# Patient Record
Sex: FEMALE | Race: WHITE | NOT HISPANIC OR LATINO | Employment: OTHER | URBAN - METROPOLITAN AREA
[De-identification: names, ages, dates, MRNs, and addresses within clinical notes are randomized per-mention and may not be internally consistent; named-entity substitution may affect disease eponyms.]

---

## 2020-12-29 ENCOUNTER — OFFICE VISIT CONVERTED (OUTPATIENT)
Dept: GASTROENTEROLOGY | Facility: CLINIC | Age: 60
End: 2020-12-29
Attending: INTERNAL MEDICINE

## 2021-03-05 ENCOUNTER — HOSPITAL ENCOUNTER (OUTPATIENT)
Dept: PREADMISSION TESTING | Facility: HOSPITAL | Age: 61
Discharge: HOME | End: 2021-03-05
Attending: INTERNAL MEDICINE

## 2021-03-05 LAB — SARS-COV-2 RNA SPEC QL NAA+PROBE: NOT DETECTED

## 2021-03-08 ENCOUNTER — HOSPITAL ENCOUNTER (OUTPATIENT)
Dept: GENERAL RADIOLOGY | Facility: HOSPITAL | Age: 61
Discharge: HOME | End: 2021-03-08
Attending: INTERNAL MEDICINE

## 2021-03-08 LAB
25(OH)D3 SERPL-MCNC: 34.9 NG/ML (ref 30–100)
ALBUMIN SERPL-MCNC: 4.1 G/DL (ref 3.5–5)
ALBUMIN/GLOB SERPL: 1.9 {RATIO} (ref 1.4–2.6)
ALP SERPL-CCNC: 79 U/L (ref 53–141)
ALT SERPL-CCNC: 8 U/L (ref 10–40)
ANION GAP SERPL CALC-SCNC: 21 MMOL/L (ref 8–19)
APPEARANCE UR: CLEAR
AST SERPL-CCNC: 19 U/L (ref 15–50)
BASOPHILS # BLD AUTO: 0.04 10*3/UL (ref 0–0.2)
BASOPHILS NFR BLD AUTO: 0.4 % (ref 0–3)
BILIRUB SERPL-MCNC: 0.19 MG/DL (ref 0.2–1.3)
BILIRUB UR QL: NEGATIVE
BUN SERPL-MCNC: 50 MG/DL (ref 5–25)
BUN/CREAT SERPL: 14 {RATIO} (ref 6–20)
CALCIUM SERPL-MCNC: 8.7 MG/DL (ref 8.7–10.4)
CHLORIDE SERPL-SCNC: 103 MMOL/L (ref 99–111)
COLOR UR: YELLOW
CONV ABS IMM GRAN: 0.02 10*3/UL (ref 0–0.2)
CONV BACTERIA: NEGATIVE
CONV CO2: 19 MMOL/L (ref 22–32)
CONV COLLECTION SOURCE (UA): ABNORMAL
CONV CREATININE URINE, RANDOM: 46.9 MG/DL (ref 10–300)
CONV HYALINE CASTS IN URINE MICRO: ABNORMAL /[LPF]
CONV IMMATURE GRAN: 0.2 % (ref 0–1.8)
CONV MICROALBUM.,U,RANDOM: 15.4 MG/L (ref 0–20)
CONV PROTEIN TO CREATININE RATIO (RANDOM URINE): 0.24 {RATIO} (ref 0–0.1)
CONV TOTAL PROTEIN: 6.3 G/DL (ref 6.3–8.2)
CONV UROBILINOGEN IN URINE BY AUTOMATED TEST STRIP: 0.2 {EHRLICHU}/DL (ref 0.1–1)
CREAT UR-MCNC: 3.49 MG/DL (ref 0.5–0.9)
DEPRECATED RDW RBC AUTO: 47.6 FL (ref 36.4–46.3)
EOSINOPHIL # BLD AUTO: 0.65 10*3/UL (ref 0–0.7)
EOSINOPHIL # BLD AUTO: 7 % (ref 0–7)
ERYTHROCYTE [DISTWIDTH] IN BLOOD BY AUTOMATED COUNT: 13.4 % (ref 11.7–14.4)
GFR SERPLBLD BASED ON 1.73 SQ M-ARVRAT: 14 ML/MIN/{1.73_M2}
GLOBULIN UR ELPH-MCNC: 2.2 G/DL (ref 2–3.5)
GLUCOSE SERPL-MCNC: 88 MG/DL (ref 65–99)
GLUCOSE UR QL: NEGATIVE MG/DL
HCT VFR BLD AUTO: 30.3 % (ref 37–47)
HGB BLD-MCNC: 10 G/DL (ref 12–16)
HGB UR QL STRIP: NEGATIVE
KETONES UR QL STRIP: NEGATIVE MG/DL
LEUKOCYTE ESTERASE UR QL STRIP: ABNORMAL
LYMPHOCYTES # BLD AUTO: 3.48 10*3/UL (ref 1–5)
LYMPHOCYTES NFR BLD AUTO: 37.3 % (ref 20–45)
MCH RBC QN AUTO: 32.1 PG (ref 27–31)
MCHC RBC AUTO-ENTMCNC: 33 G/DL (ref 33–37)
MCV RBC AUTO: 97.1 FL (ref 81–99)
MICROALBUMIN/CREAT UR: 32.8 MG/G{CRE} (ref 0–35)
MONOCYTES # BLD AUTO: 0.71 10*3/UL (ref 0.2–1.2)
MONOCYTES NFR BLD AUTO: 7.6 % (ref 3–10)
NEUTROPHILS # BLD AUTO: 4.43 10*3/UL (ref 2–8)
NEUTROPHILS NFR BLD AUTO: 47.5 % (ref 30–85)
NITRITE UR QL STRIP: NEGATIVE
NRBC CBCN: 0 % (ref 0–0.7)
OSMOLALITY SERPL CALC.SUM OF ELEC: 299 MOSM/KG (ref 273–304)
PH UR STRIP.AUTO: 6 [PH] (ref 5–8)
PHOSPHATE SERPL-MCNC: 5 MG/DL (ref 2.4–4.5)
PLATELET # BLD AUTO: 215 10*3/UL (ref 130–400)
PMV BLD AUTO: 11.6 FL (ref 9.4–12.3)
POTASSIUM SERPL-SCNC: 5.2 MMOL/L (ref 3.5–5.3)
PROT UR QL: ABNORMAL MG/DL
PROT UR-MCNC: 11.4 MG/DL
PTH-INTACT SERPL-MCNC: 191.7 PG/ML (ref 11.1–79.5)
RBC # BLD AUTO: 3.12 10*6/UL (ref 4.2–5.4)
RBC #/AREA URNS HPF: ABNORMAL /[HPF]
SODIUM SERPL-SCNC: 138 MMOL/L (ref 135–147)
SP GR UR: 1.01 (ref 1–1.03)
SQUAMOUS SPT QL MICRO: ABNORMAL /[HPF]
WBC # BLD AUTO: 9.33 10*3/UL (ref 4.8–10.8)
WBC #/AREA URNS HPF: ABNORMAL /[HPF]

## 2021-03-11 ENCOUNTER — HOSPITAL ENCOUNTER (OUTPATIENT)
Dept: GASTROENTEROLOGY | Facility: HOSPITAL | Age: 61
Setting detail: HOSPITAL OUTPATIENT SURGERY
Discharge: HOME | End: 2021-03-11
Attending: INTERNAL MEDICINE

## 2021-03-11 ENCOUNTER — HOSPITAL ENCOUNTER (OUTPATIENT)
Dept: GASTROENTEROLOGY | Facility: HOSPITAL | Age: 61
Discharge: HOME | End: 2021-03-11
Attending: INTERNAL MEDICINE

## 2021-03-11 LAB — GLUCOSE BLD-MCNC: 89 MG/DL (ref 65–99)

## 2021-03-12 LAB
HBV CORE AB SER DONR QL IA: NEGATIVE
HBV E AG SERPL QL IA: NEGATIVE
HBV SURFACE AB SER QL: NON REACTIVE

## 2021-03-23 ENCOUNTER — OFFICE VISIT CONVERTED (OUTPATIENT)
Dept: SURGERY | Facility: CLINIC | Age: 61
End: 2021-03-23
Attending: SURGERY

## 2021-04-05 ENCOUNTER — HOSPITAL ENCOUNTER (OUTPATIENT)
Dept: OTHER | Facility: HOSPITAL | Age: 61
Discharge: HOME | End: 2021-04-05
Attending: INTERNAL MEDICINE

## 2021-04-05 LAB
ALBUMIN SERPL-MCNC: 4.2 G/DL (ref 3.5–5)
ALBUMIN/GLOB SERPL: 1.6 {RATIO} (ref 1.4–2.6)
ALP SERPL-CCNC: 96 U/L (ref 53–141)
ALT SERPL-CCNC: 8 U/L (ref 10–40)
ANION GAP SERPL CALC-SCNC: 16 MMOL/L (ref 8–19)
APPEARANCE UR: CLEAR
AST SERPL-CCNC: 17 U/L (ref 15–50)
BASOPHILS # BLD AUTO: 0.02 10*3/UL (ref 0–0.2)
BASOPHILS NFR BLD AUTO: 0.3 % (ref 0–3)
BILIRUB SERPL-MCNC: 0.18 MG/DL (ref 0.2–1.3)
BILIRUB UR QL: NEGATIVE
BUN SERPL-MCNC: 41 MG/DL (ref 5–25)
BUN/CREAT SERPL: 13 {RATIO} (ref 6–20)
CALCIUM SERPL-MCNC: 8.8 MG/DL (ref 8.7–10.4)
CHLORIDE SERPL-SCNC: 107 MMOL/L (ref 99–111)
COLOR UR: YELLOW
CONV ABS IMM GRAN: 0.01 10*3/UL (ref 0–0.2)
CONV CO2: 20 MMOL/L (ref 22–32)
CONV COLLECTION SOURCE (UA): NORMAL
CONV CREATININE URINE, RANDOM: 32 MG/DL (ref 10–300)
CONV IMMATURE GRAN: 0.2 % (ref 0–1.8)
CONV MICROALBUM.,U,RANDOM: <12 MG/L (ref 0–20)
CONV PROTEIN TO CREATININE RATIO (RANDOM URINE): 0.22 {RATIO} (ref 0–0.1)
CONV TOTAL PROTEIN: 6.8 G/DL (ref 6.3–8.2)
CONV UROBILINOGEN IN URINE BY AUTOMATED TEST STRIP: 0.2 {EHRLICHU}/DL (ref 0.1–1)
CREAT UR-MCNC: 3.05 MG/DL (ref 0.5–0.9)
DEPRECATED RDW RBC AUTO: 48.3 FL (ref 36.4–46.3)
EOSINOPHIL # BLD AUTO: 0.38 10*3/UL (ref 0–0.7)
EOSINOPHIL # BLD AUTO: 6.2 % (ref 0–7)
ERYTHROCYTE [DISTWIDTH] IN BLOOD BY AUTOMATED COUNT: 13.2 % (ref 11.7–14.4)
GFR SERPLBLD BASED ON 1.73 SQ M-ARVRAT: 16 ML/MIN/{1.73_M2}
GLOBULIN UR ELPH-MCNC: 2.6 G/DL (ref 2–3.5)
GLUCOSE SERPL-MCNC: 146 MG/DL (ref 65–99)
GLUCOSE UR QL: NEGATIVE MG/DL
HCT VFR BLD AUTO: 32.5 % (ref 37–47)
HGB BLD-MCNC: 10.6 G/DL (ref 12–16)
HGB UR QL STRIP: NEGATIVE
KETONES UR QL STRIP: NEGATIVE MG/DL
LEUKOCYTE ESTERASE UR QL STRIP: NEGATIVE
LYMPHOCYTES # BLD AUTO: 2.83 10*3/UL (ref 1–5)
LYMPHOCYTES NFR BLD AUTO: 45.9 % (ref 20–45)
MCH RBC QN AUTO: 32 PG (ref 27–31)
MCHC RBC AUTO-ENTMCNC: 32.6 G/DL (ref 33–37)
MCV RBC AUTO: 98.2 FL (ref 81–99)
MICROALBUMIN/CREAT UR: 37.5 MG/G{CRE} (ref 0–35)
MONOCYTES # BLD AUTO: 0.41 10*3/UL (ref 0.2–1.2)
MONOCYTES NFR BLD AUTO: 6.7 % (ref 3–10)
NEUTROPHILS # BLD AUTO: 2.51 10*3/UL (ref 2–8)
NEUTROPHILS NFR BLD AUTO: 40.7 % (ref 30–85)
NITRITE UR QL STRIP: NEGATIVE
NRBC CBCN: 0 % (ref 0–0.7)
OSMOLALITY SERPL CALC.SUM OF ELEC: 299 MOSM/KG (ref 273–304)
PH UR STRIP.AUTO: 6 [PH] (ref 5–8)
PHOSPHATE SERPL-MCNC: 4.6 MG/DL (ref 2.4–4.5)
PLATELET # BLD AUTO: 181 10*3/UL (ref 130–400)
PMV BLD AUTO: 11.6 FL (ref 9.4–12.3)
POTASSIUM SERPL-SCNC: 5.2 MMOL/L (ref 3.5–5.3)
PROT UR QL: NEGATIVE MG/DL
PROT UR-MCNC: 7.1 MG/DL
PTH-INTACT SERPL-MCNC: 157 PG/ML (ref 11.1–79.5)
RBC # BLD AUTO: 3.31 10*6/UL (ref 4.2–5.4)
SODIUM SERPL-SCNC: 138 MMOL/L (ref 135–147)
SP GR UR: 1.01 (ref 1–1.03)
WBC # BLD AUTO: 6.16 10*3/UL (ref 4.8–10.8)

## 2021-04-12 ENCOUNTER — HOSPITAL ENCOUNTER (OUTPATIENT)
Dept: PREADMISSION TESTING | Facility: HOSPITAL | Age: 61
Discharge: HOME | End: 2021-04-12
Attending: SURGERY

## 2021-04-13 LAB — SARS-COV-2 RNA SPEC QL NAA+PROBE: NOT DETECTED

## 2021-04-15 ENCOUNTER — HOSPITAL ENCOUNTER (OUTPATIENT)
Dept: LAB | Facility: HOSPITAL | Age: 61
Discharge: HOME | End: 2021-04-15
Attending: INTERNAL MEDICINE

## 2021-04-16 ENCOUNTER — HOSPITAL ENCOUNTER (OUTPATIENT)
Dept: PERIOP | Facility: HOSPITAL | Age: 61
Setting detail: HOSPITAL OUTPATIENT SURGERY
Discharge: HOME | End: 2021-04-16
Attending: SURGERY

## 2021-04-16 LAB
ANION GAP SERPL CALC-SCNC: 15 MMOL/L (ref 8–19)
BASOPHILS # BLD AUTO: 0.03 10*3/UL (ref 0–0.2)
BASOPHILS NFR BLD AUTO: 0.5 % (ref 0–3)
BUN SERPL-MCNC: 47 MG/DL (ref 5–25)
BUN/CREAT SERPL: 13 {RATIO} (ref 6–20)
CALCIUM SERPL-MCNC: 9 MG/DL (ref 8.7–10.4)
CHLORIDE SERPL-SCNC: 108 MMOL/L (ref 99–111)
CONV ABS IMM GRAN: 0.02 10*3/UL (ref 0–0.2)
CONV CO2: 21 MMOL/L (ref 22–32)
CONV IMMATURE GRAN: 0.3 % (ref 0–1.8)
CREAT UR-MCNC: 3.61 MG/DL (ref 0.5–0.9)
DEPRECATED RDW RBC AUTO: 46.4 FL (ref 36.4–46.3)
EOSINOPHIL # BLD AUTO: 0.29 10*3/UL (ref 0–0.7)
EOSINOPHIL # BLD AUTO: 5.1 % (ref 0–7)
ERYTHROCYTE [DISTWIDTH] IN BLOOD BY AUTOMATED COUNT: 13.2 % (ref 11.7–14.4)
GFR SERPLBLD BASED ON 1.73 SQ M-ARVRAT: 13 ML/MIN/{1.73_M2}
GLUCOSE SERPL-MCNC: 77 MG/DL (ref 65–99)
HBV CORE AB SER DONR QL IA: NEGATIVE
HBV E AG SERPL QL IA: NEGATIVE
HBV SURFACE AB SER QL: NON REACTIVE
HCT VFR BLD AUTO: 31.7 % (ref 37–47)
HGB BLD-MCNC: 10.8 G/DL (ref 12–16)
LYMPHOCYTES # BLD AUTO: 2.36 10*3/UL (ref 1–5)
LYMPHOCYTES NFR BLD AUTO: 41.2 % (ref 20–45)
MCH RBC QN AUTO: 32.6 PG (ref 27–31)
MCHC RBC AUTO-ENTMCNC: 34.1 G/DL (ref 33–37)
MCV RBC AUTO: 95.8 FL (ref 81–99)
MONOCYTES # BLD AUTO: 0.4 10*3/UL (ref 0.2–1.2)
MONOCYTES NFR BLD AUTO: 7 % (ref 3–10)
NEUTROPHILS # BLD AUTO: 2.63 10*3/UL (ref 2–8)
NEUTROPHILS NFR BLD AUTO: 45.9 % (ref 30–85)
NRBC CBCN: 0 % (ref 0–0.7)
OSMOLALITY SERPL CALC.SUM OF ELEC: 297 MOSM/KG (ref 273–304)
PLATELET # BLD AUTO: 175 10*3/UL (ref 130–400)
PMV BLD AUTO: 11.5 FL (ref 9.4–12.3)
POTASSIUM SERPL-SCNC: 5.6 MMOL/L (ref 3.5–5.3)
RBC # BLD AUTO: 3.31 10*6/UL (ref 4.2–5.4)
SODIUM SERPL-SCNC: 138 MMOL/L (ref 135–147)
WBC # BLD AUTO: 5.73 10*3/UL (ref 4.8–10.8)

## 2021-05-14 VITALS — BODY MASS INDEX: 25.86 KG/M2 | HEIGHT: 64 IN | WEIGHT: 151.5 LBS

## 2021-05-14 VITALS
HEIGHT: 64 IN | DIASTOLIC BLOOD PRESSURE: 70 MMHG | WEIGHT: 148.5 LBS | BODY MASS INDEX: 25.35 KG/M2 | SYSTOLIC BLOOD PRESSURE: 151 MMHG

## 2021-06-10 RX ORDER — OMEPRAZOLE 20 MG/1
CAPSULE, DELAYED RELEASE ORAL
Qty: 60 CAPSULE | Refills: 1 | Status: SHIPPED | OUTPATIENT
Start: 2021-06-10 | End: 2021-08-18

## 2021-08-18 RX ORDER — OMEPRAZOLE 20 MG/1
TABLET, DELAYED RELEASE ORAL
COMMUNITY
End: 2021-08-18 | Stop reason: SDUPTHER

## 2021-08-18 RX ORDER — OMEPRAZOLE 20 MG/1
CAPSULE, DELAYED RELEASE ORAL
Qty: 60 CAPSULE | Refills: 0 | Status: SHIPPED | OUTPATIENT
Start: 2021-08-18 | End: 2021-09-20

## 2021-09-20 RX ORDER — OMEPRAZOLE 20 MG/1
CAPSULE, DELAYED RELEASE ORAL
Qty: 60 CAPSULE | Refills: 0 | Status: SHIPPED | OUTPATIENT
Start: 2021-09-20 | End: 2021-10-20

## 2021-09-21 ENCOUNTER — HOSPITAL ENCOUNTER (EMERGENCY)
Facility: HOSPITAL | Age: 61
Discharge: HOME | End: 2021-09-21
Attending: EMERGENCY MEDICINE | Admitting: EMERGENCY MEDICINE

## 2021-09-21 VITALS
OXYGEN SATURATION: 99 % | DIASTOLIC BLOOD PRESSURE: 60 MMHG | SYSTOLIC BLOOD PRESSURE: 114 MMHG | HEIGHT: 64 IN | BODY MASS INDEX: 26 KG/M2 | HEART RATE: 84 BPM | TEMPERATURE: 99.6 F | RESPIRATION RATE: 14 BRPM

## 2021-09-21 DIAGNOSIS — K52.9 COLITIS: Primary | ICD-10-CM

## 2021-09-21 LAB
027 TOXIN: NORMAL
ALBUMIN SERPL-MCNC: 3.9 G/DL (ref 3.5–5.2)
ALBUMIN/GLOB SERPL: 1.4 G/DL
ALP SERPL-CCNC: 109 U/L (ref 39–117)
ALT SERPL W P-5'-P-CCNC: 7 U/L (ref 1–33)
ANION GAP SERPL CALCULATED.3IONS-SCNC: 15 MMOL/L (ref 5–15)
AST SERPL-CCNC: 19 U/L (ref 1–32)
BACTERIA UR QL AUTO: ABNORMAL /HPF
BASOPHILS # BLD AUTO: 0.03 10*3/MM3 (ref 0–0.2)
BASOPHILS NFR BLD AUTO: 0.3 % (ref 0–1.5)
BILIRUB SERPL-MCNC: 0.3 MG/DL (ref 0–1.2)
BILIRUB UR QL STRIP: NEGATIVE
BUN SERPL-MCNC: 47 MG/DL (ref 6–20)
BUN/CREAT SERPL: 7.5 (ref 7–25)
C DIFF TOX GENS STL QL NAA+PROBE: NEGATIVE
CALCIUM SPEC-SCNC: 9.5 MG/DL (ref 8.6–10.5)
CHLORIDE SERPL-SCNC: 95 MMOL/L (ref 98–107)
CLARITY UR: ABNORMAL
CO2 SERPL-SCNC: 20 MMOL/L (ref 22–29)
COLOR UR: YELLOW
CREAT SERPL-MCNC: 6.23 MG/DL (ref 0.57–1)
DEPRECATED RDW RBC AUTO: 47.8 FL (ref 37–54)
EOSINOPHIL # BLD AUTO: 0.01 10*3/MM3 (ref 0–0.4)
EOSINOPHIL NFR BLD AUTO: 0.1 % (ref 0.3–6.2)
ERYTHROCYTE [DISTWIDTH] IN BLOOD BY AUTOMATED COUNT: 13.8 % (ref 12.3–15.4)
GFR SERPL CREATININE-BSD FRML MDRD: 7 ML/MIN/1.73
GFR SERPL CREATININE-BSD FRML MDRD: ABNORMAL ML/MIN/{1.73_M2}
GLOBULIN UR ELPH-MCNC: 2.8 GM/DL
GLUCOSE SERPL-MCNC: 116 MG/DL (ref 65–99)
GLUCOSE UR STRIP-MCNC: NEGATIVE MG/DL
HCT VFR BLD AUTO: 37.6 % (ref 34–46.6)
HGB BLD-MCNC: 12.9 G/DL (ref 12–15.9)
HGB UR QL STRIP.AUTO: ABNORMAL
HOLD SPECIMEN: NORMAL
HOLD SPECIMEN: NORMAL
HYALINE CASTS UR QL AUTO: ABNORMAL /LPF
IMM GRANULOCYTES # BLD AUTO: 0.03 10*3/MM3 (ref 0–0.05)
IMM GRANULOCYTES NFR BLD AUTO: 0.3 % (ref 0–0.5)
KETONES UR QL STRIP: NEGATIVE
LEUKOCYTE ESTERASE UR QL STRIP.AUTO: NEGATIVE
LIPASE SERPL-CCNC: 25 U/L (ref 13–60)
LYMPHOCYTES # BLD AUTO: 2.24 10*3/MM3 (ref 0.7–3.1)
LYMPHOCYTES NFR BLD AUTO: 18.9 % (ref 19.6–45.3)
MCH RBC QN AUTO: 32.3 PG (ref 26.6–33)
MCHC RBC AUTO-ENTMCNC: 34.3 G/DL (ref 31.5–35.7)
MCV RBC AUTO: 94.2 FL (ref 79–97)
MONOCYTES # BLD AUTO: 0.85 10*3/MM3 (ref 0.1–0.9)
MONOCYTES NFR BLD AUTO: 7.2 % (ref 5–12)
NEUTROPHILS NFR BLD AUTO: 73.2 % (ref 42.7–76)
NEUTROPHILS NFR BLD AUTO: 8.71 10*3/MM3 (ref 1.7–7)
NITRITE UR QL STRIP: NEGATIVE
NRBC BLD AUTO-RTO: 0 /100 WBC (ref 0–0.2)
PH UR STRIP.AUTO: 5.5 [PH] (ref 5–8)
PLATELET # BLD AUTO: 213 10*3/MM3 (ref 140–450)
PMV BLD AUTO: 11.6 FL (ref 6–12)
POTASSIUM SERPL-SCNC: 4.8 MMOL/L (ref 3.5–5.2)
PROT SERPL-MCNC: 6.7 G/DL (ref 6–8.5)
PROT UR QL STRIP: ABNORMAL
RBC # BLD AUTO: 3.99 10*6/MM3 (ref 3.77–5.28)
RBC # UR: ABNORMAL /HPF
REF LAB TEST METHOD: ABNORMAL
SODIUM SERPL-SCNC: 130 MMOL/L (ref 136–145)
SP GR UR STRIP: 1.01 (ref 1–1.03)
SQUAMOUS #/AREA URNS HPF: ABNORMAL /HPF
UROBILINOGEN UR QL STRIP: ABNORMAL
WBC # BLD AUTO: 11.87 10*3/MM3 (ref 3.4–10.8)
WBC UR QL AUTO: ABNORMAL /HPF
WHOLE BLOOD HOLD SPECIMEN: NORMAL
WHOLE BLOOD HOLD SPECIMEN: NORMAL
YEAST URNS QL MICRO: ABNORMAL /HPF

## 2021-09-21 PROCEDURE — 99283 EMERGENCY DEPT VISIT LOW MDM: CPT

## 2021-09-21 PROCEDURE — 87493 C DIFF AMPLIFIED PROBE: CPT | Performed by: EMERGENCY MEDICINE

## 2021-09-21 PROCEDURE — 85025 COMPLETE CBC W/AUTO DIFF WBC: CPT | Performed by: EMERGENCY MEDICINE

## 2021-09-21 PROCEDURE — 81001 URINALYSIS AUTO W/SCOPE: CPT | Performed by: EMERGENCY MEDICINE

## 2021-09-21 PROCEDURE — 83690 ASSAY OF LIPASE: CPT

## 2021-09-21 PROCEDURE — 80053 COMPREHEN METABOLIC PANEL: CPT

## 2021-09-21 RX ORDER — ATORVASTATIN CALCIUM 10 MG/1
10 TABLET, FILM COATED ORAL NIGHTLY
COMMUNITY

## 2021-09-21 RX ORDER — ONDANSETRON 4 MG/1
4 TABLET, FILM COATED ORAL EVERY 6 HOURS PRN
COMMUNITY

## 2021-09-21 RX ORDER — PREGABALIN 150 MG/1
150 CAPSULE ORAL NIGHTLY
COMMUNITY

## 2021-09-21 RX ORDER — METOPROLOL SUCCINATE 50 MG/1
50 TABLET, EXTENDED RELEASE ORAL NIGHTLY
COMMUNITY

## 2021-09-21 RX ORDER — LACTULOSE 10 G/15ML
30 SOLUTION ORAL DAILY PRN
COMMUNITY

## 2021-09-21 RX ORDER — TIZANIDINE 4 MG/1
4 TABLET ORAL NIGHTLY
COMMUNITY

## 2021-09-21 RX ORDER — ALBUTEROL SULFATE 90 UG/1
2 AEROSOL, METERED RESPIRATORY (INHALATION) EVERY 4 HOURS PRN
COMMUNITY

## 2021-09-21 RX ORDER — SODIUM CHLORIDE 0.9 % (FLUSH) 0.9 %
10 SYRINGE (ML) INJECTION AS NEEDED
Status: DISCONTINUED | OUTPATIENT
Start: 2021-09-21 | End: 2021-09-21 | Stop reason: HOSPADM

## 2021-09-21 RX ORDER — SODIUM BICARBONATE 650 MG/1
650 TABLET ORAL 2 TIMES DAILY
COMMUNITY
End: 2022-08-09

## 2021-09-21 RX ORDER — DICYCLOMINE HCL 20 MG
20 TABLET ORAL EVERY 6 HOURS
Qty: 20 TABLET | Refills: 0 | Status: SHIPPED | OUTPATIENT
Start: 2021-09-21 | End: 2021-09-24 | Stop reason: HOSPADM

## 2021-09-21 RX ORDER — FUROSEMIDE 80 MG
80 TABLET ORAL 2 TIMES DAILY
Status: ON HOLD | COMMUNITY
End: 2021-09-23

## 2021-09-21 RX ORDER — METRONIDAZOLE 500 MG/1
500 TABLET ORAL 3 TIMES DAILY
Qty: 15 TABLET | Refills: 0 | Status: SHIPPED | OUTPATIENT
Start: 2021-09-21 | End: 2021-09-24 | Stop reason: HOSPADM

## 2021-09-21 RX ORDER — CIPROFLOXACIN 500 MG/1
500 TABLET, FILM COATED ORAL EVERY 12 HOURS
Qty: 20 TABLET | Refills: 0 | Status: SHIPPED | OUTPATIENT
Start: 2021-09-21 | End: 2021-09-24 | Stop reason: HOSPADM

## 2021-09-21 RX ORDER — FLUTICASONE PROPIONATE 50 MCG
2 SPRAY, SUSPENSION (ML) NASAL DAILY PRN
COMMUNITY

## 2021-09-21 RX ORDER — CHOLECALCIFEROL (VITAMIN D3) 125 MCG
2000 CAPSULE ORAL DAILY
COMMUNITY
End: 2023-03-09

## 2021-09-21 RX ORDER — DOCUSATE SODIUM 100 MG/1
100 CAPSULE, LIQUID FILLED ORAL 2 TIMES DAILY PRN
COMMUNITY

## 2021-09-21 RX ORDER — SEVELAMER CARBONATE 800 MG/1
1600 TABLET, FILM COATED ORAL
COMMUNITY
End: 2021-11-16

## 2021-09-23 ENCOUNTER — HOSPITAL ENCOUNTER (OUTPATIENT)
Facility: HOSPITAL | Age: 61
Discharge: HOME | End: 2021-09-24
Attending: EMERGENCY MEDICINE | Admitting: INTERNAL MEDICINE

## 2021-09-23 DIAGNOSIS — R55 SYNCOPE, UNSPECIFIED SYNCOPE TYPE: ICD-10-CM

## 2021-09-23 DIAGNOSIS — D62 ACUTE BLOOD LOSS ANEMIA: ICD-10-CM

## 2021-09-23 DIAGNOSIS — K92.2 GASTROINTESTINAL HEMORRHAGE, UNSPECIFIED GASTROINTESTINAL HEMORRHAGE TYPE: Primary | ICD-10-CM

## 2021-09-23 DIAGNOSIS — N18.6 ESRD (END STAGE RENAL DISEASE) (HCC): ICD-10-CM

## 2021-09-23 PROBLEM — Z99.2 ESRD (END STAGE RENAL DISEASE) ON DIALYSIS: Status: ACTIVE | Noted: 2021-09-23

## 2021-09-23 LAB
ALBUMIN SERPL-MCNC: 3.3 G/DL (ref 3.5–5.2)
ALBUMIN/GLOB SERPL: 1.2 G/DL
ALP SERPL-CCNC: 96 U/L (ref 39–117)
ALT SERPL W P-5'-P-CCNC: 6 U/L (ref 1–33)
ANION GAP SERPL CALCULATED.3IONS-SCNC: 16.5 MMOL/L (ref 5–15)
APPEARANCE FLD: ABNORMAL
AST SERPL-CCNC: 13 U/L (ref 1–32)
BASOPHILS # BLD AUTO: 0.02 10*3/MM3 (ref 0–0.2)
BASOPHILS NFR BLD AUTO: 0.2 % (ref 0–1.5)
BILIRUB SERPL-MCNC: 0.2 MG/DL (ref 0–1.2)
BUN SERPL-MCNC: 57 MG/DL (ref 6–20)
BUN/CREAT SERPL: 8.2 (ref 7–25)
CALCIUM SPEC-SCNC: 8.8 MG/DL (ref 8.6–10.5)
CHLORIDE SERPL-SCNC: 97 MMOL/L (ref 98–107)
CO2 SERPL-SCNC: 19.5 MMOL/L (ref 22–29)
COLOR FLD: ABNORMAL
CREAT SERPL-MCNC: 6.92 MG/DL (ref 0.57–1)
DEPRECATED RDW RBC AUTO: 47.9 FL (ref 37–54)
EOSINOPHIL # BLD AUTO: 0.22 10*3/MM3 (ref 0–0.4)
EOSINOPHIL NFR BLD AUTO: 2.6 % (ref 0.3–6.2)
EOSINOPHIL NFR FLD MANUAL: 1 %
ERYTHROCYTE [DISTWIDTH] IN BLOOD BY AUTOMATED COUNT: 13.9 % (ref 12.3–15.4)
GFR SERPL CREATININE-BSD FRML MDRD: 6 ML/MIN/1.73
GFR SERPL CREATININE-BSD FRML MDRD: ABNORMAL ML/MIN/{1.73_M2}
GLOBULIN UR ELPH-MCNC: 2.7 GM/DL
GLUCOSE BLDC GLUCOMTR-MCNC: 155 MG/DL (ref 70–99)
GLUCOSE SERPL-MCNC: 165 MG/DL (ref 65–99)
HCT VFR BLD AUTO: 31.5 % (ref 34–46.6)
HGB BLD-MCNC: 10.9 G/DL (ref 12–15.9)
HOLD SPECIMEN: NORMAL
HOLD SPECIMEN: NORMAL
IMM GRANULOCYTES # BLD AUTO: 0.02 10*3/MM3 (ref 0–0.05)
IMM GRANULOCYTES NFR BLD AUTO: 0.2 % (ref 0–0.5)
LYMPHOCYTES # BLD AUTO: 1.55 10*3/MM3 (ref 0.7–3.1)
LYMPHOCYTES NFR BLD AUTO: 18.1 % (ref 19.6–45.3)
LYMPHOCYTES NFR FLD MANUAL: 15 %
MACROPHAGE FLUID: 39 %
MAGNESIUM SERPL-MCNC: 1.6 MG/DL (ref 1.6–2.6)
MCH RBC QN AUTO: 32.7 PG (ref 26.6–33)
MCHC RBC AUTO-ENTMCNC: 34.6 G/DL (ref 31.5–35.7)
MCV RBC AUTO: 94.6 FL (ref 79–97)
MONOCYTES # BLD AUTO: 0.83 10*3/MM3 (ref 0.1–0.9)
MONOCYTES NFR BLD AUTO: 9.7 % (ref 5–12)
MONOCYTES NFR FLD: 9 %
NEUTROPHILS NFR BLD AUTO: 5.92 10*3/MM3 (ref 1.7–7)
NEUTROPHILS NFR BLD AUTO: 69.2 % (ref 42.7–76)
NEUTROPHILS NFR FLD MANUAL: 36 %
NRBC BLD AUTO-RTO: 0 /100 WBC (ref 0–0.2)
NUC CELL # FLD: 1672 /MM3
PLATELET # BLD AUTO: 151 10*3/MM3 (ref 140–450)
PMV BLD AUTO: 12 FL (ref 6–12)
POTASSIUM SERPL-SCNC: 4.1 MMOL/L (ref 3.5–5.2)
PROT SERPL-MCNC: 6 G/DL (ref 6–8.5)
QT INTERVAL: 429 MS
QT INTERVAL: 469 MS
QT INTERVAL: 477 MS
RBC # BLD AUTO: 3.33 10*6/MM3 (ref 3.77–5.28)
RBC # FLD AUTO: <2000 /MM3
SODIUM SERPL-SCNC: 133 MMOL/L (ref 136–145)
TROPONIN T SERPL-MCNC: <0.01 NG/ML (ref 0–0.03)
TROPONIN T SERPL-MCNC: <0.01 NG/ML (ref 0–0.03)
WBC # BLD AUTO: 8.56 10*3/MM3 (ref 3.4–10.8)
WHOLE BLOOD HOLD SPECIMEN: NORMAL
WHOLE BLOOD HOLD SPECIMEN: NORMAL

## 2021-09-23 PROCEDURE — 87205 SMEAR GRAM STAIN: CPT | Performed by: INTERNAL MEDICINE

## 2021-09-23 PROCEDURE — G0378 HOSPITAL OBSERVATION PER HR: HCPCS

## 2021-09-23 PROCEDURE — 82962 GLUCOSE BLOOD TEST: CPT

## 2021-09-23 PROCEDURE — 93010 ELECTROCARDIOGRAM REPORT: CPT | Performed by: INTERNAL MEDICINE

## 2021-09-23 PROCEDURE — 89051 BODY FLUID CELL COUNT: CPT | Performed by: INTERNAL MEDICINE

## 2021-09-23 PROCEDURE — 80053 COMPREHEN METABOLIC PANEL: CPT | Performed by: EMERGENCY MEDICINE

## 2021-09-23 PROCEDURE — 25010000002 ONDANSETRON PER 1 MG: Performed by: EMERGENCY MEDICINE

## 2021-09-23 PROCEDURE — 84484 ASSAY OF TROPONIN QUANT: CPT | Performed by: EMERGENCY MEDICINE

## 2021-09-23 PROCEDURE — 96374 THER/PROPH/DIAG INJ IV PUSH: CPT

## 2021-09-23 PROCEDURE — 93005 ELECTROCARDIOGRAM TRACING: CPT | Performed by: EMERGENCY MEDICINE

## 2021-09-23 PROCEDURE — 87070 CULTURE OTHR SPECIMN AEROBIC: CPT | Performed by: INTERNAL MEDICINE

## 2021-09-23 PROCEDURE — 99284 EMERGENCY DEPT VISIT MOD MDM: CPT

## 2021-09-23 PROCEDURE — 84484 ASSAY OF TROPONIN QUANT: CPT | Performed by: INTERNAL MEDICINE

## 2021-09-23 PROCEDURE — 93005 ELECTROCARDIOGRAM TRACING: CPT | Performed by: INTERNAL MEDICINE

## 2021-09-23 PROCEDURE — 87506 IADNA-DNA/RNA PROBE TQ 6-11: CPT | Performed by: INTERNAL MEDICINE

## 2021-09-23 PROCEDURE — 63710000001 ONDANSETRON PER 8 MG: Performed by: INTERNAL MEDICINE

## 2021-09-23 PROCEDURE — 83735 ASSAY OF MAGNESIUM: CPT | Performed by: EMERGENCY MEDICINE

## 2021-09-23 PROCEDURE — 90945 DIALYSIS ONE EVALUATION: CPT

## 2021-09-23 PROCEDURE — 99214 OFFICE O/P EST MOD 30 MIN: CPT | Performed by: INTERNAL MEDICINE

## 2021-09-23 PROCEDURE — 83630 LACTOFERRIN FECAL (QUAL): CPT | Performed by: INTERNAL MEDICINE

## 2021-09-23 PROCEDURE — 85025 COMPLETE CBC W/AUTO DIFF WBC: CPT | Performed by: EMERGENCY MEDICINE

## 2021-09-23 PROCEDURE — 96375 TX/PRO/DX INJ NEW DRUG ADDON: CPT

## 2021-09-23 RX ORDER — NIFEDIPINE 30 MG/1
30 TABLET, EXTENDED RELEASE ORAL DAILY
COMMUNITY
End: 2021-11-16

## 2021-09-23 RX ORDER — SODIUM CHLORIDE 0.9 % (FLUSH) 0.9 %
10 SYRINGE (ML) INJECTION AS NEEDED
Status: DISCONTINUED | OUTPATIENT
Start: 2021-09-23 | End: 2021-09-24 | Stop reason: HOSPADM

## 2021-09-23 RX ORDER — METOPROLOL SUCCINATE 50 MG/1
50 TABLET, EXTENDED RELEASE ORAL DAILY
Status: DISCONTINUED | OUTPATIENT
Start: 2021-09-23 | End: 2021-09-24 | Stop reason: HOSPADM

## 2021-09-23 RX ORDER — LACTULOSE 10 G/15ML
30 SOLUTION ORAL DAILY
Status: DISCONTINUED | OUTPATIENT
Start: 2021-09-23 | End: 2021-09-24 | Stop reason: HOSPADM

## 2021-09-23 RX ORDER — CALCIUM CARBONATE 200(500)MG
2 TABLET,CHEWABLE ORAL 2 TIMES DAILY PRN
Status: DISCONTINUED | OUTPATIENT
Start: 2021-09-23 | End: 2021-09-24 | Stop reason: HOSPADM

## 2021-09-23 RX ORDER — ONDANSETRON 2 MG/ML
4 INJECTION INTRAMUSCULAR; INTRAVENOUS ONCE
Status: COMPLETED | OUTPATIENT
Start: 2021-09-23 | End: 2021-09-23

## 2021-09-23 RX ORDER — SODIUM CHLORIDE, SODIUM LACTATE, CALCIUM CHLORIDE, MAGNESIUM CHLORIDE AND DEXTROSE 1.5; 538; 448; 18.3; 5.08 G/100ML; MG/100ML; MG/100ML; MG/100ML; MG/100ML
2000 INJECTION, SOLUTION INTRAPERITONEAL 4 TIMES DAILY
Status: DISCONTINUED | OUTPATIENT
Start: 2021-09-23 | End: 2021-09-24 | Stop reason: HOSPADM

## 2021-09-23 RX ORDER — LORATADINE 10 MG/1
10 TABLET ORAL DAILY
COMMUNITY

## 2021-09-23 RX ORDER — DOCUSATE SODIUM 100 MG/1
100 CAPSULE, LIQUID FILLED ORAL DAILY
Status: DISCONTINUED | OUTPATIENT
Start: 2021-09-23 | End: 2021-09-24 | Stop reason: HOSPADM

## 2021-09-23 RX ORDER — ACETAMINOPHEN 325 MG/1
650 TABLET ORAL EVERY 4 HOURS PRN
Status: DISCONTINUED | OUTPATIENT
Start: 2021-09-23 | End: 2021-09-24 | Stop reason: HOSPADM

## 2021-09-23 RX ORDER — NITROGLYCERIN 0.4 MG/1
0.4 TABLET SUBLINGUAL
Status: DISCONTINUED | OUTPATIENT
Start: 2021-09-23 | End: 2021-09-24 | Stop reason: HOSPADM

## 2021-09-23 RX ORDER — ONDANSETRON 4 MG/1
4 TABLET, FILM COATED ORAL EVERY 6 HOURS PRN
Status: DISCONTINUED | OUTPATIENT
Start: 2021-09-23 | End: 2021-09-24 | Stop reason: HOSPADM

## 2021-09-23 RX ORDER — ONDANSETRON 2 MG/ML
4 INJECTION INTRAMUSCULAR; INTRAVENOUS EVERY 4 HOURS PRN
Status: DISCONTINUED | OUTPATIENT
Start: 2021-09-23 | End: 2021-09-24 | Stop reason: HOSPADM

## 2021-09-23 RX ORDER — SODIUM CHLORIDE 0.9 % (FLUSH) 0.9 %
10 SYRINGE (ML) INJECTION EVERY 12 HOURS SCHEDULED
Status: DISCONTINUED | OUTPATIENT
Start: 2021-09-23 | End: 2021-09-24 | Stop reason: HOSPADM

## 2021-09-23 RX ORDER — PANTOPRAZOLE SODIUM 40 MG/10ML
80 INJECTION, POWDER, LYOPHILIZED, FOR SOLUTION INTRAVENOUS ONCE
Status: COMPLETED | OUTPATIENT
Start: 2021-09-23 | End: 2021-09-23

## 2021-09-23 RX ADMIN — METOPROLOL SUCCINATE 50 MG: 50 TABLET, EXTENDED RELEASE ORAL at 12:25

## 2021-09-23 RX ADMIN — ONDANSETRON HYDROCHLORIDE 4 MG: 4 TABLET, FILM COATED ORAL at 12:37

## 2021-09-23 RX ADMIN — SODIUM CHLORIDE, SODIUM LACTATE, CALCIUM CHLORIDE, MAGNESIUM CHLORIDE AND DEXTROSE 2000 ML: 1.5; 538; 448; 18.3; 5.08 INJECTION, SOLUTION INTRAPERITONEAL at 09:07

## 2021-09-23 RX ADMIN — POLYETHYLENE GLYCOL 3350, SODIUM SULFATE ANHYDROUS, SODIUM BICARBONATE, SODIUM CHLORIDE, POTASSIUM CHLORIDE 4000 ML: 236; 22.74; 6.74; 5.86; 2.97 POWDER, FOR SOLUTION ORAL at 17:43

## 2021-09-23 RX ADMIN — NITROGLYCERIN 0.4 MG: 0.4 TABLET SUBLINGUAL at 12:28

## 2021-09-23 RX ADMIN — SODIUM CHLORIDE, SODIUM LACTATE, CALCIUM CHLORIDE, MAGNESIUM CHLORIDE AND DEXTROSE 2000 ML: 1.5; 538; 448; 18.3; 5.08 INJECTION, SOLUTION INTRAPERITONEAL at 13:07

## 2021-09-23 RX ADMIN — PANTOPRAZOLE SODIUM 80 MG: 40 INJECTION, POWDER, FOR SOLUTION INTRAVENOUS at 03:13

## 2021-09-23 RX ADMIN — SODIUM CHLORIDE, SODIUM LACTATE, CALCIUM CHLORIDE, MAGNESIUM CHLORIDE AND DEXTROSE 2000 ML: 1.5; 538; 448; 18.3; 5.08 INJECTION, SOLUTION INTRAPERITONEAL at 18:22

## 2021-09-23 RX ADMIN — ONDANSETRON 4 MG: 2 INJECTION INTRAMUSCULAR; INTRAVENOUS at 03:13

## 2021-09-23 RX ADMIN — SODIUM CHLORIDE, PRESERVATIVE FREE 10 ML: 5 INJECTION INTRAVENOUS at 09:08

## 2021-09-24 ENCOUNTER — READMISSION MANAGEMENT (OUTPATIENT)
Dept: CALL CENTER | Facility: HOSPITAL | Age: 61
End: 2021-09-24

## 2021-09-24 ENCOUNTER — ANESTHESIA EVENT (OUTPATIENT)
Dept: GASTROENTEROLOGY | Facility: HOSPITAL | Age: 61
End: 2021-09-24

## 2021-09-24 ENCOUNTER — ANESTHESIA (OUTPATIENT)
Dept: GASTROENTEROLOGY | Facility: HOSPITAL | Age: 61
End: 2021-09-24

## 2021-09-24 VITALS
SYSTOLIC BLOOD PRESSURE: 138 MMHG | WEIGHT: 140.65 LBS | OXYGEN SATURATION: 100 % | HEART RATE: 72 BPM | TEMPERATURE: 97.4 F | RESPIRATION RATE: 18 BRPM | BODY MASS INDEX: 24.01 KG/M2 | HEIGHT: 64 IN | DIASTOLIC BLOOD PRESSURE: 82 MMHG

## 2021-09-24 LAB
027 TOXIN: NORMAL
C COLI+JEJ+UPSA DNA STL QL NAA+NON-PROBE: NOT DETECTED
C DIFF TOX GENS STL QL NAA+PROBE: NEGATIVE
CRYPTOSP DNA STL QL NAA+NON-PROBE: NOT DETECTED
DEPRECATED RDW RBC AUTO: 45.5 FL (ref 37–54)
E HISTOLYT DNA STL QL NAA+NON-PROBE: NOT DETECTED
EC STX1+STX2 GENES STL QL NAA+NON-PROBE: NOT DETECTED
ERYTHROCYTE [DISTWIDTH] IN BLOOD BY AUTOMATED COUNT: 13.2 % (ref 12.3–15.4)
G LAMBLIA DNA STL QL NAA+NON-PROBE: NOT DETECTED
HCT VFR BLD AUTO: 31.5 % (ref 34–46.6)
HGB BLD-MCNC: 10.6 G/DL (ref 12–15.9)
LACTOFERRIN STL QL LA: POSITIVE
MCH RBC QN AUTO: 31.5 PG (ref 26.6–33)
MCHC RBC AUTO-ENTMCNC: 33.7 G/DL (ref 31.5–35.7)
MCV RBC AUTO: 93.5 FL (ref 79–97)
PLATELET # BLD AUTO: 173 10*3/MM3 (ref 140–450)
PMV BLD AUTO: 12.1 FL (ref 6–12)
RBC # BLD AUTO: 3.37 10*6/MM3 (ref 3.77–5.28)
S ENT+BONG DNA STL QL NAA+NON-PROBE: NOT DETECTED
SHIGELLA SP+EIEC IPAH ST NAA+NON-PROBE: NOT DETECTED
WBC # BLD AUTO: 7.04 10*3/MM3 (ref 3.4–10.8)

## 2021-09-24 PROCEDURE — 96372 THER/PROPH/DIAG INJ SC/IM: CPT

## 2021-09-24 PROCEDURE — 96376 TX/PRO/DX INJ SAME DRUG ADON: CPT

## 2021-09-24 PROCEDURE — 25010000002 ONDANSETRON PER 1 MG: Performed by: NURSE PRACTITIONER

## 2021-09-24 PROCEDURE — 25010000002 ENOXAPARIN PER 10 MG: Performed by: INTERNAL MEDICINE

## 2021-09-24 PROCEDURE — G0378 HOSPITAL OBSERVATION PER HR: HCPCS

## 2021-09-24 PROCEDURE — 45380 COLONOSCOPY AND BIOPSY: CPT | Performed by: INTERNAL MEDICINE

## 2021-09-24 PROCEDURE — 85027 COMPLETE CBC AUTOMATED: CPT | Performed by: INTERNAL MEDICINE

## 2021-09-24 PROCEDURE — 25010000002 PROPOFOL 10 MG/ML EMULSION: Performed by: NURSE ANESTHETIST, CERTIFIED REGISTERED

## 2021-09-24 PROCEDURE — 88305 TISSUE EXAM BY PATHOLOGIST: CPT | Performed by: INTERNAL MEDICINE

## 2021-09-24 PROCEDURE — 25010000002 AMPICILLIN PER 500 MG: Performed by: INTERNAL MEDICINE

## 2021-09-24 PROCEDURE — 45385 COLONOSCOPY W/LESION REMOVAL: CPT | Performed by: INTERNAL MEDICINE

## 2021-09-24 PROCEDURE — 87493 C DIFF AMPLIFIED PROBE: CPT | Performed by: INTERNAL MEDICINE

## 2021-09-24 PROCEDURE — 25010000002 GENTAMICIN PER 80 MG: Performed by: INTERNAL MEDICINE

## 2021-09-24 RX ORDER — PROPOFOL 10 MG/ML
VIAL (ML) INTRAVENOUS AS NEEDED
Status: DISCONTINUED | OUTPATIENT
Start: 2021-09-24 | End: 2021-09-24 | Stop reason: SURG

## 2021-09-24 RX ORDER — SODIUM CHLORIDE 9 MG/ML
9 INJECTION, SOLUTION INTRAVENOUS CONTINUOUS
Status: DISCONTINUED | OUTPATIENT
Start: 2021-09-24 | End: 2021-09-24 | Stop reason: HOSPADM

## 2021-09-24 RX ADMIN — PROPOFOL 150 MCG/KG/MIN: 10 INJECTION, EMULSION INTRAVENOUS at 09:40

## 2021-09-24 RX ADMIN — AMPICILLIN SODIUM 1 G: 1 INJECTION, POWDER, FOR SOLUTION INTRAMUSCULAR; INTRAVENOUS at 08:58

## 2021-09-24 RX ADMIN — LACTULOSE 30 ML: 20 SOLUTION ORAL at 11:05

## 2021-09-24 RX ADMIN — PROPOFOL 50 MG: 10 INJECTION, EMULSION INTRAVENOUS at 09:39

## 2021-09-24 RX ADMIN — SODIUM CHLORIDE, PRESERVATIVE FREE 10 ML: 5 INJECTION INTRAVENOUS at 11:05

## 2021-09-24 RX ADMIN — SODIUM CHLORIDE, SODIUM LACTATE, CALCIUM CHLORIDE, MAGNESIUM CHLORIDE AND DEXTROSE 2000 ML: 1.5; 538; 448; 18.3; 5.08 INJECTION, SOLUTION INTRAPERITONEAL at 00:10

## 2021-09-24 RX ADMIN — ONDANSETRON 4 MG: 2 INJECTION INTRAMUSCULAR; INTRAVENOUS at 01:41

## 2021-09-24 RX ADMIN — GENTAMICIN SULFATE 55 MG: 40 INJECTION, SOLUTION INTRAMUSCULAR; INTRAVENOUS at 09:36

## 2021-09-24 RX ADMIN — METOPROLOL SUCCINATE 50 MG: 50 TABLET, EXTENDED RELEASE ORAL at 11:05

## 2021-09-24 RX ADMIN — SODIUM CHLORIDE, PRESERVATIVE FREE 10 ML: 5 INJECTION INTRAVENOUS at 00:40

## 2021-09-24 RX ADMIN — DOCUSATE SODIUM 100 MG: 100 CAPSULE, LIQUID FILLED ORAL at 11:05

## 2021-09-24 RX ADMIN — ENOXAPARIN SODIUM 30 MG: 100 INJECTION SUBCUTANEOUS at 11:05

## 2021-09-24 RX ADMIN — SODIUM CHLORIDE 9 ML: 9 INJECTION, SOLUTION INTRAVENOUS at 08:55

## 2021-09-26 LAB
BACTERIA FLD CULT: NORMAL
GRAM STN SPEC: NORMAL
GRAM STN SPEC: NORMAL

## 2021-09-27 LAB
CYTO UR: NORMAL
LAB AP CASE REPORT: NORMAL
LAB AP CLINICAL INFORMATION: NORMAL
PATH REPORT.FINAL DX SPEC: NORMAL
PATH REPORT.GROSS SPEC: NORMAL

## 2021-09-28 ENCOUNTER — READMISSION MANAGEMENT (OUTPATIENT)
Dept: CALL CENTER | Facility: HOSPITAL | Age: 61
End: 2021-09-28

## 2021-10-05 ENCOUNTER — READMISSION MANAGEMENT (OUTPATIENT)
Dept: CALL CENTER | Facility: HOSPITAL | Age: 61
End: 2021-10-05

## 2021-10-12 ENCOUNTER — READMISSION MANAGEMENT (OUTPATIENT)
Dept: CALL CENTER | Facility: HOSPITAL | Age: 61
End: 2021-10-12

## 2021-10-19 ENCOUNTER — READMISSION MANAGEMENT (OUTPATIENT)
Dept: CALL CENTER | Facility: HOSPITAL | Age: 61
End: 2021-10-19

## 2021-10-20 RX ORDER — OMEPRAZOLE 20 MG/1
CAPSULE, DELAYED RELEASE ORAL
Qty: 60 CAPSULE | Refills: 0 | Status: SHIPPED | OUTPATIENT
Start: 2021-10-20 | End: 2021-11-18

## 2021-11-16 ENCOUNTER — OFFICE VISIT (OUTPATIENT)
Dept: GASTROENTEROLOGY | Facility: CLINIC | Age: 61
End: 2021-11-16

## 2021-11-16 ENCOUNTER — PREP FOR SURGERY (OUTPATIENT)
Dept: OTHER | Facility: HOSPITAL | Age: 61
End: 2021-11-16

## 2021-11-16 VITALS
SYSTOLIC BLOOD PRESSURE: 139 MMHG | BODY MASS INDEX: 25.03 KG/M2 | WEIGHT: 146.61 LBS | HEIGHT: 64 IN | DIASTOLIC BLOOD PRESSURE: 65 MMHG | HEART RATE: 62 BPM

## 2021-11-16 DIAGNOSIS — R10.13 EPIGASTRIC PAIN: Primary | ICD-10-CM

## 2021-11-16 DIAGNOSIS — K55.9 ISCHEMIC COLITIS (HCC): ICD-10-CM

## 2021-11-16 DIAGNOSIS — R11.0 NAUSEA: ICD-10-CM

## 2021-11-16 PROCEDURE — 99213 OFFICE O/P EST LOW 20 MIN: CPT | Performed by: INTERNAL MEDICINE

## 2021-11-16 RX ORDER — POLYETHYLENE GLYCOL 3350 17 G/17G
17 POWDER, FOR SOLUTION ORAL DAILY
COMMUNITY
Start: 2021-10-20

## 2021-11-18 RX ORDER — OMEPRAZOLE 20 MG/1
CAPSULE, DELAYED RELEASE ORAL
Qty: 60 CAPSULE | Refills: 0 | Status: SHIPPED | OUTPATIENT
Start: 2021-11-18 | End: 2021-12-20

## 2021-11-19 ENCOUNTER — TELEPHONE (OUTPATIENT)
Dept: GASTROENTEROLOGY | Facility: CLINIC | Age: 61
End: 2021-11-19

## 2021-11-22 ENCOUNTER — TELEPHONE (OUTPATIENT)
Dept: GASTROENTEROLOGY | Facility: CLINIC | Age: 61
End: 2021-11-22

## 2021-12-03 ENCOUNTER — HOSPITAL ENCOUNTER (OUTPATIENT)
Dept: ULTRASOUND IMAGING | Facility: HOSPITAL | Age: 61
Discharge: HOME | End: 2021-12-03
Admitting: INTERNAL MEDICINE

## 2021-12-03 DIAGNOSIS — R11.0 NAUSEA: ICD-10-CM

## 2021-12-03 DIAGNOSIS — R10.13 EPIGASTRIC PAIN: ICD-10-CM

## 2021-12-03 PROCEDURE — 76705 ECHO EXAM OF ABDOMEN: CPT

## 2021-12-08 ENCOUNTER — HOSPITAL ENCOUNTER (OUTPATIENT)
Dept: CT IMAGING | Facility: HOSPITAL | Age: 61
Discharge: HOME | End: 2021-12-08
Admitting: INTERNAL MEDICINE

## 2021-12-08 DIAGNOSIS — R10.13 EPIGASTRIC PAIN: ICD-10-CM

## 2021-12-08 DIAGNOSIS — K55.9 ISCHEMIC COLITIS (HCC): ICD-10-CM

## 2021-12-08 PROCEDURE — 0 IOPAMIDOL PER 1 ML: Performed by: INTERNAL MEDICINE

## 2021-12-08 PROCEDURE — 74174 CTA ABD&PLVS W/CONTRAST: CPT

## 2021-12-08 RX ADMIN — IOPAMIDOL 100 ML: 755 INJECTION, SOLUTION INTRAVENOUS at 15:20

## 2021-12-09 ENCOUNTER — TELEPHONE (OUTPATIENT)
Dept: GASTROENTEROLOGY | Facility: CLINIC | Age: 61
End: 2021-12-09

## 2021-12-15 ENCOUNTER — TELEPHONE (OUTPATIENT)
Dept: GASTROENTEROLOGY | Facility: CLINIC | Age: 61
End: 2021-12-15

## 2021-12-15 DIAGNOSIS — T82.856A STENOSIS OF PERIPHERAL VASCULAR STENT, INITIAL ENCOUNTER (HCC): Primary | ICD-10-CM

## 2021-12-20 RX ORDER — OMEPRAZOLE 20 MG/1
CAPSULE, DELAYED RELEASE ORAL
Qty: 60 CAPSULE | Refills: 0 | Status: SHIPPED | OUTPATIENT
Start: 2021-12-20 | End: 2022-01-18

## 2022-01-04 ENCOUNTER — OFFICE VISIT (OUTPATIENT)
Dept: VASCULAR SURGERY | Facility: HOSPITAL | Age: 62
End: 2022-01-04

## 2022-01-04 VITALS
TEMPERATURE: 98.3 F | HEART RATE: 76 BPM | DIASTOLIC BLOOD PRESSURE: 60 MMHG | SYSTOLIC BLOOD PRESSURE: 116 MMHG | RESPIRATION RATE: 18 BRPM | OXYGEN SATURATION: 97 %

## 2022-01-04 DIAGNOSIS — I73.9 PVD (PERIPHERAL VASCULAR DISEASE) WITH CLAUDICATION: Primary | ICD-10-CM

## 2022-01-04 PROCEDURE — G0463 HOSPITAL OUTPT CLINIC VISIT: HCPCS | Performed by: SURGERY

## 2022-01-04 PROCEDURE — 99204 OFFICE O/P NEW MOD 45 MIN: CPT | Performed by: SURGERY

## 2022-01-04 RX ORDER — CALCIUM ACETATE 667 MG/1
1334 CAPSULE ORAL 3 TIMES DAILY
COMMUNITY

## 2022-01-04 RX ORDER — SODIUM CHLORIDE 9 MG/ML
100 INJECTION, SOLUTION INTRAVENOUS CONTINUOUS
Status: CANCELLED | OUTPATIENT
Start: 2022-01-04

## 2022-01-18 RX ORDER — OMEPRAZOLE 20 MG/1
CAPSULE, DELAYED RELEASE ORAL
Qty: 60 CAPSULE | Refills: 0 | Status: SHIPPED | OUTPATIENT
Start: 2022-01-18 | End: 2022-02-17 | Stop reason: CLARIF

## 2022-01-20 ENCOUNTER — TELEPHONE (OUTPATIENT)
Dept: GASTROENTEROLOGY | Facility: CLINIC | Age: 62
End: 2022-01-20

## 2022-01-27 ENCOUNTER — ANESTHESIA EVENT (OUTPATIENT)
Dept: GASTROENTEROLOGY | Facility: HOSPITAL | Age: 62
End: 2022-01-27

## 2022-01-27 ENCOUNTER — HOSPITAL ENCOUNTER (OUTPATIENT)
Facility: HOSPITAL | Age: 62
Setting detail: HOSPITAL OUTPATIENT SURGERY
Discharge: HOME | End: 2022-01-27
Attending: INTERNAL MEDICINE | Admitting: INTERNAL MEDICINE

## 2022-01-27 ENCOUNTER — ANESTHESIA (OUTPATIENT)
Dept: GASTROENTEROLOGY | Facility: HOSPITAL | Age: 62
End: 2022-01-27

## 2022-01-27 VITALS
HEART RATE: 70 BPM | RESPIRATION RATE: 20 BRPM | WEIGHT: 149.03 LBS | DIASTOLIC BLOOD PRESSURE: 92 MMHG | SYSTOLIC BLOOD PRESSURE: 170 MMHG | BODY MASS INDEX: 25.44 KG/M2 | OXYGEN SATURATION: 100 % | HEIGHT: 64 IN | TEMPERATURE: 97.6 F

## 2022-01-27 DIAGNOSIS — R10.13 EPIGASTRIC PAIN: ICD-10-CM

## 2022-01-27 PROCEDURE — 25010000002 PROPOFOL 10 MG/ML EMULSION: Performed by: NURSE ANESTHETIST, CERTIFIED REGISTERED

## 2022-01-27 PROCEDURE — 88305 TISSUE EXAM BY PATHOLOGIST: CPT | Performed by: INTERNAL MEDICINE

## 2022-01-27 PROCEDURE — 43239 EGD BIOPSY SINGLE/MULTIPLE: CPT | Performed by: INTERNAL MEDICINE

## 2022-01-27 RX ORDER — SODIUM CHLORIDE 9 MG/ML
9 INJECTION, SOLUTION INTRAVENOUS CONTINUOUS
Status: DISCONTINUED | OUTPATIENT
Start: 2022-01-27 | End: 2022-01-27 | Stop reason: HOSPADM

## 2022-01-27 RX ORDER — LIDOCAINE HYDROCHLORIDE 20 MG/ML
INJECTION, SOLUTION INFILTRATION; PERINEURAL AS NEEDED
Status: DISCONTINUED | OUTPATIENT
Start: 2022-01-27 | End: 2022-01-27 | Stop reason: SURG

## 2022-01-27 RX ORDER — PROPOFOL 10 MG/ML
VIAL (ML) INTRAVENOUS AS NEEDED
Status: DISCONTINUED | OUTPATIENT
Start: 2022-01-27 | End: 2022-01-27 | Stop reason: SURG

## 2022-01-27 RX ORDER — SODIUM CHLORIDE, SODIUM LACTATE, POTASSIUM CHLORIDE, CALCIUM CHLORIDE 600; 310; 30; 20 MG/100ML; MG/100ML; MG/100ML; MG/100ML
30 INJECTION, SOLUTION INTRAVENOUS CONTINUOUS
Status: DISCONTINUED | OUTPATIENT
Start: 2022-01-27 | End: 2022-01-27 | Stop reason: HOSPADM

## 2022-01-27 RX ADMIN — PROPOFOL 200 MCG/KG/MIN: 10 INJECTION, EMULSION INTRAVENOUS at 12:09

## 2022-01-27 RX ADMIN — PROPOFOL 50 MG: 10 INJECTION, EMULSION INTRAVENOUS at 12:09

## 2022-01-27 RX ADMIN — SODIUM CHLORIDE, POTASSIUM CHLORIDE, SODIUM LACTATE AND CALCIUM CHLORIDE: 600; 310; 30; 20 INJECTION, SOLUTION INTRAVENOUS at 12:07

## 2022-01-27 RX ADMIN — LIDOCAINE HYDROCHLORIDE 100 MG: 20 INJECTION, SOLUTION INFILTRATION; PERINEURAL at 12:09

## 2022-01-28 ENCOUNTER — TELEPHONE (OUTPATIENT)
Dept: GASTROENTEROLOGY | Facility: CLINIC | Age: 62
End: 2022-01-28

## 2022-02-14 ENCOUNTER — HOSPITAL ENCOUNTER (OUTPATIENT)
Facility: HOSPITAL | Age: 62
Setting detail: HOSPITAL OUTPATIENT SURGERY
Discharge: HOME | End: 2022-02-14
Attending: SURGERY | Admitting: SURGERY

## 2022-02-14 VITALS
HEIGHT: 64 IN | RESPIRATION RATE: 18 BRPM | TEMPERATURE: 98 F | BODY MASS INDEX: 24.99 KG/M2 | WEIGHT: 146.39 LBS | OXYGEN SATURATION: 99 % | HEART RATE: 63 BPM | DIASTOLIC BLOOD PRESSURE: 77 MMHG | SYSTOLIC BLOOD PRESSURE: 151 MMHG

## 2022-02-14 DIAGNOSIS — I73.9 PVD (PERIPHERAL VASCULAR DISEASE) WITH CLAUDICATION: ICD-10-CM

## 2022-02-14 LAB
ANION GAP SERPL CALCULATED.3IONS-SCNC: 11.8 MMOL/L (ref 5–15)
BUN SERPL-MCNC: 45 MG/DL (ref 8–23)
BUN/CREAT SERPL: 9.4 (ref 7–25)
CALCIUM SPEC-SCNC: 8.8 MG/DL (ref 8.6–10.5)
CHLORIDE SERPL-SCNC: 103 MMOL/L (ref 98–107)
CO2 SERPL-SCNC: 25.2 MMOL/L (ref 22–29)
CREAT SERPL-MCNC: 4.8 MG/DL (ref 0.57–1)
GFR SERPL CREATININE-BSD FRML MDRD: 9 ML/MIN/1.73
GFR SERPL CREATININE-BSD FRML MDRD: ABNORMAL ML/MIN/{1.73_M2}
GLUCOSE SERPL-MCNC: 105 MG/DL (ref 65–99)
POTASSIUM SERPL-SCNC: 3.8 MMOL/L (ref 3.5–5.2)
SODIUM SERPL-SCNC: 140 MMOL/L (ref 136–145)

## 2022-02-14 PROCEDURE — 80048 BASIC METABOLIC PNL TOTAL CA: CPT | Performed by: SURGERY

## 2022-02-14 PROCEDURE — 37221 PR REVSC OPN/PRQ ILIAC ART W/STNT PLMT & ANGIOPLSTY: CPT | Performed by: SURGERY

## 2022-02-14 PROCEDURE — 75630 X-RAY AORTA LEG ARTERIES: CPT | Performed by: SURGERY

## 2022-02-14 PROCEDURE — C1887 CATHETER, GUIDING: HCPCS | Performed by: SURGERY

## 2022-02-14 PROCEDURE — C1894 INTRO/SHEATH, NON-LASER: HCPCS | Performed by: SURGERY

## 2022-02-14 PROCEDURE — C1769 GUIDE WIRE: HCPCS | Performed by: SURGERY

## 2022-02-14 PROCEDURE — 0 IODIXANOL PER 1 ML: Performed by: SURGERY

## 2022-02-14 PROCEDURE — 0 MEPERIDINE PER 100 MG: Performed by: SURGERY

## 2022-02-14 PROCEDURE — 25010000002 HYDRALAZINE PER 20 MG: Performed by: SURGERY

## 2022-02-14 PROCEDURE — 25010000002 MIDAZOLAM PER 1 MG

## 2022-02-14 PROCEDURE — 75716 ARTERY X-RAYS ARMS/LEGS: CPT | Performed by: SURGERY

## 2022-02-14 PROCEDURE — 25010000002 LORAZEPAM PER 2 MG: Performed by: SURGERY

## 2022-02-14 PROCEDURE — 99152 MOD SED SAME PHYS/QHP 5/>YRS: CPT | Performed by: SURGERY

## 2022-02-14 PROCEDURE — C1876 STENT, NON-COA/NON-COV W/DEL: HCPCS | Performed by: SURGERY

## 2022-02-14 PROCEDURE — S0260 H&P FOR SURGERY: HCPCS | Performed by: SURGERY

## 2022-02-14 PROCEDURE — 99153 MOD SED SAME PHYS/QHP EA: CPT | Performed by: SURGERY

## 2022-02-14 PROCEDURE — C1725 CATH, TRANSLUMIN NON-LASER: HCPCS | Performed by: SURGERY

## 2022-02-14 PROCEDURE — 37223 PR REVSC OPN/PRQ ILIAC ART W/STNT & ANGIOP IPSILATL: CPT | Performed by: SURGERY

## 2022-02-14 PROCEDURE — 25010000002 HEPARIN (PORCINE) PER 1000 UNITS: Performed by: SURGERY

## 2022-02-14 PROCEDURE — C1874 STENT, COATED/COV W/DEL SYS: HCPCS | Performed by: SURGERY

## 2022-02-14 PROCEDURE — C1760 CLOSURE DEV, VASC: HCPCS | Performed by: SURGERY

## 2022-02-14 DEVICE — ICAST COVERED STENT, 9MMX59MMX120CM
Type: IMPLANTABLE DEVICE | Status: FUNCTIONAL
Brand: ICAST

## 2022-02-14 DEVICE — PREMOUNTED STENT SYSTEM
Type: IMPLANTABLE DEVICE | Status: FUNCTIONAL
Brand: EXPRESS® LD ILIAC / BILIARY

## 2022-02-14 RX ORDER — SODIUM CHLORIDE 9 MG/ML
100 INJECTION, SOLUTION INTRAVENOUS CONTINUOUS
Status: DISCONTINUED | OUTPATIENT
Start: 2022-02-14 | End: 2022-02-14 | Stop reason: HOSPADM

## 2022-02-14 RX ORDER — HEPARIN SODIUM 1000 [USP'U]/ML
INJECTION, SOLUTION INTRAVENOUS; SUBCUTANEOUS AS NEEDED
Status: DISCONTINUED | OUTPATIENT
Start: 2022-02-14 | End: 2022-02-14 | Stop reason: HOSPADM

## 2022-02-14 RX ORDER — CLOPIDOGREL BISULFATE 75 MG/1
75 TABLET ORAL DAILY
Qty: 180 TABLET | Refills: 0 | Status: SHIPPED | OUTPATIENT
Start: 2022-02-14 | End: 2022-08-09

## 2022-02-14 RX ORDER — HYDRALAZINE HYDROCHLORIDE 20 MG/ML
INJECTION INTRAMUSCULAR; INTRAVENOUS AS NEEDED
Status: DISCONTINUED | OUTPATIENT
Start: 2022-02-14 | End: 2022-02-14 | Stop reason: HOSPADM

## 2022-02-14 RX ORDER — CLOPIDOGREL BISULFATE 75 MG/1
TABLET ORAL AS NEEDED
Status: DISCONTINUED | OUTPATIENT
Start: 2022-02-14 | End: 2022-02-14 | Stop reason: HOSPADM

## 2022-02-14 RX ORDER — ACETAMINOPHEN 325 MG/1
650 TABLET ORAL EVERY 4 HOURS PRN
Status: DISCONTINUED | OUTPATIENT
Start: 2022-02-14 | End: 2022-02-14 | Stop reason: HOSPADM

## 2022-02-14 RX ORDER — LIDOCAINE HYDROCHLORIDE 20 MG/ML
INJECTION, SOLUTION INFILTRATION; PERINEURAL AS NEEDED
Status: DISCONTINUED | OUTPATIENT
Start: 2022-02-14 | End: 2022-02-14 | Stop reason: HOSPADM

## 2022-02-14 RX ORDER — MIDAZOLAM HYDROCHLORIDE 2 MG/2ML
INJECTION, SOLUTION INTRAMUSCULAR; INTRAVENOUS AS NEEDED
Status: DISCONTINUED | OUTPATIENT
Start: 2022-02-14 | End: 2022-02-14 | Stop reason: HOSPADM

## 2022-02-14 RX ORDER — LORAZEPAM 2 MG/ML
INJECTION INTRAMUSCULAR AS NEEDED
Status: DISCONTINUED | OUTPATIENT
Start: 2022-02-14 | End: 2022-02-14 | Stop reason: HOSPADM

## 2022-02-14 RX ORDER — MEPERIDINE HYDROCHLORIDE 25 MG/ML
INJECTION INTRAMUSCULAR; INTRAVENOUS; SUBCUTANEOUS AS NEEDED
Status: DISCONTINUED | OUTPATIENT
Start: 2022-02-14 | End: 2022-02-14 | Stop reason: HOSPADM

## 2022-02-14 RX ORDER — IODIXANOL 320 MG/ML
INJECTION, SOLUTION INTRAVASCULAR AS NEEDED
Status: DISCONTINUED | OUTPATIENT
Start: 2022-02-14 | End: 2022-02-14 | Stop reason: HOSPADM

## 2022-02-17 RX ORDER — PANTOPRAZOLE SODIUM 20 MG/1
20 TABLET, DELAYED RELEASE ORAL DAILY
Qty: 30 TABLET | Refills: 3 | Status: SHIPPED | OUTPATIENT
Start: 2022-02-17 | End: 2022-06-20

## 2022-03-07 ENCOUNTER — OFFICE VISIT (OUTPATIENT)
Dept: VASCULAR SURGERY | Facility: HOSPITAL | Age: 62
End: 2022-03-07

## 2022-03-07 VITALS
OXYGEN SATURATION: 98 % | TEMPERATURE: 98.1 F | RESPIRATION RATE: 18 BRPM | HEART RATE: 81 BPM | SYSTOLIC BLOOD PRESSURE: 202 MMHG | DIASTOLIC BLOOD PRESSURE: 104 MMHG

## 2022-03-07 DIAGNOSIS — Z95.9 S/P ARTERIAL STENT: ICD-10-CM

## 2022-03-07 DIAGNOSIS — I73.9 PVD (PERIPHERAL VASCULAR DISEASE) WITH CLAUDICATION: Primary | ICD-10-CM

## 2022-03-07 PROCEDURE — G0463 HOSPITAL OUTPT CLINIC VISIT: HCPCS | Performed by: NURSE PRACTITIONER

## 2022-03-07 PROCEDURE — 99213 OFFICE O/P EST LOW 20 MIN: CPT | Performed by: NURSE PRACTITIONER

## 2022-03-17 ENCOUNTER — OFFICE VISIT (OUTPATIENT)
Dept: ORTHOPEDIC SURGERY | Facility: CLINIC | Age: 62
End: 2022-03-17

## 2022-03-17 VITALS — HEIGHT: 64 IN | WEIGHT: 146 LBS | BODY MASS INDEX: 24.92 KG/M2

## 2022-03-17 DIAGNOSIS — M17.12 PRIMARY OSTEOARTHRITIS OF LEFT KNEE: ICD-10-CM

## 2022-03-17 DIAGNOSIS — M25.562 LEFT KNEE PAIN, UNSPECIFIED CHRONICITY: Primary | ICD-10-CM

## 2022-03-17 PROCEDURE — 99203 OFFICE O/P NEW LOW 30 MIN: CPT | Performed by: ORTHOPAEDIC SURGERY

## 2022-03-17 PROCEDURE — 20610 DRAIN/INJ JOINT/BURSA W/O US: CPT | Performed by: ORTHOPAEDIC SURGERY

## 2022-03-17 RX ADMIN — TRIAMCINOLONE ACETONIDE 40 MG: 40 INJECTION, SUSPENSION INTRA-ARTICULAR; INTRAMUSCULAR at 14:48

## 2022-03-17 RX ADMIN — LIDOCAINE HYDROCHLORIDE 9 ML: 10 INJECTION, SOLUTION INFILTRATION; PERINEURAL at 14:48

## 2022-03-18 RX ORDER — TRIAMCINOLONE ACETONIDE 40 MG/ML
40 INJECTION, SUSPENSION INTRA-ARTICULAR; INTRAMUSCULAR
Status: COMPLETED | OUTPATIENT
Start: 2022-03-17 | End: 2022-03-17

## 2022-03-18 RX ORDER — LIDOCAINE HYDROCHLORIDE 10 MG/ML
9 INJECTION, SOLUTION INFILTRATION; PERINEURAL
Status: COMPLETED | OUTPATIENT
Start: 2022-03-17 | End: 2022-03-17

## 2022-03-22 ENCOUNTER — OFFICE VISIT (OUTPATIENT)
Dept: VASCULAR SURGERY | Facility: HOSPITAL | Age: 62
End: 2022-03-22

## 2022-03-22 VITALS
DIASTOLIC BLOOD PRESSURE: 106 MMHG | OXYGEN SATURATION: 96 % | HEART RATE: 74 BPM | RESPIRATION RATE: 18 BRPM | SYSTOLIC BLOOD PRESSURE: 202 MMHG | TEMPERATURE: 98.2 F

## 2022-03-22 DIAGNOSIS — I73.9 PVD (PERIPHERAL VASCULAR DISEASE) WITH CLAUDICATION: Primary | ICD-10-CM

## 2022-03-22 PROCEDURE — 99213 OFFICE O/P EST LOW 20 MIN: CPT | Performed by: SURGERY

## 2022-03-22 PROCEDURE — G0463 HOSPITAL OUTPT CLINIC VISIT: HCPCS | Performed by: SURGERY

## 2022-06-20 RX ORDER — PANTOPRAZOLE SODIUM 20 MG/1
TABLET, DELAYED RELEASE ORAL
Qty: 30 TABLET | Refills: 2 | Status: SHIPPED | OUTPATIENT
Start: 2022-06-20 | End: 2022-08-09

## 2022-08-09 ENCOUNTER — OFFICE VISIT (OUTPATIENT)
Dept: GASTROENTEROLOGY | Facility: CLINIC | Age: 62
End: 2022-08-09

## 2022-08-09 VITALS
HEART RATE: 72 BPM | DIASTOLIC BLOOD PRESSURE: 81 MMHG | WEIGHT: 147.4 LBS | HEIGHT: 64 IN | BODY MASS INDEX: 25.16 KG/M2 | SYSTOLIC BLOOD PRESSURE: 178 MMHG

## 2022-08-09 DIAGNOSIS — R12 HEARTBURN: ICD-10-CM

## 2022-08-09 DIAGNOSIS — R10.13 EPIGASTRIC PAIN: Primary | ICD-10-CM

## 2022-08-09 PROCEDURE — 99214 OFFICE O/P EST MOD 30 MIN: CPT | Performed by: INTERNAL MEDICINE

## 2022-08-09 RX ORDER — AMLODIPINE BESYLATE 2.5 MG/1
2.5 TABLET ORAL DAILY
COMMUNITY
End: 2023-03-09

## 2022-08-09 RX ORDER — OMEPRAZOLE 20 MG/1
20 TABLET, DELAYED RELEASE ORAL DAILY
Qty: 90 TABLET | Refills: 2 | Status: SHIPPED | OUTPATIENT
Start: 2022-08-09

## 2023-01-31 ENCOUNTER — OFFICE VISIT (OUTPATIENT)
Dept: SURGERY | Facility: CLINIC | Age: 63
End: 2023-01-31
Payer: COMMERCIAL

## 2023-01-31 VITALS — WEIGHT: 147 LBS | HEIGHT: 64 IN | BODY MASS INDEX: 25.1 KG/M2 | RESPIRATION RATE: 16 BRPM

## 2023-01-31 DIAGNOSIS — R19.00 ABDOMINAL WALL BULGE: Primary | ICD-10-CM

## 2023-01-31 DIAGNOSIS — R10.13 EPIGASTRIC PAIN: ICD-10-CM

## 2023-01-31 PROCEDURE — 99214 OFFICE O/P EST MOD 30 MIN: CPT | Performed by: SURGERY

## 2023-01-31 RX ORDER — DIPHENHYDRAMINE HCL 25 MG
25 TABLET ORAL EVERY 6 HOURS PRN
COMMUNITY

## 2023-01-31 RX ORDER — FENTANYL CITRATE 50 UG/ML
INJECTION, SOLUTION INTRAMUSCULAR; INTRAVENOUS
COMMUNITY

## 2023-01-31 RX ORDER — FUROSEMIDE 80 MG
80 TABLET ORAL 2 TIMES DAILY
COMMUNITY

## 2023-01-31 RX ORDER — PROMETHAZINE HYDROCHLORIDE 25 MG/1
TABLET ORAL
COMMUNITY
End: 2023-03-09

## 2023-01-31 RX ORDER — ERGOCALCIFEROL 1.25 MG/1
CAPSULE ORAL
COMMUNITY
End: 2023-03-09

## 2023-01-31 RX ORDER — BUTALBITAL, ACETAMINOPHEN AND CAFFEINE 50; 325; 40 MG/1; MG/1; MG/1
TABLET ORAL
COMMUNITY
End: 2023-03-09

## 2023-01-31 RX ORDER — ZOLPIDEM TARTRATE 5 MG/1
TABLET ORAL
COMMUNITY
End: 2023-03-09

## 2023-01-31 RX ORDER — LOSARTAN POTASSIUM 50 MG/1
50 TABLET ORAL NIGHTLY
COMMUNITY
Start: 2022-11-30

## 2023-01-31 RX ORDER — BUTALBITAL, ACETAMINOPHEN, CAFFEINE AND CODEINE PHOSPHATE 50; 325; 40; 30 MG/1; MG/1; MG/1; MG/1
CAPSULE ORAL
COMMUNITY
End: 2023-03-09

## 2023-01-31 RX ORDER — RANITIDINE 150 MG/1
TABLET ORAL
COMMUNITY
End: 2023-03-09

## 2023-01-31 RX ORDER — MECLIZINE HYDROCHLORIDE 25 MG/1
TABLET ORAL
COMMUNITY
End: 2023-03-09

## 2023-01-31 RX ORDER — TERBINAFINE HYDROCHLORIDE 250 MG/1
TABLET ORAL
COMMUNITY
End: 2023-03-09

## 2023-01-31 RX ORDER — VALACYCLOVIR HYDROCHLORIDE 500 MG/1
TABLET, FILM COATED ORAL
COMMUNITY
End: 2023-03-09

## 2023-01-31 RX ORDER — CYANOCOBALAMIN 1000 UG/ML
INJECTION, SOLUTION INTRAMUSCULAR; SUBCUTANEOUS
COMMUNITY
End: 2023-03-09

## 2023-01-31 RX ORDER — SODIUM ZIRCONIUM CYCLOSILICATE 10 G/10G
20 POWDER, FOR SUSPENSION ORAL
COMMUNITY
Start: 2022-12-05

## 2023-01-31 RX ORDER — ESTRADIOL 1 MG/1
TABLET ORAL
COMMUNITY
End: 2023-03-09

## 2023-01-31 RX ORDER — NALOXONE HYDROCHLORIDE 4 MG/.1ML
SPRAY NASAL
COMMUNITY
Start: 2022-11-10

## 2023-01-31 RX ORDER — SIMETHICONE 125 MG
125 TABLET,CHEWABLE ORAL EVERY 6 HOURS PRN
COMMUNITY

## 2023-01-31 RX ORDER — ACETAMINOPHEN, ASPIRIN AND CAFFEINE 250; 250; 65 MG/1; MG/1; MG/1
1 TABLET, FILM COATED ORAL EVERY 6 HOURS PRN
COMMUNITY

## 2023-01-31 RX ORDER — CLONAZEPAM 0.5 MG/1
TABLET ORAL
COMMUNITY
End: 2023-03-09

## 2023-01-31 RX ORDER — ESTRADIOL 0.5 MG/1
TABLET ORAL
COMMUNITY
End: 2023-03-09

## 2023-01-31 RX ORDER — LISINOPRIL 20 MG/1
TABLET ORAL
COMMUNITY
End: 2023-03-09

## 2023-01-31 RX ORDER — METOCLOPRAMIDE 10 MG/1
10 TABLET ORAL EVERY 4 HOURS PRN
COMMUNITY
Start: 2023-01-23

## 2023-02-13 ENCOUNTER — TRANSCRIBE ORDERS (OUTPATIENT)
Dept: VASCULAR SURGERY | Facility: HOSPITAL | Age: 63
End: 2023-02-13
Payer: COMMERCIAL

## 2023-02-13 DIAGNOSIS — I73.9 PVD (PERIPHERAL VASCULAR DISEASE): Primary | ICD-10-CM

## 2023-02-17 ENCOUNTER — HOSPITAL ENCOUNTER (OUTPATIENT)
Dept: CT IMAGING | Facility: HOSPITAL | Age: 63
Discharge: HOME | End: 2023-02-17
Admitting: SURGERY
Payer: COMMERCIAL

## 2023-02-17 DIAGNOSIS — R19.00 ABDOMINAL WALL BULGE: ICD-10-CM

## 2023-02-17 PROCEDURE — 74176 CT ABD & PELVIS W/O CONTRAST: CPT

## 2023-02-28 ENCOUNTER — OFFICE VISIT (OUTPATIENT)
Dept: SURGERY | Facility: CLINIC | Age: 63
End: 2023-02-28
Payer: COMMERCIAL

## 2023-02-28 ENCOUNTER — PREP FOR SURGERY (OUTPATIENT)
Dept: OTHER | Facility: HOSPITAL | Age: 63
End: 2023-02-28
Payer: COMMERCIAL

## 2023-02-28 VITALS — WEIGHT: 147 LBS | RESPIRATION RATE: 16 BRPM | HEIGHT: 64 IN | BODY MASS INDEX: 25.1 KG/M2

## 2023-02-28 DIAGNOSIS — T85.611S PERITONEAL DIALYSIS CATHETER DYSFUNCTION, SEQUELA: Primary | ICD-10-CM

## 2023-02-28 DIAGNOSIS — T85.611A PERITONEAL DIALYSIS CATHETER DYSFUNCTION: Primary | ICD-10-CM

## 2023-02-28 PROCEDURE — 99213 OFFICE O/P EST LOW 20 MIN: CPT | Performed by: SURGERY

## 2023-02-28 RX ORDER — DM/PE/ACETAMINOPHEN/CHLORPHENR 10-5-325-2
TABLET, SEQUENTIAL ORAL
COMMUNITY
Start: 2023-02-21 | End: 2023-03-09

## 2023-02-28 RX ORDER — SODIUM CHLORIDE 0.9 % (FLUSH) 0.9 %
10 SYRINGE (ML) INJECTION AS NEEDED
Status: CANCELLED | OUTPATIENT
Start: 2023-02-28

## 2023-02-28 RX ORDER — CLOTRIMAZOLE 1 %
CREAM (GRAM) TOPICAL
COMMUNITY
Start: 2023-02-21 | End: 2023-03-09

## 2023-02-28 RX ORDER — CEFAZOLIN SODIUM 2 G/100ML
2 INJECTION, SOLUTION INTRAVENOUS ONCE
Status: CANCELLED | OUTPATIENT
Start: 2023-02-28 | End: 2023-02-28

## 2023-02-28 RX ORDER — SODIUM CHLORIDE 0.9 % (FLUSH) 0.9 %
10 SYRINGE (ML) INJECTION EVERY 12 HOURS SCHEDULED
Status: CANCELLED | OUTPATIENT
Start: 2023-02-28

## 2023-02-28 RX ORDER — SODIUM CHLORIDE 9 MG/ML
40 INJECTION, SOLUTION INTRAVENOUS AS NEEDED
Status: CANCELLED | OUTPATIENT
Start: 2023-02-28

## 2023-03-09 ENCOUNTER — TELEPHONE (OUTPATIENT)
Dept: SURGERY | Facility: CLINIC | Age: 63
End: 2023-03-09
Payer: COMMERCIAL

## 2023-03-09 RX ORDER — CAL/D3/MAG11/ZINC/COP/MANG/BOR 600 MG-800
1 TABLET ORAL 2 TIMES DAILY
COMMUNITY

## 2023-03-09 RX ORDER — AMLODIPINE BESYLATE 10 MG/1
10 TABLET ORAL DAILY
COMMUNITY

## 2023-03-13 ENCOUNTER — APPOINTMENT (OUTPATIENT)
Dept: GENERAL RADIOLOGY | Facility: HOSPITAL | Age: 63
End: 2023-03-13
Payer: COMMERCIAL

## 2023-03-13 ENCOUNTER — ANESTHESIA EVENT (OUTPATIENT)
Dept: PERIOP | Facility: HOSPITAL | Age: 63
End: 2023-03-13
Payer: COMMERCIAL

## 2023-03-13 ENCOUNTER — ANESTHESIA (OUTPATIENT)
Dept: PERIOP | Facility: HOSPITAL | Age: 63
End: 2023-03-13
Payer: COMMERCIAL

## 2023-03-13 ENCOUNTER — HOSPITAL ENCOUNTER (OUTPATIENT)
Facility: HOSPITAL | Age: 63
Setting detail: HOSPITAL OUTPATIENT SURGERY
Discharge: HOME | End: 2023-03-13
Attending: SURGERY | Admitting: SURGERY
Payer: COMMERCIAL

## 2023-03-13 VITALS
HEART RATE: 70 BPM | WEIGHT: 149.03 LBS | SYSTOLIC BLOOD PRESSURE: 127 MMHG | BODY MASS INDEX: 25.44 KG/M2 | DIASTOLIC BLOOD PRESSURE: 66 MMHG | TEMPERATURE: 97.6 F | OXYGEN SATURATION: 94 % | RESPIRATION RATE: 18 BRPM | HEIGHT: 64 IN

## 2023-03-13 DIAGNOSIS — T85.611A PERITONEAL DIALYSIS CATHETER DYSFUNCTION: ICD-10-CM

## 2023-03-13 LAB
ANION GAP SERPL CALCULATED.3IONS-SCNC: 10.7 MMOL/L (ref 5–15)
BASOPHILS # BLD AUTO: 0.04 10*3/MM3 (ref 0–0.2)
BASOPHILS NFR BLD AUTO: 0.5 % (ref 0–1.5)
BUN SERPL-MCNC: 44 MG/DL (ref 8–23)
BUN/CREAT SERPL: 8.5 (ref 7–25)
CALCIUM SPEC-SCNC: 8.3 MG/DL (ref 8.6–10.5)
CHLORIDE SERPL-SCNC: 105 MMOL/L (ref 98–107)
CO2 SERPL-SCNC: 22.3 MMOL/L (ref 22–29)
CREAT SERPL-MCNC: 5.2 MG/DL (ref 0.57–1)
DEPRECATED RDW RBC AUTO: 48 FL (ref 37–54)
EGFRCR SERPLBLD CKD-EPI 2021: 8.9 ML/MIN/1.73
EOSINOPHIL # BLD AUTO: 0.29 10*3/MM3 (ref 0–0.4)
EOSINOPHIL NFR BLD AUTO: 3.7 % (ref 0.3–6.2)
ERYTHROCYTE [DISTWIDTH] IN BLOOD BY AUTOMATED COUNT: 13.4 % (ref 12.3–15.4)
GLUCOSE SERPL-MCNC: 83 MG/DL (ref 65–99)
HCT VFR BLD AUTO: 36 % (ref 34–46.6)
HGB BLD-MCNC: 12 G/DL (ref 12–15.9)
IMM GRANULOCYTES # BLD AUTO: 0.02 10*3/MM3 (ref 0–0.05)
IMM GRANULOCYTES NFR BLD AUTO: 0.3 % (ref 0–0.5)
LYMPHOCYTES # BLD AUTO: 3.42 10*3/MM3 (ref 0.7–3.1)
LYMPHOCYTES NFR BLD AUTO: 43.8 % (ref 19.6–45.3)
MCH RBC QN AUTO: 32 PG (ref 26.6–33)
MCHC RBC AUTO-ENTMCNC: 33.3 G/DL (ref 31.5–35.7)
MCV RBC AUTO: 96 FL (ref 79–97)
MONOCYTES # BLD AUTO: 0.59 10*3/MM3 (ref 0.1–0.9)
MONOCYTES NFR BLD AUTO: 7.6 % (ref 5–12)
NEUTROPHILS NFR BLD AUTO: 3.45 10*3/MM3 (ref 1.7–7)
NEUTROPHILS NFR BLD AUTO: 44.1 % (ref 42.7–76)
NRBC BLD AUTO-RTO: 0 /100 WBC (ref 0–0.2)
PLATELET # BLD AUTO: 247 10*3/MM3 (ref 140–450)
PMV BLD AUTO: 10.7 FL (ref 6–12)
POTASSIUM SERPL-SCNC: 4.6 MMOL/L (ref 3.5–5.2)
QT INTERVAL: 428 MS
RBC # BLD AUTO: 3.75 10*6/MM3 (ref 3.77–5.28)
SODIUM SERPL-SCNC: 138 MMOL/L (ref 136–145)
WBC NRBC COR # BLD: 7.81 10*3/MM3 (ref 3.4–10.8)

## 2023-03-13 PROCEDURE — C1750 CATH, HEMODIALYSIS,LONG-TERM: HCPCS | Performed by: SURGERY

## 2023-03-13 PROCEDURE — 93005 ELECTROCARDIOGRAM TRACING: CPT | Performed by: ANESTHESIOLOGY

## 2023-03-13 PROCEDURE — 25010000002 ONDANSETRON PER 1 MG: Performed by: NURSE ANESTHETIST, CERTIFIED REGISTERED

## 2023-03-13 PROCEDURE — 49324 LAP INSERT TUNNEL IP CATH: CPT | Performed by: SURGERY

## 2023-03-13 PROCEDURE — 25010000002 HYDROMORPHONE 1 MG/ML SOLUTION: Performed by: SURGERY

## 2023-03-13 PROCEDURE — 25010000002 HEPARIN (PORCINE) PER 1000 UNITS: Performed by: SURGERY

## 2023-03-13 PROCEDURE — 25010000002 CEFAZOLIN PER 500 MG: Performed by: SURGERY

## 2023-03-13 PROCEDURE — 25010000002 DEXAMETHASONE PER 1 MG: Performed by: NURSE ANESTHETIST, CERTIFIED REGISTERED

## 2023-03-13 PROCEDURE — 25010000002 PROPOFOL 10 MG/ML EMULSION: Performed by: NURSE ANESTHETIST, CERTIFIED REGISTERED

## 2023-03-13 PROCEDURE — 25010000002 FENTANYL CITRATE (PF) 50 MCG/ML SOLUTION: Performed by: NURSE ANESTHETIST, CERTIFIED REGISTERED

## 2023-03-13 PROCEDURE — 85025 COMPLETE CBC W/AUTO DIFF WBC: CPT | Performed by: ANESTHESIOLOGY

## 2023-03-13 PROCEDURE — 76000 FLUOROSCOPY <1 HR PHYS/QHP: CPT

## 2023-03-13 PROCEDURE — 0 LIDOCAINE 1 % SOLUTION 10 ML VIAL: Performed by: SURGERY

## 2023-03-13 PROCEDURE — 25010000002 MIDAZOLAM PER 1MG: Performed by: ANESTHESIOLOGY

## 2023-03-13 PROCEDURE — 80048 BASIC METABOLIC PNL TOTAL CA: CPT | Performed by: ANESTHESIOLOGY

## 2023-03-13 PROCEDURE — 71045 X-RAY EXAM CHEST 1 VIEW: CPT

## 2023-03-13 DEVICE — IMPLANTABLE DEVICE: Type: IMPLANTABLE DEVICE | Site: ABDOMEN | Status: FUNCTIONAL

## 2023-03-13 RX ORDER — MAGNESIUM HYDROXIDE 1200 MG/15ML
LIQUID ORAL AS NEEDED
Status: DISCONTINUED | OUTPATIENT
Start: 2023-03-13 | End: 2023-03-13 | Stop reason: HOSPADM

## 2023-03-13 RX ORDER — BUPIVACAINE HCL/0.9 % NACL/PF 0.1 %
2 PLASTIC BAG, INJECTION (ML) EPIDURAL ONCE
Status: COMPLETED | OUTPATIENT
Start: 2023-03-13 | End: 2023-03-13

## 2023-03-13 RX ORDER — ONDANSETRON 2 MG/ML
INJECTION INTRAMUSCULAR; INTRAVENOUS AS NEEDED
Status: DISCONTINUED | OUTPATIENT
Start: 2023-03-13 | End: 2023-03-13 | Stop reason: SURG

## 2023-03-13 RX ORDER — PHENYLEPHRINE HCL IN 0.9% NACL 1 MG/10 ML
SYRINGE (ML) INTRAVENOUS AS NEEDED
Status: DISCONTINUED | OUTPATIENT
Start: 2023-03-13 | End: 2023-03-13 | Stop reason: SURG

## 2023-03-13 RX ORDER — FENTANYL CITRATE 50 UG/ML
INJECTION, SOLUTION INTRAMUSCULAR; INTRAVENOUS AS NEEDED
Status: DISCONTINUED | OUTPATIENT
Start: 2023-03-13 | End: 2023-03-13 | Stop reason: SURG

## 2023-03-13 RX ORDER — HEPARIN SODIUM 5000 [USP'U]/ML
INJECTION, SOLUTION INTRAVENOUS; SUBCUTANEOUS AS NEEDED
Status: DISCONTINUED | OUTPATIENT
Start: 2023-03-13 | End: 2023-03-13 | Stop reason: HOSPADM

## 2023-03-13 RX ORDER — SODIUM CHLORIDE 0.9 % (FLUSH) 0.9 %
10 SYRINGE (ML) INJECTION EVERY 12 HOURS SCHEDULED
Status: DISCONTINUED | OUTPATIENT
Start: 2023-03-13 | End: 2023-03-13 | Stop reason: HOSPADM

## 2023-03-13 RX ORDER — LIDOCAINE HYDROCHLORIDE 20 MG/ML
INJECTION, SOLUTION EPIDURAL; INFILTRATION; INTRACAUDAL; PERINEURAL AS NEEDED
Status: DISCONTINUED | OUTPATIENT
Start: 2023-03-13 | End: 2023-03-13 | Stop reason: SURG

## 2023-03-13 RX ORDER — DOCUSATE SODIUM 100 MG/1
100 CAPSULE, LIQUID FILLED ORAL 2 TIMES DAILY PRN
Qty: 10 CAPSULE | Refills: 1 | Status: SHIPPED | OUTPATIENT
Start: 2023-03-13 | End: 2024-03-12

## 2023-03-13 RX ORDER — ROCURONIUM BROMIDE 10 MG/ML
INJECTION, SOLUTION INTRAVENOUS AS NEEDED
Status: DISCONTINUED | OUTPATIENT
Start: 2023-03-13 | End: 2023-03-13 | Stop reason: SURG

## 2023-03-13 RX ORDER — HYDROCODONE BITARTRATE AND ACETAMINOPHEN 5; 325 MG/1; MG/1
1-2 TABLET ORAL EVERY 4 HOURS PRN
Qty: 20 TABLET | Refills: 0 | Status: SHIPPED | OUTPATIENT
Start: 2023-03-13

## 2023-03-13 RX ORDER — SODIUM CHLORIDE 9 MG/ML
9 INJECTION, SOLUTION INTRAVENOUS CONTINUOUS PRN
Status: DISCONTINUED | OUTPATIENT
Start: 2023-03-13 | End: 2023-03-13 | Stop reason: HOSPADM

## 2023-03-13 RX ORDER — EPHEDRINE SULFATE 50 MG/ML
INJECTION, SOLUTION INTRAVENOUS AS NEEDED
Status: DISCONTINUED | OUTPATIENT
Start: 2023-03-13 | End: 2023-03-13 | Stop reason: SURG

## 2023-03-13 RX ORDER — DEXMEDETOMIDINE HYDROCHLORIDE 100 UG/ML
INJECTION, SOLUTION INTRAVENOUS AS NEEDED
Status: DISCONTINUED | OUTPATIENT
Start: 2023-03-13 | End: 2023-03-13 | Stop reason: SURG

## 2023-03-13 RX ORDER — DEXAMETHASONE SODIUM PHOSPHATE 4 MG/ML
INJECTION, SOLUTION INTRA-ARTICULAR; INTRALESIONAL; INTRAMUSCULAR; INTRAVENOUS; SOFT TISSUE AS NEEDED
Status: DISCONTINUED | OUTPATIENT
Start: 2023-03-13 | End: 2023-03-13 | Stop reason: SURG

## 2023-03-13 RX ORDER — MIDAZOLAM HYDROCHLORIDE 2 MG/2ML
2 INJECTION, SOLUTION INTRAMUSCULAR; INTRAVENOUS ONCE
Status: COMPLETED | OUTPATIENT
Start: 2023-03-13 | End: 2023-03-13

## 2023-03-13 RX ORDER — ACETAMINOPHEN 500 MG
1000 TABLET ORAL ONCE
Status: COMPLETED | OUTPATIENT
Start: 2023-03-13 | End: 2023-03-13

## 2023-03-13 RX ORDER — PROPOFOL 10 MG/ML
VIAL (ML) INTRAVENOUS AS NEEDED
Status: DISCONTINUED | OUTPATIENT
Start: 2023-03-13 | End: 2023-03-13 | Stop reason: SURG

## 2023-03-13 RX ORDER — HYDROCODONE BITARTRATE AND ACETAMINOPHEN 5; 325 MG/1; MG/1
1 TABLET ORAL ONCE AS NEEDED
Status: DISCONTINUED | OUTPATIENT
Start: 2023-03-13 | End: 2023-03-13 | Stop reason: HOSPADM

## 2023-03-13 RX ORDER — SODIUM CHLORIDE 0.9 % (FLUSH) 0.9 %
10 SYRINGE (ML) INJECTION AS NEEDED
Status: DISCONTINUED | OUTPATIENT
Start: 2023-03-13 | End: 2023-03-13 | Stop reason: HOSPADM

## 2023-03-13 RX ORDER — SODIUM CHLORIDE 9 MG/ML
40 INJECTION, SOLUTION INTRAVENOUS AS NEEDED
Status: DISCONTINUED | OUTPATIENT
Start: 2023-03-13 | End: 2023-03-13 | Stop reason: HOSPADM

## 2023-03-13 RX ADMIN — Medication 200 MCG: at 12:40

## 2023-03-13 RX ADMIN — EPHEDRINE SULFATE 10 MG: 50 INJECTION INTRAVENOUS at 13:01

## 2023-03-13 RX ADMIN — MIDAZOLAM HYDROCHLORIDE 2 MG: 1 INJECTION, SOLUTION INTRAMUSCULAR; INTRAVENOUS at 11:09

## 2023-03-13 RX ADMIN — Medication 200 MCG: at 12:49

## 2023-03-13 RX ADMIN — ONDANSETRON 4 MG: 2 INJECTION INTRAMUSCULAR; INTRAVENOUS at 12:02

## 2023-03-13 RX ADMIN — LIDOCAINE HYDROCHLORIDE 40 MG: 20 INJECTION, SOLUTION EPIDURAL; INFILTRATION; INTRACAUDAL; PERINEURAL at 12:00

## 2023-03-13 RX ADMIN — EPHEDRINE SULFATE 10 MG: 50 INJECTION INTRAVENOUS at 12:49

## 2023-03-13 RX ADMIN — FENTANYL CITRATE 50 MCG: 50 INJECTION, SOLUTION INTRAMUSCULAR; INTRAVENOUS at 12:17

## 2023-03-13 RX ADMIN — DEXMEDETOMIDINE HYDROCHLORIDE 20 MCG: 100 INJECTION, SOLUTION, CONCENTRATE INTRAVENOUS at 12:21

## 2023-03-13 RX ADMIN — Medication 200 MCG: at 13:18

## 2023-03-13 RX ADMIN — PROPOFOL 120 MG: 10 INJECTION, EMULSION INTRAVENOUS at 12:00

## 2023-03-13 RX ADMIN — PROPOFOL 80 MG: 10 INJECTION, EMULSION INTRAVENOUS at 12:24

## 2023-03-13 RX ADMIN — SODIUM CHLORIDE 9 ML/HR: 9 INJECTION, SOLUTION INTRAVENOUS at 09:06

## 2023-03-13 RX ADMIN — ACETAMINOPHEN 1000 MG: 500 TABLET ORAL at 09:07

## 2023-03-13 RX ADMIN — ROCURONIUM BROMIDE 50 MG: 10 INJECTION, SOLUTION INTRAVENOUS at 12:01

## 2023-03-13 RX ADMIN — DEXAMETHASONE SODIUM PHOSPHATE 4 MG: 4 INJECTION, SOLUTION INTRA-ARTICULAR; INTRALESIONAL; INTRAMUSCULAR; INTRAVENOUS; SOFT TISSUE at 12:01

## 2023-03-13 RX ADMIN — Medication 2 G: at 12:05

## 2023-03-13 RX ADMIN — EPHEDRINE SULFATE 10 MG: 50 INJECTION INTRAVENOUS at 13:18

## 2023-03-13 RX ADMIN — FENTANYL CITRATE 25 MCG: 50 INJECTION, SOLUTION INTRAMUSCULAR; INTRAVENOUS at 13:58

## 2023-03-13 RX ADMIN — HYDROMORPHONE HYDROCHLORIDE 0.5 MG: 1 INJECTION, SOLUTION INTRAMUSCULAR; INTRAVENOUS; SUBCUTANEOUS at 14:33

## 2023-03-13 RX ADMIN — FENTANYL CITRATE 50 MCG: 50 INJECTION, SOLUTION INTRAMUSCULAR; INTRAVENOUS at 12:22

## 2023-03-13 RX ADMIN — Medication 200 MCG: at 13:01

## 2023-03-13 RX ADMIN — HYDROCODONE BITARTRATE AND ACETAMINOPHEN 1 TABLET: 5; 325 TABLET ORAL at 14:47

## 2023-03-29 ENCOUNTER — HOSPITAL ENCOUNTER (OUTPATIENT)
Dept: CARDIOLOGY | Facility: HOSPITAL | Age: 63
Discharge: HOME | End: 2023-03-29
Payer: COMMERCIAL

## 2023-03-29 ENCOUNTER — OFFICE VISIT (OUTPATIENT)
Dept: VASCULAR SURGERY | Facility: HOSPITAL | Age: 63
End: 2023-03-29
Payer: COMMERCIAL

## 2023-03-29 VITALS
RESPIRATION RATE: 18 BRPM | OXYGEN SATURATION: 94 % | SYSTOLIC BLOOD PRESSURE: 150 MMHG | DIASTOLIC BLOOD PRESSURE: 85 MMHG | TEMPERATURE: 97.6 F | HEART RATE: 68 BPM

## 2023-03-29 DIAGNOSIS — Z99.2 ESRD (END STAGE RENAL DISEASE) ON DIALYSIS: ICD-10-CM

## 2023-03-29 DIAGNOSIS — I73.9 PVD (PERIPHERAL VASCULAR DISEASE) WITH CLAUDICATION: Primary | ICD-10-CM

## 2023-03-29 DIAGNOSIS — I73.9 PVD (PERIPHERAL VASCULAR DISEASE): ICD-10-CM

## 2023-03-29 DIAGNOSIS — N18.6 ESRD (END STAGE RENAL DISEASE) ON DIALYSIS: ICD-10-CM

## 2023-03-29 LAB
BH CV LOWER ARTERIAL LEFT ABI RATIO: 1.2
BH CV LOWER ARTERIAL LEFT DORSALIS PEDIS SYS MAX: 179
BH CV LOWER ARTERIAL LEFT GREAT TOE SYS MAX: 133
BH CV LOWER ARTERIAL LEFT LOW THIGH SYS MAX: 171
BH CV LOWER ARTERIAL LEFT POPLITEAL SYS MAX: 199
BH CV LOWER ARTERIAL LEFT POST TIBIAL SYS MAX: 178
BH CV LOWER ARTERIAL LEFT TBI RATIO: 0.89
BH CV LOWER ARTERIAL RIGHT ABI RATIO: 1.3
BH CV LOWER ARTERIAL RIGHT DORSALIS PEDIS SYS MAX: 174
BH CV LOWER ARTERIAL RIGHT GREAT TOE SYS MAX: 136
BH CV LOWER ARTERIAL RIGHT LOW THIGH SYS MAX: 162
BH CV LOWER ARTERIAL RIGHT POPLITEAL SYS MAX: 179
BH CV LOWER ARTERIAL RIGHT POST TIBIAL SYS MAX: 193
BH CV LOWER ARTERIAL RIGHT TBI RATIO: 0.91
MAXIMAL PREDICTED HEART RATE: 159 BPM
STRESS TARGET HR: 135 BPM
UPPER ARTERIAL LEFT ARM BRACHIAL SYS MAX: 150 MMHG
UPPER ARTERIAL RIGHT ARM BRACHIAL SYS MAX: 149 MMHG

## 2023-03-29 PROCEDURE — 93923 UPR/LXTR ART STDY 3+ LVLS: CPT

## 2023-04-19 ENCOUNTER — TRANSCRIBE ORDERS (OUTPATIENT)
Dept: ADMINISTRATIVE | Facility: HOSPITAL | Age: 63
End: 2023-04-19
Payer: COMMERCIAL

## 2023-04-19 DIAGNOSIS — N18.6 ESRD (END STAGE RENAL DISEASE): Primary | ICD-10-CM

## 2023-04-20 ENCOUNTER — HOSPITAL ENCOUNTER (OUTPATIENT)
Dept: INFUSION THERAPY | Facility: HOSPITAL | Age: 63
Discharge: HOME | End: 2023-04-20
Attending: STUDENT IN AN ORGANIZED HEALTH CARE EDUCATION/TRAINING PROGRAM | Admitting: STUDENT IN AN ORGANIZED HEALTH CARE EDUCATION/TRAINING PROGRAM
Payer: COMMERCIAL

## 2023-04-20 VITALS
OXYGEN SATURATION: 92 % | RESPIRATION RATE: 18 BRPM | HEIGHT: 64 IN | DIASTOLIC BLOOD PRESSURE: 57 MMHG | TEMPERATURE: 97.9 F | WEIGHT: 150 LBS | HEART RATE: 66 BPM | SYSTOLIC BLOOD PRESSURE: 140 MMHG | BODY MASS INDEX: 25.61 KG/M2

## 2023-04-20 PROCEDURE — 0 LIDOCAINE 1 % SOLUTION: Performed by: STUDENT IN AN ORGANIZED HEALTH CARE EDUCATION/TRAINING PROGRAM

## 2023-04-20 RX ORDER — LIDOCAINE HYDROCHLORIDE 10 MG/ML
20 INJECTION, SOLUTION INFILTRATION; PERINEURAL ONCE
Status: COMPLETED | OUTPATIENT
Start: 2023-04-20 | End: 2023-04-20

## 2023-04-20 RX ADMIN — LIDOCAINE HYDROCHLORIDE 10 ML: 10 INJECTION, SOLUTION INFILTRATION; PERINEURAL at 09:24

## 2023-06-02 RX ORDER — OMEPRAZOLE 20 MG/1
CAPSULE, DELAYED RELEASE ORAL
Qty: 90 CAPSULE | Refills: 1 | Status: SHIPPED | OUTPATIENT
Start: 2023-06-02

## 2023-08-15 ENCOUNTER — OFFICE VISIT (OUTPATIENT)
Dept: GASTROENTEROLOGY | Facility: CLINIC | Age: 63
End: 2023-08-15
Payer: COMMERCIAL

## 2023-08-15 VITALS
SYSTOLIC BLOOD PRESSURE: 168 MMHG | HEIGHT: 64 IN | DIASTOLIC BLOOD PRESSURE: 82 MMHG | BODY MASS INDEX: 31.99 KG/M2 | WEIGHT: 187.4 LBS | HEART RATE: 63 BPM

## 2023-08-15 DIAGNOSIS — R10.31 BILATERAL LOWER ABDOMINAL CRAMPING: ICD-10-CM

## 2023-08-15 DIAGNOSIS — E83.110 HEREDITARY HEMOCHROMATOSIS: Primary | ICD-10-CM

## 2023-08-15 DIAGNOSIS — R10.32 BILATERAL LOWER ABDOMINAL CRAMPING: ICD-10-CM

## 2023-08-15 PROCEDURE — 1160F RVW MEDS BY RX/DR IN RCRD: CPT | Performed by: INTERNAL MEDICINE

## 2023-08-15 PROCEDURE — 99214 OFFICE O/P EST MOD 30 MIN: CPT | Performed by: INTERNAL MEDICINE

## 2023-08-15 PROCEDURE — 1159F MED LIST DOCD IN RCRD: CPT | Performed by: INTERNAL MEDICINE

## 2023-08-15 RX ORDER — DICYCLOMINE HYDROCHLORIDE 10 MG/1
10 CAPSULE ORAL
Qty: 120 CAPSULE | Refills: 0 | Status: SHIPPED | OUTPATIENT
Start: 2023-08-15

## 2023-08-29 ENCOUNTER — TELEPHONE (OUTPATIENT)
Dept: GASTROENTEROLOGY | Facility: CLINIC | Age: 63
End: 2023-08-29
Payer: COMMERCIAL

## 2023-08-29 ENCOUNTER — LAB (OUTPATIENT)
Dept: LAB | Facility: HOSPITAL | Age: 63
End: 2023-08-29
Payer: COMMERCIAL

## 2023-08-29 DIAGNOSIS — E83.110 HEREDITARY HEMOCHROMATOSIS: ICD-10-CM

## 2023-08-29 LAB
BASOPHILS # BLD AUTO: 0.04 10*3/MM3 (ref 0–0.2)
BASOPHILS NFR BLD AUTO: 0.5 % (ref 0–1.5)
DEPRECATED RDW RBC AUTO: 45.3 FL (ref 37–54)
EOSINOPHIL # BLD AUTO: 0.37 10*3/MM3 (ref 0–0.4)
EOSINOPHIL NFR BLD AUTO: 4.8 % (ref 0.3–6.2)
ERYTHROCYTE [DISTWIDTH] IN BLOOD BY AUTOMATED COUNT: 13.2 % (ref 12.3–15.4)
FERRITIN SERPL-MCNC: 90.2 NG/ML (ref 13–150)
HCT VFR BLD AUTO: 33.2 % (ref 34–46.6)
HGB BLD-MCNC: 10.9 G/DL (ref 12–15.9)
IMM GRANULOCYTES # BLD AUTO: 0.02 10*3/MM3 (ref 0–0.05)
IMM GRANULOCYTES NFR BLD AUTO: 0.3 % (ref 0–0.5)
IRON 24H UR-MRATE: 115 MCG/DL (ref 37–145)
IRON SATN MFR SERPL: 43 % (ref 20–50)
LYMPHOCYTES # BLD AUTO: 3.01 10*3/MM3 (ref 0.7–3.1)
LYMPHOCYTES NFR BLD AUTO: 38.9 % (ref 19.6–45.3)
MCH RBC QN AUTO: 31 PG (ref 26.6–33)
MCHC RBC AUTO-ENTMCNC: 32.8 G/DL (ref 31.5–35.7)
MCV RBC AUTO: 94.3 FL (ref 79–97)
MONOCYTES # BLD AUTO: 0.58 10*3/MM3 (ref 0.1–0.9)
MONOCYTES NFR BLD AUTO: 7.5 % (ref 5–12)
NEUTROPHILS NFR BLD AUTO: 3.72 10*3/MM3 (ref 1.7–7)
NEUTROPHILS NFR BLD AUTO: 48 % (ref 42.7–76)
NRBC BLD AUTO-RTO: 0 /100 WBC (ref 0–0.2)
PLATELET # BLD AUTO: 287 10*3/MM3 (ref 140–450)
PMV BLD AUTO: 11.8 FL (ref 6–12)
RBC # BLD AUTO: 3.52 10*6/MM3 (ref 3.77–5.28)
TIBC SERPL-MCNC: 270 MCG/DL (ref 298–536)
TRANSFERRIN SERPL-MCNC: 181 MG/DL (ref 200–360)
WBC NRBC COR # BLD: 7.74 10*3/MM3 (ref 3.4–10.8)

## 2023-08-29 PROCEDURE — 84466 ASSAY OF TRANSFERRIN: CPT

## 2023-08-29 PROCEDURE — 82728 ASSAY OF FERRITIN: CPT

## 2023-08-29 PROCEDURE — 36415 COLL VENOUS BLD VENIPUNCTURE: CPT

## 2023-08-29 PROCEDURE — 83540 ASSAY OF IRON: CPT

## 2023-08-29 PROCEDURE — 85025 COMPLETE CBC W/AUTO DIFF WBC: CPT

## 2023-08-29 PROCEDURE — 81256 HFE GENE: CPT

## 2023-08-29 RX ORDER — DICYCLOMINE HYDROCHLORIDE 10 MG/1
CAPSULE ORAL
Qty: 120 CAPSULE | Refills: 0 | OUTPATIENT
Start: 2023-08-29

## 2023-09-05 ENCOUNTER — TELEPHONE (OUTPATIENT)
Dept: GASTROENTEROLOGY | Facility: CLINIC | Age: 63
End: 2023-09-05
Payer: COMMERCIAL

## 2023-09-07 ENCOUNTER — OFFICE VISIT (OUTPATIENT)
Dept: SURGERY | Facility: CLINIC | Age: 63
End: 2023-09-07
Payer: COMMERCIAL

## 2023-09-07 VITALS — RESPIRATION RATE: 16 BRPM | BODY MASS INDEX: 23.73 KG/M2 | HEIGHT: 64 IN | WEIGHT: 139 LBS

## 2023-09-07 DIAGNOSIS — T85.611D PERITONEAL DIALYSIS CATHETER DYSFUNCTION, SUBSEQUENT ENCOUNTER: Primary | ICD-10-CM

## 2023-09-07 LAB
HFE GENE MUT ANL BLD/T: NORMAL
IMP & REVIEW OF LAB RESULTS: NORMAL

## 2023-09-07 RX ORDER — GINGER ROOT/GINGER ROOT EXT 262.5 MG
1 CAPSULE ORAL 2 TIMES DAILY
COMMUNITY
Start: 2023-08-28

## 2023-09-07 RX ORDER — ONDANSETRON 4 MG/1
TABLET, ORALLY DISINTEGRATING ORAL
COMMUNITY

## 2023-09-08 ENCOUNTER — TELEPHONE (OUTPATIENT)
Dept: GASTROENTEROLOGY | Facility: CLINIC | Age: 63
End: 2023-09-08
Payer: COMMERCIAL

## 2023-10-10 ENCOUNTER — HOSPITAL ENCOUNTER (OUTPATIENT)
Dept: CT IMAGING | Facility: HOSPITAL | Age: 63
Discharge: HOME | End: 2023-10-10
Admitting: SURGERY
Payer: COMMERCIAL

## 2023-10-10 DIAGNOSIS — T85.611D PERITONEAL DIALYSIS CATHETER DYSFUNCTION, SUBSEQUENT ENCOUNTER: ICD-10-CM

## 2023-10-10 PROCEDURE — 74176 CT ABD & PELVIS W/O CONTRAST: CPT

## 2023-10-11 ENCOUNTER — TELEPHONE (OUTPATIENT)
Dept: SURGERY | Facility: CLINIC | Age: 63
End: 2023-10-11
Payer: COMMERCIAL

## 2023-10-18 ENCOUNTER — TELEPHONE (OUTPATIENT)
Dept: SURGERY | Facility: CLINIC | Age: 63
End: 2023-10-18
Payer: COMMERCIAL

## 2023-11-16 ENCOUNTER — TELEPHONE (OUTPATIENT)
Dept: GASTROENTEROLOGY | Facility: CLINIC | Age: 63
End: 2023-11-16
Payer: COMMERCIAL

## 2023-11-16 RX ORDER — OMEPRAZOLE 20 MG/1
20 CAPSULE, DELAYED RELEASE ORAL 2 TIMES DAILY
Qty: 180 CAPSULE | Refills: 0 | Status: SHIPPED | OUTPATIENT
Start: 2023-11-16

## 2024-02-20 ENCOUNTER — OFFICE VISIT (OUTPATIENT)
Dept: GASTROENTEROLOGY | Facility: CLINIC | Age: 64
End: 2024-02-20
Payer: COMMERCIAL

## 2024-02-20 VITALS
DIASTOLIC BLOOD PRESSURE: 92 MMHG | WEIGHT: 146.4 LBS | HEIGHT: 64 IN | BODY MASS INDEX: 25 KG/M2 | HEART RATE: 82 BPM | SYSTOLIC BLOOD PRESSURE: 199 MMHG

## 2024-02-20 DIAGNOSIS — K58.1 IRRITABLE BOWEL SYNDROME WITH CONSTIPATION: Primary | ICD-10-CM

## 2024-02-20 DIAGNOSIS — K21.9 GASTROESOPHAGEAL REFLUX DISEASE, UNSPECIFIED WHETHER ESOPHAGITIS PRESENT: ICD-10-CM

## 2024-02-20 DIAGNOSIS — E83.110 HEREDITARY HEMOCHROMATOSIS: ICD-10-CM

## 2024-02-20 PROCEDURE — 1159F MED LIST DOCD IN RCRD: CPT | Performed by: INTERNAL MEDICINE

## 2024-02-20 PROCEDURE — 1160F RVW MEDS BY RX/DR IN RCRD: CPT | Performed by: INTERNAL MEDICINE

## 2024-02-20 PROCEDURE — 99214 OFFICE O/P EST MOD 30 MIN: CPT | Performed by: INTERNAL MEDICINE

## 2024-02-20 RX ORDER — OMEPRAZOLE 40 MG/1
40 CAPSULE, DELAYED RELEASE ORAL DAILY
Qty: 90 CAPSULE | Refills: 1 | Status: SHIPPED | OUTPATIENT
Start: 2024-02-20

## 2024-02-20 RX ORDER — OXYCODONE AND ACETAMINOPHEN 10; 325 MG/1; MG/1
TABLET ORAL
COMMUNITY
Start: 2024-02-05

## 2024-02-27 ENCOUNTER — TELEPHONE (OUTPATIENT)
Dept: GASTROENTEROLOGY | Facility: CLINIC | Age: 64
End: 2024-02-27
Payer: COMMERCIAL

## 2024-03-04 ENCOUNTER — TELEPHONE (OUTPATIENT)
Dept: VASCULAR SURGERY | Facility: HOSPITAL | Age: 64
End: 2024-03-04
Payer: COMMERCIAL

## 2024-03-05 ENCOUNTER — TELEPHONE (OUTPATIENT)
Dept: GASTROENTEROLOGY | Facility: CLINIC | Age: 64
End: 2024-03-05
Payer: COMMERCIAL

## 2024-03-05 RX ORDER — PLECANATIDE 3 MG/1
3 TABLET ORAL DAILY
Qty: 90 TABLET | Refills: 0 | Status: SHIPPED | OUTPATIENT
Start: 2024-03-05 | End: 2024-06-03

## 2024-03-12 ENCOUNTER — TELEPHONE (OUTPATIENT)
Dept: GASTROENTEROLOGY | Facility: CLINIC | Age: 64
End: 2024-03-12
Payer: MEDICAID

## 2024-03-13 ENCOUNTER — HOSPITAL ENCOUNTER (EMERGENCY)
Facility: HOSPITAL | Age: 64
Discharge: HOME | End: 2024-03-13
Attending: EMERGENCY MEDICINE | Admitting: EMERGENCY MEDICINE
Payer: MEDICAID

## 2024-03-13 ENCOUNTER — APPOINTMENT (OUTPATIENT)
Dept: CT IMAGING | Facility: HOSPITAL | Age: 64
End: 2024-03-13
Payer: MEDICAID

## 2024-03-13 VITALS
DIASTOLIC BLOOD PRESSURE: 93 MMHG | HEIGHT: 64 IN | HEART RATE: 71 BPM | OXYGEN SATURATION: 98 % | SYSTOLIC BLOOD PRESSURE: 199 MMHG | TEMPERATURE: 98.4 F | BODY MASS INDEX: 19.5 KG/M2 | RESPIRATION RATE: 16 BRPM | WEIGHT: 114.2 LBS

## 2024-03-13 DIAGNOSIS — R10.84 GENERALIZED ABDOMINAL PAIN: Primary | ICD-10-CM

## 2024-03-13 DIAGNOSIS — R11.2 NAUSEA AND VOMITING, UNSPECIFIED VOMITING TYPE: Primary | ICD-10-CM

## 2024-03-13 LAB
ALBUMIN SERPL-MCNC: 3.5 G/DL (ref 3.5–5.2)
ALBUMIN/GLOB SERPL: 1.2 G/DL
ALP SERPL-CCNC: 85 U/L (ref 39–117)
ALT SERPL W P-5'-P-CCNC: 17 U/L (ref 1–33)
ANION GAP SERPL CALCULATED.3IONS-SCNC: 12.2 MMOL/L (ref 5–15)
AST SERPL-CCNC: 25 U/L (ref 1–32)
BACTERIA UR QL AUTO: NORMAL /HPF
BASOPHILS # BLD AUTO: 0.06 10*3/MM3 (ref 0–0.2)
BASOPHILS NFR BLD AUTO: 0.8 % (ref 0–1.5)
BILIRUB SERPL-MCNC: 0.2 MG/DL (ref 0–1.2)
BILIRUB UR QL STRIP: NEGATIVE
BUN SERPL-MCNC: 52 MG/DL (ref 8–23)
BUN/CREAT SERPL: 7.9 (ref 7–25)
CALCIUM SPEC-SCNC: 10.1 MG/DL (ref 8.6–10.5)
CHLORIDE SERPL-SCNC: 105 MMOL/L (ref 98–107)
CLARITY UR: CLEAR
CO2 SERPL-SCNC: 23.8 MMOL/L (ref 22–29)
COLOR UR: YELLOW
CREAT SERPL-MCNC: 6.59 MG/DL (ref 0.57–1)
D-LACTATE SERPL-SCNC: 0.9 MMOL/L (ref 0.5–2)
DEPRECATED RDW RBC AUTO: 47.2 FL (ref 37–54)
EGFRCR SERPLBLD CKD-EPI 2021: 6.6 ML/MIN/1.73
EOSINOPHIL # BLD AUTO: 0.16 10*3/MM3 (ref 0–0.4)
EOSINOPHIL NFR BLD AUTO: 2.1 % (ref 0.3–6.2)
ERYTHROCYTE [DISTWIDTH] IN BLOOD BY AUTOMATED COUNT: 13.7 % (ref 12.3–15.4)
GLOBULIN UR ELPH-MCNC: 3 GM/DL
GLUCOSE SERPL-MCNC: 97 MG/DL (ref 65–99)
GLUCOSE UR STRIP-MCNC: NEGATIVE MG/DL
HCT VFR BLD AUTO: 40.5 % (ref 34–46.6)
HGB BLD-MCNC: 12.9 G/DL (ref 12–15.9)
HGB UR QL STRIP.AUTO: ABNORMAL
HOLD SPECIMEN: NORMAL
HOLD SPECIMEN: NORMAL
HYALINE CASTS UR QL AUTO: NORMAL /LPF
IMM GRANULOCYTES # BLD AUTO: 0.02 10*3/MM3 (ref 0–0.05)
IMM GRANULOCYTES NFR BLD AUTO: 0.3 % (ref 0–0.5)
KETONES UR QL STRIP: NEGATIVE
LEUKOCYTE ESTERASE UR QL STRIP.AUTO: NEGATIVE
LIPASE SERPL-CCNC: 95 U/L (ref 13–60)
LYMPHOCYTES # BLD AUTO: 3.45 10*3/MM3 (ref 0.7–3.1)
LYMPHOCYTES NFR BLD AUTO: 44.3 % (ref 19.6–45.3)
MCH RBC QN AUTO: 29.7 PG (ref 26.6–33)
MCHC RBC AUTO-ENTMCNC: 31.9 G/DL (ref 31.5–35.7)
MCV RBC AUTO: 93.1 FL (ref 79–97)
MONOCYTES # BLD AUTO: 0.43 10*3/MM3 (ref 0.1–0.9)
MONOCYTES NFR BLD AUTO: 5.5 % (ref 5–12)
NEUTROPHILS NFR BLD AUTO: 3.67 10*3/MM3 (ref 1.7–7)
NEUTROPHILS NFR BLD AUTO: 47 % (ref 42.7–76)
NITRITE UR QL STRIP: NEGATIVE
NRBC BLD AUTO-RTO: 0 /100 WBC (ref 0–0.2)
PH UR STRIP.AUTO: 8 [PH] (ref 5–8)
PLATELET # BLD AUTO: 317 10*3/MM3 (ref 140–450)
PMV BLD AUTO: 12 FL (ref 6–12)
POTASSIUM SERPL-SCNC: 5.5 MMOL/L (ref 3.5–5.2)
PROT SERPL-MCNC: 6.5 G/DL (ref 6–8.5)
PROT UR QL STRIP: ABNORMAL
RBC # BLD AUTO: 4.35 10*6/MM3 (ref 3.77–5.28)
RBC # UR STRIP: NORMAL /HPF
REF LAB TEST METHOD: NORMAL
SODIUM SERPL-SCNC: 141 MMOL/L (ref 136–145)
SP GR UR STRIP: 1.01 (ref 1–1.03)
SQUAMOUS #/AREA URNS HPF: NORMAL /HPF
UROBILINOGEN UR QL STRIP: ABNORMAL
WBC # UR STRIP: NORMAL /HPF
WBC NRBC COR # BLD AUTO: 7.79 10*3/MM3 (ref 3.4–10.8)
WHOLE BLOOD HOLD COAG: NORMAL
WHOLE BLOOD HOLD SPECIMEN: NORMAL

## 2024-03-13 PROCEDURE — 25810000003 SODIUM CHLORIDE 0.9 % SOLUTION

## 2024-03-13 PROCEDURE — 99285 EMERGENCY DEPT VISIT HI MDM: CPT

## 2024-03-13 PROCEDURE — 25010000002 MORPHINE PER 10 MG: Performed by: EMERGENCY MEDICINE

## 2024-03-13 PROCEDURE — 36415 COLL VENOUS BLD VENIPUNCTURE: CPT | Performed by: EMERGENCY MEDICINE

## 2024-03-13 PROCEDURE — 83690 ASSAY OF LIPASE: CPT | Performed by: EMERGENCY MEDICINE

## 2024-03-13 PROCEDURE — 36415 COLL VENOUS BLD VENIPUNCTURE: CPT

## 2024-03-13 PROCEDURE — 25510000001 IOPAMIDOL PER 1 ML: Performed by: EMERGENCY MEDICINE

## 2024-03-13 PROCEDURE — 96375 TX/PRO/DX INJ NEW DRUG ADDON: CPT

## 2024-03-13 PROCEDURE — 74177 CT ABD & PELVIS W/CONTRAST: CPT

## 2024-03-13 PROCEDURE — 80053 COMPREHEN METABOLIC PANEL: CPT | Performed by: EMERGENCY MEDICINE

## 2024-03-13 PROCEDURE — 81001 URINALYSIS AUTO W/SCOPE: CPT | Performed by: EMERGENCY MEDICINE

## 2024-03-13 PROCEDURE — 83605 ASSAY OF LACTIC ACID: CPT | Performed by: EMERGENCY MEDICINE

## 2024-03-13 PROCEDURE — 85025 COMPLETE CBC W/AUTO DIFF WBC: CPT | Performed by: EMERGENCY MEDICINE

## 2024-03-13 PROCEDURE — 96374 THER/PROPH/DIAG INJ IV PUSH: CPT

## 2024-03-13 PROCEDURE — 25010000002 ONDANSETRON PER 1 MG: Performed by: EMERGENCY MEDICINE

## 2024-03-13 RX ORDER — ONDANSETRON 2 MG/ML
4 INJECTION INTRAMUSCULAR; INTRAVENOUS ONCE
Status: COMPLETED | OUTPATIENT
Start: 2024-03-13 | End: 2024-03-13

## 2024-03-13 RX ORDER — SODIUM CHLORIDE 0.9 % (FLUSH) 0.9 %
10 SYRINGE (ML) INJECTION AS NEEDED
Status: DISCONTINUED | OUTPATIENT
Start: 2024-03-13 | End: 2024-03-13 | Stop reason: HOSPADM

## 2024-03-13 RX ADMIN — ONDANSETRON 4 MG: 2 INJECTION INTRAMUSCULAR; INTRAVENOUS at 13:07

## 2024-03-13 RX ADMIN — SODIUM CHLORIDE 1000 ML: 9 INJECTION, SOLUTION INTRAVENOUS at 13:07

## 2024-03-13 RX ADMIN — IOPAMIDOL 100 ML: 755 INJECTION, SOLUTION INTRAVENOUS at 13:27

## 2024-03-13 RX ADMIN — MORPHINE SULFATE 4 MG: 4 INJECTION, SOLUTION INTRAMUSCULAR; INTRAVENOUS at 13:07

## 2024-03-14 RX ORDER — PROMETHAZINE HYDROCHLORIDE 12.5 MG/1
12.5 TABLET ORAL EVERY 6 HOURS PRN
Qty: 15 TABLET | Refills: 0 | Status: SHIPPED | OUTPATIENT
Start: 2024-03-14

## 2024-04-01 RX ORDER — DICYCLOMINE HYDROCHLORIDE 10 MG/1
10 CAPSULE ORAL
Qty: 120 CAPSULE | Refills: 0 | Status: SHIPPED | OUTPATIENT
Start: 2024-04-01

## 2024-04-05 ENCOUNTER — HOSPITAL ENCOUNTER (OUTPATIENT)
Dept: NUCLEAR MEDICINE | Facility: HOSPITAL | Age: 64
Discharge: HOME | End: 2024-04-05
Payer: MEDICAID

## 2024-04-05 ENCOUNTER — TELEPHONE (OUTPATIENT)
Dept: GASTROENTEROLOGY | Facility: CLINIC | Age: 64
End: 2024-04-05
Payer: MEDICAID

## 2024-04-05 DIAGNOSIS — R11.2 NAUSEA AND VOMITING, UNSPECIFIED VOMITING TYPE: ICD-10-CM

## 2024-04-05 PROCEDURE — A9541 TC99M SULFUR COLLOID: HCPCS | Performed by: INTERNAL MEDICINE

## 2024-04-05 PROCEDURE — 78264 GASTRIC EMPTYING IMG STUDY: CPT

## 2024-04-05 PROCEDURE — 0 TECHNETIUM SULFUR COLLOID: Performed by: INTERNAL MEDICINE

## 2024-04-05 RX ADMIN — TECHNETIUM TC 99M SULFUR COLLOID 1 DOSE: KIT at 07:54

## 2024-04-11 RX ORDER — PROMETHAZINE HYDROCHLORIDE 12.5 MG/1
12.5 TABLET ORAL EVERY 6 HOURS PRN
Qty: 15 TABLET | Refills: 1 | Status: SHIPPED | OUTPATIENT
Start: 2024-04-11

## 2024-04-17 ENCOUNTER — TELEPHONE (OUTPATIENT)
Dept: NEUROSURGERY | Facility: CLINIC | Age: 64
End: 2024-04-17
Payer: MEDICAID

## 2024-05-20 RX ORDER — DICYCLOMINE HYDROCHLORIDE 10 MG/1
10 CAPSULE ORAL
Qty: 120 CAPSULE | Refills: 0 | Status: SHIPPED | OUTPATIENT
Start: 2024-05-20

## 2024-05-21 ENCOUNTER — OFFICE VISIT (OUTPATIENT)
Dept: NEUROSURGERY | Facility: CLINIC | Age: 64
End: 2024-05-21
Payer: MEDICAID

## 2024-05-21 VITALS
BODY MASS INDEX: 24.05 KG/M2 | SYSTOLIC BLOOD PRESSURE: 169 MMHG | WEIGHT: 140.9 LBS | HEIGHT: 64 IN | DIASTOLIC BLOOD PRESSURE: 69 MMHG

## 2024-05-21 DIAGNOSIS — Z46.2 END OF BATTERY LIFE OF INTRATHECAL INFUSION PUMP: Primary | ICD-10-CM

## 2024-05-30 ENCOUNTER — ANESTHESIA EVENT (OUTPATIENT)
Dept: PERIOP | Facility: HOSPITAL | Age: 64
End: 2024-05-30
Payer: MEDICAID

## 2024-05-31 ENCOUNTER — ANESTHESIA (OUTPATIENT)
Dept: PERIOP | Facility: HOSPITAL | Age: 64
End: 2024-05-31
Payer: MEDICAID

## 2024-05-31 ENCOUNTER — TELEPHONE (OUTPATIENT)
Dept: NEUROSURGERY | Facility: CLINIC | Age: 64
End: 2024-05-31

## 2024-05-31 ENCOUNTER — HOSPITAL ENCOUNTER (OUTPATIENT)
Facility: HOSPITAL | Age: 64
Setting detail: HOSPITAL OUTPATIENT SURGERY
Discharge: HOME | End: 2024-05-31
Attending: NEUROLOGICAL SURGERY | Admitting: NEUROLOGICAL SURGERY
Payer: MEDICAID

## 2024-05-31 VITALS
SYSTOLIC BLOOD PRESSURE: 159 MMHG | DIASTOLIC BLOOD PRESSURE: 69 MMHG | WEIGHT: 140.43 LBS | BODY MASS INDEX: 23.98 KG/M2 | HEART RATE: 79 BPM | TEMPERATURE: 98.9 F | RESPIRATION RATE: 16 BRPM | HEIGHT: 64 IN | OXYGEN SATURATION: 93 %

## 2024-05-31 DIAGNOSIS — Z46.2 END OF BATTERY LIFE OF INTRATHECAL INFUSION PUMP: ICD-10-CM

## 2024-05-31 LAB
ANION GAP SERPL CALCULATED.3IONS-SCNC: 11.4 MMOL/L (ref 5–15)
BASOPHILS # BLD AUTO: 0.05 10*3/MM3 (ref 0–0.2)
BASOPHILS NFR BLD AUTO: 0.8 % (ref 0–1.5)
BUN SERPL-MCNC: 45 MG/DL (ref 8–23)
BUN/CREAT SERPL: 7.1 (ref 7–25)
CALCIUM SPEC-SCNC: 8.8 MG/DL (ref 8.6–10.5)
CHLORIDE SERPL-SCNC: 105 MMOL/L (ref 98–107)
CO2 SERPL-SCNC: 22.6 MMOL/L (ref 22–29)
CREAT SERPL-MCNC: 6.38 MG/DL (ref 0.57–1)
DEPRECATED RDW RBC AUTO: 47.2 FL (ref 37–54)
EGFRCR SERPLBLD CKD-EPI 2021: 6.9 ML/MIN/1.73
EOSINOPHIL # BLD AUTO: 0.21 10*3/MM3 (ref 0–0.4)
EOSINOPHIL NFR BLD AUTO: 3.3 % (ref 0.3–6.2)
ERYTHROCYTE [DISTWIDTH] IN BLOOD BY AUTOMATED COUNT: 13.2 % (ref 12.3–15.4)
GLUCOSE SERPL-MCNC: 75 MG/DL (ref 65–99)
HCT VFR BLD AUTO: 31.2 % (ref 34–46.6)
HGB BLD-MCNC: 10.2 G/DL (ref 12–15.9)
IMM GRANULOCYTES # BLD AUTO: 0.02 10*3/MM3 (ref 0–0.05)
IMM GRANULOCYTES NFR BLD AUTO: 0.3 % (ref 0–0.5)
LYMPHOCYTES # BLD AUTO: 3.12 10*3/MM3 (ref 0.7–3.1)
LYMPHOCYTES NFR BLD AUTO: 48.9 % (ref 19.6–45.3)
MCH RBC QN AUTO: 31.6 PG (ref 26.6–33)
MCHC RBC AUTO-ENTMCNC: 32.7 G/DL (ref 31.5–35.7)
MCV RBC AUTO: 96.6 FL (ref 79–97)
MONOCYTES # BLD AUTO: 0.5 10*3/MM3 (ref 0.1–0.9)
MONOCYTES NFR BLD AUTO: 7.8 % (ref 5–12)
NEUTROPHILS NFR BLD AUTO: 2.48 10*3/MM3 (ref 1.7–7)
NEUTROPHILS NFR BLD AUTO: 38.9 % (ref 42.7–76)
NRBC BLD AUTO-RTO: 0 /100 WBC (ref 0–0.2)
PLATELET # BLD AUTO: 229 10*3/MM3 (ref 140–450)
PMV BLD AUTO: 11.5 FL (ref 6–12)
POTASSIUM SERPL-SCNC: 5.3 MMOL/L (ref 3.5–5.2)
RBC # BLD AUTO: 3.23 10*6/MM3 (ref 3.77–5.28)
SODIUM SERPL-SCNC: 139 MMOL/L (ref 136–145)
WBC NRBC COR # BLD AUTO: 6.38 10*3/MM3 (ref 3.4–10.8)

## 2024-05-31 PROCEDURE — 25010000002 FENTANYL CITRATE (PF) 50 MCG/ML SOLUTION

## 2024-05-31 PROCEDURE — 25010000002 ONDANSETRON PER 1 MG

## 2024-05-31 PROCEDURE — 62365 REMOVE SPINE INFUSION DEVICE: CPT | Performed by: NEUROLOGICAL SURGERY

## 2024-05-31 PROCEDURE — 85025 COMPLETE CBC W/AUTO DIFF WBC: CPT | Performed by: NEUROLOGICAL SURGERY

## 2024-05-31 PROCEDURE — 25010000002 MIDAZOLAM PER 1MG: Performed by: ANESTHESIOLOGY

## 2024-05-31 PROCEDURE — 25010000002 PROPOFOL 200 MG/20ML EMULSION

## 2024-05-31 PROCEDURE — 80048 BASIC METABOLIC PNL TOTAL CA: CPT | Performed by: NEUROLOGICAL SURGERY

## 2024-05-31 PROCEDURE — 25010000002 HYDROMORPHONE 1 MG/ML SOLUTION

## 2024-05-31 PROCEDURE — 25810000003 SODIUM CHLORIDE 0.9 % SOLUTION: Performed by: ANESTHESIOLOGY

## 2024-05-31 PROCEDURE — 25010000002 DEXAMETHASONE PER 1 MG

## 2024-05-31 PROCEDURE — 25010000002 CEFAZOLIN PER 500 MG: Performed by: NEUROLOGICAL SURGERY

## 2024-05-31 RX ORDER — PROMETHAZINE HYDROCHLORIDE 25 MG/1
25 SUPPOSITORY RECTAL ONCE AS NEEDED
Status: DISCONTINUED | OUTPATIENT
Start: 2024-05-31 | End: 2024-05-31 | Stop reason: HOSPADM

## 2024-05-31 RX ORDER — DEXAMETHASONE SODIUM PHOSPHATE 4 MG/ML
INJECTION, SOLUTION INTRA-ARTICULAR; INTRALESIONAL; INTRAMUSCULAR; INTRAVENOUS; SOFT TISSUE AS NEEDED
Status: DISCONTINUED | OUTPATIENT
Start: 2024-05-31 | End: 2024-05-31 | Stop reason: SURG

## 2024-05-31 RX ORDER — MIDAZOLAM HYDROCHLORIDE 2 MG/2ML
2 INJECTION, SOLUTION INTRAMUSCULAR; INTRAVENOUS ONCE
Status: COMPLETED | OUTPATIENT
Start: 2024-05-31 | End: 2024-05-31

## 2024-05-31 RX ORDER — OXYCODONE HYDROCHLORIDE 5 MG/1
5 TABLET ORAL
Status: DISCONTINUED | OUTPATIENT
Start: 2024-05-31 | End: 2024-05-31 | Stop reason: HOSPADM

## 2024-05-31 RX ORDER — ONDANSETRON 2 MG/ML
4 INJECTION INTRAMUSCULAR; INTRAVENOUS ONCE AS NEEDED
Status: DISCONTINUED | OUTPATIENT
Start: 2024-05-31 | End: 2024-05-31 | Stop reason: HOSPADM

## 2024-05-31 RX ORDER — ACETAMINOPHEN 500 MG
1000 TABLET ORAL ONCE
Status: COMPLETED | OUTPATIENT
Start: 2024-05-31 | End: 2024-05-31

## 2024-05-31 RX ORDER — GINSENG 100 MG
CAPSULE ORAL AS NEEDED
Status: DISCONTINUED | OUTPATIENT
Start: 2024-05-31 | End: 2024-05-31 | Stop reason: HOSPADM

## 2024-05-31 RX ORDER — FENTANYL CITRATE 50 UG/ML
INJECTION, SOLUTION INTRAMUSCULAR; INTRAVENOUS AS NEEDED
Status: DISCONTINUED | OUTPATIENT
Start: 2024-05-31 | End: 2024-05-31 | Stop reason: SURG

## 2024-05-31 RX ORDER — LIDOCAINE HYDROCHLORIDE 20 MG/ML
INJECTION, SOLUTION EPIDURAL; INFILTRATION; INTRACAUDAL; PERINEURAL AS NEEDED
Status: DISCONTINUED | OUTPATIENT
Start: 2024-05-31 | End: 2024-05-31 | Stop reason: SURG

## 2024-05-31 RX ORDER — ONDANSETRON 2 MG/ML
INJECTION INTRAMUSCULAR; INTRAVENOUS AS NEEDED
Status: DISCONTINUED | OUTPATIENT
Start: 2024-05-31 | End: 2024-05-31 | Stop reason: SURG

## 2024-05-31 RX ORDER — HYDROCODONE BITARTRATE AND ACETAMINOPHEN 5; 325 MG/1; MG/1
1 TABLET ORAL EVERY 4 HOURS PRN
Qty: 10 TABLET | Refills: 0 | Status: ON HOLD | OUTPATIENT
Start: 2024-05-31

## 2024-05-31 RX ORDER — SODIUM CHLORIDE 9 MG/ML
9 INJECTION, SOLUTION INTRAVENOUS CONTINUOUS PRN
Status: DISCONTINUED | OUTPATIENT
Start: 2024-05-31 | End: 2024-05-31 | Stop reason: HOSPADM

## 2024-05-31 RX ORDER — PROPOFOL 10 MG/ML
INJECTION, EMULSION INTRAVENOUS AS NEEDED
Status: DISCONTINUED | OUTPATIENT
Start: 2024-05-31 | End: 2024-05-31 | Stop reason: SURG

## 2024-05-31 RX ORDER — PHENYLEPHRINE HCL IN 0.9% NACL 1 MG/10 ML
SYRINGE (ML) INTRAVENOUS AS NEEDED
Status: DISCONTINUED | OUTPATIENT
Start: 2024-05-31 | End: 2024-05-31 | Stop reason: SURG

## 2024-05-31 RX ORDER — LIDOCAINE HYDROCHLORIDE AND EPINEPHRINE 10; 10 MG/ML; UG/ML
INJECTION, SOLUTION INFILTRATION; PERINEURAL AS NEEDED
Status: DISCONTINUED | OUTPATIENT
Start: 2024-05-31 | End: 2024-05-31 | Stop reason: HOSPADM

## 2024-05-31 RX ORDER — SCOLOPAMINE TRANSDERMAL SYSTEM 1 MG/1
1 PATCH, EXTENDED RELEASE TRANSDERMAL ONCE
Status: DISCONTINUED | OUTPATIENT
Start: 2024-05-31 | End: 2024-05-31 | Stop reason: HOSPADM

## 2024-05-31 RX ORDER — PROMETHAZINE HYDROCHLORIDE 12.5 MG/1
25 TABLET ORAL ONCE AS NEEDED
Status: DISCONTINUED | OUTPATIENT
Start: 2024-05-31 | End: 2024-05-31 | Stop reason: HOSPADM

## 2024-05-31 RX ADMIN — Medication 100 MCG: at 09:51

## 2024-05-31 RX ADMIN — FENTANYL CITRATE 25 MCG: 50 INJECTION, SOLUTION INTRAMUSCULAR; INTRAVENOUS at 09:37

## 2024-05-31 RX ADMIN — PROPOFOL 80 MG: 10 INJECTION, EMULSION INTRAVENOUS at 09:37

## 2024-05-31 RX ADMIN — Medication 100 MCG: at 09:54

## 2024-05-31 RX ADMIN — FENTANYL CITRATE 25 MCG: 50 INJECTION, SOLUTION INTRAMUSCULAR; INTRAVENOUS at 09:59

## 2024-05-31 RX ADMIN — SODIUM CHLORIDE 2 G: 9 INJECTION, SOLUTION INTRAVENOUS at 09:43

## 2024-05-31 RX ADMIN — LIDOCAINE HYDROCHLORIDE 60 MG: 20 INJECTION, SOLUTION EPIDURAL; INFILTRATION; INTRACAUDAL; PERINEURAL at 09:37

## 2024-05-31 RX ADMIN — ACETAMINOPHEN 1000 MG: 500 TABLET ORAL at 08:40

## 2024-05-31 RX ADMIN — SCOPALAMINE 1 PATCH: 1 PATCH, EXTENDED RELEASE TRANSDERMAL at 08:40

## 2024-05-31 RX ADMIN — ONDANSETRON 4 MG: 2 INJECTION INTRAMUSCULAR; INTRAVENOUS at 09:41

## 2024-05-31 RX ADMIN — FENTANYL CITRATE 25 MCG: 50 INJECTION, SOLUTION INTRAMUSCULAR; INTRAVENOUS at 09:54

## 2024-05-31 RX ADMIN — SODIUM CHLORIDE 9 ML/HR: 9 INJECTION, SOLUTION INTRAVENOUS at 08:40

## 2024-05-31 RX ADMIN — DEXAMETHASONE SODIUM PHOSPHATE 4 MG: 4 INJECTION, SOLUTION INTRAMUSCULAR; INTRAVENOUS at 09:41

## 2024-05-31 RX ADMIN — HYDROMORPHONE HYDROCHLORIDE 0.5 MG: 1 INJECTION, SOLUTION INTRAMUSCULAR; INTRAVENOUS; SUBCUTANEOUS at 10:32

## 2024-05-31 RX ADMIN — Medication 100 MCG: at 09:59

## 2024-05-31 RX ADMIN — LIDOCAINE HYDROCHLORIDE 40 MG: 20 INJECTION, SOLUTION EPIDURAL; INFILTRATION; INTRACAUDAL; PERINEURAL at 09:54

## 2024-05-31 RX ADMIN — MIDAZOLAM HYDROCHLORIDE 2 MG: 1 INJECTION, SOLUTION INTRAMUSCULAR; INTRAVENOUS at 09:01

## 2024-06-04 ENCOUNTER — OFFICE VISIT (OUTPATIENT)
Dept: NEUROSURGERY | Facility: CLINIC | Age: 64
End: 2024-06-04
Payer: MEDICAID

## 2024-06-04 VITALS
DIASTOLIC BLOOD PRESSURE: 92 MMHG | BODY MASS INDEX: 23.63 KG/M2 | WEIGHT: 138.4 LBS | SYSTOLIC BLOOD PRESSURE: 167 MMHG | HEIGHT: 64 IN

## 2024-06-04 DIAGNOSIS — G96.00 POSTOPERATIVE CSF LEAK: ICD-10-CM

## 2024-06-04 DIAGNOSIS — Z46.2 END OF BATTERY LIFE OF INTRATHECAL INFUSION PUMP: Primary | ICD-10-CM

## 2024-06-04 DIAGNOSIS — G97.82 POSTOPERATIVE CSF LEAK: ICD-10-CM

## 2024-06-04 PROCEDURE — 99024 POSTOP FOLLOW-UP VISIT: CPT | Performed by: NEUROLOGICAL SURGERY

## 2024-06-05 ENCOUNTER — TELEPHONE (OUTPATIENT)
Dept: NEUROSURGERY | Facility: CLINIC | Age: 64
End: 2024-06-05

## 2024-06-05 ENCOUNTER — APPOINTMENT (OUTPATIENT)
Dept: GENERAL RADIOLOGY | Facility: HOSPITAL | Age: 64
End: 2024-06-05
Payer: MEDICAID

## 2024-06-05 ENCOUNTER — ANESTHESIA EVENT (OUTPATIENT)
Dept: PERIOP | Facility: HOSPITAL | Age: 64
End: 2024-06-05
Payer: MEDICAID

## 2024-06-05 ENCOUNTER — ANESTHESIA (OUTPATIENT)
Dept: PERIOP | Facility: HOSPITAL | Age: 64
End: 2024-06-05
Payer: MEDICAID

## 2024-06-05 ENCOUNTER — APPOINTMENT (OUTPATIENT)
Dept: CT IMAGING | Facility: HOSPITAL | Age: 64
End: 2024-06-05
Payer: MEDICAID

## 2024-06-05 ENCOUNTER — HOSPITAL ENCOUNTER (INPATIENT)
Facility: HOSPITAL | Age: 64
LOS: 14 days | Discharge: HOME | End: 2024-06-19
Attending: EMERGENCY MEDICINE | Admitting: NEUROLOGICAL SURGERY
Payer: MEDICAID

## 2024-06-05 DIAGNOSIS — R65.20 SEPSIS WITH ENCEPHALOPATHY WITHOUT SEPTIC SHOCK, DUE TO UNSPECIFIED ORGANISM: ICD-10-CM

## 2024-06-05 DIAGNOSIS — Z09 POSTOPERATIVE FOLLOW-UP: ICD-10-CM

## 2024-06-05 DIAGNOSIS — G97.82 MENINGITIS AFTER PROCEDURE: ICD-10-CM

## 2024-06-05 DIAGNOSIS — E87.5 ACUTE HYPERKALEMIA: ICD-10-CM

## 2024-06-05 DIAGNOSIS — G93.41 SEPSIS WITH ENCEPHALOPATHY WITHOUT SEPTIC SHOCK, DUE TO UNSPECIFIED ORGANISM: ICD-10-CM

## 2024-06-05 DIAGNOSIS — G03.8 MENINGITIS AFTER PROCEDURE: ICD-10-CM

## 2024-06-05 DIAGNOSIS — N18.6 ESRD ON PERITONEAL DIALYSIS: ICD-10-CM

## 2024-06-05 DIAGNOSIS — R26.2 DIFFICULTY IN WALKING: ICD-10-CM

## 2024-06-05 DIAGNOSIS — R50.9 ACUTE FEBRILE ILLNESS: ICD-10-CM

## 2024-06-05 DIAGNOSIS — A41.9 SEPSIS WITH ENCEPHALOPATHY WITHOUT SEPTIC SHOCK, DUE TO UNSPECIFIED ORGANISM: ICD-10-CM

## 2024-06-05 DIAGNOSIS — R41.82 ALTERED MENTAL STATUS, UNSPECIFIED ALTERED MENTAL STATUS TYPE: Primary | ICD-10-CM

## 2024-06-05 DIAGNOSIS — Z99.2 ESRD ON PERITONEAL DIALYSIS: ICD-10-CM

## 2024-06-05 LAB
ALBUMIN SERPL-MCNC: 3.7 G/DL (ref 3.5–5.2)
ALBUMIN/GLOB SERPL: 1.2 G/DL
ALP SERPL-CCNC: 92 U/L (ref 39–117)
ALT SERPL W P-5'-P-CCNC: <5 U/L (ref 1–33)
ANION GAP SERPL CALCULATED.3IONS-SCNC: 18.2 MMOL/L (ref 5–15)
ANION GAP SERPL CALCULATED.3IONS-SCNC: 21 MMOL/L (ref 5–15)
APPEARANCE CSF: ABNORMAL
AST SERPL-CCNC: 20 U/L (ref 1–32)
BASOPHILS # BLD AUTO: 0.02 10*3/MM3 (ref 0–0.2)
BASOPHILS NFR BLD AUTO: 0.1 % (ref 0–1.5)
BILIRUB SERPL-MCNC: 0.2 MG/DL (ref 0–1.2)
BUN SERPL-MCNC: 57 MG/DL (ref 8–23)
BUN SERPL-MCNC: 62 MG/DL (ref 8–23)
BUN/CREAT SERPL: 8 (ref 7–25)
BUN/CREAT SERPL: 8.2 (ref 7–25)
C GATTII+NEOFOR DNA CSF QL NAA+NON-PROBE: NOT DETECTED
CALCIUM SPEC-SCNC: 10.6 MG/DL (ref 8.6–10.5)
CALCIUM SPEC-SCNC: 9.3 MG/DL (ref 8.6–10.5)
CHLORIDE SERPL-SCNC: 102 MMOL/L (ref 98–107)
CHLORIDE SERPL-SCNC: 112 MMOL/L (ref 98–107)
CMV DNA CSF QL NAA+PROBE: NOT DETECTED
CO2 SERPL-SCNC: 13.8 MMOL/L (ref 22–29)
CO2 SERPL-SCNC: 14 MMOL/L (ref 22–29)
COLOR CSF: COLORLESS
CREAT SERPL-MCNC: 6.91 MG/DL (ref 0.57–1)
CREAT SERPL-MCNC: 7.71 MG/DL (ref 0.57–1)
D-LACTATE SERPL-SCNC: 2.4 MMOL/L (ref 0.5–2)
DEPRECATED RDW RBC AUTO: 46.7 FL (ref 37–54)
E COLI K1 DNA CSF QL NAA+NON-PROBE: NOT DETECTED
EGFRCR SERPLBLD CKD-EPI 2021: 5.5 ML/MIN/1.73
EGFRCR SERPLBLD CKD-EPI 2021: 6.3 ML/MIN/1.73
EOSINOPHIL # BLD AUTO: 0.01 10*3/MM3 (ref 0–0.4)
EOSINOPHIL NFR BLD AUTO: 0.1 % (ref 0.3–6.2)
ERYTHROCYTE [DISTWIDTH] IN BLOOD BY AUTOMATED COUNT: 13.4 % (ref 12.3–15.4)
EV RNA CSF QL NAA+PROBE: NOT DETECTED
GLOBULIN UR ELPH-MCNC: 3 GM/DL
GLUCOSE BLDC GLUCOMTR-MCNC: 100 MG/DL (ref 70–99)
GLUCOSE BLDC GLUCOMTR-MCNC: 126 MG/DL (ref 70–99)
GLUCOSE CSF-MCNC: 58 MG/DL (ref 40–70)
GLUCOSE SERPL-MCNC: 145 MG/DL (ref 65–99)
GLUCOSE SERPL-MCNC: 89 MG/DL (ref 65–99)
GP B STREP DNA SPEC QL NAA+PROBE: NOT DETECTED
HAEM INFLU SEROTYP DNA SPEC NAA+PROBE: NOT DETECTED
HCT VFR BLD AUTO: 34.9 % (ref 34–46.6)
HGB BLD-MCNC: 11.7 G/DL (ref 12–15.9)
HHV6 DNA CSF QL NAA+PROBE: NOT DETECTED
HOLD SPECIMEN: NORMAL
HOLD SPECIMEN: NORMAL
HSV1 DNA CSF QL NAA+PROBE: NOT DETECTED
HSV2 DNA CSF QL NAA+PROBE: NOT DETECTED
IMM GRANULOCYTES # BLD AUTO: 0.1 10*3/MM3 (ref 0–0.05)
IMM GRANULOCYTES NFR BLD AUTO: 0.7 % (ref 0–0.5)
L MONOCYTOG RRNA SPEC QL PROBE: NOT DETECTED
LYMPHOCYTES # BLD AUTO: 0.9 10*3/MM3 (ref 0.7–3.1)
LYMPHOCYTES NFR BLD AUTO: 6.3 % (ref 19.6–45.3)
LYMPHOCYTES NFR CSF MANUAL: 14 % (ref 40–80)
MAGNESIUM SERPL-MCNC: 1.7 MG/DL (ref 1.6–2.4)
MCH RBC QN AUTO: 32.1 PG (ref 26.6–33)
MCHC RBC AUTO-ENTMCNC: 33.5 G/DL (ref 31.5–35.7)
MCV RBC AUTO: 95.6 FL (ref 79–97)
MONOCYTES # BLD AUTO: 0.27 10*3/MM3 (ref 0.1–0.9)
MONOCYTES NFR BLD AUTO: 1.9 % (ref 5–12)
MONOCYTES NFR CSF MANUAL: 6 % (ref 15–45)
N MEN DNA SPEC QL NAA+PROBE: NOT DETECTED
NEUTROPHILS NFR BLD AUTO: 12.88 10*3/MM3 (ref 1.7–7)
NEUTROPHILS NFR BLD AUTO: 90.9 % (ref 42.7–76)
NEUTROPHILS NFR CSF MICRO: 80 % (ref 0–5)
NRBC BLD AUTO-RTO: 0 /100 WBC (ref 0–0.2)
NUC CELL # CSF MANUAL: 2390 /MM3 (ref 0–5)
PARECHOVIRUS A RNA CSF QL NAA+NON-PROBE: NOT DETECTED
PLATELET # BLD AUTO: 276 10*3/MM3 (ref 140–450)
PMV BLD AUTO: 12.1 FL (ref 6–12)
POTASSIUM SERPL-SCNC: 3.6 MMOL/L (ref 3.5–5.2)
POTASSIUM SERPL-SCNC: 6.2 MMOL/L (ref 3.5–5.2)
PROCALCITONIN SERPL-MCNC: 13.64 NG/ML (ref 0–0.25)
PROT CSF-MCNC: 166.7 MG/DL (ref 15–45)
PROT SERPL-MCNC: 6.7 G/DL (ref 6–8.5)
RBC # BLD AUTO: 3.65 10*6/MM3 (ref 3.77–5.28)
RBC # CSF MANUAL: 20 /MM3
S PNEUM DNA CSF QL NAA+NON-PROBE: NOT DETECTED
SODIUM SERPL-SCNC: 137 MMOL/L (ref 136–145)
SODIUM SERPL-SCNC: 144 MMOL/L (ref 136–145)
TROPONIN T SERPL HS-MCNC: 23 NG/L
TUBE # CSF: ABNORMAL
VZV DNA CSF QL NAA+PROBE: NOT DETECTED
WBC NRBC COR # BLD AUTO: 14.18 10*3/MM3 (ref 3.4–10.8)
WHOLE BLOOD HOLD COAG: NORMAL
WHOLE BLOOD HOLD SPECIMEN: NORMAL
XANTHOCHROMIA FLD QL: ABNORMAL

## 2024-06-05 PROCEDURE — 87483 CNS DNA AMP PROBE TYPE 12-25: CPT | Performed by: NEUROLOGICAL SURGERY

## 2024-06-05 PROCEDURE — 84484 ASSAY OF TROPONIN QUANT: CPT | Performed by: EMERGENCY MEDICINE

## 2024-06-05 PROCEDURE — 63710000001 INSULIN REGULAR HUMAN PER 5 UNITS: Performed by: EMERGENCY MEDICINE

## 2024-06-05 PROCEDURE — 83735 ASSAY OF MAGNESIUM: CPT | Performed by: EMERGENCY MEDICINE

## 2024-06-05 PROCEDURE — 86592 SYPHILIS TEST NON-TREP QUAL: CPT | Performed by: NEUROLOGICAL SURGERY

## 2024-06-05 PROCEDURE — 89051 BODY FLUID CELL COUNT: CPT | Performed by: NEUROLOGICAL SURGERY

## 2024-06-05 PROCEDURE — 25010000002 CLEVIDIPINE BUTYRATE PER 1 MG: Performed by: PHYSICIAN ASSISTANT

## 2024-06-05 PROCEDURE — 71045 X-RAY EXAM CHEST 1 VIEW: CPT

## 2024-06-05 PROCEDURE — 83605 ASSAY OF LACTIC ACID: CPT | Performed by: EMERGENCY MEDICINE

## 2024-06-05 PROCEDURE — 62355 REMOVE SPINAL CANAL CATHETER: CPT | Performed by: NEUROLOGICAL SURGERY

## 2024-06-05 PROCEDURE — 84157 ASSAY OF PROTEIN OTHER: CPT | Performed by: NEUROLOGICAL SURGERY

## 2024-06-05 PROCEDURE — 87070 CULTURE OTHR SPECIMN AEROBIC: CPT | Performed by: NEUROLOGICAL SURGERY

## 2024-06-05 PROCEDURE — 25810000003 SODIUM CHLORIDE 0.9 % SOLUTION: Performed by: EMERGENCY MEDICINE

## 2024-06-05 PROCEDURE — C9248 INJ, CLEVIDIPINE BUTYRATE: HCPCS | Performed by: PHYSICIAN ASSISTANT

## 2024-06-05 PROCEDURE — 82948 REAGENT STRIP/BLOOD GLUCOSE: CPT | Performed by: NEUROLOGICAL SURGERY

## 2024-06-05 PROCEDURE — 87205 SMEAR GRAM STAIN: CPT | Performed by: NEUROLOGICAL SURGERY

## 2024-06-05 PROCEDURE — 36415 COLL VENOUS BLD VENIPUNCTURE: CPT

## 2024-06-05 PROCEDURE — 25010000002 CEFTRIAXONE PER 250 MG: Performed by: EMERGENCY MEDICINE

## 2024-06-05 PROCEDURE — 99024 POSTOP FOLLOW-UP VISIT: CPT | Performed by: NEUROLOGICAL SURGERY

## 2024-06-05 PROCEDURE — 80053 COMPREHEN METABOLIC PANEL: CPT | Performed by: EMERGENCY MEDICINE

## 2024-06-05 PROCEDURE — 25010000002 MIDAZOLAM PER 1MG: Performed by: STUDENT IN AN ORGANIZED HEALTH CARE EDUCATION/TRAINING PROGRAM

## 2024-06-05 PROCEDURE — 99223 1ST HOSP IP/OBS HIGH 75: CPT | Performed by: STUDENT IN AN ORGANIZED HEALTH CARE EDUCATION/TRAINING PROGRAM

## 2024-06-05 PROCEDURE — 93010 ELECTROCARDIOGRAM REPORT: CPT | Performed by: INTERNAL MEDICINE

## 2024-06-05 PROCEDURE — 82945 GLUCOSE OTHER FLUID: CPT | Performed by: NEUROLOGICAL SURGERY

## 2024-06-05 PROCEDURE — 25010000002 ONDANSETRON PER 1 MG

## 2024-06-05 PROCEDURE — 84145 PROCALCITONIN (PCT): CPT | Performed by: STUDENT IN AN ORGANIZED HEALTH CARE EDUCATION/TRAINING PROGRAM

## 2024-06-05 PROCEDURE — 87040 BLOOD CULTURE FOR BACTERIA: CPT | Performed by: EMERGENCY MEDICINE

## 2024-06-05 PROCEDURE — 25010000002 CALCIUM GLUCONATE-NACL 1-0.675 GM/50ML-% SOLUTION: Performed by: EMERGENCY MEDICINE

## 2024-06-05 PROCEDURE — 25010000002 VANCOMYCIN 5 G RECONSTITUTED SOLUTION: Performed by: EMERGENCY MEDICINE

## 2024-06-05 PROCEDURE — 25010000002 SUGAMMADEX 200 MG/2ML SOLUTION

## 2024-06-05 PROCEDURE — 82948 REAGENT STRIP/BLOOD GLUCOSE: CPT | Performed by: EMERGENCY MEDICINE

## 2024-06-05 PROCEDURE — 25010000002 ESMOLOL 100 MG/10ML SOLUTION

## 2024-06-05 PROCEDURE — 0JPT0VZ REMOVAL OF INFUSION PUMP FROM TRUNK SUBCUTANEOUS TISSUE AND FASCIA, OPEN APPROACH: ICD-10-PCS | Performed by: NEUROLOGICAL SURGERY

## 2024-06-05 PROCEDURE — 25010000002 DEXAMETHASONE PER 1 MG

## 2024-06-05 PROCEDURE — 70450 CT HEAD/BRAIN W/O DYE: CPT

## 2024-06-05 PROCEDURE — 93005 ELECTROCARDIOGRAM TRACING: CPT | Performed by: STUDENT IN AN ORGANIZED HEALTH CARE EDUCATION/TRAINING PROGRAM

## 2024-06-05 PROCEDURE — 25010000002 FENTANYL CITRATE (PF) 50 MCG/ML SOLUTION

## 2024-06-05 PROCEDURE — 25010000002 PROPOFOL 200 MG/20ML EMULSION

## 2024-06-05 PROCEDURE — 25010000002 AMPICILLIN PER 500 MG: Performed by: EMERGENCY MEDICINE

## 2024-06-05 PROCEDURE — 99285 EMERGENCY DEPT VISIT HI MDM: CPT

## 2024-06-05 PROCEDURE — 25810000003 SODIUM CHLORIDE 0.9 % SOLUTION 500 ML FLEX CONT: Performed by: STUDENT IN AN ORGANIZED HEALTH CARE EDUCATION/TRAINING PROGRAM

## 2024-06-05 PROCEDURE — 25810000003 SEPSIS FLUID NS 0.9 % SOLUTION: Performed by: EMERGENCY MEDICINE

## 2024-06-05 PROCEDURE — 87154 CUL TYP ID BLD PTHGN 6+ TRGT: CPT | Performed by: EMERGENCY MEDICINE

## 2024-06-05 PROCEDURE — 99291 CRITICAL CARE FIRST HOUR: CPT | Performed by: INTERNAL MEDICINE

## 2024-06-05 PROCEDURE — 85025 COMPLETE CBC W/AUTO DIFF WBC: CPT

## 2024-06-05 PROCEDURE — 25010000002 HYDROMORPHONE 1 MG/ML SOLUTION: Performed by: FAMILY MEDICINE

## 2024-06-05 PROCEDURE — 87015 SPECIMEN INFECT AGNT CONCNTJ: CPT | Performed by: NEUROLOGICAL SURGERY

## 2024-06-05 PROCEDURE — 25010000002 HYDROMORPHONE 1 MG/ML SOLUTION

## 2024-06-05 PROCEDURE — 25010000002 LABETALOL 5 MG/ML SOLUTION

## 2024-06-05 RX ORDER — ATORVASTATIN CALCIUM 10 MG/1
10 TABLET, FILM COATED ORAL NIGHTLY
Status: DISCONTINUED | OUTPATIENT
Start: 2024-06-05 | End: 2024-06-19 | Stop reason: HOSPADM

## 2024-06-05 RX ORDER — PROMETHAZINE HYDROCHLORIDE 12.5 MG/1
25 TABLET ORAL ONCE AS NEEDED
Status: DISCONTINUED | OUTPATIENT
Start: 2024-06-05 | End: 2024-06-05 | Stop reason: HOSPADM

## 2024-06-05 RX ORDER — FENTANYL CITRATE 50 UG/ML
INJECTION, SOLUTION INTRAMUSCULAR; INTRAVENOUS AS NEEDED
Status: DISCONTINUED | OUTPATIENT
Start: 2024-06-05 | End: 2024-06-05 | Stop reason: SURG

## 2024-06-05 RX ORDER — PANTOPRAZOLE SODIUM 40 MG/1
40 TABLET, DELAYED RELEASE ORAL
Status: DISCONTINUED | OUTPATIENT
Start: 2024-06-06 | End: 2024-06-19 | Stop reason: HOSPADM

## 2024-06-05 RX ORDER — SODIUM CHLORIDE, SODIUM LACTATE, CALCIUM CHLORIDE, MAGNESIUM CHLORIDE AND DEXTROSE 1.5; 538; 448; 18.3; 5.08 G/100ML; MG/100ML; MG/100ML; MG/100ML; MG/100ML
2000 INJECTION, SOLUTION INTRAPERITONEAL
Status: COMPLETED | OUTPATIENT
Start: 2024-06-07 | End: 2024-06-07

## 2024-06-05 RX ORDER — METOPROLOL SUCCINATE 50 MG/1
50 TABLET, EXTENDED RELEASE ORAL NIGHTLY
Status: DISCONTINUED | OUTPATIENT
Start: 2024-06-05 | End: 2024-06-07

## 2024-06-05 RX ORDER — SODIUM CHLORIDE 0.9 % (FLUSH) 0.9 %
10 SYRINGE (ML) INJECTION AS NEEDED
Status: DISCONTINUED | OUTPATIENT
Start: 2024-06-05 | End: 2024-06-19 | Stop reason: HOSPADM

## 2024-06-05 RX ORDER — DEXTROSE MONOHYDRATE 25 G/50ML
25 INJECTION, SOLUTION INTRAVENOUS ONCE
Status: DISCONTINUED | OUTPATIENT
Start: 2024-06-05 | End: 2024-06-05

## 2024-06-05 RX ORDER — LIDOCAINE HYDROCHLORIDE 20 MG/ML
INJECTION, SOLUTION EPIDURAL; INFILTRATION; INTRACAUDAL; PERINEURAL AS NEEDED
Status: DISCONTINUED | OUTPATIENT
Start: 2024-06-05 | End: 2024-06-05 | Stop reason: SURG

## 2024-06-05 RX ORDER — BUPIVACAINE HYDROCHLORIDE AND EPINEPHRINE 5; 5 MG/ML; UG/ML
INJECTION, SOLUTION EPIDURAL; INTRACAUDAL; PERINEURAL AS NEEDED
Status: DISCONTINUED | OUTPATIENT
Start: 2024-06-05 | End: 2024-06-05 | Stop reason: HOSPADM

## 2024-06-05 RX ORDER — ALBUTEROL SULFATE 90 UG/1
2 AEROSOL, METERED RESPIRATORY (INHALATION) EVERY 4 HOURS PRN
Status: DISCONTINUED | OUTPATIENT
Start: 2024-06-05 | End: 2024-06-19 | Stop reason: HOSPADM

## 2024-06-05 RX ORDER — ROCURONIUM BROMIDE 10 MG/ML
INJECTION, SOLUTION INTRAVENOUS AS NEEDED
Status: DISCONTINUED | OUTPATIENT
Start: 2024-06-05 | End: 2024-06-05 | Stop reason: SURG

## 2024-06-05 RX ORDER — PREGABALIN 75 MG/1
150 CAPSULE ORAL NIGHTLY
Status: DISCONTINUED | OUTPATIENT
Start: 2024-06-05 | End: 2024-06-05

## 2024-06-05 RX ORDER — ACETAMINOPHEN 650 MG/1
650 SUPPOSITORY RECTAL ONCE
Status: COMPLETED | OUTPATIENT
Start: 2024-06-05 | End: 2024-06-05

## 2024-06-05 RX ORDER — PROPOFOL 10 MG/ML
INJECTION, EMULSION INTRAVENOUS AS NEEDED
Status: DISCONTINUED | OUTPATIENT
Start: 2024-06-05 | End: 2024-06-05 | Stop reason: SURG

## 2024-06-05 RX ORDER — CALCIUM GLUCONATE 20 MG/ML
1000 INJECTION, SOLUTION INTRAVENOUS ONCE
Status: COMPLETED | OUTPATIENT
Start: 2024-06-05 | End: 2024-06-05

## 2024-06-05 RX ORDER — OXYCODONE HYDROCHLORIDE 5 MG/1
5 TABLET ORAL
Status: DISCONTINUED | OUTPATIENT
Start: 2024-06-05 | End: 2024-06-05 | Stop reason: HOSPADM

## 2024-06-05 RX ORDER — LOSARTAN POTASSIUM 50 MG/1
50 TABLET ORAL NIGHTLY
Status: DISCONTINUED | OUTPATIENT
Start: 2024-06-05 | End: 2024-06-05

## 2024-06-05 RX ORDER — DEXAMETHASONE SODIUM PHOSPHATE 4 MG/ML
INJECTION, SOLUTION INTRA-ARTICULAR; INTRALESIONAL; INTRAMUSCULAR; INTRAVENOUS; SOFT TISSUE AS NEEDED
Status: DISCONTINUED | OUTPATIENT
Start: 2024-06-05 | End: 2024-06-05 | Stop reason: SURG

## 2024-06-05 RX ORDER — AMLODIPINE BESYLATE 10 MG/1
10 TABLET ORAL DAILY
Status: DISCONTINUED | OUTPATIENT
Start: 2024-06-06 | End: 2024-06-12

## 2024-06-05 RX ORDER — ESMOLOL HYDROCHLORIDE 10 MG/ML
INJECTION INTRAVENOUS AS NEEDED
Status: DISCONTINUED | OUTPATIENT
Start: 2024-06-05 | End: 2024-06-05 | Stop reason: SURG

## 2024-06-05 RX ORDER — ACETAMINOPHEN 500 MG
1000 TABLET ORAL EVERY 8 HOURS PRN
Status: DISCONTINUED | OUTPATIENT
Start: 2024-06-05 | End: 2024-06-19 | Stop reason: HOSPADM

## 2024-06-05 RX ORDER — SODIUM CHLORIDE 0.9 % (FLUSH) 0.9 %
10 SYRINGE (ML) INJECTION EVERY 12 HOURS SCHEDULED
Status: DISCONTINUED | OUTPATIENT
Start: 2024-06-05 | End: 2024-06-19 | Stop reason: HOSPADM

## 2024-06-05 RX ORDER — FLUTICASONE PROPIONATE 50 MCG
2 SPRAY, SUSPENSION (ML) NASAL DAILY PRN
Status: DISCONTINUED | OUTPATIENT
Start: 2024-06-05 | End: 2024-06-19 | Stop reason: HOSPADM

## 2024-06-05 RX ORDER — DEXTROSE MONOHYDRATE 25 G/50ML
25 INJECTION, SOLUTION INTRAVENOUS ONCE
Status: COMPLETED | OUTPATIENT
Start: 2024-06-05 | End: 2024-06-05

## 2024-06-05 RX ORDER — PROMETHAZINE HYDROCHLORIDE 25 MG/1
25 SUPPOSITORY RECTAL ONCE AS NEEDED
Status: DISCONTINUED | OUTPATIENT
Start: 2024-06-05 | End: 2024-06-05 | Stop reason: HOSPADM

## 2024-06-05 RX ORDER — SODIUM CHLORIDE, SODIUM LACTATE, CALCIUM CHLORIDE, MAGNESIUM CHLORIDE AND DEXTROSE 1.5; 538; 448; 18.3; 5.08 G/100ML; MG/100ML; MG/100ML; MG/100ML; MG/100ML
2000 INJECTION, SOLUTION INTRAPERITONEAL
Status: DISCONTINUED | OUTPATIENT
Start: 2024-06-05 | End: 2024-06-05

## 2024-06-05 RX ORDER — ONDANSETRON 2 MG/ML
4 INJECTION INTRAMUSCULAR; INTRAVENOUS ONCE AS NEEDED
Status: DISCONTINUED | OUTPATIENT
Start: 2024-06-05 | End: 2024-06-05 | Stop reason: HOSPADM

## 2024-06-05 RX ORDER — SODIUM CHLORIDE 9 MG/ML
40 INJECTION, SOLUTION INTRAVENOUS AS NEEDED
Status: DISCONTINUED | OUTPATIENT
Start: 2024-06-05 | End: 2024-06-19 | Stop reason: HOSPADM

## 2024-06-05 RX ORDER — NITROGLYCERIN 0.4 MG/1
0.4 TABLET SUBLINGUAL
Status: DISCONTINUED | OUTPATIENT
Start: 2024-06-05 | End: 2024-06-10

## 2024-06-05 RX ORDER — LABETALOL HYDROCHLORIDE 5 MG/ML
INJECTION, SOLUTION INTRAVENOUS AS NEEDED
Status: DISCONTINUED | OUTPATIENT
Start: 2024-06-05 | End: 2024-06-05 | Stop reason: SURG

## 2024-06-05 RX ORDER — ONDANSETRON 2 MG/ML
INJECTION INTRAMUSCULAR; INTRAVENOUS AS NEEDED
Status: DISCONTINUED | OUTPATIENT
Start: 2024-06-05 | End: 2024-06-05 | Stop reason: SURG

## 2024-06-05 RX ORDER — MIDAZOLAM HYDROCHLORIDE 2 MG/2ML
1 INJECTION, SOLUTION INTRAMUSCULAR; INTRAVENOUS ONCE AS NEEDED
Status: COMPLETED | OUTPATIENT
Start: 2024-06-05 | End: 2024-06-05

## 2024-06-05 RX ORDER — TIZANIDINE 4 MG/1
4 TABLET ORAL NIGHTLY
Status: DISCONTINUED | OUTPATIENT
Start: 2024-06-05 | End: 2024-06-05

## 2024-06-05 RX ORDER — AMOXICILLIN 250 MG
1 CAPSULE ORAL DAILY
Status: DISCONTINUED | OUTPATIENT
Start: 2024-06-06 | End: 2024-06-19 | Stop reason: HOSPADM

## 2024-06-05 RX ORDER — DEXMEDETOMIDINE HYDROCHLORIDE 4 UG/ML
.2-1.5 INJECTION, SOLUTION INTRAVENOUS
Status: DISCONTINUED | OUTPATIENT
Start: 2024-06-05 | End: 2024-06-07

## 2024-06-05 RX ADMIN — LABETALOL HYDROCHLORIDE 2.5 MG: 5 INJECTION, SOLUTION INTRAVENOUS at 16:47

## 2024-06-05 RX ADMIN — ACETAMINOPHEN 650 MG: 650 SUPPOSITORY RECTAL at 20:19

## 2024-06-05 RX ADMIN — SODIUM CHLORIDE 1884 ML: 9 INJECTION, SOLUTION INTRAVENOUS at 13:26

## 2024-06-05 RX ADMIN — ESMOLOL HYDROCHLORIDE 10 MG: 100 INJECTION, SOLUTION INTRAVENOUS at 16:43

## 2024-06-05 RX ADMIN — DEXTROSE MONOHYDRATE 25 G: 25 INJECTION, SOLUTION INTRAVENOUS at 14:15

## 2024-06-05 RX ADMIN — SODIUM CHLORIDE 950 ML: 9 INJECTION, SOLUTION INTRAVENOUS at 15:26

## 2024-06-05 RX ADMIN — ESMOLOL HYDROCHLORIDE 10 MG: 100 INJECTION, SOLUTION INTRAVENOUS at 16:31

## 2024-06-05 RX ADMIN — LIDOCAINE HYDROCHLORIDE 40 MG: 20 INJECTION, SOLUTION EPIDURAL; INFILTRATION; INTRACAUDAL; PERINEURAL at 15:30

## 2024-06-05 RX ADMIN — FENTANYL CITRATE 25 MCG: 50 INJECTION, SOLUTION INTRAMUSCULAR; INTRAVENOUS at 16:51

## 2024-06-05 RX ADMIN — SODIUM CHLORIDE 44 ML: 9 INJECTION, SOLUTION INTRAVENOUS at 16:10

## 2024-06-05 RX ADMIN — ACETAMINOPHEN 650 MG: 650 SUPPOSITORY RECTAL at 13:29

## 2024-06-05 RX ADMIN — SODIUM BICARBONATE 50 MEQ: 84 INJECTION INTRAVENOUS at 14:12

## 2024-06-05 RX ADMIN — HYDROMORPHONE HYDROCHLORIDE 0.5 MG: 1 INJECTION, SOLUTION INTRAMUSCULAR; INTRAVENOUS; SUBCUTANEOUS at 21:41

## 2024-06-05 RX ADMIN — LABETALOL HYDROCHLORIDE 2.5 MG: 5 INJECTION, SOLUTION INTRAVENOUS at 16:53

## 2024-06-05 RX ADMIN — FENTANYL CITRATE 25 MCG: 50 INJECTION, SOLUTION INTRAMUSCULAR; INTRAVENOUS at 15:43

## 2024-06-05 RX ADMIN — CALCIUM GLUCONATE 1000 MG: 20 INJECTION, SOLUTION INTRAVENOUS at 14:07

## 2024-06-05 RX ADMIN — ESMOLOL HYDROCHLORIDE 10 MG: 100 INJECTION, SOLUTION INTRAVENOUS at 16:50

## 2024-06-05 RX ADMIN — FENTANYL CITRATE 25 MCG: 50 INJECTION, SOLUTION INTRAMUSCULAR; INTRAVENOUS at 16:42

## 2024-06-05 RX ADMIN — SUGAMMADEX 200 MG: 100 INJECTION, SOLUTION INTRAVENOUS at 16:26

## 2024-06-05 RX ADMIN — VANCOMYCIN HYDROCHLORIDE 1250 MG: 5 INJECTION, POWDER, LYOPHILIZED, FOR SOLUTION INTRAVENOUS at 13:44

## 2024-06-05 RX ADMIN — SODIUM CHLORIDE 9 ML/HR: 9 INJECTION, SOLUTION INTRAVENOUS at 16:15

## 2024-06-05 RX ADMIN — Medication 10 ML: at 22:55

## 2024-06-05 RX ADMIN — CLEVIPIDINE 2 MG/HR: 0.5 EMULSION INTRAVENOUS at 18:30

## 2024-06-05 RX ADMIN — ONDANSETRON 4 MG: 2 INJECTION INTRAMUSCULAR; INTRAVENOUS at 16:00

## 2024-06-05 RX ADMIN — MIDAZOLAM HYDROCHLORIDE 1 MG: 1 INJECTION, SOLUTION INTRAMUSCULAR; INTRAVENOUS at 20:19

## 2024-06-05 RX ADMIN — SODIUM CHLORIDE 9 ML/HR: 9 INJECTION, SOLUTION INTRAVENOUS at 15:25

## 2024-06-05 RX ADMIN — INSULIN HUMAN 7 UNITS: 100 INJECTION, SOLUTION PARENTERAL at 14:12

## 2024-06-05 RX ADMIN — AMPICILLIN SODIUM 2000 MG: 2 INJECTION, POWDER, FOR SOLUTION INTRAVENOUS at 13:29

## 2024-06-05 RX ADMIN — PROPOFOL 120 MG: 10 INJECTION, EMULSION INTRAVENOUS at 15:30

## 2024-06-05 RX ADMIN — ROCURONIUM BROMIDE 50 MG: 10 INJECTION, SOLUTION INTRAVENOUS at 15:30

## 2024-06-05 RX ADMIN — DEXAMETHASONE SODIUM PHOSPHATE 4 MG: 4 INJECTION, SOLUTION INTRAMUSCULAR; INTRAVENOUS at 15:34

## 2024-06-05 RX ADMIN — FENTANYL CITRATE 25 MCG: 50 INJECTION, SOLUTION INTRAMUSCULAR; INTRAVENOUS at 15:30

## 2024-06-05 RX ADMIN — HYDROMORPHONE HYDROCHLORIDE 0.5 MG: 1 INJECTION, SOLUTION INTRAMUSCULAR; INTRAVENOUS; SUBCUTANEOUS at 17:03

## 2024-06-05 RX ADMIN — CEFTRIAXONE SODIUM 2000 MG: 2 INJECTION, POWDER, FOR SOLUTION INTRAMUSCULAR; INTRAVENOUS at 13:23

## 2024-06-05 RX ADMIN — DEXMEDETOMIDINE HYDROCHLORIDE 0.2 MCG/KG/HR: 4 INJECTION, SOLUTION INTRAVENOUS at 22:50

## 2024-06-06 LAB
ALBUMIN SERPL-MCNC: 3.1 G/DL (ref 3.5–5.2)
ALBUMIN/GLOB SERPL: 1.3 G/DL
ALP SERPL-CCNC: 55 U/L (ref 39–117)
ALT SERPL W P-5'-P-CCNC: <5 U/L (ref 1–33)
ANION GAP SERPL CALCULATED.3IONS-SCNC: 13.7 MMOL/L (ref 5–15)
AST SERPL-CCNC: 14 U/L (ref 1–32)
BACTERIA BLD CULT: NORMAL
BILIRUB SERPL-MCNC: <0.2 MG/DL (ref 0–1.2)
BOTTLE TYPE: NORMAL
BUN SERPL-MCNC: 57 MG/DL (ref 8–23)
BUN/CREAT SERPL: 9.1 (ref 7–25)
BURR CELLS BLD QL SMEAR: ABNORMAL
CALCIUM SPEC-SCNC: 8.8 MG/DL (ref 8.6–10.5)
CHLORIDE SERPL-SCNC: 110 MMOL/L (ref 98–107)
CLUMPED PLATELETS: PRESENT
CO2 SERPL-SCNC: 17.3 MMOL/L (ref 22–29)
CREAT SERPL-MCNC: 6.28 MG/DL (ref 0.57–1)
D-LACTATE SERPL-SCNC: 0.8 MMOL/L (ref 0.5–2)
D-LACTATE SERPL-SCNC: 2 MMOL/L (ref 0.5–2)
DEPRECATED RDW RBC AUTO: 48.4 FL (ref 37–54)
EGFRCR SERPLBLD CKD-EPI 2021: 7 ML/MIN/1.73
ERYTHROCYTE [DISTWIDTH] IN BLOOD BY AUTOMATED COUNT: 13.9 % (ref 12.3–15.4)
GLOBULIN UR ELPH-MCNC: 2.3 GM/DL
GLUCOSE BLDC GLUCOMTR-MCNC: 102 MG/DL (ref 70–99)
GLUCOSE BLDC GLUCOMTR-MCNC: 108 MG/DL (ref 70–99)
GLUCOSE BLDC GLUCOMTR-MCNC: 95 MG/DL (ref 70–99)
GLUCOSE SERPL-MCNC: 142 MG/DL (ref 65–99)
HCT VFR BLD AUTO: 27 % (ref 34–46.6)
HGB BLD-MCNC: 9 G/DL (ref 12–15.9)
LYMPHOCYTES # BLD MANUAL: 1.35 10*3/MM3 (ref 0.7–3.1)
LYMPHOCYTES NFR BLD MANUAL: 5 % (ref 5–12)
MAGNESIUM SERPL-MCNC: 1.4 MG/DL (ref 1.6–2.4)
MCH RBC QN AUTO: 31.8 PG (ref 26.6–33)
MCHC RBC AUTO-ENTMCNC: 33.3 G/DL (ref 31.5–35.7)
MCV RBC AUTO: 95.4 FL (ref 79–97)
MONOCYTES # BLD: 0.96 10*3/MM3 (ref 0.1–0.9)
NEUTROPHILS # BLD AUTO: 16.92 10*3/MM3 (ref 1.7–7)
NEUTROPHILS NFR BLD MANUAL: 72 % (ref 42.7–76)
NEUTS BAND NFR BLD MANUAL: 16 % (ref 0–5)
NRBC SPEC MANUAL: 1 /100 WBC (ref 0–0.2)
OVALOCYTES BLD QL SMEAR: ABNORMAL
PHOSPHATE SERPL-MCNC: 5 MG/DL (ref 2.5–4.5)
PLATELET # BLD AUTO: 159 10*3/MM3 (ref 140–450)
PMV BLD AUTO: 11.2 FL (ref 6–12)
POTASSIUM SERPL-SCNC: 5.3 MMOL/L (ref 3.5–5.2)
PROCALCITONIN SERPL-MCNC: 195.08 NG/ML (ref 0–0.25)
PROT SERPL-MCNC: 5.4 G/DL (ref 6–8.5)
RBC # BLD AUTO: 2.83 10*6/MM3 (ref 3.77–5.28)
SMALL PLATELETS BLD QL SMEAR: ADEQUATE
SODIUM SERPL-SCNC: 141 MMOL/L (ref 136–145)
VANCOMYCIN SERPL-MCNC: 20.1 MCG/ML (ref 5–40)
VARIANT LYMPHS NFR BLD MANUAL: 7 % (ref 19.6–45.3)
WBC MORPH BLD: NORMAL
WBC NRBC COR # BLD AUTO: 19.23 10*3/MM3 (ref 3.4–10.8)

## 2024-06-06 PROCEDURE — 36415 COLL VENOUS BLD VENIPUNCTURE: CPT | Performed by: EMERGENCY MEDICINE

## 2024-06-06 PROCEDURE — 25010000002 HEPARIN (PORCINE) PER 1000 UNITS: Performed by: NURSE PRACTITIONER

## 2024-06-06 PROCEDURE — 83605 ASSAY OF LACTIC ACID: CPT | Performed by: EMERGENCY MEDICINE

## 2024-06-06 PROCEDURE — 84100 ASSAY OF PHOSPHORUS: CPT | Performed by: NEUROLOGICAL SURGERY

## 2024-06-06 PROCEDURE — 83605 ASSAY OF LACTIC ACID: CPT | Performed by: STUDENT IN AN ORGANIZED HEALTH CARE EDUCATION/TRAINING PROGRAM

## 2024-06-06 PROCEDURE — 87040 BLOOD CULTURE FOR BACTERIA: CPT | Performed by: INTERNAL MEDICINE

## 2024-06-06 PROCEDURE — 25010000002 HYDROMORPHONE 1 MG/ML SOLUTION: Performed by: FAMILY MEDICINE

## 2024-06-06 PROCEDURE — 25010000002 MAGNESIUM SULFATE 2 GM/50ML SOLUTION: Performed by: INTERNAL MEDICINE

## 2024-06-06 PROCEDURE — 25010000002 MEROPENEM PER 100 MG: Performed by: INTERNAL MEDICINE

## 2024-06-06 PROCEDURE — 80053 COMPREHEN METABOLIC PANEL: CPT | Performed by: NEUROLOGICAL SURGERY

## 2024-06-06 PROCEDURE — C9248 INJ, CLEVIDIPINE BUTYRATE: HCPCS | Performed by: NURSE PRACTITIONER

## 2024-06-06 PROCEDURE — 85007 BL SMEAR W/DIFF WBC COUNT: CPT | Performed by: PHYSICIAN ASSISTANT

## 2024-06-06 PROCEDURE — 85025 COMPLETE CBC W/AUTO DIFF WBC: CPT | Performed by: PHYSICIAN ASSISTANT

## 2024-06-06 PROCEDURE — 83735 ASSAY OF MAGNESIUM: CPT | Performed by: NEUROLOGICAL SURGERY

## 2024-06-06 PROCEDURE — 25010000002 AMPICILLIN PER 500 MG: Performed by: NEUROLOGICAL SURGERY

## 2024-06-06 PROCEDURE — 84145 PROCALCITONIN (PCT): CPT | Performed by: STUDENT IN AN ORGANIZED HEALTH CARE EDUCATION/TRAINING PROGRAM

## 2024-06-06 PROCEDURE — 99291 CRITICAL CARE FIRST HOUR: CPT | Performed by: INTERNAL MEDICINE

## 2024-06-06 PROCEDURE — 82948 REAGENT STRIP/BLOOD GLUCOSE: CPT

## 2024-06-06 PROCEDURE — 80202 ASSAY OF VANCOMYCIN: CPT | Performed by: NEUROLOGICAL SURGERY

## 2024-06-06 PROCEDURE — 99024 POSTOP FOLLOW-UP VISIT: CPT | Performed by: NEUROLOGICAL SURGERY

## 2024-06-06 PROCEDURE — 25010000002 CLEVIDIPINE BUTYRATE PER 1 MG: Performed by: NURSE PRACTITIONER

## 2024-06-06 RX ORDER — MAGNESIUM SULFATE HEPTAHYDRATE 40 MG/ML
2 INJECTION, SOLUTION INTRAVENOUS ONCE
Status: COMPLETED | OUTPATIENT
Start: 2024-06-06 | End: 2024-06-06

## 2024-06-06 RX ORDER — SODIUM CHLORIDE, SODIUM LACTATE, CALCIUM CHLORIDE, MAGNESIUM CHLORIDE AND DEXTROSE 1.5; 538; 448; 18.3; 5.08 G/100ML; MG/100ML; MG/100ML; MG/100ML; MG/100ML
2000 INJECTION, SOLUTION INTRAPERITONEAL
Status: DISCONTINUED | OUTPATIENT
Start: 2024-06-06 | End: 2024-06-06

## 2024-06-06 RX ADMIN — CLEVIPIDINE 4 MG/HR: 0.5 EMULSION INTRAVENOUS at 18:06

## 2024-06-06 RX ADMIN — SODIUM CHLORIDE, SODIUM LACTATE, CALCIUM CHLORIDE, MAGNESIUM CHLORIDE AND DEXTROSE 2000 ML: 1.5; 538; 448; 18.3; 5.08 INJECTION, SOLUTION INTRAPERITONEAL at 13:50

## 2024-06-06 RX ADMIN — AMPICILLIN SODIUM 2 G: 2 INJECTION, POWDER, FOR SOLUTION INTRAMUSCULAR; INTRAVENOUS at 00:29

## 2024-06-06 RX ADMIN — Medication 10 ML: at 20:41

## 2024-06-06 RX ADMIN — MAGNESIUM SULFATE HEPTAHYDRATE 2 G: 40 INJECTION, SOLUTION INTRAVENOUS at 10:53

## 2024-06-06 RX ADMIN — SODIUM CHLORIDE, SODIUM LACTATE, CALCIUM CHLORIDE, MAGNESIUM CHLORIDE AND DEXTROSE 2000 ML: 1.5; 538; 448; 18.3; 5.08 INJECTION, SOLUTION INTRAPERITONEAL at 07:41

## 2024-06-06 RX ADMIN — HYDROMORPHONE HYDROCHLORIDE 0.5 MG: 1 INJECTION, SOLUTION INTRAMUSCULAR; INTRAVENOUS; SUBCUTANEOUS at 06:28

## 2024-06-06 RX ADMIN — HYDROMORPHONE HYDROCHLORIDE 0.5 MG: 1 INJECTION, SOLUTION INTRAMUSCULAR; INTRAVENOUS; SUBCUTANEOUS at 18:06

## 2024-06-06 RX ADMIN — HYDROMORPHONE HYDROCHLORIDE 0.5 MG: 1 INJECTION, SOLUTION INTRAMUSCULAR; INTRAVENOUS; SUBCUTANEOUS at 03:35

## 2024-06-06 RX ADMIN — DEXMEDETOMIDINE HYDROCHLORIDE 0.4 MCG/KG/HR: 4 INJECTION, SOLUTION INTRAVENOUS at 13:59

## 2024-06-06 RX ADMIN — METOPROLOL SUCCINATE 50 MG: 50 TABLET, EXTENDED RELEASE ORAL at 20:41

## 2024-06-06 RX ADMIN — HYDROMORPHONE HYDROCHLORIDE 0.5 MG: 1 INJECTION, SOLUTION INTRAMUSCULAR; INTRAVENOUS; SUBCUTANEOUS at 09:33

## 2024-06-06 RX ADMIN — MEROPENEM 1000 MG: 1 INJECTION, POWDER, FOR SOLUTION INTRAVENOUS at 12:24

## 2024-06-06 RX ADMIN — Medication 10 ML: at 09:00

## 2024-06-06 RX ADMIN — HYDROMORPHONE HYDROCHLORIDE 0.5 MG: 1 INJECTION, SOLUTION INTRAMUSCULAR; INTRAVENOUS; SUBCUTANEOUS at 13:59

## 2024-06-06 RX ADMIN — CLEVIPIDINE 8 MG/HR: 0.5 EMULSION INTRAVENOUS at 01:43

## 2024-06-06 RX ADMIN — SODIUM CHLORIDE, SODIUM LACTATE, CALCIUM CHLORIDE, MAGNESIUM CHLORIDE AND DEXTROSE 2000 ML: 1.5; 538; 448; 18.3; 5.08 INJECTION, SOLUTION INTRAPERITONEAL at 01:06

## 2024-06-06 RX ADMIN — SODIUM CHLORIDE, SODIUM LACTATE, CALCIUM CHLORIDE, MAGNESIUM CHLORIDE AND DEXTROSE: 1.5; 538; 448; 18.3; 5.08 INJECTION, SOLUTION INTRAPERITONEAL at 21:44

## 2024-06-06 RX ADMIN — ATORVASTATIN CALCIUM 10 MG: 10 TABLET, FILM COATED ORAL at 20:41

## 2024-06-06 RX ADMIN — SODIUM CHLORIDE, SODIUM LACTATE, CALCIUM CHLORIDE, MAGNESIUM CHLORIDE AND DEXTROSE 2000 ML: 1.5; 538; 448; 18.3; 5.08 INJECTION, SOLUTION INTRAPERITONEAL at 18:07

## 2024-06-06 RX ADMIN — ACETAMINOPHEN 1000 MG: 500 TABLET ORAL at 21:43

## 2024-06-06 RX ADMIN — HYDROMORPHONE HYDROCHLORIDE 0.5 MG: 1 INJECTION, SOLUTION INTRAMUSCULAR; INTRAVENOUS; SUBCUTANEOUS at 21:43

## 2024-06-07 PROBLEM — I10 HYPERTENSION: Status: ACTIVE | Noted: 2024-06-07

## 2024-06-07 LAB
ALBUMIN SERPL-MCNC: 3.1 G/DL (ref 3.5–5.2)
ANION GAP SERPL CALCULATED.3IONS-SCNC: 14.7 MMOL/L (ref 5–15)
BACTERIA SPEC AEROBE CULT: ABNORMAL
BACTERIA SPEC AEROBE CULT: ABNORMAL
BASOPHILS # BLD AUTO: 0.03 10*3/MM3 (ref 0–0.2)
BASOPHILS NFR BLD AUTO: 0.2 % (ref 0–1.5)
BUN SERPL-MCNC: 49 MG/DL (ref 8–23)
BUN/CREAT SERPL: 8.5 (ref 7–25)
CALCIUM SPEC-SCNC: 8.8 MG/DL (ref 8.6–10.5)
CHLORIDE SERPL-SCNC: 107 MMOL/L (ref 98–107)
CO2 SERPL-SCNC: 20.3 MMOL/L (ref 22–29)
CREAT SERPL-MCNC: 5.79 MG/DL (ref 0.57–1)
D-LACTATE SERPL-SCNC: 0.5 MMOL/L (ref 0.5–2)
DEPRECATED RDW RBC AUTO: 49.1 FL (ref 37–54)
EGFRCR SERPLBLD CKD-EPI 2021: 7.8 ML/MIN/1.73
EOSINOPHIL # BLD AUTO: 0.09 10*3/MM3 (ref 0–0.4)
EOSINOPHIL NFR BLD AUTO: 0.6 % (ref 0.3–6.2)
ERYTHROCYTE [DISTWIDTH] IN BLOOD BY AUTOMATED COUNT: 13.8 % (ref 12.3–15.4)
GLUCOSE BLDC GLUCOMTR-MCNC: 105 MG/DL (ref 70–99)
GLUCOSE BLDC GLUCOMTR-MCNC: 113 MG/DL (ref 70–99)
GLUCOSE BLDC GLUCOMTR-MCNC: 128 MG/DL (ref 70–99)
GLUCOSE SERPL-MCNC: 99 MG/DL (ref 65–99)
GRAM STN SPEC: ABNORMAL
HCT VFR BLD AUTO: 26.9 % (ref 34–46.6)
HGB BLD-MCNC: 9 G/DL (ref 12–15.9)
IMM GRANULOCYTES # BLD AUTO: 0.34 10*3/MM3 (ref 0–0.05)
IMM GRANULOCYTES NFR BLD AUTO: 2.4 % (ref 0–0.5)
ISOLATED FROM: ABNORMAL
ISOLATED FROM: ABNORMAL
LYMPHOCYTES # BLD AUTO: 2.78 10*3/MM3 (ref 0.7–3.1)
LYMPHOCYTES NFR BLD AUTO: 19.3 % (ref 19.6–45.3)
MAGNESIUM SERPL-MCNC: 2 MG/DL (ref 1.6–2.4)
MCH RBC QN AUTO: 32.3 PG (ref 26.6–33)
MCHC RBC AUTO-ENTMCNC: 33.5 G/DL (ref 31.5–35.7)
MCV RBC AUTO: 96.4 FL (ref 79–97)
MONOCYTES # BLD AUTO: 0.87 10*3/MM3 (ref 0.1–0.9)
MONOCYTES NFR BLD AUTO: 6 % (ref 5–12)
NEUTROPHILS NFR BLD AUTO: 10.32 10*3/MM3 (ref 1.7–7)
NEUTROPHILS NFR BLD AUTO: 71.5 % (ref 42.7–76)
NRBC BLD AUTO-RTO: 0 /100 WBC (ref 0–0.2)
PHOSPHATE SERPL-MCNC: 4.9 MG/DL (ref 2.5–4.5)
PLATELET # BLD AUTO: 145 10*3/MM3 (ref 140–450)
PMV BLD AUTO: 11.8 FL (ref 6–12)
POTASSIUM SERPL-SCNC: 4.4 MMOL/L (ref 3.5–5.2)
PROCALCITONIN SERPL-MCNC: 215.92 NG/ML (ref 0–0.25)
RBC # BLD AUTO: 2.79 10*6/MM3 (ref 3.77–5.28)
REAGIN AB CSF QL: NON REACTIVE
SODIUM SERPL-SCNC: 142 MMOL/L (ref 136–145)
WBC NRBC COR # BLD AUTO: 14.43 10*3/MM3 (ref 3.4–10.8)

## 2024-06-07 PROCEDURE — 25810000003 SODIUM CHLORIDE 0.9 % SOLUTION: Performed by: INTERNAL MEDICINE

## 2024-06-07 PROCEDURE — 25010000002 VANCOMYCIN 5 G RECONSTITUTED SOLUTION: Performed by: INTERNAL MEDICINE

## 2024-06-07 PROCEDURE — 99232 SBSQ HOSP IP/OBS MODERATE 35: CPT | Performed by: INTERNAL MEDICINE

## 2024-06-07 PROCEDURE — 25010000002 HEPARIN (PORCINE) PER 1000 UNITS: Performed by: NURSE PRACTITIONER

## 2024-06-07 PROCEDURE — 25010000002 LABETALOL 5 MG/ML SOLUTION: Performed by: INTERNAL MEDICINE

## 2024-06-07 PROCEDURE — 82948 REAGENT STRIP/BLOOD GLUCOSE: CPT

## 2024-06-07 PROCEDURE — 80069 RENAL FUNCTION PANEL: CPT | Performed by: INTERNAL MEDICINE

## 2024-06-07 PROCEDURE — 84145 PROCALCITONIN (PCT): CPT | Performed by: PHYSICIAN ASSISTANT

## 2024-06-07 PROCEDURE — 25010000002 MEROPENEM PER 100 MG: Performed by: INTERNAL MEDICINE

## 2024-06-07 PROCEDURE — 83605 ASSAY OF LACTIC ACID: CPT | Performed by: PHYSICIAN ASSISTANT

## 2024-06-07 PROCEDURE — 99233 SBSQ HOSP IP/OBS HIGH 50: CPT | Performed by: INTERNAL MEDICINE

## 2024-06-07 PROCEDURE — 85025 COMPLETE CBC W/AUTO DIFF WBC: CPT | Performed by: INTERNAL MEDICINE

## 2024-06-07 PROCEDURE — 25010000002 HYDROMORPHONE 1 MG/ML SOLUTION: Performed by: FAMILY MEDICINE

## 2024-06-07 PROCEDURE — 83735 ASSAY OF MAGNESIUM: CPT | Performed by: INTERNAL MEDICINE

## 2024-06-07 RX ORDER — HYDROCODONE BITARTRATE AND ACETAMINOPHEN 10; 325 MG/1; MG/1
1 TABLET ORAL EVERY 4 HOURS PRN
Status: DISCONTINUED | OUTPATIENT
Start: 2024-06-07 | End: 2024-06-12

## 2024-06-07 RX ORDER — LABETALOL HYDROCHLORIDE 5 MG/ML
20 INJECTION, SOLUTION INTRAVENOUS EVERY 4 HOURS PRN
Status: DISCONTINUED | OUTPATIENT
Start: 2024-06-07 | End: 2024-06-09

## 2024-06-07 RX ORDER — METOPROLOL SUCCINATE 50 MG/1
50 TABLET, EXTENDED RELEASE ORAL ONCE
Status: COMPLETED | OUTPATIENT
Start: 2024-06-07 | End: 2024-06-07

## 2024-06-07 RX ORDER — LOSARTAN POTASSIUM 50 MG/1
100 TABLET ORAL
Status: DISCONTINUED | OUTPATIENT
Start: 2024-06-07 | End: 2024-06-09

## 2024-06-07 RX ORDER — SODIUM CHLORIDE, SODIUM LACTATE, CALCIUM CHLORIDE, MAGNESIUM CHLORIDE AND DEXTROSE 1.5; 538; 448; 18.3; 5.08 G/100ML; MG/100ML; MG/100ML; MG/100ML; MG/100ML
2000 INJECTION, SOLUTION INTRAPERITONEAL
Status: DISCONTINUED | OUTPATIENT
Start: 2024-06-07 | End: 2024-06-07

## 2024-06-07 RX ORDER — SODIUM CHLORIDE, SODIUM LACTATE, CALCIUM CHLORIDE, MAGNESIUM CHLORIDE AND DEXTROSE 1.5; 538; 448; 18.3; 5.08 G/100ML; MG/100ML; MG/100ML; MG/100ML; MG/100ML
2000 INJECTION, SOLUTION INTRAPERITONEAL
Status: DISCONTINUED | OUTPATIENT
Start: 2024-06-08 | End: 2024-06-14

## 2024-06-07 RX ORDER — METOPROLOL SUCCINATE 50 MG/1
100 TABLET, EXTENDED RELEASE ORAL NIGHTLY
Status: DISCONTINUED | OUTPATIENT
Start: 2024-06-07 | End: 2024-06-09

## 2024-06-07 RX ADMIN — HYDROCODONE BITARTRATE AND ACETAMINOPHEN 1 TABLET: 10; 325 TABLET ORAL at 16:02

## 2024-06-07 RX ADMIN — ATORVASTATIN CALCIUM 10 MG: 10 TABLET, FILM COATED ORAL at 20:32

## 2024-06-07 RX ADMIN — LABETALOL HYDROCHLORIDE 20 MG: 5 INJECTION, SOLUTION INTRAVENOUS at 11:37

## 2024-06-07 RX ADMIN — AMLODIPINE BESYLATE 10 MG: 10 TABLET ORAL at 07:02

## 2024-06-07 RX ADMIN — NYSTATIN 500000 UNITS: 100000 SUSPENSION ORAL at 20:32

## 2024-06-07 RX ADMIN — LOSARTAN POTASSIUM 100 MG: 50 TABLET, FILM COATED ORAL at 11:06

## 2024-06-07 RX ADMIN — SODIUM CHLORIDE, SODIUM LACTATE, CALCIUM CHLORIDE, MAGNESIUM CHLORIDE AND DEXTROSE 2000 ML: 1.5; 538; 448; 18.3; 5.08 INJECTION, SOLUTION INTRAPERITONEAL at 05:00

## 2024-06-07 RX ADMIN — Medication 10 ML: at 11:06

## 2024-06-07 RX ADMIN — HYDROMORPHONE HYDROCHLORIDE 0.5 MG: 1 INJECTION, SOLUTION INTRAMUSCULAR; INTRAVENOUS; SUBCUTANEOUS at 04:06

## 2024-06-07 RX ADMIN — ACETAMINOPHEN 1000 MG: 500 TABLET ORAL at 06:24

## 2024-06-07 RX ADMIN — SODIUM CHLORIDE, SODIUM LACTATE, CALCIUM CHLORIDE, MAGNESIUM CHLORIDE AND DEXTROSE: 1.5; 538; 448; 18.3; 5.08 INJECTION, SOLUTION INTRAPERITONEAL at 22:54

## 2024-06-07 RX ADMIN — HYDROMORPHONE HYDROCHLORIDE 0.5 MG: 1 INJECTION, SOLUTION INTRAMUSCULAR; INTRAVENOUS; SUBCUTANEOUS at 13:13

## 2024-06-07 RX ADMIN — Medication 10 ML: at 20:33

## 2024-06-07 RX ADMIN — PANTOPRAZOLE SODIUM 40 MG: 40 TABLET, DELAYED RELEASE ORAL at 06:24

## 2024-06-07 RX ADMIN — METOPROLOL SUCCINATE 50 MG: 50 TABLET, EXTENDED RELEASE ORAL at 11:37

## 2024-06-07 RX ADMIN — NYSTATIN 500000 UNITS: 100000 SUSPENSION ORAL at 16:02

## 2024-06-07 RX ADMIN — VANCOMYCIN HYDROCHLORIDE 1250 MG: 5 INJECTION, POWDER, LYOPHILIZED, FOR SOLUTION INTRAVENOUS at 16:02

## 2024-06-07 RX ADMIN — HYDROMORPHONE HYDROCHLORIDE 0.5 MG: 1 INJECTION, SOLUTION INTRAMUSCULAR; INTRAVENOUS; SUBCUTANEOUS at 07:55

## 2024-06-07 RX ADMIN — SODIUM CHLORIDE, SODIUM LACTATE, CALCIUM CHLORIDE, MAGNESIUM CHLORIDE AND DEXTROSE 2000 ML: 1.5; 538; 448; 18.3; 5.08 INJECTION, SOLUTION INTRAPERITONEAL at 09:55

## 2024-06-07 RX ADMIN — SODIUM CHLORIDE, SODIUM LACTATE, CALCIUM CHLORIDE, MAGNESIUM CHLORIDE AND DEXTROSE 2000 ML: 1.5; 538; 448; 18.3; 5.08 INJECTION, SOLUTION INTRAPERITONEAL at 19:46

## 2024-06-07 RX ADMIN — METOPROLOL SUCCINATE 100 MG: 50 TABLET, EXTENDED RELEASE ORAL at 20:33

## 2024-06-07 RX ADMIN — MEROPENEM 1000 MG: 1 INJECTION, POWDER, FOR SOLUTION INTRAVENOUS at 11:06

## 2024-06-07 RX ADMIN — LABETALOL HYDROCHLORIDE 20 MG: 5 INJECTION, SOLUTION INTRAVENOUS at 21:13

## 2024-06-07 RX ADMIN — NYSTATIN 500000 UNITS: 100000 SUSPENSION ORAL at 11:06

## 2024-06-07 RX ADMIN — HYDROMORPHONE HYDROCHLORIDE 0.5 MG: 1 INJECTION, SOLUTION INTRAMUSCULAR; INTRAVENOUS; SUBCUTANEOUS at 21:07

## 2024-06-07 RX ADMIN — LABETALOL HYDROCHLORIDE 20 MG: 5 INJECTION, SOLUTION INTRAVENOUS at 16:19

## 2024-06-07 RX ADMIN — SODIUM CHLORIDE, SODIUM LACTATE, CALCIUM CHLORIDE, MAGNESIUM CHLORIDE AND DEXTROSE 2000 ML: 1.5; 538; 448; 18.3; 5.08 INJECTION, SOLUTION INTRAPERITONEAL at 13:13

## 2024-06-07 RX ADMIN — HYDROMORPHONE HYDROCHLORIDE 0.5 MG: 1 INJECTION, SOLUTION INTRAMUSCULAR; INTRAVENOUS; SUBCUTANEOUS at 01:07

## 2024-06-07 RX ADMIN — HYDROCODONE BITARTRATE AND ACETAMINOPHEN 1 TABLET: 10; 325 TABLET ORAL at 11:06

## 2024-06-07 RX ADMIN — HYDROMORPHONE HYDROCHLORIDE 0.5 MG: 1 INJECTION, SOLUTION INTRAMUSCULAR; INTRAVENOUS; SUBCUTANEOUS at 17:57

## 2024-06-08 LAB
ALBUMIN SERPL-MCNC: 3 G/DL (ref 3.5–5.2)
ALBUMIN/GLOB SERPL: 0.9 G/DL
ALP SERPL-CCNC: 90 U/L (ref 39–117)
ALT SERPL W P-5'-P-CCNC: <5 U/L (ref 1–33)
ANION GAP SERPL CALCULATED.3IONS-SCNC: 14.4 MMOL/L (ref 5–15)
AST SERPL-CCNC: 13 U/L (ref 1–32)
BACTERIA SPEC AEROBE CULT: NORMAL
BASOPHILS # BLD AUTO: 0.04 10*3/MM3 (ref 0–0.2)
BASOPHILS NFR BLD AUTO: 0.3 % (ref 0–1.5)
BILIRUB SERPL-MCNC: 0.2 MG/DL (ref 0–1.2)
BUN SERPL-MCNC: 37 MG/DL (ref 8–23)
BUN/CREAT SERPL: 6.9 (ref 7–25)
CALCIUM SPEC-SCNC: 9.4 MG/DL (ref 8.6–10.5)
CHLORIDE SERPL-SCNC: 104 MMOL/L (ref 98–107)
CO2 SERPL-SCNC: 20.6 MMOL/L (ref 22–29)
CREAT SERPL-MCNC: 5.35 MG/DL (ref 0.57–1)
DEPRECATED RDW RBC AUTO: 47.3 FL (ref 37–54)
EGFRCR SERPLBLD CKD-EPI 2021: 8.5 ML/MIN/1.73
EOSINOPHIL # BLD AUTO: 0.24 10*3/MM3 (ref 0–0.4)
EOSINOPHIL NFR BLD AUTO: 2 % (ref 0.3–6.2)
ERYTHROCYTE [DISTWIDTH] IN BLOOD BY AUTOMATED COUNT: 13.5 % (ref 12.3–15.4)
GLOBULIN UR ELPH-MCNC: 3.3 GM/DL
GLUCOSE SERPL-MCNC: 101 MG/DL (ref 65–99)
GRAM STN SPEC: NORMAL
GRAM STN SPEC: NORMAL
HCT VFR BLD AUTO: 31.2 % (ref 34–46.6)
HGB BLD-MCNC: 10.4 G/DL (ref 12–15.9)
IMM GRANULOCYTES # BLD AUTO: 0.27 10*3/MM3 (ref 0–0.05)
IMM GRANULOCYTES NFR BLD AUTO: 2.3 % (ref 0–0.5)
LYMPHOCYTES # BLD AUTO: 2.15 10*3/MM3 (ref 0.7–3.1)
LYMPHOCYTES NFR BLD AUTO: 18.2 % (ref 19.6–45.3)
MAGNESIUM SERPL-MCNC: 1.8 MG/DL (ref 1.6–2.4)
MCH RBC QN AUTO: 31.7 PG (ref 26.6–33)
MCHC RBC AUTO-ENTMCNC: 33.3 G/DL (ref 31.5–35.7)
MCV RBC AUTO: 95.1 FL (ref 79–97)
MONOCYTES # BLD AUTO: 0.87 10*3/MM3 (ref 0.1–0.9)
MONOCYTES NFR BLD AUTO: 7.4 % (ref 5–12)
NEUTROPHILS NFR BLD AUTO: 69.8 % (ref 42.7–76)
NEUTROPHILS NFR BLD AUTO: 8.23 10*3/MM3 (ref 1.7–7)
NRBC BLD AUTO-RTO: 0 /100 WBC (ref 0–0.2)
PHOSPHATE SERPL-MCNC: 5.1 MG/DL (ref 2.5–4.5)
PLATELET # BLD AUTO: 185 10*3/MM3 (ref 140–450)
PMV BLD AUTO: 12 FL (ref 6–12)
POTASSIUM SERPL-SCNC: 4 MMOL/L (ref 3.5–5.2)
PROT SERPL-MCNC: 6.3 G/DL (ref 6–8.5)
RBC # BLD AUTO: 3.28 10*6/MM3 (ref 3.77–5.28)
SODIUM SERPL-SCNC: 139 MMOL/L (ref 136–145)
VANCOMYCIN SERPL-MCNC: 28.15 MCG/ML (ref 5–40)
WBC NRBC COR # BLD AUTO: 11.8 10*3/MM3 (ref 3.4–10.8)

## 2024-06-08 PROCEDURE — 94799 UNLISTED PULMONARY SVC/PX: CPT

## 2024-06-08 PROCEDURE — 85025 COMPLETE CBC W/AUTO DIFF WBC: CPT | Performed by: INTERNAL MEDICINE

## 2024-06-08 PROCEDURE — 94761 N-INVAS EAR/PLS OXIMETRY MLT: CPT

## 2024-06-08 PROCEDURE — 25010000002 HYDROMORPHONE 1 MG/ML SOLUTION: Performed by: FAMILY MEDICINE

## 2024-06-08 PROCEDURE — 25010000002 LABETALOL 5 MG/ML SOLUTION: Performed by: INTERNAL MEDICINE

## 2024-06-08 PROCEDURE — 99232 SBSQ HOSP IP/OBS MODERATE 35: CPT | Performed by: INTERNAL MEDICINE

## 2024-06-08 PROCEDURE — 80053 COMPREHEN METABOLIC PANEL: CPT | Performed by: PHYSICIAN ASSISTANT

## 2024-06-08 PROCEDURE — 99024 POSTOP FOLLOW-UP VISIT: CPT | Performed by: NEUROLOGICAL SURGERY

## 2024-06-08 PROCEDURE — 25010000002 MEROPENEM PER 100 MG: Performed by: INTERNAL MEDICINE

## 2024-06-08 PROCEDURE — 97162 PT EVAL MOD COMPLEX 30 MIN: CPT

## 2024-06-08 PROCEDURE — 25010000002 HYDRALAZINE PER 20 MG: Performed by: FAMILY MEDICINE

## 2024-06-08 PROCEDURE — 80202 ASSAY OF VANCOMYCIN: CPT | Performed by: INTERNAL MEDICINE

## 2024-06-08 PROCEDURE — 25010000002 HEPARIN (PORCINE) PER 1000 UNITS: Performed by: INTERNAL MEDICINE

## 2024-06-08 PROCEDURE — 97116 GAIT TRAINING THERAPY: CPT

## 2024-06-08 PROCEDURE — 83735 ASSAY OF MAGNESIUM: CPT | Performed by: PHYSICIAN ASSISTANT

## 2024-06-08 PROCEDURE — 84100 ASSAY OF PHOSPHORUS: CPT | Performed by: PHYSICIAN ASSISTANT

## 2024-06-08 RX ORDER — FUROSEMIDE 40 MG/1
80 TABLET ORAL
Status: DISCONTINUED | OUTPATIENT
Start: 2024-06-08 | End: 2024-06-09

## 2024-06-08 RX ORDER — HYDRALAZINE HYDROCHLORIDE 20 MG/ML
10 INJECTION INTRAMUSCULAR; INTRAVENOUS ONCE
Status: COMPLETED | OUTPATIENT
Start: 2024-06-08 | End: 2024-06-08

## 2024-06-08 RX ADMIN — SODIUM CHLORIDE, SODIUM LACTATE, CALCIUM CHLORIDE, MAGNESIUM CHLORIDE AND DEXTROSE 2000 ML: 1.5; 538; 448; 18.3; 5.08 INJECTION, SOLUTION INTRAPERITONEAL at 17:03

## 2024-06-08 RX ADMIN — HYDROCODONE BITARTRATE AND ACETAMINOPHEN 1 TABLET: 10; 325 TABLET ORAL at 01:01

## 2024-06-08 RX ADMIN — SODIUM CHLORIDE, SODIUM LACTATE, CALCIUM CHLORIDE, MAGNESIUM CHLORIDE AND DEXTROSE: 1.5; 538; 448; 18.3; 5.08 INJECTION, SOLUTION INTRAPERITONEAL at 23:05

## 2024-06-08 RX ADMIN — PANTOPRAZOLE SODIUM 40 MG: 40 TABLET, DELAYED RELEASE ORAL at 05:26

## 2024-06-08 RX ADMIN — HYDROCODONE BITARTRATE AND ACETAMINOPHEN 1 TABLET: 10; 325 TABLET ORAL at 17:02

## 2024-06-08 RX ADMIN — FUROSEMIDE 80 MG: 40 TABLET ORAL at 09:31

## 2024-06-08 RX ADMIN — ATORVASTATIN CALCIUM 10 MG: 10 TABLET, FILM COATED ORAL at 21:30

## 2024-06-08 RX ADMIN — HYDROCODONE BITARTRATE AND ACETAMINOPHEN 1 TABLET: 10; 325 TABLET ORAL at 05:26

## 2024-06-08 RX ADMIN — AMLODIPINE BESYLATE 10 MG: 10 TABLET ORAL at 09:31

## 2024-06-08 RX ADMIN — FUROSEMIDE 80 MG: 40 TABLET ORAL at 17:02

## 2024-06-08 RX ADMIN — NYSTATIN 500000 UNITS: 100000 SUSPENSION ORAL at 08:59

## 2024-06-08 RX ADMIN — HYDRALAZINE HYDROCHLORIDE 10 MG: 20 INJECTION, SOLUTION INTRAMUSCULAR; INTRAVENOUS at 00:53

## 2024-06-08 RX ADMIN — HYDROCODONE BITARTRATE AND ACETAMINOPHEN 1 TABLET: 10; 325 TABLET ORAL at 09:00

## 2024-06-08 RX ADMIN — Medication 10 ML: at 09:57

## 2024-06-08 RX ADMIN — LABETALOL HYDROCHLORIDE 20 MG: 5 INJECTION, SOLUTION INTRAVENOUS at 06:35

## 2024-06-08 RX ADMIN — NYSTATIN 500000 UNITS: 100000 SUSPENSION ORAL at 21:30

## 2024-06-08 RX ADMIN — HYDROCODONE BITARTRATE AND ACETAMINOPHEN 1 TABLET: 10; 325 TABLET ORAL at 23:18

## 2024-06-08 RX ADMIN — SODIUM CHLORIDE, SODIUM LACTATE, CALCIUM CHLORIDE, MAGNESIUM CHLORIDE AND DEXTROSE 2000 ML: 1.5; 538; 448; 18.3; 5.08 INJECTION, SOLUTION INTRAPERITONEAL at 08:58

## 2024-06-08 RX ADMIN — LABETALOL HYDROCHLORIDE 20 MG: 5 INJECTION, SOLUTION INTRAVENOUS at 01:45

## 2024-06-08 RX ADMIN — SODIUM CHLORIDE, SODIUM LACTATE, CALCIUM CHLORIDE, MAGNESIUM CHLORIDE AND DEXTROSE 2000 ML: 1.5; 538; 448; 18.3; 5.08 INJECTION, SOLUTION INTRAPERITONEAL at 12:43

## 2024-06-08 RX ADMIN — HYDROMORPHONE HYDROCHLORIDE 0.5 MG: 1 INJECTION, SOLUTION INTRAMUSCULAR; INTRAVENOUS; SUBCUTANEOUS at 02:39

## 2024-06-08 RX ADMIN — LOSARTAN POTASSIUM 100 MG: 50 TABLET, FILM COATED ORAL at 09:31

## 2024-06-08 RX ADMIN — MEROPENEM 1000 MG: 1 INJECTION, POWDER, FOR SOLUTION INTRAVENOUS at 10:08

## 2024-06-08 RX ADMIN — Medication 10 ML: at 21:31

## 2024-06-08 RX ADMIN — NYSTATIN 500000 UNITS: 100000 SUSPENSION ORAL at 17:02

## 2024-06-09 LAB
ALBUMIN SERPL-MCNC: 2.6 G/DL (ref 3.5–5.2)
ALBUMIN/GLOB SERPL: 0.8 G/DL
ALP SERPL-CCNC: 82 U/L (ref 39–117)
ALT SERPL W P-5'-P-CCNC: <5 U/L (ref 1–33)
ANION GAP SERPL CALCULATED.3IONS-SCNC: 12.7 MMOL/L (ref 5–15)
AST SERPL-CCNC: 15 U/L (ref 1–32)
BASOPHILS # BLD AUTO: 0.05 10*3/MM3 (ref 0–0.2)
BASOPHILS NFR BLD AUTO: 0.8 % (ref 0–1.5)
BILIRUB SERPL-MCNC: 0.2 MG/DL (ref 0–1.2)
BUN SERPL-MCNC: 33 MG/DL (ref 8–23)
BUN/CREAT SERPL: 6.2 (ref 7–25)
CALCIUM SPEC-SCNC: 9 MG/DL (ref 8.6–10.5)
CHLORIDE SERPL-SCNC: 103 MMOL/L (ref 98–107)
CO2 SERPL-SCNC: 24.3 MMOL/L (ref 22–29)
CREAT SERPL-MCNC: 5.35 MG/DL (ref 0.57–1)
DEPRECATED RDW RBC AUTO: 48.1 FL (ref 37–54)
EGFRCR SERPLBLD CKD-EPI 2021: 8.5 ML/MIN/1.73
EOSINOPHIL # BLD AUTO: 0.61 10*3/MM3 (ref 0–0.4)
EOSINOPHIL NFR BLD AUTO: 9.5 % (ref 0.3–6.2)
ERYTHROCYTE [DISTWIDTH] IN BLOOD BY AUTOMATED COUNT: 13.3 % (ref 12.3–15.4)
GLOBULIN UR ELPH-MCNC: 3.3 GM/DL
GLUCOSE SERPL-MCNC: 89 MG/DL (ref 65–99)
HCT VFR BLD AUTO: 32.9 % (ref 34–46.6)
HGB BLD-MCNC: 10.8 G/DL (ref 12–15.9)
IMM GRANULOCYTES # BLD AUTO: 0.12 10*3/MM3 (ref 0–0.05)
IMM GRANULOCYTES NFR BLD AUTO: 1.9 % (ref 0–0.5)
LYMPHOCYTES # BLD AUTO: 1.85 10*3/MM3 (ref 0.7–3.1)
LYMPHOCYTES NFR BLD AUTO: 28.7 % (ref 19.6–45.3)
MAGNESIUM SERPL-MCNC: 1.8 MG/DL (ref 1.6–2.4)
MCH RBC QN AUTO: 32.1 PG (ref 26.6–33)
MCHC RBC AUTO-ENTMCNC: 32.8 G/DL (ref 31.5–35.7)
MCV RBC AUTO: 97.9 FL (ref 79–97)
MONOCYTES # BLD AUTO: 0.83 10*3/MM3 (ref 0.1–0.9)
MONOCYTES NFR BLD AUTO: 12.9 % (ref 5–12)
NEUTROPHILS NFR BLD AUTO: 2.99 10*3/MM3 (ref 1.7–7)
NEUTROPHILS NFR BLD AUTO: 46.2 % (ref 42.7–76)
NRBC BLD AUTO-RTO: 0 /100 WBC (ref 0–0.2)
PHOSPHATE SERPL-MCNC: 5.2 MG/DL (ref 2.5–4.5)
PLATELET # BLD AUTO: 170 10*3/MM3 (ref 140–450)
PMV BLD AUTO: 12 FL (ref 6–12)
POTASSIUM SERPL-SCNC: 3.5 MMOL/L (ref 3.5–5.2)
PROCALCITONIN SERPL-MCNC: 61.47 NG/ML (ref 0–0.25)
PROT SERPL-MCNC: 5.9 G/DL (ref 6–8.5)
QT INTERVAL: 357 MS
QTC INTERVAL: 455 MS
RBC # BLD AUTO: 3.36 10*6/MM3 (ref 3.77–5.28)
SODIUM SERPL-SCNC: 140 MMOL/L (ref 136–145)
WBC NRBC COR # BLD AUTO: 6.45 10*3/MM3 (ref 3.4–10.8)

## 2024-06-09 PROCEDURE — 83735 ASSAY OF MAGNESIUM: CPT | Performed by: PHYSICIAN ASSISTANT

## 2024-06-09 PROCEDURE — 80053 COMPREHEN METABOLIC PANEL: CPT | Performed by: PHYSICIAN ASSISTANT

## 2024-06-09 PROCEDURE — 99232 SBSQ HOSP IP/OBS MODERATE 35: CPT | Performed by: INTERNAL MEDICINE

## 2024-06-09 PROCEDURE — 85025 COMPLETE CBC W/AUTO DIFF WBC: CPT | Performed by: INTERNAL MEDICINE

## 2024-06-09 PROCEDURE — 94799 UNLISTED PULMONARY SVC/PX: CPT

## 2024-06-09 PROCEDURE — 84100 ASSAY OF PHOSPHORUS: CPT | Performed by: PHYSICIAN ASSISTANT

## 2024-06-09 PROCEDURE — 84145 PROCALCITONIN (PCT): CPT | Performed by: INTERNAL MEDICINE

## 2024-06-09 PROCEDURE — 94761 N-INVAS EAR/PLS OXIMETRY MLT: CPT

## 2024-06-09 PROCEDURE — 25010000002 ONDANSETRON PER 1 MG: Performed by: INTERNAL MEDICINE

## 2024-06-09 PROCEDURE — 25010000002 MEROPENEM PER 100 MG: Performed by: INTERNAL MEDICINE

## 2024-06-09 PROCEDURE — 97110 THERAPEUTIC EXERCISES: CPT

## 2024-06-09 PROCEDURE — 25010000002 HYDROMORPHONE 1 MG/ML SOLUTION: Performed by: INTERNAL MEDICINE

## 2024-06-09 RX ORDER — POLYETHYLENE GLYCOL 3350 17 G/17G
17 POWDER, FOR SOLUTION ORAL DAILY PRN
Status: DISCONTINUED | OUTPATIENT
Start: 2024-06-09 | End: 2024-06-19 | Stop reason: HOSPADM

## 2024-06-09 RX ORDER — SIMETHICONE 80 MG
80 TABLET,CHEWABLE ORAL 4 TIMES DAILY PRN
Status: DISCONTINUED | OUTPATIENT
Start: 2024-06-09 | End: 2024-06-19 | Stop reason: HOSPADM

## 2024-06-09 RX ORDER — ONDANSETRON 2 MG/ML
4 INJECTION INTRAMUSCULAR; INTRAVENOUS EVERY 6 HOURS PRN
Status: DISCONTINUED | OUTPATIENT
Start: 2024-06-09 | End: 2024-06-19 | Stop reason: HOSPADM

## 2024-06-09 RX ADMIN — POLYETHYLENE GLYCOL 3350 17 G: 17 POWDER, FOR SOLUTION ORAL at 06:38

## 2024-06-09 RX ADMIN — HYDROCODONE BITARTRATE AND ACETAMINOPHEN 1 TABLET: 10; 325 TABLET ORAL at 11:22

## 2024-06-09 RX ADMIN — ACETAMINOPHEN 1000 MG: 500 TABLET ORAL at 10:08

## 2024-06-09 RX ADMIN — ATORVASTATIN CALCIUM 10 MG: 10 TABLET, FILM COATED ORAL at 22:16

## 2024-06-09 RX ADMIN — SIMETHICONE 80 MG: 80 TABLET, CHEWABLE ORAL at 12:45

## 2024-06-09 RX ADMIN — Medication 10 ML: at 22:17

## 2024-06-09 RX ADMIN — HYDROCODONE BITARTRATE AND ACETAMINOPHEN 1 TABLET: 10; 325 TABLET ORAL at 07:26

## 2024-06-09 RX ADMIN — SODIUM CHLORIDE, SODIUM LACTATE, CALCIUM CHLORIDE, MAGNESIUM CHLORIDE AND DEXTROSE 2000 ML: 1.5; 538; 448; 18.3; 5.08 INJECTION, SOLUTION INTRAPERITONEAL at 07:26

## 2024-06-09 RX ADMIN — ONDANSETRON 4 MG: 2 INJECTION INTRAMUSCULAR; INTRAVENOUS at 18:20

## 2024-06-09 RX ADMIN — SODIUM CHLORIDE, SODIUM LACTATE, CALCIUM CHLORIDE, MAGNESIUM CHLORIDE AND DEXTROSE 2000 ML: 1.5; 538; 448; 18.3; 5.08 INJECTION, SOLUTION INTRAPERITONEAL at 17:35

## 2024-06-09 RX ADMIN — MEROPENEM 1000 MG: 1 INJECTION, POWDER, FOR SOLUTION INTRAVENOUS at 11:22

## 2024-06-09 RX ADMIN — PANTOPRAZOLE SODIUM 40 MG: 40 TABLET, DELAYED RELEASE ORAL at 05:06

## 2024-06-09 RX ADMIN — HYDROCODONE BITARTRATE AND ACETAMINOPHEN 1 TABLET: 10; 325 TABLET ORAL at 17:33

## 2024-06-09 RX ADMIN — HYDROCODONE BITARTRATE AND ACETAMINOPHEN 1 TABLET: 10; 325 TABLET ORAL at 22:16

## 2024-06-09 RX ADMIN — SODIUM CHLORIDE, SODIUM LACTATE, CALCIUM CHLORIDE, MAGNESIUM CHLORIDE AND DEXTROSE 2000 ML: 1.5; 538; 448; 18.3; 5.08 INJECTION, SOLUTION INTRAPERITONEAL at 12:30

## 2024-06-09 RX ADMIN — NYSTATIN 500000 UNITS: 100000 SUSPENSION ORAL at 08:26

## 2024-06-09 RX ADMIN — HYDROMORPHONE HYDROCHLORIDE 0.25 MG: 1 INJECTION, SOLUTION INTRAMUSCULAR; INTRAVENOUS; SUBCUTANEOUS at 18:20

## 2024-06-09 RX ADMIN — Medication 10 ML: at 08:26

## 2024-06-10 LAB
ALBUMIN SERPL-MCNC: 2.8 G/DL (ref 3.5–5.2)
ANION GAP SERPL CALCULATED.3IONS-SCNC: 13.2 MMOL/L (ref 5–15)
BUN SERPL-MCNC: 29 MG/DL (ref 8–23)
BUN/CREAT SERPL: 5.8 (ref 7–25)
CALCIUM SPEC-SCNC: 8.9 MG/DL (ref 8.6–10.5)
CHLORIDE SERPL-SCNC: 102 MMOL/L (ref 98–107)
CO2 SERPL-SCNC: 24.8 MMOL/L (ref 22–29)
CREAT SERPL-MCNC: 5.01 MG/DL (ref 0.57–1)
DEPRECATED RDW RBC AUTO: 46.5 FL (ref 37–54)
EGFRCR SERPLBLD CKD-EPI 2021: 9.2 ML/MIN/1.73
ERYTHROCYTE [DISTWIDTH] IN BLOOD BY AUTOMATED COUNT: 12.9 % (ref 12.3–15.4)
GLUCOSE SERPL-MCNC: 88 MG/DL (ref 65–99)
HCT VFR BLD AUTO: 32.4 % (ref 34–46.6)
HGB BLD-MCNC: 10.4 G/DL (ref 12–15.9)
MAGNESIUM SERPL-MCNC: 1.8 MG/DL (ref 1.6–2.4)
MCH RBC QN AUTO: 31.3 PG (ref 26.6–33)
MCHC RBC AUTO-ENTMCNC: 32.1 G/DL (ref 31.5–35.7)
MCV RBC AUTO: 97.6 FL (ref 79–97)
PHOSPHATE SERPL-MCNC: 5.2 MG/DL (ref 2.5–4.5)
PLATELET # BLD AUTO: 194 10*3/MM3 (ref 140–450)
PMV BLD AUTO: 11.9 FL (ref 6–12)
POTASSIUM SERPL-SCNC: 3.4 MMOL/L (ref 3.5–5.2)
RBC # BLD AUTO: 3.32 10*6/MM3 (ref 3.77–5.28)
SODIUM SERPL-SCNC: 140 MMOL/L (ref 136–145)
VANCOMYCIN SERPL-MCNC: 17.48 MCG/ML (ref 5–40)
WBC NRBC COR # BLD AUTO: 5.85 10*3/MM3 (ref 3.4–10.8)

## 2024-06-10 PROCEDURE — 99024 POSTOP FOLLOW-UP VISIT: CPT | Performed by: NEUROLOGICAL SURGERY

## 2024-06-10 PROCEDURE — 80202 ASSAY OF VANCOMYCIN: CPT | Performed by: INTERNAL MEDICINE

## 2024-06-10 PROCEDURE — 94761 N-INVAS EAR/PLS OXIMETRY MLT: CPT

## 2024-06-10 PROCEDURE — 85027 COMPLETE CBC AUTOMATED: CPT | Performed by: PHYSICIAN ASSISTANT

## 2024-06-10 PROCEDURE — 99233 SBSQ HOSP IP/OBS HIGH 50: CPT | Performed by: INTERNAL MEDICINE

## 2024-06-10 PROCEDURE — 25010000002 HEPARIN (PORCINE) PER 1000 UNITS: Performed by: INTERNAL MEDICINE

## 2024-06-10 PROCEDURE — 94799 UNLISTED PULMONARY SVC/PX: CPT

## 2024-06-10 PROCEDURE — 25010000002 VANCOMYCIN 5 G RECONSTITUTED SOLUTION: Performed by: INTERNAL MEDICINE

## 2024-06-10 PROCEDURE — 25810000003 SODIUM CHLORIDE 0.9 % SOLUTION: Performed by: INTERNAL MEDICINE

## 2024-06-10 PROCEDURE — 80069 RENAL FUNCTION PANEL: CPT | Performed by: INTERNAL MEDICINE

## 2024-06-10 PROCEDURE — 83735 ASSAY OF MAGNESIUM: CPT | Performed by: PHYSICIAN ASSISTANT

## 2024-06-10 RX ORDER — POTASSIUM CHLORIDE 750 MG/1
40 CAPSULE, EXTENDED RELEASE ORAL ONCE
Status: COMPLETED | OUTPATIENT
Start: 2024-06-10 | End: 2024-06-10

## 2024-06-10 RX ADMIN — ATORVASTATIN CALCIUM 10 MG: 10 TABLET, FILM COATED ORAL at 20:19

## 2024-06-10 RX ADMIN — HYDROCODONE BITARTRATE AND ACETAMINOPHEN 1 TABLET: 10; 325 TABLET ORAL at 07:36

## 2024-06-10 RX ADMIN — SODIUM CHLORIDE, SODIUM LACTATE, CALCIUM CHLORIDE, MAGNESIUM CHLORIDE AND DEXTROSE 2000 ML: 1.5; 538; 448; 18.3; 5.08 INJECTION, SOLUTION INTRAPERITONEAL at 12:26

## 2024-06-10 RX ADMIN — HYDROCODONE BITARTRATE AND ACETAMINOPHEN 1 TABLET: 10; 325 TABLET ORAL at 22:51

## 2024-06-10 RX ADMIN — SODIUM CHLORIDE, SODIUM LACTATE, CALCIUM CHLORIDE, MAGNESIUM CHLORIDE AND DEXTROSE 2000 ML: 1.5; 538; 448; 18.3; 5.08 INJECTION, SOLUTION INTRAPERITONEAL at 08:10

## 2024-06-10 RX ADMIN — SENNOSIDES AND DOCUSATE SODIUM 1 TABLET: 50; 8.6 TABLET ORAL at 08:45

## 2024-06-10 RX ADMIN — PANTOPRAZOLE SODIUM 40 MG: 40 TABLET, DELAYED RELEASE ORAL at 06:06

## 2024-06-10 RX ADMIN — POTASSIUM CHLORIDE 40 MEQ: 750 CAPSULE, EXTENDED RELEASE ORAL at 08:50

## 2024-06-10 RX ADMIN — Medication 10 ML: at 20:19

## 2024-06-10 RX ADMIN — VANCOMYCIN HYDROCHLORIDE 750 MG: 5 INJECTION, POWDER, LYOPHILIZED, FOR SOLUTION INTRAVENOUS at 12:44

## 2024-06-10 RX ADMIN — Medication 10 ML: at 08:43

## 2024-06-10 RX ADMIN — HYDROCODONE BITARTRATE AND ACETAMINOPHEN 1 TABLET: 10; 325 TABLET ORAL at 03:07

## 2024-06-10 RX ADMIN — SODIUM CHLORIDE, SODIUM LACTATE, CALCIUM CHLORIDE, MAGNESIUM CHLORIDE AND DEXTROSE: 1.5; 538; 448; 18.3; 5.08 INJECTION, SOLUTION INTRAPERITONEAL at 01:22

## 2024-06-10 RX ADMIN — HYDROCODONE BITARTRATE AND ACETAMINOPHEN 1 TABLET: 10; 325 TABLET ORAL at 16:59

## 2024-06-11 ENCOUNTER — APPOINTMENT (OUTPATIENT)
Dept: GENERAL RADIOLOGY | Facility: HOSPITAL | Age: 64
End: 2024-06-11
Payer: MEDICAID

## 2024-06-11 ENCOUNTER — TELEPHONE (OUTPATIENT)
Dept: ONCOLOGY | Facility: HOSPITAL | Age: 64
End: 2024-06-11

## 2024-06-11 LAB
ANION GAP SERPL CALCULATED.3IONS-SCNC: 13.3 MMOL/L (ref 5–15)
BACTERIA SPEC AEROBE CULT: NORMAL
BACTERIA SPEC AEROBE CULT: NORMAL
BUN SERPL-MCNC: 35 MG/DL (ref 8–23)
BUN/CREAT SERPL: 6.6 (ref 7–25)
CALCIUM SPEC-SCNC: 9 MG/DL (ref 8.6–10.5)
CHLORIDE SERPL-SCNC: 105 MMOL/L (ref 98–107)
CO2 SERPL-SCNC: 21.7 MMOL/L (ref 22–29)
CREAT SERPL-MCNC: 5.28 MG/DL (ref 0.57–1)
DEPRECATED RDW RBC AUTO: 45.4 FL (ref 37–54)
EGFRCR SERPLBLD CKD-EPI 2021: 8.7 ML/MIN/1.73
ERYTHROCYTE [DISTWIDTH] IN BLOOD BY AUTOMATED COUNT: 12.6 % (ref 12.3–15.4)
GLUCOSE SERPL-MCNC: 94 MG/DL (ref 65–99)
HCT VFR BLD AUTO: 32.2 % (ref 34–46.6)
HGB BLD-MCNC: 10.2 G/DL (ref 12–15.9)
MCH RBC QN AUTO: 31.3 PG (ref 26.6–33)
MCHC RBC AUTO-ENTMCNC: 31.7 G/DL (ref 31.5–35.7)
MCV RBC AUTO: 98.8 FL (ref 79–97)
PLATELET # BLD AUTO: 204 10*3/MM3 (ref 140–450)
PMV BLD AUTO: 11.7 FL (ref 6–12)
POTASSIUM SERPL-SCNC: 4.2 MMOL/L (ref 3.5–5.2)
RBC # BLD AUTO: 3.26 10*6/MM3 (ref 3.77–5.28)
SODIUM SERPL-SCNC: 140 MMOL/L (ref 136–145)
VANCOMYCIN SERPL-MCNC: 24.08 MCG/ML (ref 5–40)
WBC NRBC COR # BLD AUTO: 7.06 10*3/MM3 (ref 3.4–10.8)

## 2024-06-11 PROCEDURE — 85027 COMPLETE CBC AUTOMATED: CPT | Performed by: INTERNAL MEDICINE

## 2024-06-11 PROCEDURE — 94761 N-INVAS EAR/PLS OXIMETRY MLT: CPT

## 2024-06-11 PROCEDURE — 99232 SBSQ HOSP IP/OBS MODERATE 35: CPT | Performed by: INTERNAL MEDICINE

## 2024-06-11 PROCEDURE — 97165 OT EVAL LOW COMPLEX 30 MIN: CPT

## 2024-06-11 PROCEDURE — 25010000002 HEPARIN (PORCINE) PER 1000 UNITS: Performed by: INTERNAL MEDICINE

## 2024-06-11 PROCEDURE — 94799 UNLISTED PULMONARY SVC/PX: CPT

## 2024-06-11 PROCEDURE — 80048 BASIC METABOLIC PNL TOTAL CA: CPT | Performed by: INTERNAL MEDICINE

## 2024-06-11 PROCEDURE — 74018 RADEX ABDOMEN 1 VIEW: CPT

## 2024-06-11 PROCEDURE — 80202 ASSAY OF VANCOMYCIN: CPT | Performed by: INTERNAL MEDICINE

## 2024-06-11 RX ADMIN — HYDROCODONE BITARTRATE AND ACETAMINOPHEN 1 TABLET: 10; 325 TABLET ORAL at 22:45

## 2024-06-11 RX ADMIN — SODIUM CHLORIDE, SODIUM LACTATE, CALCIUM CHLORIDE, MAGNESIUM CHLORIDE AND DEXTROSE 2000 ML: 1.5; 538; 448; 18.3; 5.08 INJECTION, SOLUTION INTRAPERITONEAL at 07:40

## 2024-06-11 RX ADMIN — Medication 10 ML: at 20:34

## 2024-06-11 RX ADMIN — HYDROCODONE BITARTRATE AND ACETAMINOPHEN 1 TABLET: 10; 325 TABLET ORAL at 03:55

## 2024-06-11 RX ADMIN — PANTOPRAZOLE SODIUM 40 MG: 40 TABLET, DELAYED RELEASE ORAL at 05:15

## 2024-06-11 RX ADMIN — SENNOSIDES AND DOCUSATE SODIUM 1 TABLET: 50; 8.6 TABLET ORAL at 08:07

## 2024-06-11 RX ADMIN — HYDROCODONE BITARTRATE AND ACETAMINOPHEN 1 TABLET: 10; 325 TABLET ORAL at 08:07

## 2024-06-11 RX ADMIN — POLYETHYLENE GLYCOL 3350 17 G: 17 POWDER, FOR SOLUTION ORAL at 05:15

## 2024-06-11 RX ADMIN — SODIUM CHLORIDE, SODIUM LACTATE, CALCIUM CHLORIDE, MAGNESIUM CHLORIDE AND DEXTROSE 2000 ML: 1.5; 538; 448; 18.3; 5.08 INJECTION, SOLUTION INTRAPERITONEAL at 17:49

## 2024-06-11 RX ADMIN — SODIUM CHLORIDE, SODIUM LACTATE, CALCIUM CHLORIDE, MAGNESIUM CHLORIDE AND DEXTROSE 2000 ML: 1.5; 538; 448; 18.3; 5.08 INJECTION, SOLUTION INTRAPERITONEAL at 12:10

## 2024-06-11 RX ADMIN — ATORVASTATIN CALCIUM 10 MG: 10 TABLET, FILM COATED ORAL at 20:34

## 2024-06-11 RX ADMIN — SODIUM CHLORIDE, SODIUM LACTATE, CALCIUM CHLORIDE, MAGNESIUM CHLORIDE AND DEXTROSE: 1.5; 538; 448; 18.3; 5.08 INJECTION, SOLUTION INTRAPERITONEAL at 22:31

## 2024-06-11 RX ADMIN — Medication 10 ML: at 08:07

## 2024-06-11 RX ADMIN — HYDROCODONE BITARTRATE AND ACETAMINOPHEN 1 TABLET: 10; 325 TABLET ORAL at 13:13

## 2024-06-12 LAB
ANION GAP SERPL CALCULATED.3IONS-SCNC: 12.4 MMOL/L (ref 5–15)
BUN SERPL-MCNC: 34 MG/DL (ref 8–23)
BUN/CREAT SERPL: 7.1 (ref 7–25)
CALCIUM SPEC-SCNC: 9.2 MG/DL (ref 8.6–10.5)
CHLORIDE SERPL-SCNC: 102 MMOL/L (ref 98–107)
CO2 SERPL-SCNC: 23.6 MMOL/L (ref 22–29)
CREAT SERPL-MCNC: 4.78 MG/DL (ref 0.57–1)
DEPRECATED RDW RBC AUTO: 43.5 FL (ref 37–54)
EGFRCR SERPLBLD CKD-EPI 2021: 9.8 ML/MIN/1.73
ERYTHROCYTE [DISTWIDTH] IN BLOOD BY AUTOMATED COUNT: 12.4 % (ref 12.3–15.4)
GLUCOSE SERPL-MCNC: 93 MG/DL (ref 65–99)
HCT VFR BLD AUTO: 30 % (ref 34–46.6)
HGB BLD-MCNC: 10 G/DL (ref 12–15.9)
MCH RBC QN AUTO: 31.6 PG (ref 26.6–33)
MCHC RBC AUTO-ENTMCNC: 33.3 G/DL (ref 31.5–35.7)
MCV RBC AUTO: 94.9 FL (ref 79–97)
PLATELET # BLD AUTO: 260 10*3/MM3 (ref 140–450)
PMV BLD AUTO: 10.9 FL (ref 6–12)
POTASSIUM SERPL-SCNC: 4 MMOL/L (ref 3.5–5.2)
RBC # BLD AUTO: 3.16 10*6/MM3 (ref 3.77–5.28)
SODIUM SERPL-SCNC: 138 MMOL/L (ref 136–145)
VANCOMYCIN SERPL-MCNC: 19.39 MCG/ML (ref 5–40)
WBC NRBC COR # BLD AUTO: 6.85 10*3/MM3 (ref 3.4–10.8)

## 2024-06-12 PROCEDURE — 85027 COMPLETE CBC AUTOMATED: CPT | Performed by: INTERNAL MEDICINE

## 2024-06-12 PROCEDURE — 80048 BASIC METABOLIC PNL TOTAL CA: CPT | Performed by: INTERNAL MEDICINE

## 2024-06-12 PROCEDURE — 99222 1ST HOSP IP/OBS MODERATE 55: CPT | Performed by: INTERNAL MEDICINE

## 2024-06-12 PROCEDURE — 80202 ASSAY OF VANCOMYCIN: CPT | Performed by: INTERNAL MEDICINE

## 2024-06-12 PROCEDURE — 25010000002 ONDANSETRON PER 1 MG: Performed by: INTERNAL MEDICINE

## 2024-06-12 PROCEDURE — 25810000003 SODIUM CHLORIDE 0.9 % SOLUTION: Performed by: INTERNAL MEDICINE

## 2024-06-12 PROCEDURE — 25010000002 HEPARIN (PORCINE) PER 1000 UNITS: Performed by: INTERNAL MEDICINE

## 2024-06-12 PROCEDURE — 99024 POSTOP FOLLOW-UP VISIT: CPT | Performed by: NEUROLOGICAL SURGERY

## 2024-06-12 PROCEDURE — 25010000002 VANCOMYCIN 5 G RECONSTITUTED SOLUTION: Performed by: INTERNAL MEDICINE

## 2024-06-12 RX ORDER — HYDROCODONE BITARTRATE AND ACETAMINOPHEN 7.5; 325 MG/1; MG/1
1 TABLET ORAL EVERY 4 HOURS PRN
Status: DISPENSED | OUTPATIENT
Start: 2024-06-12 | End: 2024-06-17

## 2024-06-12 RX ORDER — AMLODIPINE BESYLATE 5 MG/1
5 TABLET ORAL DAILY
Status: DISCONTINUED | OUTPATIENT
Start: 2024-06-12 | End: 2024-06-14

## 2024-06-12 RX ADMIN — Medication 10 ML: at 20:55

## 2024-06-12 RX ADMIN — SODIUM CHLORIDE, SODIUM LACTATE, CALCIUM CHLORIDE, MAGNESIUM CHLORIDE AND DEXTROSE 2000 ML: 1.5; 538; 448; 18.3; 5.08 INJECTION, SOLUTION INTRAPERITONEAL at 09:17

## 2024-06-12 RX ADMIN — HYDROCODONE BITARTRATE AND ACETAMINOPHEN 1 TABLET: 7.5; 325 TABLET ORAL at 10:11

## 2024-06-12 RX ADMIN — AMLODIPINE BESYLATE 5 MG: 5 TABLET ORAL at 12:00

## 2024-06-12 RX ADMIN — ATORVASTATIN CALCIUM 10 MG: 10 TABLET, FILM COATED ORAL at 20:55

## 2024-06-12 RX ADMIN — SODIUM CHLORIDE, SODIUM LACTATE, CALCIUM CHLORIDE, MAGNESIUM CHLORIDE AND DEXTROSE: 1.5; 538; 448; 18.3; 5.08 INJECTION, SOLUTION INTRAPERITONEAL at 23:36

## 2024-06-12 RX ADMIN — VANCOMYCIN HYDROCHLORIDE 750 MG: 5 INJECTION, POWDER, LYOPHILIZED, FOR SOLUTION INTRAVENOUS at 12:00

## 2024-06-12 RX ADMIN — SODIUM CHLORIDE, SODIUM LACTATE, CALCIUM CHLORIDE, MAGNESIUM CHLORIDE AND DEXTROSE 2000 ML: 1.5; 538; 448; 18.3; 5.08 INJECTION, SOLUTION INTRAPERITONEAL at 16:12

## 2024-06-12 RX ADMIN — Medication 10 ML: at 09:21

## 2024-06-12 RX ADMIN — SIMETHICONE 80 MG: 80 TABLET, CHEWABLE ORAL at 09:21

## 2024-06-12 RX ADMIN — HYDROCODONE BITARTRATE AND ACETAMINOPHEN 1 TABLET: 7.5; 325 TABLET ORAL at 19:23

## 2024-06-12 RX ADMIN — ONDANSETRON 4 MG: 2 INJECTION INTRAMUSCULAR; INTRAVENOUS at 09:21

## 2024-06-12 RX ADMIN — SODIUM CHLORIDE, SODIUM LACTATE, CALCIUM CHLORIDE, MAGNESIUM CHLORIDE AND DEXTROSE 2000 ML: 1.5; 538; 448; 18.3; 5.08 INJECTION, SOLUTION INTRAPERITONEAL at 12:19

## 2024-06-12 RX ADMIN — PANTOPRAZOLE SODIUM 40 MG: 40 TABLET, DELAYED RELEASE ORAL at 05:08

## 2024-06-12 RX ADMIN — HYDROCODONE BITARTRATE AND ACETAMINOPHEN 1 TABLET: 7.5; 325 TABLET ORAL at 14:20

## 2024-06-13 LAB
ANION GAP SERPL CALCULATED.3IONS-SCNC: 15.6 MMOL/L (ref 5–15)
BUN SERPL-MCNC: 33 MG/DL (ref 8–23)
BUN/CREAT SERPL: 6.9 (ref 7–25)
CALCIUM SPEC-SCNC: 9.2 MG/DL (ref 8.6–10.5)
CHLORIDE SERPL-SCNC: 99 MMOL/L (ref 98–107)
CO2 SERPL-SCNC: 26.4 MMOL/L (ref 22–29)
CREAT SERPL-MCNC: 4.8 MG/DL (ref 0.57–1)
EGFRCR SERPLBLD CKD-EPI 2021: 9.7 ML/MIN/1.73
GLUCOSE SERPL-MCNC: 80 MG/DL (ref 65–99)
POTASSIUM SERPL-SCNC: 5.2 MMOL/L (ref 3.5–5.2)
SODIUM SERPL-SCNC: 141 MMOL/L (ref 136–145)

## 2024-06-13 PROCEDURE — 80048 BASIC METABOLIC PNL TOTAL CA: CPT | Performed by: INTERNAL MEDICINE

## 2024-06-13 PROCEDURE — 94799 UNLISTED PULMONARY SVC/PX: CPT

## 2024-06-13 PROCEDURE — 99232 SBSQ HOSP IP/OBS MODERATE 35: CPT | Performed by: INTERNAL MEDICINE

## 2024-06-13 RX ADMIN — HYDROCODONE BITARTRATE AND ACETAMINOPHEN 1 TABLET: 7.5; 325 TABLET ORAL at 23:20

## 2024-06-13 RX ADMIN — ATORVASTATIN CALCIUM 10 MG: 10 TABLET, FILM COATED ORAL at 20:00

## 2024-06-13 RX ADMIN — ACETAMINOPHEN 1000 MG: 500 TABLET ORAL at 20:18

## 2024-06-13 RX ADMIN — PANTOPRAZOLE SODIUM 40 MG: 40 TABLET, DELAYED RELEASE ORAL at 05:57

## 2024-06-13 RX ADMIN — Medication 10 ML: at 20:18

## 2024-06-13 RX ADMIN — SENNOSIDES AND DOCUSATE SODIUM 1 TABLET: 50; 8.6 TABLET ORAL at 09:08

## 2024-06-13 RX ADMIN — HYDROCODONE BITARTRATE AND ACETAMINOPHEN 1 TABLET: 7.5; 325 TABLET ORAL at 18:06

## 2024-06-13 RX ADMIN — HYDROCODONE BITARTRATE AND ACETAMINOPHEN 1 TABLET: 7.5; 325 TABLET ORAL at 00:20

## 2024-06-13 RX ADMIN — Medication 10 ML: at 09:09

## 2024-06-13 RX ADMIN — HYDROCODONE BITARTRATE AND ACETAMINOPHEN 1 TABLET: 7.5; 325 TABLET ORAL at 12:56

## 2024-06-13 RX ADMIN — AMLODIPINE BESYLATE 5 MG: 5 TABLET ORAL at 09:08

## 2024-06-13 RX ADMIN — HYDROCODONE BITARTRATE AND ACETAMINOPHEN 1 TABLET: 7.5; 325 TABLET ORAL at 07:54

## 2024-06-14 LAB
ANION GAP SERPL CALCULATED.3IONS-SCNC: 12.9 MMOL/L (ref 5–15)
BUN SERPL-MCNC: 41 MG/DL (ref 8–23)
BUN/CREAT SERPL: 6.9 (ref 7–25)
CALCIUM SPEC-SCNC: 8.8 MG/DL (ref 8.6–10.5)
CHLORIDE SERPL-SCNC: 107 MMOL/L (ref 98–107)
CO2 SERPL-SCNC: 21.1 MMOL/L (ref 22–29)
CREAT SERPL-MCNC: 5.95 MG/DL (ref 0.57–1)
EGFRCR SERPLBLD CKD-EPI 2021: 7.5 ML/MIN/1.73
GLUCOSE SERPL-MCNC: 91 MG/DL (ref 65–99)
POTASSIUM SERPL-SCNC: 4.6 MMOL/L (ref 3.5–5.2)
SODIUM SERPL-SCNC: 141 MMOL/L (ref 136–145)
VANCOMYCIN SERPL-MCNC: 23.1 MCG/ML (ref 5–40)

## 2024-06-14 PROCEDURE — 63710000001 DIPHENHYDRAMINE PER 50 MG: Performed by: INTERNAL MEDICINE

## 2024-06-14 PROCEDURE — 63710000001 DIPHENHYDRAMINE PER 50 MG: Performed by: PHYSICIAN ASSISTANT

## 2024-06-14 PROCEDURE — 80202 ASSAY OF VANCOMYCIN: CPT | Performed by: INTERNAL MEDICINE

## 2024-06-14 PROCEDURE — 25010000002 VANCOMYCIN 5 G RECONSTITUTED SOLUTION: Performed by: INTERNAL MEDICINE

## 2024-06-14 PROCEDURE — 25810000003 SODIUM CHLORIDE 0.9 % SOLUTION: Performed by: INTERNAL MEDICINE

## 2024-06-14 PROCEDURE — 94799 UNLISTED PULMONARY SVC/PX: CPT

## 2024-06-14 PROCEDURE — 80048 BASIC METABOLIC PNL TOTAL CA: CPT | Performed by: INTERNAL MEDICINE

## 2024-06-14 PROCEDURE — 25010000002 HEPARIN (PORCINE) PER 1000 UNITS: Performed by: STUDENT IN AN ORGANIZED HEALTH CARE EDUCATION/TRAINING PROGRAM

## 2024-06-14 PROCEDURE — 25010000002 HYDRALAZINE PER 20 MG: Performed by: PHYSICIAN ASSISTANT

## 2024-06-14 PROCEDURE — 99024 POSTOP FOLLOW-UP VISIT: CPT | Performed by: NEUROLOGICAL SURGERY

## 2024-06-14 PROCEDURE — 99232 SBSQ HOSP IP/OBS MODERATE 35: CPT | Performed by: INTERNAL MEDICINE

## 2024-06-14 RX ORDER — HYDROXYZINE PAMOATE 25 MG/1
25 CAPSULE ORAL ONCE AS NEEDED
Status: COMPLETED | OUTPATIENT
Start: 2024-06-14 | End: 2024-06-14

## 2024-06-14 RX ORDER — AMLODIPINE BESYLATE 10 MG/1
10 TABLET ORAL DAILY
Status: DISCONTINUED | OUTPATIENT
Start: 2024-06-14 | End: 2024-06-19 | Stop reason: HOSPADM

## 2024-06-14 RX ORDER — DIPHENHYDRAMINE HCL 25 MG
25 CAPSULE ORAL EVERY 6 HOURS PRN
Status: DISCONTINUED | OUTPATIENT
Start: 2024-06-14 | End: 2024-06-15

## 2024-06-14 RX ORDER — HYDRALAZINE HYDROCHLORIDE 20 MG/ML
10 INJECTION INTRAMUSCULAR; INTRAVENOUS ONCE
Status: COMPLETED | OUTPATIENT
Start: 2024-06-14 | End: 2024-06-14

## 2024-06-14 RX ORDER — DIPHENHYDRAMINE HCL 25 MG
25 CAPSULE ORAL ONCE AS NEEDED
Status: COMPLETED | OUTPATIENT
Start: 2024-06-14 | End: 2024-06-14

## 2024-06-14 RX ADMIN — SODIUM CHLORIDE, SODIUM LACTATE, CALCIUM CHLORIDE, MAGNESIUM CHLORIDE AND DEXTROSE: 1.5; 538; 448; 18.3; 5.08 INJECTION, SOLUTION INTRAPERITONEAL at 17:19

## 2024-06-14 RX ADMIN — PANTOPRAZOLE SODIUM 40 MG: 40 TABLET, DELAYED RELEASE ORAL at 06:03

## 2024-06-14 RX ADMIN — Medication 10 ML: at 20:34

## 2024-06-14 RX ADMIN — DIPHENHYDRAMINE HYDROCHLORIDE 25 MG: 25 CAPSULE ORAL at 23:06

## 2024-06-14 RX ADMIN — DIPHENHYDRAMINE HYDROCHLORIDE 25 MG: 25 CAPSULE ORAL at 02:20

## 2024-06-14 RX ADMIN — HYDROCODONE BITARTRATE AND ACETAMINOPHEN 1 TABLET: 7.5; 325 TABLET ORAL at 17:44

## 2024-06-14 RX ADMIN — HYDROXYZINE PAMOATE 25 MG: 25 CAPSULE ORAL at 23:42

## 2024-06-14 RX ADMIN — SENNOSIDES AND DOCUSATE SODIUM 1 TABLET: 50; 8.6 TABLET ORAL at 08:23

## 2024-06-14 RX ADMIN — HYDROCODONE BITARTRATE AND ACETAMINOPHEN 1 TABLET: 7.5; 325 TABLET ORAL at 08:23

## 2024-06-14 RX ADMIN — AMLODIPINE BESYLATE 10 MG: 10 TABLET ORAL at 08:23

## 2024-06-14 RX ADMIN — VANCOMYCIN HYDROCHLORIDE 750 MG: 5 INJECTION, POWDER, LYOPHILIZED, FOR SOLUTION INTRAVENOUS at 20:34

## 2024-06-14 RX ADMIN — HYDROCODONE BITARTRATE AND ACETAMINOPHEN 1 TABLET: 7.5; 325 TABLET ORAL at 23:25

## 2024-06-14 RX ADMIN — HYDRALAZINE HYDROCHLORIDE 10 MG: 20 INJECTION, SOLUTION INTRAMUSCULAR; INTRAVENOUS at 00:59

## 2024-06-14 RX ADMIN — ATORVASTATIN CALCIUM 10 MG: 10 TABLET, FILM COATED ORAL at 20:33

## 2024-06-14 RX ADMIN — SODIUM CHLORIDE, SODIUM LACTATE, CALCIUM CHLORIDE, MAGNESIUM CHLORIDE AND DEXTROSE 2000 ML: 1.5; 538; 448; 18.3; 5.08 INJECTION, SOLUTION INTRAPERITONEAL at 08:14

## 2024-06-14 RX ADMIN — SODIUM CHLORIDE, SODIUM LACTATE, CALCIUM CHLORIDE, MAGNESIUM CHLORIDE AND DEXTROSE: 1.5; 538; 448; 18.3; 5.08 INJECTION, SOLUTION INTRAPERITONEAL at 12:51

## 2024-06-14 RX ADMIN — DIPHENHYDRAMINE HYDROCHLORIDE 25 MG: 25 CAPSULE ORAL at 15:50

## 2024-06-14 RX ADMIN — Medication 10 ML: at 08:24

## 2024-06-14 RX ADMIN — HYDROCODONE BITARTRATE AND ACETAMINOPHEN 1 TABLET: 7.5; 325 TABLET ORAL at 13:00

## 2024-06-15 PROCEDURE — 99232 SBSQ HOSP IP/OBS MODERATE 35: CPT | Performed by: INTERNAL MEDICINE

## 2024-06-15 RX ORDER — HYDROXYZINE PAMOATE 25 MG/1
25 CAPSULE ORAL 2 TIMES DAILY PRN
Status: COMPLETED | OUTPATIENT
Start: 2024-06-15 | End: 2024-06-15

## 2024-06-15 RX ORDER — METOPROLOL SUCCINATE 50 MG/1
50 TABLET, EXTENDED RELEASE ORAL
Status: DISCONTINUED | OUTPATIENT
Start: 2024-06-15 | End: 2024-06-15

## 2024-06-15 RX ORDER — METOPROLOL SUCCINATE 25 MG/1
25 TABLET, EXTENDED RELEASE ORAL
Status: DISCONTINUED | OUTPATIENT
Start: 2024-06-15 | End: 2024-06-19 | Stop reason: HOSPADM

## 2024-06-15 RX ORDER — HYDROXYZINE PAMOATE 25 MG/1
25 CAPSULE ORAL 2 TIMES DAILY PRN
Status: DISCONTINUED | OUTPATIENT
Start: 2024-06-15 | End: 2024-06-19 | Stop reason: HOSPADM

## 2024-06-15 RX ORDER — CALCIUM ACETATE 667 MG/1
1334 CAPSULE ORAL 3 TIMES DAILY
Status: DISCONTINUED | OUTPATIENT
Start: 2024-06-15 | End: 2024-06-19 | Stop reason: HOSPADM

## 2024-06-15 RX ADMIN — CALCIUM ACETATE 1334 MG: 667 CAPSULE ORAL at 16:45

## 2024-06-15 RX ADMIN — HYDROCODONE BITARTRATE AND ACETAMINOPHEN 1 TABLET: 7.5; 325 TABLET ORAL at 11:58

## 2024-06-15 RX ADMIN — Medication 10 ML: at 09:22

## 2024-06-15 RX ADMIN — METOPROLOL SUCCINATE 25 MG: 25 TABLET, EXTENDED RELEASE ORAL at 11:56

## 2024-06-15 RX ADMIN — AMLODIPINE BESYLATE 10 MG: 10 TABLET ORAL at 09:22

## 2024-06-15 RX ADMIN — ATORVASTATIN CALCIUM 10 MG: 10 TABLET, FILM COATED ORAL at 20:05

## 2024-06-15 RX ADMIN — Medication 10 ML: at 20:05

## 2024-06-15 RX ADMIN — SENNOSIDES AND DOCUSATE SODIUM 1 TABLET: 50; 8.6 TABLET ORAL at 09:22

## 2024-06-15 RX ADMIN — CALCIUM ACETATE 1334 MG: 667 CAPSULE ORAL at 11:56

## 2024-06-15 RX ADMIN — HYDROCODONE BITARTRATE AND ACETAMINOPHEN 1 TABLET: 7.5; 325 TABLET ORAL at 06:25

## 2024-06-15 RX ADMIN — CALCIUM ACETATE 1334 MG: 667 CAPSULE ORAL at 20:04

## 2024-06-15 RX ADMIN — HYDROXYZINE PAMOATE 25 MG: 25 CAPSULE ORAL at 15:07

## 2024-06-15 RX ADMIN — PANTOPRAZOLE SODIUM 40 MG: 40 TABLET, DELAYED RELEASE ORAL at 06:25

## 2024-06-15 RX ADMIN — HYDROCODONE BITARTRATE AND ACETAMINOPHEN 1 TABLET: 7.5; 325 TABLET ORAL at 21:16

## 2024-06-15 RX ADMIN — POLYETHYLENE GLYCOL 3350 17 G: 17 POWDER, FOR SOLUTION ORAL at 11:56

## 2024-06-16 LAB — VANCOMYCIN SERPL-MCNC: 20.56 MCG/ML (ref 5–40)

## 2024-06-16 PROCEDURE — 80202 ASSAY OF VANCOMYCIN: CPT | Performed by: INTERNAL MEDICINE

## 2024-06-16 PROCEDURE — 99232 SBSQ HOSP IP/OBS MODERATE 35: CPT | Performed by: INTERNAL MEDICINE

## 2024-06-16 PROCEDURE — 97116 GAIT TRAINING THERAPY: CPT

## 2024-06-16 RX ADMIN — Medication 10 ML: at 21:22

## 2024-06-16 RX ADMIN — HYDROXYZINE PAMOATE 25 MG: 25 CAPSULE ORAL at 17:32

## 2024-06-16 RX ADMIN — AMLODIPINE BESYLATE 10 MG: 10 TABLET ORAL at 08:10

## 2024-06-16 RX ADMIN — METOPROLOL SUCCINATE 25 MG: 25 TABLET, EXTENDED RELEASE ORAL at 08:10

## 2024-06-16 RX ADMIN — HYDROCODONE BITARTRATE AND ACETAMINOPHEN 1 TABLET: 7.5; 325 TABLET ORAL at 14:36

## 2024-06-16 RX ADMIN — HYDROCODONE BITARTRATE AND ACETAMINOPHEN 1 TABLET: 7.5; 325 TABLET ORAL at 10:27

## 2024-06-16 RX ADMIN — CALCIUM ACETATE 1334 MG: 667 CAPSULE ORAL at 08:10

## 2024-06-16 RX ADMIN — CALCIUM ACETATE 1334 MG: 667 CAPSULE ORAL at 21:22

## 2024-06-16 RX ADMIN — Medication 10 ML: at 08:16

## 2024-06-16 RX ADMIN — CALCIUM ACETATE 1334 MG: 667 CAPSULE ORAL at 17:32

## 2024-06-16 RX ADMIN — HYDROCODONE BITARTRATE AND ACETAMINOPHEN 1 TABLET: 7.5; 325 TABLET ORAL at 21:22

## 2024-06-16 RX ADMIN — HYDROXYZINE PAMOATE 25 MG: 25 CAPSULE ORAL at 05:28

## 2024-06-16 RX ADMIN — SENNOSIDES AND DOCUSATE SODIUM 1 TABLET: 50; 8.6 TABLET ORAL at 08:10

## 2024-06-16 RX ADMIN — ATORVASTATIN CALCIUM 10 MG: 10 TABLET, FILM COATED ORAL at 21:22

## 2024-06-16 RX ADMIN — PANTOPRAZOLE SODIUM 40 MG: 40 TABLET, DELAYED RELEASE ORAL at 05:28

## 2024-06-17 ENCOUNTER — APPOINTMENT (OUTPATIENT)
Dept: GENERAL RADIOLOGY | Facility: HOSPITAL | Age: 64
End: 2024-06-17
Payer: MEDICAID

## 2024-06-17 PROBLEM — Z99.2 ESRD ON PERITONEAL DIALYSIS: Status: ACTIVE | Noted: 2024-06-05

## 2024-06-17 PROBLEM — N18.6 ESRD ON PERITONEAL DIALYSIS: Status: ACTIVE | Noted: 2024-06-05

## 2024-06-17 LAB
ANION GAP SERPL CALCULATED.3IONS-SCNC: 14.3 MMOL/L (ref 5–15)
BUN SERPL-MCNC: 68 MG/DL (ref 8–23)
BUN/CREAT SERPL: 11 (ref 7–25)
CALCIUM SPEC-SCNC: 9.2 MG/DL (ref 8.6–10.5)
CHLORIDE SERPL-SCNC: 105 MMOL/L (ref 98–107)
CO2 SERPL-SCNC: 16.7 MMOL/L (ref 22–29)
CREAT SERPL-MCNC: 6.21 MG/DL (ref 0.57–1)
DEPRECATED RDW RBC AUTO: 41.8 FL (ref 37–54)
EGFRCR SERPLBLD CKD-EPI 2021: 7.1 ML/MIN/1.73
ERYTHROCYTE [DISTWIDTH] IN BLOOD BY AUTOMATED COUNT: 12 % (ref 12.3–15.4)
GLUCOSE SERPL-MCNC: 79 MG/DL (ref 65–99)
HCT VFR BLD AUTO: 27 % (ref 34–46.6)
HGB BLD-MCNC: 9 G/DL (ref 12–15.9)
MCH RBC QN AUTO: 31.5 PG (ref 26.6–33)
MCHC RBC AUTO-ENTMCNC: 33.3 G/DL (ref 31.5–35.7)
MCV RBC AUTO: 94.4 FL (ref 79–97)
PLATELET # BLD AUTO: 377 10*3/MM3 (ref 140–450)
PMV BLD AUTO: 10.7 FL (ref 6–12)
POTASSIUM SERPL-SCNC: 6 MMOL/L (ref 3.5–5.2)
RBC # BLD AUTO: 2.86 10*6/MM3 (ref 3.77–5.28)
SODIUM SERPL-SCNC: 136 MMOL/L (ref 136–145)
VANCOMYCIN SERPL-MCNC: 16.82 MCG/ML (ref 5–40)
WBC NRBC COR # BLD AUTO: 6.94 10*3/MM3 (ref 3.4–10.8)

## 2024-06-17 PROCEDURE — 99221 1ST HOSP IP/OBS SF/LOW 40: CPT | Performed by: SURGERY

## 2024-06-17 PROCEDURE — 74018 RADEX ABDOMEN 1 VIEW: CPT

## 2024-06-17 PROCEDURE — 25010000002 VANCOMYCIN 5 G RECONSTITUTED SOLUTION: Performed by: INTERNAL MEDICINE

## 2024-06-17 PROCEDURE — 99232 SBSQ HOSP IP/OBS MODERATE 35: CPT | Performed by: INTERNAL MEDICINE

## 2024-06-17 PROCEDURE — 80048 BASIC METABOLIC PNL TOTAL CA: CPT | Performed by: INTERNAL MEDICINE

## 2024-06-17 PROCEDURE — 25010000002 HEPARIN (PORCINE) PER 1000 UNITS: Performed by: NURSE PRACTITIONER

## 2024-06-17 PROCEDURE — 25810000003 SODIUM CHLORIDE 0.9 % SOLUTION: Performed by: INTERNAL MEDICINE

## 2024-06-17 PROCEDURE — 85027 COMPLETE CBC AUTOMATED: CPT | Performed by: INTERNAL MEDICINE

## 2024-06-17 PROCEDURE — 80202 ASSAY OF VANCOMYCIN: CPT | Performed by: INTERNAL MEDICINE

## 2024-06-17 RX ORDER — HYDROCODONE BITARTRATE AND ACETAMINOPHEN 5; 325 MG/1; MG/1
1 TABLET ORAL ONCE AS NEEDED
Status: COMPLETED | OUTPATIENT
Start: 2024-06-17 | End: 2024-06-17

## 2024-06-17 RX ORDER — LACTULOSE 10 G/15ML
10 SOLUTION ORAL
Status: ACTIVE | OUTPATIENT
Start: 2024-06-17 | End: 2024-06-17

## 2024-06-17 RX ORDER — MAG HYDROX/ALUMINUM HYD/SIMETH 400-400-40
1 SUSPENSION, ORAL (FINAL DOSE FORM) ORAL ONCE
Status: DISCONTINUED | OUTPATIENT
Start: 2024-06-17 | End: 2024-06-19 | Stop reason: HOSPADM

## 2024-06-17 RX ORDER — LACTULOSE 10 G/15ML
20 SOLUTION ORAL
Status: DISPENSED | OUTPATIENT
Start: 2024-06-17 | End: 2024-06-17

## 2024-06-17 RX ORDER — SODIUM CHLORIDE, SODIUM LACTATE, CALCIUM CHLORIDE, MAGNESIUM CHLORIDE AND DEXTROSE 1.5; 538; 448; 18.3; 5.08 G/100ML; MG/100ML; MG/100ML; MG/100ML; MG/100ML
2000 INJECTION, SOLUTION INTRAPERITONEAL
Status: DISCONTINUED | OUTPATIENT
Start: 2024-06-18 | End: 2024-06-19 | Stop reason: HOSPADM

## 2024-06-17 RX ORDER — SODIUM BICARBONATE 650 MG/1
1300 TABLET ORAL 3 TIMES DAILY
Status: DISCONTINUED | OUTPATIENT
Start: 2024-06-17 | End: 2024-06-19 | Stop reason: HOSPADM

## 2024-06-17 RX ADMIN — PANTOPRAZOLE SODIUM 40 MG: 40 TABLET, DELAYED RELEASE ORAL at 05:27

## 2024-06-17 RX ADMIN — HYDROXYZINE PAMOATE 25 MG: 25 CAPSULE ORAL at 05:27

## 2024-06-17 RX ADMIN — CALCIUM ACETATE 1334 MG: 667 CAPSULE ORAL at 21:37

## 2024-06-17 RX ADMIN — ACETAMINOPHEN 1000 MG: 500 TABLET ORAL at 10:01

## 2024-06-17 RX ADMIN — SODIUM CHLORIDE, SODIUM LACTATE, CALCIUM CHLORIDE, MAGNESIUM CHLORIDE AND DEXTROSE: 1.5; 538; 448; 18.3; 5.08 INJECTION, SOLUTION INTRAPERITONEAL at 22:17

## 2024-06-17 RX ADMIN — HYDROCODONE BITARTRATE AND ACETAMINOPHEN 1 TABLET: 7.5; 325 TABLET ORAL at 05:27

## 2024-06-17 RX ADMIN — VANCOMYCIN HYDROCHLORIDE 750 MG: 5 INJECTION, POWDER, LYOPHILIZED, FOR SOLUTION INTRAVENOUS at 16:30

## 2024-06-17 RX ADMIN — CALCIUM ACETATE 1334 MG: 667 CAPSULE ORAL at 16:30

## 2024-06-17 RX ADMIN — HYDROCODONE BITARTRATE AND ACETAMINOPHEN 1 TABLET: 5; 325 TABLET ORAL at 18:09

## 2024-06-17 RX ADMIN — CALCIUM ACETATE 1334 MG: 667 CAPSULE ORAL at 09:14

## 2024-06-17 RX ADMIN — SODIUM BICARBONATE 650 MG TABLET 1300 MG: at 09:14

## 2024-06-17 RX ADMIN — SODIUM ZIRCONIUM CYCLOSILICATE 10 G: 10 POWDER, FOR SUSPENSION ORAL at 11:53

## 2024-06-17 RX ADMIN — Medication 10 ML: at 09:15

## 2024-06-17 RX ADMIN — SENNOSIDES AND DOCUSATE SODIUM 1 TABLET: 50; 8.6 TABLET ORAL at 09:14

## 2024-06-17 RX ADMIN — SODIUM ZIRCONIUM CYCLOSILICATE 10 G: 10 POWDER, FOR SUSPENSION ORAL at 09:14

## 2024-06-17 RX ADMIN — SODIUM BICARBONATE 650 MG TABLET 1300 MG: at 16:30

## 2024-06-17 RX ADMIN — AMLODIPINE BESYLATE 10 MG: 10 TABLET ORAL at 09:14

## 2024-06-17 RX ADMIN — ATORVASTATIN CALCIUM 10 MG: 10 TABLET, FILM COATED ORAL at 21:37

## 2024-06-17 RX ADMIN — Medication 10 ML: at 21:38

## 2024-06-17 RX ADMIN — SODIUM BICARBONATE 100 ML/HR: 84 INJECTION, SOLUTION INTRAVENOUS at 10:02

## 2024-06-17 RX ADMIN — METOPROLOL SUCCINATE 25 MG: 25 TABLET, EXTENDED RELEASE ORAL at 09:14

## 2024-06-17 RX ADMIN — SODIUM BICARBONATE 650 MG TABLET 1300 MG: at 21:37

## 2024-06-18 PROBLEM — E87.20 METABOLIC ACIDOSIS: Status: ACTIVE | Noted: 2024-06-18

## 2024-06-18 PROBLEM — E87.5 HYPERKALEMIA: Status: ACTIVE | Noted: 2024-06-18

## 2024-06-18 LAB
ANION GAP SERPL CALCULATED.3IONS-SCNC: 11.9 MMOL/L (ref 5–15)
BUN SERPL-MCNC: 67 MG/DL (ref 8–23)
BUN/CREAT SERPL: 10.6 (ref 7–25)
CALCIUM SPEC-SCNC: 9.4 MG/DL (ref 8.6–10.5)
CHLORIDE SERPL-SCNC: 106 MMOL/L (ref 98–107)
CO2 SERPL-SCNC: 19.1 MMOL/L (ref 22–29)
CREAT SERPL-MCNC: 6.32 MG/DL (ref 0.57–1)
DEPRECATED RDW RBC AUTO: 41.6 FL (ref 37–54)
EGFRCR SERPLBLD CKD-EPI 2021: 7 ML/MIN/1.73
ERYTHROCYTE [DISTWIDTH] IN BLOOD BY AUTOMATED COUNT: 12.1 % (ref 12.3–15.4)
GLUCOSE SERPL-MCNC: 80 MG/DL (ref 65–99)
HCT VFR BLD AUTO: 27.1 % (ref 34–46.6)
HGB BLD-MCNC: 9 G/DL (ref 12–15.9)
MCH RBC QN AUTO: 31.3 PG (ref 26.6–33)
MCHC RBC AUTO-ENTMCNC: 33.2 G/DL (ref 31.5–35.7)
MCV RBC AUTO: 94.1 FL (ref 79–97)
PLATELET # BLD AUTO: 390 10*3/MM3 (ref 140–450)
PMV BLD AUTO: 10.8 FL (ref 6–12)
POTASSIUM SERPL-SCNC: 5.7 MMOL/L (ref 3.5–5.2)
RBC # BLD AUTO: 2.88 10*6/MM3 (ref 3.77–5.28)
SODIUM SERPL-SCNC: 137 MMOL/L (ref 136–145)
VANCOMYCIN SERPL-MCNC: 28.65 MCG/ML (ref 5–40)
WBC NRBC COR # BLD AUTO: 6.48 10*3/MM3 (ref 3.4–10.8)

## 2024-06-18 PROCEDURE — 99233 SBSQ HOSP IP/OBS HIGH 50: CPT | Performed by: FAMILY MEDICINE

## 2024-06-18 PROCEDURE — 25010000002 HEPARIN (PORCINE) PER 1000 UNITS: Performed by: NURSE PRACTITIONER

## 2024-06-18 PROCEDURE — 80048 BASIC METABOLIC PNL TOTAL CA: CPT | Performed by: INTERNAL MEDICINE

## 2024-06-18 PROCEDURE — 85027 COMPLETE CBC AUTOMATED: CPT | Performed by: INTERNAL MEDICINE

## 2024-06-18 PROCEDURE — 80202 ASSAY OF VANCOMYCIN: CPT | Performed by: INTERNAL MEDICINE

## 2024-06-18 RX ORDER — LACTULOSE 10 G/15ML
30 SOLUTION ORAL 2 TIMES DAILY
Status: DISPENSED | OUTPATIENT
Start: 2024-06-18 | End: 2024-06-19

## 2024-06-18 RX ADMIN — SODIUM BICARBONATE 650 MG TABLET 1300 MG: at 18:10

## 2024-06-18 RX ADMIN — ATORVASTATIN CALCIUM 10 MG: 10 TABLET, FILM COATED ORAL at 22:45

## 2024-06-18 RX ADMIN — PANTOPRAZOLE SODIUM 40 MG: 40 TABLET, DELAYED RELEASE ORAL at 05:58

## 2024-06-18 RX ADMIN — SODIUM CHLORIDE, SODIUM LACTATE, CALCIUM CHLORIDE, MAGNESIUM CHLORIDE AND DEXTROSE 2000 ML: 1.5; 538; 448; 18.3; 5.08 INJECTION, SOLUTION INTRAPERITONEAL at 18:10

## 2024-06-18 RX ADMIN — LACTULOSE 30 G: 10 SOLUTION ORAL at 22:45

## 2024-06-18 RX ADMIN — HYDROXYZINE PAMOATE 25 MG: 25 CAPSULE ORAL at 06:24

## 2024-06-18 RX ADMIN — SODIUM BICARBONATE 650 MG TABLET 1300 MG: at 08:16

## 2024-06-18 RX ADMIN — METOPROLOL SUCCINATE 25 MG: 25 TABLET, EXTENDED RELEASE ORAL at 08:16

## 2024-06-18 RX ADMIN — AMLODIPINE BESYLATE 10 MG: 10 TABLET ORAL at 08:16

## 2024-06-18 RX ADMIN — SODIUM CHLORIDE, SODIUM LACTATE, CALCIUM CHLORIDE, MAGNESIUM CHLORIDE AND DEXTROSE 2000 ML: 1.5; 538; 448; 18.3; 5.08 INJECTION, SOLUTION INTRAPERITONEAL at 08:17

## 2024-06-18 RX ADMIN — SODIUM CHLORIDE, SODIUM LACTATE, CALCIUM CHLORIDE, MAGNESIUM CHLORIDE AND DEXTROSE: 1.5; 538; 448; 18.3; 5.08 INJECTION, SOLUTION INTRAPERITONEAL at 22:42

## 2024-06-18 RX ADMIN — SODIUM CHLORIDE, SODIUM LACTATE, CALCIUM CHLORIDE, MAGNESIUM CHLORIDE AND DEXTROSE 2000 ML: 1.5; 538; 448; 18.3; 5.08 INJECTION, SOLUTION INTRAPERITONEAL at 13:19

## 2024-06-18 RX ADMIN — CALCIUM ACETATE 1334 MG: 667 CAPSULE ORAL at 08:16

## 2024-06-18 RX ADMIN — CALCIUM ACETATE 1334 MG: 667 CAPSULE ORAL at 22:45

## 2024-06-18 RX ADMIN — CALCIUM ACETATE 1334 MG: 667 CAPSULE ORAL at 18:10

## 2024-06-18 RX ADMIN — SODIUM BICARBONATE 650 MG TABLET 1300 MG: at 22:45

## 2024-06-18 RX ADMIN — Medication 10 ML: at 22:46

## 2024-06-18 RX ADMIN — Medication 10 ML: at 08:17

## 2024-06-19 ENCOUNTER — READMISSION MANAGEMENT (OUTPATIENT)
Dept: CALL CENTER | Facility: HOSPITAL | Age: 64
End: 2024-06-19
Payer: MEDICAID

## 2024-06-19 VITALS
WEIGHT: 149.25 LBS | RESPIRATION RATE: 18 BRPM | BODY MASS INDEX: 25.48 KG/M2 | HEIGHT: 64 IN | SYSTOLIC BLOOD PRESSURE: 151 MMHG | DIASTOLIC BLOOD PRESSURE: 79 MMHG | HEART RATE: 72 BPM | OXYGEN SATURATION: 98 % | TEMPERATURE: 98.8 F

## 2024-06-19 LAB
ALBUMIN SERPL-MCNC: 3.1 G/DL (ref 3.5–5.2)
ANION GAP SERPL CALCULATED.3IONS-SCNC: 10.2 MMOL/L (ref 5–15)
BUN SERPL-MCNC: 59 MG/DL (ref 8–23)
BUN/CREAT SERPL: 11.2 (ref 7–25)
CALCIUM SPEC-SCNC: 9.7 MG/DL (ref 8.6–10.5)
CHLORIDE SERPL-SCNC: 104 MMOL/L (ref 98–107)
CO2 SERPL-SCNC: 23.8 MMOL/L (ref 22–29)
CREAT SERPL-MCNC: 5.27 MG/DL (ref 0.57–1)
EGFRCR SERPLBLD CKD-EPI 2021: 8.7 ML/MIN/1.73
GLUCOSE SERPL-MCNC: 92 MG/DL (ref 65–99)
PHOSPHATE SERPL-MCNC: 4.1 MG/DL (ref 2.5–4.5)
POTASSIUM SERPL-SCNC: 5.2 MMOL/L (ref 3.5–5.2)
SODIUM SERPL-SCNC: 138 MMOL/L (ref 136–145)

## 2024-06-19 PROCEDURE — 25010000002 ONDANSETRON PER 1 MG: Performed by: INTERNAL MEDICINE

## 2024-06-19 PROCEDURE — 80069 RENAL FUNCTION PANEL: CPT | Performed by: FAMILY MEDICINE

## 2024-06-19 PROCEDURE — 99239 HOSP IP/OBS DSCHRG MGMT >30: CPT | Performed by: FAMILY MEDICINE

## 2024-06-19 RX ORDER — SODIUM BICARBONATE 650 MG/1
1300 TABLET ORAL 3 TIMES DAILY
Qty: 180 TABLET | Refills: 0 | Status: SHIPPED | OUTPATIENT
Start: 2024-06-19 | End: 2024-07-19

## 2024-06-19 RX ADMIN — Medication 10 ML: at 08:24

## 2024-06-19 RX ADMIN — SODIUM BICARBONATE 650 MG TABLET 1300 MG: at 08:24

## 2024-06-19 RX ADMIN — PANTOPRAZOLE SODIUM 40 MG: 40 TABLET, DELAYED RELEASE ORAL at 05:44

## 2024-06-19 RX ADMIN — CALCIUM ACETATE 1334 MG: 667 CAPSULE ORAL at 08:24

## 2024-06-19 RX ADMIN — ONDANSETRON 4 MG: 2 INJECTION INTRAMUSCULAR; INTRAVENOUS at 08:59

## 2024-06-19 RX ADMIN — SODIUM CHLORIDE, SODIUM LACTATE, CALCIUM CHLORIDE, MAGNESIUM CHLORIDE AND DEXTROSE 2000 ML: 1.5; 538; 448; 18.3; 5.08 INJECTION, SOLUTION INTRAPERITONEAL at 13:15

## 2024-06-19 RX ADMIN — AMLODIPINE BESYLATE 10 MG: 10 TABLET ORAL at 08:24

## 2024-06-19 RX ADMIN — METOPROLOL SUCCINATE 25 MG: 25 TABLET, EXTENDED RELEASE ORAL at 08:24

## 2024-06-19 RX ADMIN — SODIUM CHLORIDE, SODIUM LACTATE, CALCIUM CHLORIDE, MAGNESIUM CHLORIDE AND DEXTROSE 2000 ML: 1.5; 538; 448; 18.3; 5.08 INJECTION, SOLUTION INTRAPERITONEAL at 08:24

## 2024-06-24 RX ORDER — DICYCLOMINE HYDROCHLORIDE 10 MG/1
CAPSULE ORAL
Qty: 120 CAPSULE | Refills: 0 | Status: SHIPPED | OUTPATIENT
Start: 2024-06-24

## 2024-06-26 ENCOUNTER — READMISSION MANAGEMENT (OUTPATIENT)
Dept: CALL CENTER | Facility: HOSPITAL | Age: 64
End: 2024-06-26
Payer: MEDICAID

## 2024-06-27 ENCOUNTER — OFFICE VISIT (OUTPATIENT)
Dept: NEUROSURGERY | Facility: CLINIC | Age: 64
End: 2024-06-27
Payer: MEDICAID

## 2024-06-27 VITALS
WEIGHT: 141.8 LBS | SYSTOLIC BLOOD PRESSURE: 167 MMHG | BODY MASS INDEX: 24.21 KG/M2 | HEIGHT: 64 IN | DIASTOLIC BLOOD PRESSURE: 89 MMHG

## 2024-06-27 DIAGNOSIS — G96.00 POSTOPERATIVE CSF LEAK: Primary | ICD-10-CM

## 2024-06-27 DIAGNOSIS — G97.82 POSTOPERATIVE CSF LEAK: Primary | ICD-10-CM

## 2024-06-27 PROCEDURE — 3077F SYST BP >= 140 MM HG: CPT | Performed by: NEUROLOGICAL SURGERY

## 2024-06-27 PROCEDURE — 3079F DIAST BP 80-89 MM HG: CPT | Performed by: NEUROLOGICAL SURGERY

## 2024-06-27 PROCEDURE — 99024 POSTOP FOLLOW-UP VISIT: CPT | Performed by: NEUROLOGICAL SURGERY

## 2024-07-02 ENCOUNTER — OFFICE VISIT (OUTPATIENT)
Dept: GASTROENTEROLOGY | Facility: CLINIC | Age: 64
End: 2024-07-02
Payer: MEDICAID

## 2024-07-02 VITALS
SYSTOLIC BLOOD PRESSURE: 140 MMHG | DIASTOLIC BLOOD PRESSURE: 83 MMHG | WEIGHT: 147.6 LBS | BODY MASS INDEX: 25.2 KG/M2 | HEART RATE: 75 BPM | HEIGHT: 64 IN

## 2024-07-02 DIAGNOSIS — K21.9 GASTROESOPHAGEAL REFLUX DISEASE, UNSPECIFIED WHETHER ESOPHAGITIS PRESENT: ICD-10-CM

## 2024-07-02 DIAGNOSIS — K58.1 IRRITABLE BOWEL SYNDROME WITH CONSTIPATION: ICD-10-CM

## 2024-07-02 DIAGNOSIS — R10.30 LOWER ABDOMINAL PAIN: Primary | ICD-10-CM

## 2024-07-10 ENCOUNTER — TELEPHONE (OUTPATIENT)
Dept: NEUROSURGERY | Facility: CLINIC | Age: 64
End: 2024-07-10
Payer: MEDICAID

## 2024-07-10 DIAGNOSIS — G89.29 CHRONIC LOW BACK PAIN, UNSPECIFIED BACK PAIN LATERALITY, UNSPECIFIED WHETHER SCIATICA PRESENT: ICD-10-CM

## 2024-07-10 DIAGNOSIS — G97.82 POSTOPERATIVE CSF LEAK: Primary | ICD-10-CM

## 2024-07-10 DIAGNOSIS — G03.8 MENINGITIS AFTER PROCEDURE: ICD-10-CM

## 2024-07-10 DIAGNOSIS — G97.82 MENINGITIS AFTER PROCEDURE: ICD-10-CM

## 2024-07-10 DIAGNOSIS — M54.50 CHRONIC LOW BACK PAIN, UNSPECIFIED BACK PAIN LATERALITY, UNSPECIFIED WHETHER SCIATICA PRESENT: ICD-10-CM

## 2024-07-10 DIAGNOSIS — G96.00 POSTOPERATIVE CSF LEAK: Primary | ICD-10-CM

## 2024-08-08 ENCOUNTER — OFFICE VISIT (OUTPATIENT)
Dept: FAMILY MEDICINE CLINIC | Facility: CLINIC | Age: 64
End: 2024-08-08
Payer: MEDICAID

## 2024-08-08 VITALS
SYSTOLIC BLOOD PRESSURE: 197 MMHG | OXYGEN SATURATION: 100 % | HEART RATE: 79 BPM | BODY MASS INDEX: 25.81 KG/M2 | WEIGHT: 151.2 LBS | HEIGHT: 64 IN | TEMPERATURE: 98.6 F | DIASTOLIC BLOOD PRESSURE: 90 MMHG | RESPIRATION RATE: 18 BRPM

## 2024-08-08 DIAGNOSIS — E78.2 MIXED HYPERLIPIDEMIA: ICD-10-CM

## 2024-08-08 DIAGNOSIS — Z00.00 ANNUAL PHYSICAL EXAM: ICD-10-CM

## 2024-08-08 DIAGNOSIS — Z11.59 NEED FOR HEPATITIS C SCREENING TEST: ICD-10-CM

## 2024-08-08 DIAGNOSIS — E11.9 TYPE 2 DIABETES MELLITUS WITHOUT COMPLICATION, WITHOUT LONG-TERM CURRENT USE OF INSULIN: Chronic | ICD-10-CM

## 2024-08-08 DIAGNOSIS — I10 PRIMARY HYPERTENSION: Chronic | ICD-10-CM

## 2024-08-08 DIAGNOSIS — N18.5 STAGE 5 CHRONIC KIDNEY DISEASE: Chronic | ICD-10-CM

## 2024-08-08 DIAGNOSIS — F33.2 MAJOR DEPRESSIVE DISORDER, RECURRENT SEVERE WITHOUT PSYCHOTIC FEATURES: Primary | Chronic | ICD-10-CM

## 2024-08-08 DIAGNOSIS — E11.43 TYPE 2 DIABETES MELLITUS WITH DIABETIC AUTONOMIC NEUROPATHY, WITHOUT LONG-TERM CURRENT USE OF INSULIN: ICD-10-CM

## 2024-08-08 PROBLEM — I63.9 STROKE: Status: ACTIVE | Noted: 2024-08-08

## 2024-08-08 PROBLEM — Z99.2 ESRD (END STAGE RENAL DISEASE) ON DIALYSIS: Chronic | Status: ACTIVE | Noted: 2021-09-23

## 2024-08-08 PROBLEM — F19.20 DRUG DEPENDENCE: Status: ACTIVE | Noted: 2024-08-08

## 2024-08-08 PROBLEM — R10.13 EPIGASTRIC PAIN: Status: RESOLVED | Noted: 2021-11-16 | Resolved: 2024-08-08

## 2024-08-08 PROBLEM — M79.7 FIBROMYALGIA: Status: ACTIVE | Noted: 2024-08-08

## 2024-08-08 PROBLEM — I50.9 CONGESTIVE HEART FAILURE: Status: ACTIVE | Noted: 2024-08-08

## 2024-08-08 PROBLEM — T85.611A PD CATHETER DYSFUNCTION: Status: RESOLVED | Noted: 2023-02-28 | Resolved: 2024-08-08

## 2024-08-08 PROBLEM — M25.562 LEFT KNEE PAIN: Status: RESOLVED | Noted: 2022-03-17 | Resolved: 2024-08-08

## 2024-08-08 PROBLEM — I73.9 PVD (PERIPHERAL VASCULAR DISEASE) WITH CLAUDICATION: Chronic | Status: ACTIVE | Noted: 2022-01-04

## 2024-08-08 PROBLEM — R56.9 SEIZURES: Status: ACTIVE | Noted: 2024-08-08

## 2024-08-08 PROBLEM — F41.9 ANXIETY: Status: ACTIVE | Noted: 2018-12-19

## 2024-08-08 PROBLEM — G43.909 MIGRAINE: Chronic | Status: ACTIVE | Noted: 2024-08-08

## 2024-08-08 PROBLEM — N18.6 ESRD ON PERITONEAL DIALYSIS: Chronic | Status: ACTIVE | Noted: 2024-06-05

## 2024-08-08 PROBLEM — D62 ACUTE BLOOD LOSS ANEMIA: Status: RESOLVED | Noted: 2021-09-23 | Resolved: 2024-08-08

## 2024-08-08 PROBLEM — G40.909 EPILEPSY: Chronic | Status: ACTIVE | Noted: 2024-08-08

## 2024-08-08 PROBLEM — G03.8 MENINGITIS AFTER PROCEDURE: Chronic | Status: ACTIVE | Noted: 2024-06-05

## 2024-08-08 PROBLEM — M17.12 PRIMARY OSTEOARTHRITIS OF LEFT KNEE: Chronic | Status: ACTIVE | Noted: 2022-03-17

## 2024-08-08 PROBLEM — N18.6 ESRD (END STAGE RENAL DISEASE) ON DIALYSIS: Chronic | Status: ACTIVE | Noted: 2021-09-23

## 2024-08-08 PROBLEM — E87.20 METABOLIC ACIDOSIS: Status: RESOLVED | Noted: 2024-06-18 | Resolved: 2024-08-08

## 2024-08-08 PROBLEM — K27.9 PEPTIC ULCER: Status: RESOLVED | Noted: 2024-08-08 | Resolved: 2024-08-08

## 2024-08-08 PROBLEM — F19.20 DRUG DEPENDENCE: Chronic | Status: ACTIVE | Noted: 2024-08-08

## 2024-08-08 PROBLEM — G97.82 MENINGITIS AFTER PROCEDURE: Chronic | Status: ACTIVE | Noted: 2024-06-05

## 2024-08-08 PROBLEM — N18.6 ESRD (END STAGE RENAL DISEASE) ON DIALYSIS: Chronic | Status: RESOLVED | Noted: 2021-09-23 | Resolved: 2024-08-08

## 2024-08-08 PROBLEM — J45.909 ASTHMA: Status: ACTIVE | Noted: 2024-08-08

## 2024-08-08 PROBLEM — G62.9 POLYNEUROPATHY: Status: ACTIVE | Noted: 2018-12-19

## 2024-08-08 PROBLEM — R56.9 SEIZURES: Status: RESOLVED | Noted: 2024-08-08 | Resolved: 2024-08-08

## 2024-08-08 PROBLEM — Z99.2 ESRD (END STAGE RENAL DISEASE) ON DIALYSIS: Chronic | Status: RESOLVED | Noted: 2021-09-23 | Resolved: 2024-08-08

## 2024-08-08 PROBLEM — A41.9 SEVERE SEPSIS: Status: RESOLVED | Noted: 2024-06-05 | Resolved: 2024-08-08

## 2024-08-08 PROBLEM — R65.20 SEVERE SEPSIS: Status: RESOLVED | Noted: 2024-06-05 | Resolved: 2024-08-08

## 2024-08-08 PROBLEM — G40.909 EPILEPSY: Status: ACTIVE | Noted: 2024-08-08

## 2024-08-08 PROBLEM — D59.10: Status: ACTIVE | Noted: 2024-08-08

## 2024-08-08 PROBLEM — M32.9 SYSTEMIC LUPUS ERYTHEMATOSUS: Status: ACTIVE | Noted: 2024-08-08

## 2024-08-08 PROBLEM — E87.5 HYPERKALEMIA: Status: RESOLVED | Noted: 2024-06-18 | Resolved: 2024-08-08

## 2024-08-08 PROBLEM — N39.0 CHRONIC UTI: Status: ACTIVE | Noted: 2024-08-08

## 2024-08-08 PROBLEM — M10.9 GOUT: Status: ACTIVE | Noted: 2024-08-08

## 2024-08-08 PROBLEM — N20.0 KIDNEY STONES: Status: ACTIVE | Noted: 2024-08-08

## 2024-08-08 PROBLEM — N39.0 CHRONIC UTI: Chronic | Status: ACTIVE | Noted: 2024-08-08

## 2024-08-08 PROBLEM — G43.909 MIGRAINE: Status: ACTIVE | Noted: 2024-08-08

## 2024-08-08 PROBLEM — Z99.2 ESRD ON PERITONEAL DIALYSIS: Chronic | Status: ACTIVE | Noted: 2024-06-05

## 2024-08-08 PROBLEM — J44.9 CHRONIC OBSTRUCTIVE PULMONARY DISEASE: Status: ACTIVE | Noted: 2024-08-08

## 2024-08-08 PROBLEM — M32.9 SYSTEMIC LUPUS ERYTHEMATOSUS: Chronic | Status: ACTIVE | Noted: 2024-08-08

## 2024-08-08 PROBLEM — K92.2 GASTROINTESTINAL HEMORRHAGE: Status: RESOLVED | Noted: 2021-09-23 | Resolved: 2024-08-08

## 2024-08-08 PROBLEM — K27.9 PEPTIC ULCER: Status: ACTIVE | Noted: 2024-08-08

## 2024-08-08 PROBLEM — F31.9 BIPOLAR DISORDER: Status: ACTIVE | Noted: 2024-08-08

## 2024-08-08 PROCEDURE — 1160F RVW MEDS BY RX/DR IN RCRD: CPT | Performed by: FAMILY MEDICINE

## 2024-08-08 PROCEDURE — 1159F MED LIST DOCD IN RCRD: CPT | Performed by: FAMILY MEDICINE

## 2024-08-08 PROCEDURE — 99396 PREV VISIT EST AGE 40-64: CPT | Performed by: FAMILY MEDICINE

## 2024-08-08 PROCEDURE — 3077F SYST BP >= 140 MM HG: CPT | Performed by: FAMILY MEDICINE

## 2024-08-08 PROCEDURE — 1125F AMNT PAIN NOTED PAIN PRSNT: CPT | Performed by: FAMILY MEDICINE

## 2024-08-08 PROCEDURE — 3080F DIAST BP >= 90 MM HG: CPT | Performed by: FAMILY MEDICINE

## 2024-08-08 RX ORDER — DM/PE/ACETAMINOPHEN/CHLORPHENR 10-5-325-2
TABLET, SEQUENTIAL ORAL
COMMUNITY
Start: 2024-07-22

## 2024-08-12 ENCOUNTER — TELEPHONE (OUTPATIENT)
Dept: NEUROSURGERY | Facility: CLINIC | Age: 64
End: 2024-08-12
Payer: MEDICAID

## 2024-08-15 ENCOUNTER — OFFICE VISIT (OUTPATIENT)
Dept: NEUROSURGERY | Facility: CLINIC | Age: 64
End: 2024-08-15
Payer: MEDICAID

## 2024-08-15 VITALS
WEIGHT: 148.1 LBS | HEIGHT: 64 IN | BODY MASS INDEX: 25.29 KG/M2 | SYSTOLIC BLOOD PRESSURE: 186 MMHG | DIASTOLIC BLOOD PRESSURE: 96 MMHG

## 2024-08-15 DIAGNOSIS — G97.82 POSTOPERATIVE CSF LEAK: Primary | ICD-10-CM

## 2024-08-15 DIAGNOSIS — G96.00 POSTOPERATIVE CSF LEAK: Primary | ICD-10-CM

## 2024-08-22 RX ORDER — DM/PE/ACETAMINOPHEN/CHLORPHENR 10-5-325-2
1 TABLET, SEQUENTIAL ORAL 2 TIMES DAILY
Qty: 60 TABLET | Refills: 11 | Status: SHIPPED | OUTPATIENT
Start: 2024-08-22

## 2024-08-22 RX ORDER — OMEPRAZOLE 40 MG/1
CAPSULE, DELAYED RELEASE ORAL
Qty: 90 CAPSULE | Refills: 0 | Status: SHIPPED | OUTPATIENT
Start: 2024-08-22

## 2024-08-23 RX ORDER — PLECANATIDE 3 MG/1
1 TABLET ORAL DAILY
Qty: 90 TABLET | Refills: 0 | Status: SHIPPED | OUTPATIENT
Start: 2024-08-23

## 2024-08-23 RX ORDER — DICYCLOMINE HYDROCHLORIDE 10 MG/1
CAPSULE ORAL
Qty: 120 CAPSULE | Refills: 0 | Status: SHIPPED | OUTPATIENT
Start: 2024-08-23

## 2024-09-27 ENCOUNTER — LAB (OUTPATIENT)
Dept: LAB | Facility: HOSPITAL | Age: 64
End: 2024-09-27
Payer: MEDICAID

## 2024-09-27 ENCOUNTER — OFFICE VISIT (OUTPATIENT)
Dept: BEHAVIORAL HEALTH | Facility: CLINIC | Age: 64
End: 2024-09-27
Payer: MEDICAID

## 2024-09-27 VITALS
HEIGHT: 64 IN | BODY MASS INDEX: 27.08 KG/M2 | DIASTOLIC BLOOD PRESSURE: 116 MMHG | OXYGEN SATURATION: 98 % | SYSTOLIC BLOOD PRESSURE: 218 MMHG | HEART RATE: 71 BPM | WEIGHT: 158.6 LBS

## 2024-09-27 DIAGNOSIS — E78.2 MIXED HYPERLIPIDEMIA: ICD-10-CM

## 2024-09-27 DIAGNOSIS — F31.13 BIPOLAR DISORDER, CURRENT EPISODE MANIC WITHOUT PSYCHOTIC FEATURES, SEVERE: Primary | ICD-10-CM

## 2024-09-27 DIAGNOSIS — Z00.00 ANNUAL PHYSICAL EXAM: ICD-10-CM

## 2024-09-27 DIAGNOSIS — Z11.59 NEED FOR HEPATITIS C SCREENING TEST: ICD-10-CM

## 2024-09-27 DIAGNOSIS — E11.9 TYPE 2 DIABETES MELLITUS WITHOUT COMPLICATION, WITHOUT LONG-TERM CURRENT USE OF INSULIN: Chronic | ICD-10-CM

## 2024-09-27 DIAGNOSIS — F41.1 GENERALIZED ANXIETY DISORDER: ICD-10-CM

## 2024-09-27 DIAGNOSIS — N18.5 STAGE 5 CHRONIC KIDNEY DISEASE: Chronic | ICD-10-CM

## 2024-09-27 DIAGNOSIS — F43.21 COMPLICATED GRIEVING: ICD-10-CM

## 2024-09-27 LAB
ALBUMIN SERPL-MCNC: 4.2 G/DL (ref 3.5–5.2)
ALBUMIN/GLOB SERPL: 1.8 G/DL
ALP SERPL-CCNC: 86 U/L (ref 39–117)
ALT SERPL W P-5'-P-CCNC: 8 U/L (ref 1–33)
ANION GAP SERPL CALCULATED.3IONS-SCNC: 16 MMOL/L (ref 5–15)
AST SERPL-CCNC: 18 U/L (ref 1–32)
BASOPHILS # BLD AUTO: 0.05 10*3/MM3 (ref 0–0.2)
BASOPHILS NFR BLD AUTO: 0.6 % (ref 0–1.5)
BILIRUB SERPL-MCNC: <0.2 MG/DL (ref 0–1.2)
BUN SERPL-MCNC: 74 MG/DL (ref 8–23)
BUN/CREAT SERPL: 7.8 (ref 7–25)
CALCIUM SPEC-SCNC: 10.3 MG/DL (ref 8.6–10.5)
CHLORIDE SERPL-SCNC: 100 MMOL/L (ref 98–107)
CHOLEST SERPL-MCNC: 153 MG/DL (ref 0–200)
CO2 SERPL-SCNC: 20 MMOL/L (ref 22–29)
CREAT SERPL-MCNC: 9.5 MG/DL (ref 0.57–1)
DEPRECATED RDW RBC AUTO: 43.3 FL (ref 37–54)
EGFRCR SERPLBLD CKD-EPI 2021: 4.3 ML/MIN/1.73
EOSINOPHIL # BLD AUTO: 0.42 10*3/MM3 (ref 0–0.4)
EOSINOPHIL NFR BLD AUTO: 4.8 % (ref 0.3–6.2)
ERYTHROCYTE [DISTWIDTH] IN BLOOD BY AUTOMATED COUNT: 12.8 % (ref 12.3–15.4)
GLOBULIN UR ELPH-MCNC: 2.4 GM/DL
GLUCOSE SERPL-MCNC: 85 MG/DL (ref 65–99)
HBA1C MFR BLD: 5.1 % (ref 4.8–5.6)
HCT VFR BLD AUTO: 34 % (ref 34–46.6)
HCV AB SER QL: NORMAL
HDLC SERPL-MCNC: 63 MG/DL (ref 40–60)
HGB BLD-MCNC: 11.4 G/DL (ref 12–15.9)
IMM GRANULOCYTES # BLD AUTO: 0.02 10*3/MM3 (ref 0–0.05)
IMM GRANULOCYTES NFR BLD AUTO: 0.2 % (ref 0–0.5)
LDLC SERPL CALC-MCNC: 75 MG/DL (ref 0–100)
LDLC/HDLC SERPL: 1.17 {RATIO}
LYMPHOCYTES # BLD AUTO: 3.51 10*3/MM3 (ref 0.7–3.1)
LYMPHOCYTES NFR BLD AUTO: 39.9 % (ref 19.6–45.3)
MCH RBC QN AUTO: 31.5 PG (ref 26.6–33)
MCHC RBC AUTO-ENTMCNC: 33.5 G/DL (ref 31.5–35.7)
MCV RBC AUTO: 93.9 FL (ref 79–97)
MONOCYTES # BLD AUTO: 0.54 10*3/MM3 (ref 0.1–0.9)
MONOCYTES NFR BLD AUTO: 6.1 % (ref 5–12)
NEUTROPHILS NFR BLD AUTO: 4.25 10*3/MM3 (ref 1.7–7)
NEUTROPHILS NFR BLD AUTO: 48.4 % (ref 42.7–76)
NRBC BLD AUTO-RTO: 0 /100 WBC (ref 0–0.2)
PLATELET # BLD AUTO: 280 10*3/MM3 (ref 140–450)
PMV BLD AUTO: 12.1 FL (ref 6–12)
POTASSIUM SERPL-SCNC: 6.1 MMOL/L (ref 3.5–5.2)
PROT SERPL-MCNC: 6.6 G/DL (ref 6–8.5)
RBC # BLD AUTO: 3.62 10*6/MM3 (ref 3.77–5.28)
SODIUM SERPL-SCNC: 136 MMOL/L (ref 136–145)
TRIGL SERPL-MCNC: 81 MG/DL (ref 0–150)
TSH SERPL DL<=0.05 MIU/L-ACNC: 2.6 UIU/ML (ref 0.27–4.2)
VLDLC SERPL-MCNC: 15 MG/DL (ref 5–40)
WBC NRBC COR # BLD AUTO: 8.79 10*3/MM3 (ref 3.4–10.8)

## 2024-09-27 PROCEDURE — 36415 COLL VENOUS BLD VENIPUNCTURE: CPT

## 2024-09-27 PROCEDURE — 83036 HEMOGLOBIN GLYCOSYLATED A1C: CPT

## 2024-09-27 PROCEDURE — 86803 HEPATITIS C AB TEST: CPT

## 2024-09-27 PROCEDURE — 80061 LIPID PANEL: CPT

## 2024-09-27 PROCEDURE — 80050 GENERAL HEALTH PANEL: CPT

## 2024-09-27 RX ORDER — CALCIUM ACETATE 667 MG/1
CAPSULE ORAL
COMMUNITY
Start: 2024-09-21 | End: 2024-09-30

## 2024-09-27 RX ORDER — OLANZAPINE 5 MG/1
5 TABLET ORAL NIGHTLY
Qty: 30 TABLET | Refills: 2 | Status: SHIPPED | OUTPATIENT
Start: 2024-09-27 | End: 2024-10-04

## 2024-09-28 ENCOUNTER — DOCUMENTATION (OUTPATIENT)
Dept: FAMILY MEDICINE CLINIC | Facility: CLINIC | Age: 64
End: 2024-09-28
Payer: MEDICAID

## 2024-09-30 ENCOUNTER — APPOINTMENT (OUTPATIENT)
Dept: CT IMAGING | Facility: HOSPITAL | Age: 64
End: 2024-09-30
Payer: MEDICAID

## 2024-09-30 ENCOUNTER — APPOINTMENT (OUTPATIENT)
Dept: GENERAL RADIOLOGY | Facility: HOSPITAL | Age: 64
End: 2024-09-30
Payer: MEDICAID

## 2024-09-30 ENCOUNTER — HOSPITAL ENCOUNTER (OUTPATIENT)
Facility: HOSPITAL | Age: 64
Setting detail: OBSERVATION
LOS: 1 days | Discharge: HOME | End: 2024-10-02
Attending: EMERGENCY MEDICINE | Admitting: FAMILY MEDICINE
Payer: MEDICAID

## 2024-09-30 DIAGNOSIS — I16.1 HYPERTENSIVE EMERGENCY: Primary | ICD-10-CM

## 2024-09-30 DIAGNOSIS — R26.2 DIFFICULTY WALKING: ICD-10-CM

## 2024-09-30 DIAGNOSIS — Z78.9 DECREASED ACTIVITIES OF DAILY LIVING (ADL): ICD-10-CM

## 2024-09-30 DIAGNOSIS — I63.9 ISCHEMIC STROKE: ICD-10-CM

## 2024-09-30 DIAGNOSIS — R13.10 DYSPHAGIA, UNSPECIFIED TYPE: ICD-10-CM

## 2024-09-30 PROBLEM — G45.9 TRANSIENT ISCHEMIC ATTACK (TIA): Status: ACTIVE | Noted: 2024-09-30

## 2024-09-30 LAB
ALBUMIN SERPL-MCNC: 3.8 G/DL (ref 3.5–5.2)
ALBUMIN/GLOB SERPL: 1.4 G/DL
ALP SERPL-CCNC: 78 U/L (ref 39–117)
ALT SERPL W P-5'-P-CCNC: 11 U/L (ref 1–33)
ANION GAP SERPL CALCULATED.3IONS-SCNC: 13.2 MMOL/L (ref 5–15)
APTT PPP: 27.7 SECONDS (ref 24.2–34.2)
AST SERPL-CCNC: 19 U/L (ref 1–32)
BASOPHILS # BLD AUTO: 0.04 10*3/MM3 (ref 0–0.2)
BASOPHILS NFR BLD AUTO: 0.6 % (ref 0–1.5)
BILIRUB SERPL-MCNC: <0.2 MG/DL (ref 0–1.2)
BILIRUB UR QL STRIP: NEGATIVE
BUN SERPL-MCNC: 55 MG/DL (ref 8–23)
BUN/CREAT SERPL: 8.8 (ref 7–25)
CALCIUM SPEC-SCNC: 11.2 MG/DL (ref 8.6–10.5)
CHLORIDE SERPL-SCNC: 99 MMOL/L (ref 98–107)
CLARITY UR: CLEAR
CO2 SERPL-SCNC: 24.8 MMOL/L (ref 22–29)
COLOR UR: YELLOW
CREAT SERPL-MCNC: 6.24 MG/DL (ref 0.57–1)
DEPRECATED RDW RBC AUTO: 44.4 FL (ref 37–54)
EGFRCR SERPLBLD CKD-EPI 2021: 7.1 ML/MIN/1.73
EOSINOPHIL # BLD AUTO: 0.33 10*3/MM3 (ref 0–0.4)
EOSINOPHIL NFR BLD AUTO: 4.6 % (ref 0.3–6.2)
ERYTHROCYTE [DISTWIDTH] IN BLOOD BY AUTOMATED COUNT: 13.3 % (ref 12.3–15.4)
GLOBULIN UR ELPH-MCNC: 2.7 GM/DL
GLUCOSE BLDC GLUCOMTR-MCNC: 132 MG/DL (ref 70–99)
GLUCOSE SERPL-MCNC: 122 MG/DL (ref 65–99)
GLUCOSE UR STRIP-MCNC: ABNORMAL MG/DL
HBA1C MFR BLD: 5 % (ref 4.8–5.6)
HCT VFR BLD AUTO: 31.5 % (ref 34–46.6)
HGB BLD-MCNC: 10.6 G/DL (ref 12–15.9)
HGB UR QL STRIP.AUTO: NEGATIVE
HOLD SPECIMEN: NORMAL
HOLD SPECIMEN: NORMAL
IMM GRANULOCYTES # BLD AUTO: 0.01 10*3/MM3 (ref 0–0.05)
IMM GRANULOCYTES NFR BLD AUTO: 0.1 % (ref 0–0.5)
INR PPP: 0.86 (ref 0.86–1.15)
IRON 24H UR-MRATE: 179 MCG/DL (ref 37–145)
IRON SATN MFR SERPL: 54 % (ref 20–50)
KETONES UR QL STRIP: NEGATIVE
LEUKOCYTE ESTERASE UR QL STRIP.AUTO: NEGATIVE
LYMPHOCYTES # BLD AUTO: 3.22 10*3/MM3 (ref 0.7–3.1)
LYMPHOCYTES NFR BLD AUTO: 45 % (ref 19.6–45.3)
MCH RBC QN AUTO: 30.7 PG (ref 26.6–33)
MCHC RBC AUTO-ENTMCNC: 33.7 G/DL (ref 31.5–35.7)
MCV RBC AUTO: 91.3 FL (ref 79–97)
MONOCYTES # BLD AUTO: 0.54 10*3/MM3 (ref 0.1–0.9)
MONOCYTES NFR BLD AUTO: 7.6 % (ref 5–12)
NEUTROPHILS NFR BLD AUTO: 3.01 10*3/MM3 (ref 1.7–7)
NEUTROPHILS NFR BLD AUTO: 42.1 % (ref 42.7–76)
NITRITE UR QL STRIP: NEGATIVE
NRBC BLD AUTO-RTO: 0 /100 WBC (ref 0–0.2)
PH UR STRIP.AUTO: 8 [PH] (ref 5–8)
PLATELET # BLD AUTO: 227 10*3/MM3 (ref 140–450)
PMV BLD AUTO: 12.1 FL (ref 6–12)
POTASSIUM SERPL-SCNC: 4.7 MMOL/L (ref 3.5–5.2)
PROT SERPL-MCNC: 6.5 G/DL (ref 6–8.5)
PROT UR QL STRIP: ABNORMAL
PROTHROMBIN TIME: 11.9 SECONDS (ref 11.8–14.9)
QT INTERVAL: 384 MS
QTC INTERVAL: 418 MS
RBC # BLD AUTO: 3.45 10*6/MM3 (ref 3.77–5.28)
SODIUM SERPL-SCNC: 137 MMOL/L (ref 136–145)
SP GR UR STRIP: 1.01 (ref 1–1.03)
TIBC SERPL-MCNC: 331 MCG/DL (ref 298–536)
TRANSFERRIN SERPL-MCNC: 222 MG/DL (ref 200–360)
TROPONIN T SERPL HS-MCNC: 21 NG/L
UROBILINOGEN UR QL STRIP: ABNORMAL
WBC NRBC COR # BLD AUTO: 7.15 10*3/MM3 (ref 3.4–10.8)
WHOLE BLOOD HOLD COAG: NORMAL
WHOLE BLOOD HOLD SPECIMEN: NORMAL

## 2024-09-30 PROCEDURE — 71045 X-RAY EXAM CHEST 1 VIEW: CPT

## 2024-09-30 PROCEDURE — 99291 CRITICAL CARE FIRST HOUR: CPT

## 2024-09-30 PROCEDURE — 84484 ASSAY OF TROPONIN QUANT: CPT | Performed by: EMERGENCY MEDICINE

## 2024-09-30 PROCEDURE — 93005 ELECTROCARDIOGRAM TRACING: CPT | Performed by: EMERGENCY MEDICINE

## 2024-09-30 PROCEDURE — 96366 THER/PROPH/DIAG IV INF ADDON: CPT

## 2024-09-30 PROCEDURE — 81003 URINALYSIS AUTO W/O SCOPE: CPT | Performed by: EMERGENCY MEDICINE

## 2024-09-30 PROCEDURE — 83540 ASSAY OF IRON: CPT | Performed by: INTERNAL MEDICINE

## 2024-09-30 PROCEDURE — 99291 CRITICAL CARE FIRST HOUR: CPT | Performed by: INTERNAL MEDICINE

## 2024-09-30 PROCEDURE — 85610 PROTHROMBIN TIME: CPT | Performed by: EMERGENCY MEDICINE

## 2024-09-30 PROCEDURE — 82948 REAGENT STRIP/BLOOD GLUCOSE: CPT | Performed by: EMERGENCY MEDICINE

## 2024-09-30 PROCEDURE — 99204 OFFICE O/P NEW MOD 45 MIN: CPT | Performed by: STUDENT IN AN ORGANIZED HEALTH CARE EDUCATION/TRAINING PROGRAM

## 2024-09-30 PROCEDURE — C9248 INJ, CLEVIDIPINE BUTYRATE: HCPCS | Performed by: EMERGENCY MEDICINE

## 2024-09-30 PROCEDURE — 84466 ASSAY OF TRANSFERRIN: CPT | Performed by: INTERNAL MEDICINE

## 2024-09-30 PROCEDURE — 83036 HEMOGLOBIN GLYCOSYLATED A1C: CPT | Performed by: FAMILY MEDICINE

## 2024-09-30 PROCEDURE — 25010000002 HEPARIN (PORCINE) PER 1000 UNITS: Performed by: FAMILY MEDICINE

## 2024-09-30 PROCEDURE — 25010000002 CLEVIDIPINE BUTYRATE PER 1 MG: Performed by: EMERGENCY MEDICINE

## 2024-09-30 PROCEDURE — G0378 HOSPITAL OBSERVATION PER HR: HCPCS

## 2024-09-30 PROCEDURE — 96372 THER/PROPH/DIAG INJ SC/IM: CPT

## 2024-09-30 PROCEDURE — 70450 CT HEAD/BRAIN W/O DYE: CPT

## 2024-09-30 PROCEDURE — 85025 COMPLETE CBC W/AUTO DIFF WBC: CPT | Performed by: EMERGENCY MEDICINE

## 2024-09-30 PROCEDURE — 96365 THER/PROPH/DIAG IV INF INIT: CPT

## 2024-09-30 PROCEDURE — 36415 COLL VENOUS BLD VENIPUNCTURE: CPT

## 2024-09-30 PROCEDURE — 85730 THROMBOPLASTIN TIME PARTIAL: CPT | Performed by: EMERGENCY MEDICINE

## 2024-09-30 PROCEDURE — 63710000001 DIPHENHYDRAMINE PER 50 MG: Performed by: FAMILY MEDICINE

## 2024-09-30 PROCEDURE — 80053 COMPREHEN METABOLIC PANEL: CPT | Performed by: EMERGENCY MEDICINE

## 2024-09-30 PROCEDURE — 99223 1ST HOSP IP/OBS HIGH 75: CPT | Performed by: FAMILY MEDICINE

## 2024-09-30 RX ORDER — OLANZAPINE 10 MG/1
5 TABLET ORAL NIGHTLY
Status: DISCONTINUED | OUTPATIENT
Start: 2024-09-30 | End: 2024-10-02 | Stop reason: HOSPADM

## 2024-09-30 RX ORDER — ONDANSETRON 2 MG/ML
4 INJECTION INTRAMUSCULAR; INTRAVENOUS EVERY 6 HOURS PRN
Status: DISCONTINUED | OUTPATIENT
Start: 2024-09-30 | End: 2024-10-02 | Stop reason: HOSPADM

## 2024-09-30 RX ORDER — SODIUM CHLORIDE 9 MG/ML
40 INJECTION, SOLUTION INTRAVENOUS AS NEEDED
Status: DISCONTINUED | OUTPATIENT
Start: 2024-09-30 | End: 2024-10-01

## 2024-09-30 RX ORDER — SODIUM CHLORIDE 0.9 % (FLUSH) 0.9 %
10 SYRINGE (ML) INJECTION AS NEEDED
Status: DISCONTINUED | OUTPATIENT
Start: 2024-09-30 | End: 2024-10-02 | Stop reason: HOSPADM

## 2024-09-30 RX ORDER — LORAZEPAM 0.5 MG/1
0.5 TABLET ORAL EVERY 8 HOURS PRN
Status: DISCONTINUED | OUTPATIENT
Start: 2024-09-30 | End: 2024-10-02 | Stop reason: HOSPADM

## 2024-09-30 RX ORDER — BISACODYL 5 MG/1
5 TABLET, DELAYED RELEASE ORAL DAILY PRN
Status: DISCONTINUED | OUTPATIENT
Start: 2024-09-30 | End: 2024-10-02 | Stop reason: HOSPADM

## 2024-09-30 RX ORDER — CALCIUM ACETATE 667 MG/1
1334 CAPSULE ORAL
COMMUNITY

## 2024-09-30 RX ORDER — SODIUM CHLORIDE 0.9 % (FLUSH) 0.9 %
10 SYRINGE (ML) INJECTION AS NEEDED
Status: DISCONTINUED | OUTPATIENT
Start: 2024-09-30 | End: 2024-10-01

## 2024-09-30 RX ORDER — LOSARTAN POTASSIUM 50 MG/1
50 TABLET ORAL NIGHTLY
Status: DISCONTINUED | OUTPATIENT
Start: 2024-09-30 | End: 2024-10-01

## 2024-09-30 RX ORDER — PANTOPRAZOLE SODIUM 40 MG/1
40 TABLET, DELAYED RELEASE ORAL
Status: DISCONTINUED | OUTPATIENT
Start: 2024-10-01 | End: 2024-10-02 | Stop reason: HOSPADM

## 2024-09-30 RX ORDER — METOPROLOL SUCCINATE 50 MG/1
50 TABLET, EXTENDED RELEASE ORAL NIGHTLY
Status: DISCONTINUED | OUTPATIENT
Start: 2024-09-30 | End: 2024-10-01

## 2024-09-30 RX ORDER — ACETAMINOPHEN 500 MG
500 TABLET ORAL ONCE
Status: COMPLETED | OUTPATIENT
Start: 2024-09-30 | End: 2024-09-30

## 2024-09-30 RX ORDER — SODIUM CHLORIDE 0.9 % (FLUSH) 0.9 %
10 SYRINGE (ML) INJECTION EVERY 12 HOURS SCHEDULED
Status: DISCONTINUED | OUTPATIENT
Start: 2024-09-30 | End: 2024-10-02 | Stop reason: HOSPADM

## 2024-09-30 RX ORDER — SODIUM CHLORIDE, SODIUM LACTATE, CALCIUM CHLORIDE, MAGNESIUM CHLORIDE AND DEXTROSE 2.5; 538; 448; 18.3; 5.08 G/100ML; MG/100ML; MG/100ML; MG/100ML; MG/100ML
2000 INJECTION, SOLUTION INTRAPERITONEAL
Status: DISCONTINUED | OUTPATIENT
Start: 2024-09-30 | End: 2024-10-02 | Stop reason: HOSPADM

## 2024-09-30 RX ORDER — IPRATROPIUM BROMIDE AND ALBUTEROL SULFATE 2.5; .5 MG/3ML; MG/3ML
3 SOLUTION RESPIRATORY (INHALATION) EVERY 4 HOURS PRN
Status: DISCONTINUED | OUTPATIENT
Start: 2024-09-30 | End: 2024-09-30 | Stop reason: SDUPTHER

## 2024-09-30 RX ORDER — NITROGLYCERIN 0.4 MG/1
0.4 TABLET SUBLINGUAL
Status: DISCONTINUED | OUTPATIENT
Start: 2024-09-30 | End: 2024-10-02 | Stop reason: HOSPADM

## 2024-09-30 RX ORDER — IPRATROPIUM BROMIDE AND ALBUTEROL SULFATE 2.5; .5 MG/3ML; MG/3ML
3 SOLUTION RESPIRATORY (INHALATION) EVERY 4 HOURS PRN
Status: DISCONTINUED | OUTPATIENT
Start: 2024-09-30 | End: 2024-10-02 | Stop reason: HOSPADM

## 2024-09-30 RX ORDER — AMOXICILLIN 250 MG
2 CAPSULE ORAL 2 TIMES DAILY
Status: DISCONTINUED | OUTPATIENT
Start: 2024-09-30 | End: 2024-10-02 | Stop reason: HOSPADM

## 2024-09-30 RX ORDER — HEPARIN SODIUM 5000 [USP'U]/ML
5000 INJECTION, SOLUTION INTRAVENOUS; SUBCUTANEOUS EVERY 12 HOURS SCHEDULED
Status: DISCONTINUED | OUTPATIENT
Start: 2024-09-30 | End: 2024-10-02 | Stop reason: HOSPADM

## 2024-09-30 RX ORDER — DIPHENHYDRAMINE HCL 25 MG
25 CAPSULE ORAL EVERY 6 HOURS PRN
Status: DISCONTINUED | OUTPATIENT
Start: 2024-09-30 | End: 2024-10-02 | Stop reason: HOSPADM

## 2024-09-30 RX ORDER — ATORVASTATIN CALCIUM 40 MG/1
80 TABLET, FILM COATED ORAL NIGHTLY
Status: DISCONTINUED | OUTPATIENT
Start: 2024-09-30 | End: 2024-10-01

## 2024-09-30 RX ORDER — BISACODYL 10 MG
10 SUPPOSITORY, RECTAL RECTAL DAILY PRN
Status: DISCONTINUED | OUTPATIENT
Start: 2024-09-30 | End: 2024-10-02 | Stop reason: HOSPADM

## 2024-09-30 RX ORDER — SODIUM CHLORIDE 9 MG/ML
40 INJECTION, SOLUTION INTRAVENOUS AS NEEDED
Status: DISCONTINUED | OUTPATIENT
Start: 2024-09-30 | End: 2024-10-02 | Stop reason: HOSPADM

## 2024-09-30 RX ORDER — CALCIUM ACETATE 667 MG/1
667 CAPSULE ORAL
COMMUNITY

## 2024-09-30 RX ORDER — AMLODIPINE BESYLATE 10 MG/1
10 TABLET ORAL DAILY
Status: DISCONTINUED | OUTPATIENT
Start: 2024-09-30 | End: 2024-10-02 | Stop reason: HOSPADM

## 2024-09-30 RX ORDER — POLYETHYLENE GLYCOL 3350 17 G/17G
17 POWDER, FOR SOLUTION ORAL DAILY PRN
Status: DISCONTINUED | OUTPATIENT
Start: 2024-09-30 | End: 2024-10-02 | Stop reason: HOSPADM

## 2024-09-30 RX ADMIN — OLANZAPINE 5 MG: 5 TABLET, FILM COATED ORAL at 20:13

## 2024-09-30 RX ADMIN — METOPROLOL SUCCINATE 50 MG: 50 TABLET, EXTENDED RELEASE ORAL at 20:13

## 2024-09-30 RX ADMIN — LORAZEPAM 0.5 MG: 0.5 TABLET ORAL at 20:13

## 2024-09-30 RX ADMIN — AMLODIPINE BESYLATE 10 MG: 10 TABLET ORAL at 20:13

## 2024-09-30 RX ADMIN — ACETAMINOPHEN 500 MG: 500 TABLET ORAL at 15:11

## 2024-09-30 RX ADMIN — CLEVIPIDINE 2 MG/HR: 0.5 EMULSION INTRAVENOUS at 14:50

## 2024-09-30 RX ADMIN — Medication 10 ML: at 20:13

## 2024-09-30 RX ADMIN — ATORVASTATIN CALCIUM 80 MG: 40 TABLET, FILM COATED ORAL at 20:13

## 2024-09-30 RX ADMIN — HEPARIN SODIUM 5000 UNITS: 5000 INJECTION INTRAVENOUS; SUBCUTANEOUS at 20:13

## 2024-09-30 RX ADMIN — DIPHENHYDRAMINE HYDROCHLORIDE 25 MG: 25 CAPSULE ORAL at 20:13

## 2024-09-30 RX ADMIN — LOSARTAN POTASSIUM 50 MG: 50 TABLET, FILM COATED ORAL at 20:13

## 2024-09-30 RX ADMIN — SENNOSIDES AND DOCUSATE SODIUM 2 TABLET: 50; 8.6 TABLET ORAL at 20:13

## 2024-10-01 ENCOUNTER — APPOINTMENT (OUTPATIENT)
Dept: CARDIOLOGY | Facility: HOSPITAL | Age: 64
End: 2024-10-01
Payer: MEDICAID

## 2024-10-01 ENCOUNTER — APPOINTMENT (OUTPATIENT)
Dept: MRI IMAGING | Facility: HOSPITAL | Age: 64
End: 2024-10-01
Payer: MEDICAID

## 2024-10-01 LAB
ANION GAP SERPL CALCULATED.3IONS-SCNC: 14.5 MMOL/L (ref 5–15)
BUN SERPL-MCNC: 58 MG/DL (ref 8–23)
BUN/CREAT SERPL: 8.3 (ref 7–25)
CALCIUM SPEC-SCNC: 10.8 MG/DL (ref 8.6–10.5)
CHLORIDE SERPL-SCNC: 104 MMOL/L (ref 98–107)
CO2 SERPL-SCNC: 23.5 MMOL/L (ref 22–29)
CREAT SERPL-MCNC: 7.02 MG/DL (ref 0.57–1)
DEPRECATED RDW RBC AUTO: 45.7 FL (ref 37–54)
EGFRCR SERPLBLD CKD-EPI 2021: 6.2 ML/MIN/1.73
ERYTHROCYTE [DISTWIDTH] IN BLOOD BY AUTOMATED COUNT: 13.4 % (ref 12.3–15.4)
GLUCOSE SERPL-MCNC: 91 MG/DL (ref 65–99)
HCT VFR BLD AUTO: 32.8 % (ref 34–46.6)
HGB BLD-MCNC: 10.9 G/DL (ref 12–15.9)
MCH RBC QN AUTO: 30.9 PG (ref 26.6–33)
MCHC RBC AUTO-ENTMCNC: 33.2 G/DL (ref 31.5–35.7)
MCV RBC AUTO: 92.9 FL (ref 79–97)
PLATELET # BLD AUTO: 210 10*3/MM3 (ref 140–450)
PMV BLD AUTO: 12.5 FL (ref 6–12)
POTASSIUM SERPL-SCNC: 5 MMOL/L (ref 3.5–5.2)
RBC # BLD AUTO: 3.53 10*6/MM3 (ref 3.77–5.28)
SODIUM SERPL-SCNC: 142 MMOL/L (ref 136–145)
WBC NRBC COR # BLD AUTO: 5.97 10*3/MM3 (ref 3.4–10.8)

## 2024-10-01 PROCEDURE — 93306 TTE W/DOPPLER COMPLETE: CPT

## 2024-10-01 PROCEDURE — 70547 MR ANGIOGRAPHY NECK W/O DYE: CPT

## 2024-10-01 PROCEDURE — 80048 BASIC METABOLIC PNL TOTAL CA: CPT | Performed by: FAMILY MEDICINE

## 2024-10-01 PROCEDURE — 70551 MRI BRAIN STEM W/O DYE: CPT

## 2024-10-01 PROCEDURE — 93306 TTE W/DOPPLER COMPLETE: CPT | Performed by: INTERNAL MEDICINE

## 2024-10-01 PROCEDURE — 99233 SBSQ HOSP IP/OBS HIGH 50: CPT | Performed by: INTERNAL MEDICINE

## 2024-10-01 PROCEDURE — 63710000001 DIPHENHYDRAMINE PER 50 MG: Performed by: FAMILY MEDICINE

## 2024-10-01 PROCEDURE — 25010000002 HYDRALAZINE PER 20 MG: Performed by: INTERNAL MEDICINE

## 2024-10-01 PROCEDURE — 96372 THER/PROPH/DIAG INJ SC/IM: CPT

## 2024-10-01 PROCEDURE — G0378 HOSPITAL OBSERVATION PER HR: HCPCS

## 2024-10-01 PROCEDURE — 97161 PT EVAL LOW COMPLEX 20 MIN: CPT

## 2024-10-01 PROCEDURE — 25010000002 HEPARIN (PORCINE) PER 1000 UNITS: Performed by: FAMILY MEDICINE

## 2024-10-01 PROCEDURE — 96375 TX/PRO/DX INJ NEW DRUG ADDON: CPT

## 2024-10-01 PROCEDURE — 70544 MR ANGIOGRAPHY HEAD W/O DYE: CPT

## 2024-10-01 PROCEDURE — 99291 CRITICAL CARE FIRST HOUR: CPT | Performed by: INTERNAL MEDICINE

## 2024-10-01 PROCEDURE — 85027 COMPLETE CBC AUTOMATED: CPT | Performed by: FAMILY MEDICINE

## 2024-10-01 PROCEDURE — 97165 OT EVAL LOW COMPLEX 30 MIN: CPT

## 2024-10-01 PROCEDURE — G0257 UNSCHED DIALYSIS ESRD PT HOS: HCPCS

## 2024-10-01 PROCEDURE — 92610 EVALUATE SWALLOWING FUNCTION: CPT

## 2024-10-01 RX ORDER — ACETAMINOPHEN 325 MG/1
650 TABLET ORAL EVERY 6 HOURS PRN
Status: DISCONTINUED | OUTPATIENT
Start: 2024-10-01 | End: 2024-10-02 | Stop reason: HOSPADM

## 2024-10-01 RX ORDER — NICOTINE 21 MG/24HR
1 PATCH, TRANSDERMAL 24 HOURS TRANSDERMAL
Status: DISCONTINUED | OUTPATIENT
Start: 2024-10-01 | End: 2024-10-02 | Stop reason: HOSPADM

## 2024-10-01 RX ORDER — FUROSEMIDE 40 MG
80 TABLET ORAL
Status: DISCONTINUED | OUTPATIENT
Start: 2024-10-01 | End: 2024-10-02 | Stop reason: HOSPADM

## 2024-10-01 RX ORDER — LOSARTAN POTASSIUM 50 MG/1
100 TABLET ORAL
Status: DISCONTINUED | OUTPATIENT
Start: 2024-10-02 | End: 2024-10-02 | Stop reason: HOSPADM

## 2024-10-01 RX ORDER — LOSARTAN POTASSIUM 50 MG/1
50 TABLET ORAL
Status: DISCONTINUED | OUTPATIENT
Start: 2024-10-01 | End: 2024-10-01

## 2024-10-01 RX ORDER — ASPIRIN 325 MG
325 TABLET, DELAYED RELEASE (ENTERIC COATED) ORAL DAILY
Status: DISCONTINUED | OUTPATIENT
Start: 2024-10-01 | End: 2024-10-02

## 2024-10-01 RX ORDER — POLYETHYLENE GLYCOL 3350 17 G
2 POWDER IN PACKET (EA) ORAL
Status: DISCONTINUED | OUTPATIENT
Start: 2024-10-01 | End: 2024-10-02 | Stop reason: HOSPADM

## 2024-10-01 RX ORDER — HYDRALAZINE HYDROCHLORIDE 20 MG/ML
20 INJECTION INTRAMUSCULAR; INTRAVENOUS EVERY 4 HOURS PRN
Status: DISCONTINUED | OUTPATIENT
Start: 2024-10-01 | End: 2024-10-02 | Stop reason: HOSPADM

## 2024-10-01 RX ORDER — ATORVASTATIN CALCIUM 40 MG/1
40 TABLET, FILM COATED ORAL NIGHTLY
Status: DISCONTINUED | OUTPATIENT
Start: 2024-10-01 | End: 2024-10-02 | Stop reason: HOSPADM

## 2024-10-01 RX ORDER — PREGABALIN 75 MG/1
150 CAPSULE ORAL NIGHTLY
Status: DISCONTINUED | OUTPATIENT
Start: 2024-10-01 | End: 2024-10-02 | Stop reason: HOSPADM

## 2024-10-01 RX ORDER — METOPROLOL SUCCINATE 50 MG/1
50 TABLET, EXTENDED RELEASE ORAL
Status: DISCONTINUED | OUTPATIENT
Start: 2024-10-01 | End: 2024-10-02 | Stop reason: HOSPADM

## 2024-10-01 RX ADMIN — Medication 10 ML: at 08:34

## 2024-10-01 RX ADMIN — LORAZEPAM 0.5 MG: 0.5 TABLET ORAL at 17:59

## 2024-10-01 RX ADMIN — SODIUM CHLORIDE, SODIUM LACTATE, CALCIUM CHLORIDE, MAGNESIUM CHLORIDE AND DEXTROSE 2000 ML: 2.5; 538; 448; 18.3; 5.08 INJECTION, SOLUTION INTRAPERITONEAL at 13:33

## 2024-10-01 RX ADMIN — METOPROLOL SUCCINATE 50 MG: 50 TABLET, EXTENDED RELEASE ORAL at 08:59

## 2024-10-01 RX ADMIN — AMLODIPINE BESYLATE 10 MG: 10 TABLET ORAL at 08:34

## 2024-10-01 RX ADMIN — ACETAMINOPHEN 650 MG: 325 TABLET ORAL at 08:59

## 2024-10-01 RX ADMIN — LORAZEPAM 0.5 MG: 0.5 TABLET ORAL at 08:59

## 2024-10-01 RX ADMIN — LOSARTAN POTASSIUM 50 MG: 50 TABLET, FILM COATED ORAL at 08:59

## 2024-10-01 RX ADMIN — DIPHENHYDRAMINE HYDROCHLORIDE 25 MG: 25 CAPSULE ORAL at 21:05

## 2024-10-01 RX ADMIN — HEPARIN SODIUM 5000 UNITS: 5000 INJECTION INTRAVENOUS; SUBCUTANEOUS at 21:05

## 2024-10-01 RX ADMIN — SODIUM CHLORIDE, SODIUM LACTATE, CALCIUM CHLORIDE, MAGNESIUM CHLORIDE AND DEXTROSE 2000 ML: 2.5; 538; 448; 18.3; 5.08 INJECTION, SOLUTION INTRAPERITONEAL at 08:34

## 2024-10-01 RX ADMIN — ACETAMINOPHEN 650 MG: 325 TABLET ORAL at 21:05

## 2024-10-01 RX ADMIN — PREGABALIN 150 MG: 75 CAPSULE ORAL at 21:05

## 2024-10-01 RX ADMIN — NICOTINE 1 PATCH: 21 PATCH, EXTENDED RELEASE TRANSDERMAL at 14:30

## 2024-10-01 RX ADMIN — HYDRALAZINE HYDROCHLORIDE 20 MG: 20 INJECTION INTRAMUSCULAR; INTRAVENOUS at 12:07

## 2024-10-01 RX ADMIN — OLANZAPINE 5 MG: 5 TABLET, FILM COATED ORAL at 21:21

## 2024-10-01 RX ADMIN — ASPIRIN 325 MG: 325 TABLET, COATED ORAL at 13:33

## 2024-10-01 RX ADMIN — HEPARIN SODIUM 5000 UNITS: 5000 INJECTION INTRAVENOUS; SUBCUTANEOUS at 08:34

## 2024-10-01 RX ADMIN — SODIUM CHLORIDE, SODIUM LACTATE, CALCIUM CHLORIDE, MAGNESIUM CHLORIDE AND DEXTROSE 2000 ML: 2.5; 538; 448; 18.3; 5.08 INJECTION, SOLUTION INTRAPERITONEAL at 17:59

## 2024-10-01 RX ADMIN — ATORVASTATIN CALCIUM 40 MG: 40 TABLET, FILM COATED ORAL at 21:05

## 2024-10-01 RX ADMIN — Medication 10 ML: at 21:05

## 2024-10-01 RX ADMIN — Medication 10 ML: at 08:35

## 2024-10-01 RX ADMIN — FUROSEMIDE 80 MG: 40 TABLET ORAL at 17:59

## 2024-10-02 ENCOUNTER — READMISSION MANAGEMENT (OUTPATIENT)
Dept: CALL CENTER | Facility: HOSPITAL | Age: 64
End: 2024-10-02
Payer: MEDICAID

## 2024-10-02 VITALS
DIASTOLIC BLOOD PRESSURE: 88 MMHG | WEIGHT: 158.29 LBS | TEMPERATURE: 98.1 F | OXYGEN SATURATION: 99 % | BODY MASS INDEX: 27.02 KG/M2 | RESPIRATION RATE: 16 BRPM | HEIGHT: 64 IN | SYSTOLIC BLOOD PRESSURE: 154 MMHG | HEART RATE: 75 BPM

## 2024-10-02 LAB
ANION GAP SERPL CALCULATED.3IONS-SCNC: 14.8 MMOL/L (ref 5–15)
BH CV ECHO MEAS - AO MAX PG: 10.8 MMHG
BH CV ECHO MEAS - AO MEAN PG: 6.2 MMHG
BH CV ECHO MEAS - AO ROOT DIAM: 2.6 CM
BH CV ECHO MEAS - AO V2 MAX: 164.5 CM/SEC
BH CV ECHO MEAS - AO V2 VTI: 34 CM
BH CV ECHO MEAS - EDV(MOD-SP2): 38.4 ML
BH CV ECHO MEAS - EDV(MOD-SP4): 45.8 ML
BH CV ECHO MEAS - EF(MOD-SP2): 54.7 %
BH CV ECHO MEAS - EF(MOD-SP4): 52.6 %
BH CV ECHO MEAS - ESV(MOD-SP2): 17.4 ML
BH CV ECHO MEAS - ESV(MOD-SP4): 21.7 ML
BH CV ECHO MEAS - IVS/LVPW: 1 CM
BH CV ECHO MEAS - IVSD: 1.1 CM
BH CV ECHO MEAS - LA DIMENSION: 2.8 CM
BH CV ECHO MEAS - LAT PEAK E' VEL: 9 CM/SEC
BH CV ECHO MEAS - LV MAX PG: 7.5 MMHG
BH CV ECHO MEAS - LV MEAN PG: 4.2 MMHG
BH CV ECHO MEAS - LV V1 MAX: 136.7 CM/SEC
BH CV ECHO MEAS - LV V1 VTI: 24.1 CM
BH CV ECHO MEAS - LVIDD: 3.9 CM
BH CV ECHO MEAS - LVIDS: 2.9 CM
BH CV ECHO MEAS - LVOT DIAM: 2 CM
BH CV ECHO MEAS - LVPWD: 1.1 CM
BH CV ECHO MEAS - MED PEAK E' VEL: 7.2 CM/SEC
BH CV ECHO MEAS - MV A MAX VEL: 103.4 CM/SEC
BH CV ECHO MEAS - MV E MAX VEL: 52 CM/SEC
BH CV ECHO MEAS - MV E/A: 0.5
BH CV ECHO MEAS - RVDD: 2.37 CM
BH CV ECHO MEAS - SV(MOD-SP2): 21 ML
BH CV ECHO MEAS - SV(MOD-SP4): 24.1 ML
BH CV ECHO MEAS - TR MAX PG: 26.5 MMHG
BH CV ECHO MEAS - TR MAX VEL: 257.2 CM/SEC
BH CV ECHO MEASUREMENTS AVERAGE E/E' RATIO: 6.42
BUN SERPL-MCNC: 65 MG/DL (ref 8–23)
BUN/CREAT SERPL: 9.4 (ref 7–25)
CALCIUM SPEC-SCNC: 9.5 MG/DL (ref 8.6–10.5)
CHLORIDE SERPL-SCNC: 106 MMOL/L (ref 98–107)
CO2 SERPL-SCNC: 19.2 MMOL/L (ref 22–29)
CREAT SERPL-MCNC: 6.9 MG/DL (ref 0.57–1)
DEPRECATED RDW RBC AUTO: 46.3 FL (ref 37–54)
EGFRCR SERPLBLD CKD-EPI 2021: 6.3 ML/MIN/1.73
ERYTHROCYTE [DISTWIDTH] IN BLOOD BY AUTOMATED COUNT: 13.4 % (ref 12.3–15.4)
GLUCOSE SERPL-MCNC: 80 MG/DL (ref 65–99)
HCT VFR BLD AUTO: 30 % (ref 34–46.6)
HGB BLD-MCNC: 9.6 G/DL (ref 12–15.9)
MAGNESIUM SERPL-MCNC: 2.1 MG/DL (ref 1.6–2.4)
MCH RBC QN AUTO: 30.3 PG (ref 26.6–33)
MCHC RBC AUTO-ENTMCNC: 32 G/DL (ref 31.5–35.7)
MCV RBC AUTO: 94.6 FL (ref 79–97)
PHOSPHATE SERPL-MCNC: 5.5 MG/DL (ref 2.5–4.5)
PLATELET # BLD AUTO: 184 10*3/MM3 (ref 140–450)
PMV BLD AUTO: 12.5 FL (ref 6–12)
POTASSIUM SERPL-SCNC: 5.2 MMOL/L (ref 3.5–5.2)
RBC # BLD AUTO: 3.17 10*6/MM3 (ref 3.77–5.28)
SODIUM SERPL-SCNC: 140 MMOL/L (ref 136–145)
WBC NRBC COR # BLD AUTO: 8.56 10*3/MM3 (ref 3.4–10.8)

## 2024-10-02 PROCEDURE — 99406 BEHAV CHNG SMOKING 3-10 MIN: CPT | Performed by: INTERNAL MEDICINE

## 2024-10-02 PROCEDURE — 85027 COMPLETE CBC AUTOMATED: CPT | Performed by: INTERNAL MEDICINE

## 2024-10-02 PROCEDURE — 99213 OFFICE O/P EST LOW 20 MIN: CPT | Performed by: PSYCHIATRY & NEUROLOGY

## 2024-10-02 PROCEDURE — 80048 BASIC METABOLIC PNL TOTAL CA: CPT | Performed by: INTERNAL MEDICINE

## 2024-10-02 PROCEDURE — G0378 HOSPITAL OBSERVATION PER HR: HCPCS

## 2024-10-02 PROCEDURE — 99239 HOSP IP/OBS DSCHRG MGMT >30: CPT | Performed by: INTERNAL MEDICINE

## 2024-10-02 PROCEDURE — 84100 ASSAY OF PHOSPHORUS: CPT | Performed by: INTERNAL MEDICINE

## 2024-10-02 PROCEDURE — 25010000002 HEPARIN (PORCINE) PER 1000 UNITS: Performed by: FAMILY MEDICINE

## 2024-10-02 PROCEDURE — 96372 THER/PROPH/DIAG INJ SC/IM: CPT

## 2024-10-02 PROCEDURE — 83735 ASSAY OF MAGNESIUM: CPT | Performed by: INTERNAL MEDICINE

## 2024-10-02 RX ORDER — NICOTINE 21 MG/24HR
1 PATCH, TRANSDERMAL 24 HOURS TRANSDERMAL DAILY
Qty: 14 PATCH | Refills: 0 | Status: SHIPPED | OUTPATIENT
Start: 2024-10-02 | End: 2024-10-09

## 2024-10-02 RX ORDER — POLYETHYLENE GLYCOL 3350 17 G
2 POWDER IN PACKET (EA) ORAL
Qty: 81 LOZENGE | Refills: 0 | Status: SHIPPED | OUTPATIENT
Start: 2024-10-02

## 2024-10-02 RX ORDER — CLOPIDOGREL BISULFATE 75 MG/1
75 TABLET ORAL DAILY
Status: DISCONTINUED | OUTPATIENT
Start: 2024-10-02 | End: 2024-10-02 | Stop reason: HOSPADM

## 2024-10-02 RX ORDER — ASPIRIN 81 MG/1
81 TABLET ORAL DAILY
Status: DISCONTINUED | OUTPATIENT
Start: 2024-10-02 | End: 2024-10-02 | Stop reason: HOSPADM

## 2024-10-02 RX ORDER — CLOPIDOGREL BISULFATE 75 MG/1
75 TABLET ORAL DAILY
Qty: 30 TABLET | Refills: 0 | Status: SHIPPED | OUTPATIENT
Start: 2024-10-03 | End: 2024-11-02

## 2024-10-02 RX ORDER — ASPIRIN 81 MG/1
81 TABLET ORAL DAILY
Qty: 30 TABLET | Refills: 0 | Status: SHIPPED | OUTPATIENT
Start: 2024-10-03 | End: 2024-11-02

## 2024-10-02 RX ORDER — ATORVASTATIN CALCIUM 40 MG/1
40 TABLET, FILM COATED ORAL NIGHTLY
Qty: 30 TABLET | Refills: 0 | Status: SHIPPED | OUTPATIENT
Start: 2024-10-02 | End: 2024-11-01

## 2024-10-02 RX ORDER — PANTOPRAZOLE SODIUM 40 MG/1
40 TABLET, DELAYED RELEASE ORAL
Qty: 30 TABLET | Refills: 0 | Status: SHIPPED | OUTPATIENT
Start: 2024-10-03 | End: 2024-11-02

## 2024-10-02 RX ADMIN — Medication 10 ML: at 08:51

## 2024-10-02 RX ADMIN — SODIUM ZIRCONIUM CYCLOSILICATE 10 G: 10 POWDER, FOR SUSPENSION ORAL at 08:57

## 2024-10-02 RX ADMIN — LORAZEPAM 0.5 MG: 0.5 TABLET ORAL at 14:33

## 2024-10-02 RX ADMIN — ACETAMINOPHEN 650 MG: 325 TABLET ORAL at 14:33

## 2024-10-02 RX ADMIN — ASPIRIN 81 MG: 81 TABLET, COATED ORAL at 08:49

## 2024-10-02 RX ADMIN — LOSARTAN POTASSIUM 100 MG: 50 TABLET, FILM COATED ORAL at 08:50

## 2024-10-02 RX ADMIN — AMLODIPINE BESYLATE 10 MG: 10 TABLET ORAL at 08:51

## 2024-10-02 RX ADMIN — NICOTINE 1 PATCH: 21 PATCH, EXTENDED RELEASE TRANSDERMAL at 08:53

## 2024-10-02 RX ADMIN — FUROSEMIDE 80 MG: 40 TABLET ORAL at 08:50

## 2024-10-02 RX ADMIN — LORAZEPAM 0.5 MG: 0.5 TABLET ORAL at 07:17

## 2024-10-02 RX ADMIN — CLOPIDOGREL BISULFATE 75 MG: 75 TABLET ORAL at 08:50

## 2024-10-02 RX ADMIN — HEPARIN SODIUM 5000 UNITS: 5000 INJECTION INTRAVENOUS; SUBCUTANEOUS at 08:53

## 2024-10-02 RX ADMIN — ACETAMINOPHEN 650 MG: 325 TABLET ORAL at 07:17

## 2024-10-02 RX ADMIN — METOPROLOL SUCCINATE 50 MG: 50 TABLET, EXTENDED RELEASE ORAL at 08:50

## 2024-10-02 RX ADMIN — PANTOPRAZOLE SODIUM 40 MG: 40 TABLET, DELAYED RELEASE ORAL at 05:00

## 2024-10-03 ENCOUNTER — TRANSITIONAL CARE MANAGEMENT TELEPHONE ENCOUNTER (OUTPATIENT)
Dept: CALL CENTER | Facility: HOSPITAL | Age: 64
End: 2024-10-03
Payer: MEDICAID

## 2024-10-04 ENCOUNTER — CLINICAL SUPPORT (OUTPATIENT)
Dept: FAMILY MEDICINE CLINIC | Facility: CLINIC | Age: 64
End: 2024-10-04
Payer: MEDICAID

## 2024-10-04 ENCOUNTER — OFFICE VISIT (OUTPATIENT)
Dept: BEHAVIORAL HEALTH | Facility: CLINIC | Age: 64
End: 2024-10-04
Payer: MEDICAID

## 2024-10-04 VITALS
WEIGHT: 162 LBS | HEART RATE: 70 BPM | HEIGHT: 64 IN | BODY MASS INDEX: 27.66 KG/M2 | SYSTOLIC BLOOD PRESSURE: 195 MMHG | DIASTOLIC BLOOD PRESSURE: 95 MMHG

## 2024-10-04 DIAGNOSIS — F31.13 BIPOLAR DISORDER, CURRENT EPISODE MANIC WITHOUT PSYCHOTIC FEATURES, SEVERE: Primary | ICD-10-CM

## 2024-10-04 DIAGNOSIS — F41.0 PANIC ATTACKS: ICD-10-CM

## 2024-10-04 DIAGNOSIS — F41.1 GENERALIZED ANXIETY DISORDER: ICD-10-CM

## 2024-10-04 DIAGNOSIS — Z79.899 MEDICATION MANAGEMENT: Primary | ICD-10-CM

## 2024-10-04 DIAGNOSIS — F43.21 COMPLICATED GRIEVING: ICD-10-CM

## 2024-10-04 DIAGNOSIS — Z79.899 MEDICATION MANAGEMENT: ICD-10-CM

## 2024-10-04 LAB
AMPHET+METHAMPHET UR QL: NEGATIVE
AMPHETAMINE INTERNAL CONTROL: NORMAL
AMPHETAMINES UR QL: NEGATIVE
BARBITURATE INTERNAL CONTROL: NORMAL
BARBITURATES UR QL SCN: NEGATIVE
BENZODIAZ UR QL SCN: NEGATIVE
BENZODIAZEPINE INTERNAL CONTROL: NORMAL
BUPRENORPHINE INTERNAL CONTROL: NORMAL
BUPRENORPHINE SERPL-MCNC: NEGATIVE NG/ML
CANNABINOIDS SERPL QL: NEGATIVE
COCAINE INTERNAL CONTROL: NORMAL
COCAINE UR QL: NEGATIVE
EXPIRATION DATE: NORMAL
Lab: NORMAL
MDMA (ECSTASY) INTERNAL CONTROL: NORMAL
MDMA UR QL SCN: NEGATIVE
METHADONE INTERNAL CONTROL: NORMAL
METHADONE UR QL SCN: NEGATIVE
METHAMPHETAMINE INTERNAL CONTROL: NORMAL
MORPHINE INTERNAL CONTROL: NORMAL
MORPHINE/OPIATES SCREEN, URINE: NEGATIVE
OXYCODONE INTERNAL CONTROL: NORMAL
OXYCODONE UR QL SCN: NEGATIVE
PCP UR QL SCN: NEGATIVE
PHENCYCLIDINE INTERNAL CONTROL: NORMAL
THC INTERNAL CONTROL: NORMAL

## 2024-10-04 PROCEDURE — 80305 DRUG TEST PRSMV DIR OPT OBS: CPT | Performed by: FAMILY MEDICINE

## 2024-10-04 RX ORDER — OLANZAPINE 10 MG/1
10 TABLET ORAL NIGHTLY
Qty: 30 TABLET | Refills: 2 | Status: SHIPPED | OUTPATIENT
Start: 2024-10-04 | End: 2025-10-04

## 2024-10-04 RX ORDER — LORAZEPAM 0.5 MG/1
0.5 TABLET ORAL DAILY PRN
Qty: 15 TABLET | Refills: 0 | Status: SHIPPED | OUTPATIENT
Start: 2024-10-04

## 2024-10-09 ENCOUNTER — LAB (OUTPATIENT)
Dept: LAB | Facility: HOSPITAL | Age: 64
End: 2024-10-09
Payer: MEDICAID

## 2024-10-09 ENCOUNTER — OFFICE VISIT (OUTPATIENT)
Dept: FAMILY MEDICINE CLINIC | Facility: CLINIC | Age: 64
End: 2024-10-09
Payer: MEDICAID

## 2024-10-09 VITALS
TEMPERATURE: 98 F | DIASTOLIC BLOOD PRESSURE: 130 MMHG | HEIGHT: 64 IN | OXYGEN SATURATION: 100 % | WEIGHT: 167.4 LBS | BODY MASS INDEX: 28.58 KG/M2 | HEART RATE: 74 BPM | SYSTOLIC BLOOD PRESSURE: 202 MMHG

## 2024-10-09 DIAGNOSIS — E87.5 HYPERKALEMIA: ICD-10-CM

## 2024-10-09 DIAGNOSIS — I10 PRIMARY HYPERTENSION: Primary | ICD-10-CM

## 2024-10-09 DIAGNOSIS — N18.5 STAGE 5 CHRONIC KIDNEY DISEASE: Chronic | ICD-10-CM

## 2024-10-09 DIAGNOSIS — Z09 HOSPITAL DISCHARGE FOLLOW-UP: ICD-10-CM

## 2024-10-09 LAB
ALBUMIN SERPL-MCNC: 3.8 G/DL (ref 3.5–5.2)
ALBUMIN/GLOB SERPL: 1.6 G/DL
ALP SERPL-CCNC: 87 U/L (ref 39–117)
ALT SERPL W P-5'-P-CCNC: 16 U/L (ref 1–33)
ANION GAP SERPL CALCULATED.3IONS-SCNC: 12.2 MMOL/L (ref 5–15)
AST SERPL-CCNC: 27 U/L (ref 1–32)
BILIRUB SERPL-MCNC: <0.2 MG/DL (ref 0–1.2)
BUN SERPL-MCNC: 57 MG/DL (ref 8–23)
BUN/CREAT SERPL: 9.6 (ref 7–25)
CALCIUM SPEC-SCNC: 9.5 MG/DL (ref 8.6–10.5)
CHLORIDE SERPL-SCNC: 101 MMOL/L (ref 98–107)
CO2 SERPL-SCNC: 22.8 MMOL/L (ref 22–29)
CREAT SERPL-MCNC: 5.96 MG/DL (ref 0.57–1)
EGFRCR SERPLBLD CKD-EPI 2021: 7.5 ML/MIN/1.73
GLOBULIN UR ELPH-MCNC: 2.4 GM/DL
GLUCOSE SERPL-MCNC: 80 MG/DL (ref 65–99)
POTASSIUM SERPL-SCNC: 6.4 MMOL/L (ref 3.5–5.2)
PROT SERPL-MCNC: 6.2 G/DL (ref 6–8.5)
SODIUM SERPL-SCNC: 136 MMOL/L (ref 136–145)

## 2024-10-09 PROCEDURE — 1160F RVW MEDS BY RX/DR IN RCRD: CPT

## 2024-10-09 PROCEDURE — 3077F SYST BP >= 140 MM HG: CPT

## 2024-10-09 PROCEDURE — 99495 TRANSJ CARE MGMT MOD F2F 14D: CPT

## 2024-10-09 PROCEDURE — 1126F AMNT PAIN NOTED NONE PRSNT: CPT

## 2024-10-09 PROCEDURE — 3080F DIAST BP >= 90 MM HG: CPT

## 2024-10-09 PROCEDURE — 1159F MED LIST DOCD IN RCRD: CPT

## 2024-10-09 PROCEDURE — 3044F HG A1C LEVEL LT 7.0%: CPT

## 2024-10-09 PROCEDURE — 80053 COMPREHEN METABOLIC PANEL: CPT

## 2024-10-09 PROCEDURE — 36415 COLL VENOUS BLD VENIPUNCTURE: CPT

## 2024-10-09 RX ORDER — HYDRALAZINE HYDROCHLORIDE 50 MG/1
25 TABLET, FILM COATED ORAL 2 TIMES DAILY
Qty: 7 TABLET | Refills: 0 | Status: SHIPPED | OUTPATIENT
Start: 2024-10-09 | End: 2024-10-16

## 2024-10-10 ENCOUNTER — HOSPITAL ENCOUNTER (EMERGENCY)
Facility: HOSPITAL | Age: 64
Discharge: HOME | End: 2024-10-10
Attending: EMERGENCY MEDICINE
Payer: MEDICAID

## 2024-10-10 VITALS
WEIGHT: 165.79 LBS | HEIGHT: 64 IN | OXYGEN SATURATION: 98 % | SYSTOLIC BLOOD PRESSURE: 158 MMHG | BODY MASS INDEX: 28.3 KG/M2 | TEMPERATURE: 98 F | RESPIRATION RATE: 16 BRPM | DIASTOLIC BLOOD PRESSURE: 90 MMHG | HEART RATE: 75 BPM

## 2024-10-10 DIAGNOSIS — E87.5 HYPERKALEMIA: ICD-10-CM

## 2024-10-10 DIAGNOSIS — N18.6 END STAGE RENAL DISEASE: Primary | ICD-10-CM

## 2024-10-10 LAB
ALBUMIN SERPL-MCNC: 3.3 G/DL (ref 3.5–5.2)
ALBUMIN/GLOB SERPL: 1.2 G/DL
ALP SERPL-CCNC: 84 U/L (ref 39–117)
ALT SERPL W P-5'-P-CCNC: 15 U/L (ref 1–33)
ANION GAP SERPL CALCULATED.3IONS-SCNC: 13.4 MMOL/L (ref 5–15)
AST SERPL-CCNC: 22 U/L (ref 1–32)
BASOPHILS # BLD AUTO: 0.03 10*3/MM3 (ref 0–0.2)
BASOPHILS NFR BLD AUTO: 0.5 % (ref 0–1.5)
BILIRUB SERPL-MCNC: <0.2 MG/DL (ref 0–1.2)
BUN SERPL-MCNC: 59 MG/DL (ref 8–23)
BUN/CREAT SERPL: 9.2 (ref 7–25)
CALCIUM SPEC-SCNC: 9.2 MG/DL (ref 8.6–10.5)
CHLORIDE SERPL-SCNC: 104 MMOL/L (ref 98–107)
CO2 SERPL-SCNC: 22.6 MMOL/L (ref 22–29)
CREAT SERPL-MCNC: 6.42 MG/DL (ref 0.57–1)
DEPRECATED RDW RBC AUTO: 47 FL (ref 37–54)
EGFRCR SERPLBLD CKD-EPI 2021: 6.9 ML/MIN/1.73
EOSINOPHIL # BLD AUTO: 0.52 10*3/MM3 (ref 0–0.4)
EOSINOPHIL NFR BLD AUTO: 8 % (ref 0.3–6.2)
ERYTHROCYTE [DISTWIDTH] IN BLOOD BY AUTOMATED COUNT: 13.6 % (ref 12.3–15.4)
GLOBULIN UR ELPH-MCNC: 2.7 GM/DL
GLUCOSE SERPL-MCNC: 93 MG/DL (ref 65–99)
HCT VFR BLD AUTO: 27.1 % (ref 34–46.6)
HGB BLD-MCNC: 9 G/DL (ref 12–15.9)
HOLD SPECIMEN: NORMAL
HOLD SPECIMEN: NORMAL
IMM GRANULOCYTES # BLD AUTO: 0.04 10*3/MM3 (ref 0–0.05)
IMM GRANULOCYTES NFR BLD AUTO: 0.6 % (ref 0–0.5)
LYMPHOCYTES # BLD AUTO: 2.32 10*3/MM3 (ref 0.7–3.1)
LYMPHOCYTES NFR BLD AUTO: 35.5 % (ref 19.6–45.3)
MCH RBC QN AUTO: 31.5 PG (ref 26.6–33)
MCHC RBC AUTO-ENTMCNC: 33.2 G/DL (ref 31.5–35.7)
MCV RBC AUTO: 94.8 FL (ref 79–97)
MONOCYTES # BLD AUTO: 0.5 10*3/MM3 (ref 0.1–0.9)
MONOCYTES NFR BLD AUTO: 7.7 % (ref 5–12)
NEUTROPHILS NFR BLD AUTO: 3.12 10*3/MM3 (ref 1.7–7)
NEUTROPHILS NFR BLD AUTO: 47.7 % (ref 42.7–76)
NRBC BLD AUTO-RTO: 0 /100 WBC (ref 0–0.2)
PLATELET # BLD AUTO: 237 10*3/MM3 (ref 140–450)
PMV BLD AUTO: 11.7 FL (ref 6–12)
POTASSIUM SERPL-SCNC: 6.2 MMOL/L (ref 3.5–5.2)
PROT SERPL-MCNC: 6 G/DL (ref 6–8.5)
QT INTERVAL: 396 MS
QTC INTERVAL: 435 MS
RBC # BLD AUTO: 2.86 10*6/MM3 (ref 3.77–5.28)
SODIUM SERPL-SCNC: 140 MMOL/L (ref 136–145)
WBC NRBC COR # BLD AUTO: 6.53 10*3/MM3 (ref 3.4–10.8)
WHOLE BLOOD HOLD COAG: NORMAL
WHOLE BLOOD HOLD SPECIMEN: NORMAL

## 2024-10-10 PROCEDURE — 36415 COLL VENOUS BLD VENIPUNCTURE: CPT

## 2024-10-10 PROCEDURE — 80053 COMPREHEN METABOLIC PANEL: CPT | Performed by: EMERGENCY MEDICINE

## 2024-10-10 PROCEDURE — 93005 ELECTROCARDIOGRAM TRACING: CPT

## 2024-10-10 PROCEDURE — 85025 COMPLETE CBC W/AUTO DIFF WBC: CPT

## 2024-10-10 PROCEDURE — 99283 EMERGENCY DEPT VISIT LOW MDM: CPT

## 2024-10-10 RX ORDER — FUROSEMIDE 80 MG
80 TABLET ORAL 2 TIMES DAILY
Qty: 14 TABLET | Refills: 0 | Status: SHIPPED | OUTPATIENT
Start: 2024-10-10

## 2024-10-10 RX ADMIN — SODIUM ZIRCONIUM CYCLOSILICATE 10 G: 10 POWDER, FOR SUSPENSION ORAL at 17:20

## 2024-10-15 ENCOUNTER — READMISSION MANAGEMENT (OUTPATIENT)
Dept: CALL CENTER | Facility: HOSPITAL | Age: 64
End: 2024-10-15
Payer: MEDICAID

## 2024-10-22 ENCOUNTER — OFFICE VISIT (OUTPATIENT)
Dept: ORTHOPEDIC SURGERY | Facility: CLINIC | Age: 64
End: 2024-10-22
Payer: MEDICAID

## 2024-10-22 VITALS
HEART RATE: 81 BPM | OXYGEN SATURATION: 95 % | HEIGHT: 64 IN | BODY MASS INDEX: 28.85 KG/M2 | WEIGHT: 169 LBS | DIASTOLIC BLOOD PRESSURE: 105 MMHG | SYSTOLIC BLOOD PRESSURE: 219 MMHG

## 2024-10-22 DIAGNOSIS — M25.562 LEFT KNEE PAIN, UNSPECIFIED CHRONICITY: Primary | ICD-10-CM

## 2024-10-22 DIAGNOSIS — M17.12 OSTEOARTHRITIS OF LEFT KNEE, UNSPECIFIED OSTEOARTHRITIS TYPE: ICD-10-CM

## 2024-10-22 RX ORDER — TRIAMCINOLONE ACETONIDE 40 MG/ML
40 INJECTION, SUSPENSION INTRA-ARTICULAR; INTRAMUSCULAR
Status: COMPLETED | OUTPATIENT
Start: 2024-10-22 | End: 2024-10-22

## 2024-10-22 RX ORDER — LIDOCAINE HYDROCHLORIDE 10 MG/ML
5 INJECTION, SOLUTION INFILTRATION; PERINEURAL
Status: COMPLETED | OUTPATIENT
Start: 2024-10-22 | End: 2024-10-22

## 2024-10-22 RX ADMIN — TRIAMCINOLONE ACETONIDE 40 MG: 40 INJECTION, SUSPENSION INTRA-ARTICULAR; INTRAMUSCULAR at 14:41

## 2024-10-22 RX ADMIN — LIDOCAINE HYDROCHLORIDE 5 ML: 10 INJECTION, SOLUTION INFILTRATION; PERINEURAL at 14:41

## 2024-10-23 ENCOUNTER — OFFICE VISIT (OUTPATIENT)
Dept: FAMILY MEDICINE CLINIC | Facility: CLINIC | Age: 64
End: 2024-10-23
Payer: MEDICAID

## 2024-10-23 ENCOUNTER — READMISSION MANAGEMENT (OUTPATIENT)
Dept: CALL CENTER | Facility: HOSPITAL | Age: 64
End: 2024-10-23
Payer: MEDICAID

## 2024-10-23 VITALS
RESPIRATION RATE: 18 BRPM | BODY MASS INDEX: 29.33 KG/M2 | DIASTOLIC BLOOD PRESSURE: 96 MMHG | SYSTOLIC BLOOD PRESSURE: 209 MMHG | TEMPERATURE: 99.1 F | HEART RATE: 96 BPM | WEIGHT: 171.8 LBS | HEIGHT: 64 IN | OXYGEN SATURATION: 98 %

## 2024-10-23 DIAGNOSIS — Z99.2 CKD (CHRONIC KIDNEY DISEASE) REQUIRING CHRONIC DIALYSIS: Primary | ICD-10-CM

## 2024-10-23 DIAGNOSIS — I10 PRIMARY HYPERTENSION: Chronic | ICD-10-CM

## 2024-10-23 DIAGNOSIS — Z99.2 ESRD ON PERITONEAL DIALYSIS: Chronic | ICD-10-CM

## 2024-10-23 DIAGNOSIS — I10 PRIMARY HYPERTENSION: ICD-10-CM

## 2024-10-23 DIAGNOSIS — F17.200 TOBACCO USE DISORDER: ICD-10-CM

## 2024-10-23 DIAGNOSIS — N18.6 CKD (CHRONIC KIDNEY DISEASE) REQUIRING CHRONIC DIALYSIS: Primary | ICD-10-CM

## 2024-10-23 DIAGNOSIS — N18.6 END STAGE CHRONIC KIDNEY DISEASE: ICD-10-CM

## 2024-10-23 DIAGNOSIS — R07.9 CHEST PAIN, UNSPECIFIED TYPE: ICD-10-CM

## 2024-10-23 DIAGNOSIS — N18.6 ESRD ON PERITONEAL DIALYSIS: Chronic | ICD-10-CM

## 2024-10-23 PROBLEM — E11.9 TYPE 2 DIABETES MELLITUS WITHOUT COMPLICATION, WITHOUT LONG-TERM CURRENT USE OF INSULIN: Status: ACTIVE | Noted: 2024-10-23

## 2024-10-23 PROBLEM — E11.9 TYPE 2 DIABETES MELLITUS WITHOUT COMPLICATION, WITHOUT LONG-TERM CURRENT USE OF INSULIN: Status: RESOLVED | Noted: 2024-10-23 | Resolved: 2024-10-23

## 2024-10-23 LAB
QT INTERVAL: 396 MS
QTC INTERVAL: 435 MS

## 2024-10-23 PROCEDURE — 82570 ASSAY OF URINE CREATININE: CPT

## 2024-10-23 PROCEDURE — 1126F AMNT PAIN NOTED NONE PRSNT: CPT

## 2024-10-23 PROCEDURE — 93000 ELECTROCARDIOGRAM COMPLETE: CPT

## 2024-10-23 PROCEDURE — 99214 OFFICE O/P EST MOD 30 MIN: CPT

## 2024-10-23 PROCEDURE — 3080F DIAST BP >= 90 MM HG: CPT

## 2024-10-23 PROCEDURE — 82043 UR ALBUMIN QUANTITATIVE: CPT

## 2024-10-23 PROCEDURE — 3044F HG A1C LEVEL LT 7.0%: CPT

## 2024-10-23 PROCEDURE — 3077F SYST BP >= 140 MM HG: CPT

## 2024-10-23 RX ORDER — NICOTINE 21 MG/24HR
1 PATCH, TRANSDERMAL 24 HOURS TRANSDERMAL EVERY 24 HOURS
Qty: 28 EACH | Refills: 0 | Status: SHIPPED | OUTPATIENT
Start: 2024-10-23

## 2024-10-23 RX ORDER — HYDRALAZINE HYDROCHLORIDE 25 MG/1
25 TABLET, FILM COATED ORAL DAILY
Qty: 30 TABLET | Refills: 0 | Status: SHIPPED | OUTPATIENT
Start: 2024-10-23 | End: 2024-11-22

## 2024-10-23 NOTE — PROGRESS NOTES
Chief Complaint     Hypertension (Recently hospitalized for hypertensive emergency and TIA on 9/30.), Chronic Kidney Disease (Most recent GFR: 6.9-peritoneal dialysis 5 days a week at home), and Nicotine Dependence (Trying to quit smoking-was using nicotine patches but ran out.)    History of Present Illness     Cecilia Sparks is a 64 y.o. female who presents to [facility] for evaluation of hypertension.        Cecilia had a hypertensive emergency recently and an a TIA on 9/30. She also has CKD with most recent GFR being 6.9. She does do peritoneal dialysis at home. She is also trying to quit smoking and ran out of the patches and needs more. Her BP today is 209/96. EKG is WNL today. We discussed that her BP being so high could cause stroke symptoms or damage to her heart. She does not want to go to the hospital. Will add on apresoline today to take daily. F/U in 4 weeks.      History      Past Medical History:   Diagnosis Date    Acid reflux disease     Anemia     NO RECENT INTERVENTIONS    Anxiety     Arthritis     Asthma     Bipolar disorder     Borderline diabetic     HX OF BUT REPORTS NO ISSUES NOT ON MEDICATION AND IS DIET CONTROLLED    Cataract     Chronic back pain 01/13/2012    Chronic bronchitis     DENIES ANY CURRENT ISSUES    Chronic UTI     NO CURRENT S/S    Colitis     Congestive heart failure (CHF)     FOLLOWED BY DR [facility], NO CURRENT S/S    Depression     Dizziness     Drug dependence     Fibromyalgia     H/O psychiatric care     Heart murmur     Hematological disorder     hemachromatosis     Hemochromatosis 01/13/2012    Hemorrhoids     Hyperlipidemia     Hypertension, essential     Hypoglycemia 01/13/2021    Kidney stones     past     Knee pain     Lack of coordination     Major depression, recurrent     Meningitis     Migraine headache     Mitral valve prolapse     Muscle spasm     Neck pain     Night sweats     HX OF NO CURRENT ISSUES    PAD  (peripheral artery disease)     STENTS L AND RIGHT GROIN    Pain management     PAIN PUMP W/FENTANYL/ DR KIRK PAIN PUMP CURRENTLY EMPTY    Peptic ulcer     Peritoneal dialysis catheter in place     PD DOES 5 DAYS/WEEK. PD EXCHANGE 4 TIMES ON THOSE DAYS.    PONV (postoperative nausea and vomiting)     Psoriasis-like skin disease     eczema    PTSD (post-traumatic stress disorder)     Ringing in ears     Seizures     NONE FOR A LONG TIME    Stroke (cerebrum)     TIA, MEMORY ISSUES    Syncope and collapse     REPORTS HAPPENED ABOUT A YEAR AGO    Weakness     Weight loss        Past Surgical History:   Procedure Laterality Date    APPENDECTOMY       SECTION      X4    COLONOSCOPY      COLONOSCOPY N/A 2021    Procedure: COLONOSCOPY;  Surgeon: Aston Fernandez MD;  Location: [facility] ENDOSCOPY;  Service: Gastroenterology;  Laterality: N/A;  COLITIS    ENDOSCOPY N/A 2022    Procedure: ESOPHAGOGASTRODUODENOSCOPY WITH BIOPSIES;  Surgeon: Dione Harrell MD;  Location: [facility] ENDOSCOPY;  Service: Gastroenterology;  Laterality: N/A;  GASTRITIS    FEMORAL ARTERY STENT Bilateral     HYSTERECTOMY  ,    partial/total     INSERTION HEMODIALYSIS CATHETER Right 2023    Procedure: HEMODIALYSIS CATHETER INSERTION;  Surgeon: Suman Butler MD;  Location: [facility] MAIN OR;  Service: General;  Laterality: Right;  PER DR VILLEDA    INSERTION PERITONEAL DIALYSIS CATHETER      REVISION    INSERTION PERITONEAL DIALYSIS CATHETER N/A 2023    Procedure: INSERTION PERITONEAL DIALYSIS CATHETER LAPAROSCOPIC;  Surgeon: Suman Butler MD;  Location: [facility] MAIN OR;  Service: General;  Laterality: N/A;  PER DR BUTLER    INTERVENTIONAL RADIOLOGY PROCEDURE Right 2022    Procedure: Abdominal Aortagram with Runoff;  Surgeon: Aspen Couch MD;  Location: [facility] CATH INVASIVE LOCATION;  Service: Peripheral Vascular;  Laterality: Right;    NECK SURGERY Left     KNOW REMOVED     PAIN PUMP INSERTION/REVISION       FENTANYL INSERTED     PAIN PUMP INSERTION/REVISION Left 5/31/2024    Procedure: PAIN PUMP REMOVAL, left lower abdomen;  Surgeon: Semaj Blackwood MD;  Location: [facility] MAIN OR;  Service: Neurosurgery;  Laterality: Left;    REMOVAL HEMODIALYSIS CATHETER      TONSILLECTOMY  1998    VOCAL CORD MASS EXCISION      WOUND EXPLORATION LEG N/A 6/5/2024    Procedure: LUMBAR WOUND EXPLORATION WITH REMOVAL OF RETAINED IT PUMP TUBING AND REPAIR OF CSF LEAK;  Surgeon: Semaj Blackwood MD;  Location: [facility] MAIN OR;  Service: Neurosurgery;  Laterality: N/A;       Family History   Problem Relation Age of Onset    Cancer Sister     Cancer Brother     Cancer Paternal Aunt     Cancer Paternal Uncle     Cancer Other     Stroke Other     Diabetes Other     Heart failure Other     Malig Hyperthermia Neg Hx         Current Medications        Current Outpatient Medications:     albuterol sulfate  (90 Base) MCG/ACT inhaler, Inhale 2 puffs Every 4 (Four) Hours As Needed for Wheezing., Disp: , Rfl:     amLODIPine (NORVASC) 10 MG tablet, Take 1 tablet by mouth Daily., Disp: , Rfl:     aspirin-acetaminophen-caffeine (EXCEDRIN MIGRAINE) 250-250-65 MG per tablet, Take 1 tablet by mouth Every 6 (Six) Hours As Needed for Headache., Disp: , Rfl:     calcium acetate (PHOS BINDER,) 667 MG capsule capsule, Take 2 capsules by mouth 3 (Three) Times a Day Before Meals., Disp: , Rfl:     calcium acetate (PHOS BINDER,) 667 MG capsule capsule, Take 1 capsule by mouth With Snacks. Take before snack, Disp: , Rfl:     Calcium Carb-Cholecalciferol (GNP Calcium 600 +D3) 600-20 MG-MCG tablet, TAKE 1 TABLET BY MOUTH TWICE DAILY, Disp: 60 tablet, Rfl: 11    diphenhydrAMINE (BENADRYL) 25 MG tablet, Take 2 tablets by mouth Every Night., Disp: , Rfl:     docusate sodium (COLACE) 100 MG capsule, Take 1 capsule by mouth 2 (Two) Times a Day., Disp: , Rfl:     fluticasone (FLONASE) 50 MCG/ACT nasal spray, Administer 2 sprays into the  nostril(s) as directed by provider Daily As Needed for Allergies., Disp: , Rfl:     furosemide (LASIX) 80 MG tablet, Take 1 tablet by mouth 2 (Two) Times a Day., Disp: 14 tablet, Rfl: 0    GNP ClearLax 17 GM/SCOOP powder, Take 17 g by mouth Daily., Disp: , Rfl:     Heparin & NaCl Lock Flush (HEPARIN COMBINATION IV), Infuse  into a venous catheter. With dialysis solution FOR PD CATH, TAKES EVERY MONDAY, Disp: , Rfl:     hydrALAZINE (APRESOLINE) 25 MG tablet, Take 1 tablet by mouth Daily for 30 days., Disp: 30 tablet, Rfl: 0    hyoscyamine (LEVSIN) 0.125 MG SL tablet, Place 1 tablet under the tongue Every 6 (Six) Hours As Needed for Cramping or Diarrhea., Disp: 60 tablet, Rfl: 2    Lokelma 10 g pack, Take 20 g by mouth 2 (Two) Times a Week. REPORTS USES 2 PACKS AND ONLY USES ON DAYS SHE DOES NOT DO PD DIALYSIS, Disp: , Rfl:     losartan (COZAAR) 50 MG tablet, Take 1 tablet by mouth Every Night., Disp: , Rfl:     OLANZapine (ZyPREXA) 10 MG tablet, Take 1 tablet by mouth Every Night., Disp: 30 tablet, Rfl: 2    pregabalin (LYRICA) 150 MG capsule, Take 1 capsule by mouth Every Night., Disp: , Rfl:     promethazine (PHENERGAN) 12.5 MG tablet, Take 1 tablet by mouth Every 6 (Six) Hours As Needed for Nausea or Vomiting., Disp: 15 tablet, Rfl: 1    tiZANidine (ZANAFLEX) 4 MG tablet, Take 1 tablet by mouth Every Night., Disp: , Rfl:     loratadine (CLARITIN) 10 MG tablet, TAKE 1 TABLET BY MOUTH ONCE DAILY FOR ALLERGIES, Disp: 30 tablet, Rfl: 0    [START ON 11/5/2024] LORazepam (ATIVAN) 0.5 MG tablet, Take 1 tablet by mouth Daily As Needed for Anxiety (Panic attacks)., Disp: 15 tablet, Rfl: 0    metoprolol succinate XL (TOPROL-XL) 50 MG 24 hr tablet, TAKE 1 TABLET BY MOUTH ONCE DAILY FOR BLOOD PRESSURE OR HEART, Disp: 30 tablet, Rfl: 0    nicotine (Nicoderm CQ) 21 MG/24HR patch, Place 1 patch on the skin as directed by provider Daily., Disp: 28 each, Rfl: 0     Allergies     Allergies   Allergen Reactions    Iron Sucrose  "Unknown - High Severity     HX OF HEMOCHROMATOSIS     Lamotrigine Rash    Latex Hives, Nausea And Vomiting and Rash     blisters      Lamictal Xr [Lamotrigine Er] Rash    Nickel Rash     RASH W/COSMETIC JEWELRY    Sulfa Antibiotics Rash     PT REPORTS SHE HAS NEVER HAD TROUBLE OR NOTED ISSUES WITH SULFA BEFORE       Social History       Social History     Social History Narrative    Not on file       Immunizations     Immunization:  Immunization History   Administered Date(s) Administered    COVID-19 (FAIZAN) 06/08/2021    Fluzone (or Fluarix & Flulaval for VFC) >6mos 10/01/2018, 11/15/2023    Influenza Injectable Mdck Pf Quad 11/01/2017, 11/05/2019, 10/19/2021    Pneumococcal Conjugate 13-Valent (PCV13) 11/01/2017          Objective     Objective     Vital Signs:   BP (!) 209/96 (BP Location: Left arm, Patient Position: Sitting, Cuff Size: Adult)   Pulse 96   Temp 99.1 °F (37.3 °C)   Resp 18   Ht 162.6 cm (64.02\")   Wt 77.9 kg (171 lb 12.8 oz)   SpO2 98%   BMI 29.47 kg/m²       Physical Exam  Constitutional:       Appearance: Normal appearance.   HENT:      Nose: Nose normal.      Mouth/Throat:      Mouth: Mucous membranes are moist.   Cardiovascular:      Rate and Rhythm: Normal rate and regular rhythm.      Pulses: Normal pulses.      Heart sounds: Normal heart sounds.   Pulmonary:      Effort: Pulmonary effort is normal.      Breath sounds: Normal breath sounds.   Skin:     General: Skin is warm and dry.   Neurological:      General: No focal deficit present.      Mental Status: She is alert and oriented to person, place, and time.   Psychiatric:         Mood and Affect: Mood normal.         Behavior: Behavior normal.         Results    The following data was reviewed by: ELOINA Persaud on 10/23/2024:            ECG 12 Lead    Date/Time: 10/23/2024 3:55 PM  Performed by: Adrienne Roe APRN    Authorized by: Adrienne Roe APRN  Comparison: compared with previous ECG from " 10/10/2024  Similar to previous ECG  Rhythm: sinus rhythm  Rate: normal  BPM: 83  ST Elevation: aVR  Other: no other findings        Assessment and Plan        Assessment and Plan    Diagnoses and all orders for this visit:    1. CKD (chronic kidney disease) requiring chronic dialysis (Primary)    2. End stage chronic kidney disease  -     Microalbumin / Creatinine Urine Ratio - Urine, Clean Catch; Future  -     Microalbumin / Creatinine Urine Ratio - Urine, Clean Catch    3. Primary hypertension  Assessment & Plan:  Hypertension is uncontrolled  Medication changes per orders.  Blood pressure will be reassessedin 4 weeks.    Orders:  -     Ambulatory Referral to Cardiology  -     ECG 12 Lead  -     hydrALAZINE (APRESOLINE) 25 MG tablet; Take 1 tablet by mouth Daily for 30 days.  Dispense: 30 tablet; Refill: 0    4. Chest pain, unspecified type  -     Ambulatory Referral to Cardiology    5. ESRD on peritoneal dialysis  Assessment & Plan:  GFR 6. Continue PD and f/u with Nephrology      6. Tobacco use disorder  -     nicotine (Nicoderm CQ) 21 MG/24HR patch; Place 1 patch on the skin as directed by provider Daily.  Dispense: 28 each; Refill: 0    7. Primary hypertension  Comments:  209/96  Assessment & Plan:  Hypertension is uncontrolled  Medication changes per orders.  Blood pressure will be reassessedin 4 weeks.    Orders:  -     Ambulatory Referral to Cardiology  -     ECG 12 Lead  -     hydrALAZINE (APRESOLINE) 25 MG tablet; Take 1 tablet by mouth Daily for 30 days.  Dispense: 30 tablet; Refill: 0    Other orders  -     ECG Scan            I spent 30 minutes caring for Cecilia on this date of service. This time includes time spent by me in the following activities:preparing for the visit, reviewing tests, performing a medically appropriate examination and/or evaluation , counseling and educating the patient/family/caregiver, ordering medications, tests, or procedures, referring and communicating with other health care  professionals , and documenting information in the medical record  Follow Up        Follow Up   Return in about 4 weeks (around 11/20/2024), or if symptoms worsen or fail to improve.  Patient was given instructions and counseling regarding her condition or for health maintenance advice. Please see specific information pulled into the AVS if appropriate.

## 2024-10-24 LAB
ALBUMIN UR-MCNC: 2.2 MG/DL
CREAT UR-MCNC: 16.9 MG/DL
MICROALBUMIN/CREAT UR: 130.2 MG/G (ref 0–29)

## 2024-10-24 RX ORDER — METOPROLOL SUCCINATE 50 MG/1
TABLET, EXTENDED RELEASE ORAL
Qty: 30 TABLET | Refills: 0 | Status: SHIPPED | OUTPATIENT
Start: 2024-10-24

## 2024-10-24 RX ORDER — LORATADINE 10 MG/1
TABLET ORAL
Qty: 30 TABLET | Refills: 0 | Status: SHIPPED | OUTPATIENT
Start: 2024-10-24

## 2024-10-29 DIAGNOSIS — F43.21 COMPLICATED GRIEVING: ICD-10-CM

## 2024-10-29 DIAGNOSIS — F41.0 PANIC ATTACKS: ICD-10-CM

## 2024-10-29 DIAGNOSIS — F31.13 BIPOLAR DISORDER, CURRENT EPISODE MANIC WITHOUT PSYCHOTIC FEATURES, SEVERE: ICD-10-CM

## 2024-10-29 DIAGNOSIS — F41.1 GENERALIZED ANXIETY DISORDER: ICD-10-CM

## 2024-10-29 RX ORDER — LORAZEPAM 0.5 MG/1
0.5 TABLET ORAL DAILY PRN
Qty: 15 TABLET | Refills: 0 | Status: SHIPPED | OUTPATIENT
Start: 2024-11-05

## 2024-10-29 NOTE — TELEPHONE ENCOUNTER
REFILL REQUEST:     LORazepam (Ativan) 0.5 MG tablet (10/04/2024)     F/UP- 11/11/2024.  LOV: 10/04/2024.    LAST UDS:  POC Medline 12 Panel Urine Drug Screen (10/04/2024 12:49)

## 2024-11-03 LAB
QT INTERVAL: 384 MS
QTC INTERVAL: 418 MS

## 2024-11-05 ENCOUNTER — PREP FOR SURGERY (OUTPATIENT)
Dept: OTHER | Facility: HOSPITAL | Age: 64
End: 2024-11-05
Payer: MEDICAID

## 2024-11-05 ENCOUNTER — OFFICE VISIT (OUTPATIENT)
Dept: GASTROENTEROLOGY | Facility: CLINIC | Age: 64
End: 2024-11-05
Payer: MEDICAID

## 2024-11-05 ENCOUNTER — OFFICE VISIT (OUTPATIENT)
Dept: ORTHOPEDIC SURGERY | Facility: CLINIC | Age: 64
End: 2024-11-05
Payer: MEDICAID

## 2024-11-05 ENCOUNTER — TELEPHONE (OUTPATIENT)
Dept: GASTROENTEROLOGY | Facility: CLINIC | Age: 64
End: 2024-11-05

## 2024-11-05 VITALS
DIASTOLIC BLOOD PRESSURE: 57 MMHG | SYSTOLIC BLOOD PRESSURE: 140 MMHG | BODY MASS INDEX: 29.06 KG/M2 | HEIGHT: 64 IN | HEART RATE: 79 BPM | WEIGHT: 170.2 LBS

## 2024-11-05 VITALS
HEART RATE: 71 BPM | OXYGEN SATURATION: 96 % | WEIGHT: 171 LBS | HEIGHT: 64 IN | DIASTOLIC BLOOD PRESSURE: 85 MMHG | SYSTOLIC BLOOD PRESSURE: 156 MMHG | BODY MASS INDEX: 29.19 KG/M2

## 2024-11-05 DIAGNOSIS — M17.12 OSTEOARTHRITIS OF LEFT KNEE, UNSPECIFIED OSTEOARTHRITIS TYPE: Primary | ICD-10-CM

## 2024-11-05 DIAGNOSIS — R11.2 NAUSEA AND VOMITING, UNSPECIFIED VOMITING TYPE: ICD-10-CM

## 2024-11-05 DIAGNOSIS — R10.30 LOWER ABDOMINAL PAIN: ICD-10-CM

## 2024-11-05 DIAGNOSIS — R10.30 LOWER ABDOMINAL PAIN: Primary | ICD-10-CM

## 2024-11-05 DIAGNOSIS — K21.9 GASTROESOPHAGEAL REFLUX DISEASE, UNSPECIFIED WHETHER ESOPHAGITIS PRESENT: ICD-10-CM

## 2024-11-05 DIAGNOSIS — M25.562 LEFT KNEE PAIN, UNSPECIFIED CHRONICITY: ICD-10-CM

## 2024-11-05 DIAGNOSIS — K58.1 IRRITABLE BOWEL SYNDROME WITH CONSTIPATION: ICD-10-CM

## 2024-11-05 DIAGNOSIS — K21.9 GASTROESOPHAGEAL REFLUX DISEASE, UNSPECIFIED WHETHER ESOPHAGITIS PRESENT: Primary | ICD-10-CM

## 2024-11-05 PROCEDURE — 99214 OFFICE O/P EST MOD 30 MIN: CPT | Performed by: INTERNAL MEDICINE

## 2024-11-05 PROCEDURE — 1159F MED LIST DOCD IN RCRD: CPT | Performed by: INTERNAL MEDICINE

## 2024-11-05 PROCEDURE — 3077F SYST BP >= 140 MM HG: CPT | Performed by: INTERNAL MEDICINE

## 2024-11-05 PROCEDURE — 1160F RVW MEDS BY RX/DR IN RCRD: CPT | Performed by: INTERNAL MEDICINE

## 2024-11-05 PROCEDURE — 3078F DIAST BP <80 MM HG: CPT | Performed by: INTERNAL MEDICINE

## 2024-11-05 RX ORDER — LOSARTAN POTASSIUM 100 MG/1
TABLET ORAL
COMMUNITY
Start: 2024-10-22

## 2024-11-05 RX ORDER — PANTOPRAZOLE SODIUM 40 MG/1
40 TABLET, DELAYED RELEASE ORAL DAILY
Qty: 90 TABLET | Refills: 1 | Status: SHIPPED | OUTPATIENT
Start: 2024-11-05

## 2024-11-05 NOTE — PROGRESS NOTES
Patient Name: Cecilia Sparks   Visit Date: 11/05/2024   Patient ID: [id number]  Provider: Dione Harrell MD    Sex: female  Location: [facility]   YOB: 1960  [facility]    Primary Care Provider Ellen Strickland DO  Location Phone: [phone]   Referring Provider: No ref. provider found        Chief Complaint  Irritable Bowel Syndrome and Heartburn    History of Present Illness  Cecilia Sparks is a 64 y.o. female who presents to [facility] for follow-up of IBS.    Ms. Sparks is a 64 year old female CKD on PD, PAD, hemochromatosis, vocal cord mass (removed), and chronic back pain (previously had fentanyl pain pump) who presents for f/u of abdominal pain.  Pt reports previously had liver bx at [facility] for hemochromatosis.  Thinks underwent phlebotomy previously for HH.  Ms. Sparks previously reported epigastric pain x years.  Previous hospitalization for rectal bleeding and dx with ischemic colitis.       Lower abd cramping worse with needing to have a bowel movement.  Pt reports she did not have any issues with lower abd pain until her fentanyl pump was no longer in use.  Had vomiting with taking lactulose.  No benefit with Bentyl for abd pain.  Pt reported previous hospitalization for meningitis.  Pump removed during hospitalization.  Takes Miralax and docusate twice daily.  Linzess caused cramping.  No benefit with Trulance.    Reports had a stroke since her last visit.  Reports no residual deficits.  Reports she had Rx for 15 pills Ativan 0.5 mg.  No GI symptoms for the two weeks she took the meds.  Levsin was helpful if could take early enough but was not effective if cramping already started.  Reports she is seeing psychiatric NP and has appointment in 2 weeks.  Reports reflux.  No dysphagia.  Omeprazole effective but was switched to pantoprazole b/c was started on  Plavix.    Past Medical History:   Diagnosis Date    Acid reflux disease     Anemia     NO RECENT INTERVENTIONS    Anxiety     Arthritis     Asthma     Bipolar disorder     Borderline diabetic     HX OF BUT REPORTS NO ISSUES NOT ON MEDICATION AND IS DIET CONTROLLED    Cataract     Chronic back pain 2012    Chronic bronchitis     DENIES ANY CURRENT ISSUES    Chronic UTI     NO CURRENT S/S    Colitis     Congestive heart failure (CHF)     FOLLOWED BY DR [facility], NO CURRENT S/S    Depression     Dizziness     Drug dependence     Fibromyalgia     H/O psychiatric care     Heart murmur     Hematological disorder     hemachromatosis     Hemochromatosis 2012    Hemorrhoids     Hyperlipidemia     Hypertension, essential     Hypoglycemia 2021    Kidney stones     past     Knee pain     Lack of coordination     Major depression, recurrent     Meningitis     Migraine headache     Mitral valve prolapse     Muscle spasm     Neck pain     Night sweats     HX OF NO CURRENT ISSUES    PAD (peripheral artery disease)     STENTS L AND RIGHT GROIN    Pain management     PAIN PUMP W/FENTANYL/ DR KIRK PAIN PUMP CURRENTLY EMPTY    Peptic ulcer     Peritoneal dialysis catheter in place     PD DOES 5 DAYS/WEEK. PD EXCHANGE 4 TIMES ON THOSE DAYS.    PONV (postoperative nausea and vomiting)     Psoriasis-like skin disease     eczema    PTSD (post-traumatic stress disorder)     Ringing in ears     Seizures     NONE FOR A LONG TIME    Stroke (cerebrum)     TIA, MEMORY ISSUES    Syncope and collapse     REPORTS HAPPENED ABOUT A YEAR AGO    Weakness     Weight loss        Past Surgical History:   Procedure Laterality Date    APPENDECTOMY       SECTION      X4    COLONOSCOPY      COLONOSCOPY N/A 2021    Procedure: COLONOSCOPY;  Surgeon: Aston Fernandez MD;  Location: [facility] ENDOSCOPY;  Service: Gastroenterology;  Laterality: N/A;  COLITIS    ENDOSCOPY N/A 2022    Procedure:  ESOPHAGOGASTRODUODENOSCOPY WITH BIOPSIES;  Surgeon: Dione Harrell MD;  Location: [facility] ENDOSCOPY;  Service: Gastroenterology;  Laterality: N/A;  GASTRITIS    FEMORAL ARTERY STENT Bilateral     HYSTERECTOMY  1984,2008    partial/total     INSERTION HEMODIALYSIS CATHETER Right 03/13/2023    Procedure: HEMODIALYSIS CATHETER INSERTION;  Surgeon: Suman Butler MD;  Location: [facility] MAIN OR;  Service: General;  Laterality: Right;  PER DR VILLEDA    INSERTION PERITONEAL DIALYSIS CATHETER      REVISION    INSERTION PERITONEAL DIALYSIS CATHETER N/A 03/13/2023    Procedure: INSERTION PERITONEAL DIALYSIS CATHETER LAPAROSCOPIC;  Surgeon: Suman Butler MD;  Location: [facility] MAIN OR;  Service: General;  Laterality: N/A;  PER DR BUTLER    INTERVENTIONAL RADIOLOGY PROCEDURE Right 02/14/2022    Procedure: Abdominal Aortagram with Runoff;  Surgeon: Aspen Couch MD;  Location: [facility] CATH INVASIVE LOCATION;  Service: Peripheral Vascular;  Laterality: Right;    NECK SURGERY Left     KNOW REMOVED    PAIN PUMP INSERTION/REVISION       FENTANYL INSERTED     PAIN PUMP INSERTION/REVISION Left 5/31/2024    Procedure: PAIN PUMP REMOVAL, left lower abdomen;  Surgeon: Semaj Blackwood MD;  Location: [facility] MAIN OR;  Service: Neurosurgery;  Laterality: Left;    REMOVAL HEMODIALYSIS CATHETER      TONSILLECTOMY  1998    VOCAL CORD MASS EXCISION      WOUND EXPLORATION LEG N/A 6/5/2024    Procedure: LUMBAR WOUND EXPLORATION WITH REMOVAL OF RETAINED IT PUMP TUBING AND REPAIR OF CSF LEAK;  Surgeon: Semaj Blackwood MD;  Location: [facility] MAIN OR;  Service: Neurosurgery;  Laterality: N/A;         Current Outpatient Medications:     albuterol sulfate  (90 Base) MCG/ACT inhaler, Inhale 2 puffs Every 4 (Four) Hours As Needed for Wheezing., Disp: , Rfl:     amLODIPine (NORVASC) 10 MG tablet, Take 1 tablet by mouth Daily., Disp: , Rfl:     aspirin-acetaminophen-caffeine (EXCEDRIN MIGRAINE) 250-250-65 MG per tablet, Take 1  tablet by mouth Every 6 (Six) Hours As Needed for Headache., Disp: , Rfl:     calcium acetate (PHOS BINDER,) 667 MG capsule capsule, Take 2 capsules by mouth 3 (Three) Times a Day Before Meals., Disp: , Rfl:     calcium acetate (PHOS BINDER,) 667 MG capsule capsule, Take 1 capsule by mouth With Snacks. Take before snack, Disp: , Rfl:     Calcium Carb-Cholecalciferol (GNP Calcium 600 +D3) 600-20 MG-MCG tablet, TAKE 1 TABLET BY MOUTH TWICE DAILY, Disp: 60 tablet, Rfl: 11    Diclofenac Sodium (VOLTAREN) 1 % gel gel, Apply 4 g topically to the appropriate area as directed 4 (Four) Times a Day As Needed (AS NEEDED)., Disp: 150 g, Rfl: 1    diphenhydrAMINE (BENADRYL) 25 MG tablet, Take 2 tablets by mouth Every Night., Disp: , Rfl:     docusate sodium (COLACE) 100 MG capsule, Take 1 capsule by mouth 2 (Two) Times a Day., Disp: , Rfl:     fluticasone (FLONASE) 50 MCG/ACT nasal spray, Administer 2 sprays into the nostril(s) as directed by provider Daily As Needed for Allergies., Disp: , Rfl:     furosemide (LASIX) 80 MG tablet, Take 1 tablet by mouth 2 (Two) Times a Day., Disp: 14 tablet, Rfl: 0    GNP ClearLax 17 GM/SCOOP powder, Take 17 g by mouth Daily., Disp: , Rfl:     Heparin & NaCl Lock Flush (HEPARIN COMBINATION IV), Infuse  into a venous catheter. With dialysis solution FOR PD CATH, TAKES EVERY MONDAY, Disp: , Rfl:     hydrALAZINE (APRESOLINE) 25 MG tablet, Take 1 tablet by mouth Daily for 30 days., Disp: 30 tablet, Rfl: 0    hyoscyamine (LEVSIN) 0.125 MG SL tablet, Place 1 tablet under the tongue Every 6 (Six) Hours As Needed for Cramping or Diarrhea., Disp: 60 tablet, Rfl: 2    Lokelma 10 g pack, Take 20 g by mouth 2 (Two) Times a Week. REPORTS USES 2 PACKS AND ONLY USES ON DAYS SHE DOES NOT DO PD DIALYSIS, Disp: , Rfl:     loratadine (CLARITIN) 10 MG tablet, TAKE 1 TABLET BY MOUTH ONCE DAILY FOR ALLERGIES, Disp: 30 tablet, Rfl: 0    LORazepam (ATIVAN) 0.5 MG tablet, Take 1 tablet by mouth Daily As Needed for  Anxiety (Panic attacks)., Disp: 15 tablet, Rfl: 0    losartan (COZAAR) 100 MG tablet, , Disp: , Rfl:     metoprolol succinate XL (TOPROL-XL) 50 MG 24 hr tablet, TAKE 1 TABLET BY MOUTH ONCE DAILY FOR BLOOD PRESSURE OR HEART, Disp: 30 tablet, Rfl: 0    nicotine (Nicoderm CQ) 21 MG/24HR patch, Place 1 patch on the skin as directed by provider Daily., Disp: 28 each, Rfl: 0    OLANZapine (ZyPREXA) 10 MG tablet, Take 1 tablet by mouth Every Night., Disp: 30 tablet, Rfl: 2    pregabalin (LYRICA) 150 MG capsule, Take 1 capsule by mouth Every Night., Disp: , Rfl:     promethazine (PHENERGAN) 12.5 MG tablet, Take 1 tablet by mouth Every 6 (Six) Hours As Needed for Nausea or Vomiting., Disp: 15 tablet, Rfl: 1    tiZANidine (ZANAFLEX) 4 MG tablet, Take 1 tablet by mouth Every Night., Disp: , Rfl:     pantoprazole (PROTONIX) 40 MG EC tablet, Take 1 tablet by mouth Daily., Disp: 90 tablet, Rfl: 1    polyethylene glycol (GoLYTELY) 236 g solution, Starting at noon on day prior to procedure, drink 8 ounces every 30 minutes until all gone or stools are clear. May add flavor packet., Disp: 4000 mL, Rfl: 0     Allergies   Allergen Reactions    Iron Sucrose Unknown - High Severity     HX OF HEMOCHROMATOSIS     Lamotrigine Rash    Latex Hives, Nausea And Vomiting and Rash     blisters      Lamictal Xr [Lamotrigine Er] Rash    Nickel Rash     RASH W/COSMETIC JEWELRY    Sulfa Antibiotics Rash     PT REPORTS SHE HAS NEVER HAD TROUBLE OR NOTED ISSUES WITH SULFA BEFORE       Family History   Problem Relation Age of Onset    Cancer Sister     Cancer Brother     Cancer Paternal Aunt     Cancer Paternal Uncle     Cancer Other     Stroke Other     Diabetes Other     Heart failure Other     Malig Hyperthermia Neg Hx         Social History     Social History Narrative    Not on file       Objective     Review of Systems   Constitutional:  Negative for chills, fever, unexpected weight gain and unexpected weight loss.   Respiratory:  Negative for  "cough and shortness of breath.    Cardiovascular:  Negative for chest pain.   Gastrointestinal:         Positive for *See HPI*   Skin:  Skin lesions: No new lesions.   Neurological:  Negative for numbness (or tingling).        Vital Signs:   /57 (BP Location: Right arm, Patient Position: Sitting, Cuff Size: Adult)   Pulse 79   Ht 162.6 cm (64\")   Wt 77.2 kg (170 lb 3.2 oz)   BMI 29.21 kg/m²       Physical Exam  Constitutional:       General: She is not in acute distress.     Appearance: Normal appearance.   HENT:      Head: Normocephalic.   Eyes:      Conjunctiva/sclera: Conjunctivae normal.      Pupils: Pupils are equal, round, and reactive to light.      Visual Fields: Right eye visual fields normal and left eye visual fields normal.   Neck:      Trachea: Trachea normal.   Cardiovascular:      Rate and Rhythm: Normal rate and regular rhythm.      Heart sounds: Normal heart sounds.   Pulmonary:      Effort: Pulmonary effort is normal.      Breath sounds: Normal breath sounds and air entry.   Abdominal:      General: Abdomen is flat. Bowel sounds are normal.      Palpations: Abdomen is soft. There is no mass.      Tenderness: There is no guarding.   Musculoskeletal:      Right lower leg: No edema.      Left lower leg: No edema.   Skin:     Findings: No lesion.   Neurological:      Mental Status: She is alert and oriented to person, place, and time.   Psychiatric:         Mood and Affect: Mood and affect normal.         Result Review :     CMP          10/2/2024    04:36 10/9/2024    12:22 10/10/2024    16:04   CMP   Glucose 80  80  93    BUN 65  57  59    Creatinine 6.90  5.96  6.42    EGFR 6.3  7.5  6.9    Sodium 140  136  140    Potassium 5.2  6.4  6.2    Chloride 106  101  104    Calcium 9.5  9.5  9.2    Total Protein  6.2  6.0    Albumin  3.8  3.3    Globulin  2.4  2.7    Total Bilirubin  <0.2  <0.2    Alkaline Phosphatase  87  84    AST (SGOT)  27  22    ALT (SGPT)  16  15    Albumin/Globulin Ratio  " 1.6  1.2    BUN/Creatinine Ratio 9.4  9.6  9.2    Anion Gap 14.8  12.2  13.4      CBC          10/1/2024    03:18 10/2/2024    04:36 10/10/2024    16:04   CBC   WBC 5.97  8.56  6.53    RBC 3.53  3.17  2.86    Hemoglobin 10.9  9.6  9.0    Hematocrit 32.8  30.0  27.1    MCV 92.9  94.6  94.8    MCH 30.9  30.3  31.5    MCHC 33.2  32.0  33.2    RDW 13.4  13.4  13.6    Platelets 210  184  237                 Assessment and Plan    Diagnoses and all orders for this visit:    1. Lower abdominal pain (Primary)    2. Nausea and vomiting, unspecified vomiting type    3. Gastroesophageal reflux disease, unspecified whether esophagitis present    4. Irritable bowel syndrome with constipation    Other orders  -     pantoprazole (PROTONIX) 40 MG EC tablet; Take 1 tablet by mouth Daily.  Dispense: 90 tablet; Refill: 1  -     polyethylene glycol (GoLYTELY) 236 g solution; Starting at noon on day prior to procedure, drink 8 ounces every 30 minutes until all gone or stools are clear. May add flavor packet.  Dispense: 4000 mL; Refill: 0      Lower abd pain:  Will eval further with colonoscopy.  Pt interested in trial of Elavil, which I agree would potentially beneficial; however, I recommended against use of medication given potential interaction with Lyrica and Zanaflex.  Continue f/u with psychiatric NP for anxiety.  IBS-C:  continue current regimen with Miralax.  GERD: will send Rx for pantoprazole.  Given worsening symptoms, will eval further with EGD.    Heriditary hemochromatosis: homozygous for C282Y mutation.  Labs 8/2023 with Tsat 43%, Hgb 10.9, ferritin 90.  Would not recommend phlebotomy at this time.        Will need cardiology clearance to hold Plavix and given pending eval for possible arrhythmia as described by patient.  She reports she has upcoming appointment.  Will need recs from nephrology regarding her PD and pre-procedural antibiotics.  Follow Up   No follow-ups on file.  Patient was given instructions and  counseling regarding her condition or for health maintenance advice. Please see specific information pulled into the AVS if appropriate.              Dione Harrell M.D.  [place] [id number]  [place]  [id number]  Office: [phone]

## 2024-11-05 NOTE — PROGRESS NOTES
"Chief Complaint  Follow-up of the Left Knee     Subjective      Cecilia Sparks presents to [facility] for follow up of the left knee. She has a couple of steps into her living room and bumps her knee and falls. She has dialysis and stents. She had a stroke 24. She quit smoking but does vape some. She notes clicking and grinding of the knee. She has some swelling. She ices at night. She had a left knee steroid injection on 10/22/24.  The injection lasted until the next morning.  She is trying to walk and she just cannot go long distances.      Allergies   Allergen Reactions    Iron Sucrose Unknown - High Severity     HX OF HEMOCHROMATOSIS     Lamotrigine Rash    Latex Hives, Nausea And Vomiting and Rash     blisters      Lamictal Xr [Lamotrigine Er] Rash    Nickel Rash     RASH W/COSMETIC JEWELRY    Sulfa Antibiotics Rash     PT REPORTS SHE HAS NEVER HAD TROUBLE OR NOTED ISSUES WITH SULFA BEFORE        Social History     Socioeconomic History    Marital status:    Tobacco Use    Smoking status: Former     Current packs/day: 0.00     Average packs/day: 0.5 packs/day for 54.0 years (27.0 ttl pk-yrs)     Types: Cigarettes     Start date:      Quit date:      Years since quittin.8     Passive exposure: Current    Smokeless tobacco: Never   Vaping Use    Vaping status: Never Used   Substance and Sexual Activity    Alcohol use: Not Currently    Drug use: Never    Sexual activity: Defer        I reviewed the patient's chief complaint, history of present illness, review of systems, past medical history, surgical history, family history, social history, medications, and allergy list.     Review of Systems     Constitutional: Denies fevers, chills, weight loss  Cardiovascular: Denies chest pain, shortness of breath  Skin: Denies rashes, acute skin changes  Neurologic: Denies headache, loss of consciousness        Vital Signs:   /85   Pulse 71   Ht 162.6 cm (64\") "   Wt 77.6 kg (171 lb)   SpO2 96%   BMI 29.35 kg/m²          Physical Exam  General: Alert. No acute distress    Ortho Exam           LEFT KNEE Flexion 110. Extension 0. Stable to varus/valgus stress. Stable to anterior/posterior drawer. Neurovascularly intact. Negative Jelly. Negative Lachman. Positive EHL, FHL, HS and TA. Sensation intact to light touch all 5 nerves of the foot. Ambulates with Antalgic gait. Patella is well tracking. Calf supple, non-tender. Positive tenderness to the medial joint line. Positive tenderness to the lateral joint line. Positive Crepitus. Good strength to hamstrings, quadriceps, dorsiflexors, and plantar flexors.  Knee Extensor Mechanism intact Mild swelling.         Procedures      Imaging Results (Most Recent)       None             Result Review :         XR Knee 3 View Left    Result Date: 10/22/2024  Narrative: X-Ray Report: Left knee X-Ray Indication: Evaluation of the left knee AP/Lateral and [place] view(s) Findings: Moderately advanced degenerative changes with near bone on bone. Prior studies available for comparison: yes             Assessment and Plan     Diagnoses and all orders for this visit:    1. Left knee pain, unspecified chronicity (Primary)    2. Osteoarthritis of left knee, unspecified osteoarthritis type        Discussed the treatment plan with the patient.     Prescribed topical cream.     Submit for visco supplementation of the left knee.        Call or return if worsening symptoms.    Follow Up     After Visco supplementation of the left knee.       Patient was given instructions and counseling regarding her condition or for health maintenance advice. Please see specific information pulled into the AVS if appropriate.     Scribed for Shahla Ram MD by Heidi Ch MA.  11/05/24   11:16 EST    I have personally performed the services described in this document as scribed by the above individual and it is both accurate and complete. Shahla JAIMES  Yes MD Yo 11/05/24

## 2024-11-05 NOTE — TELEPHONE ENCOUNTER
2024    Dear Dr Recio,      Patient Name: Cecilia Sparks  : 1960      This patient is waiting to have a Colonoscopy and Esophagogastroduodenoscopy which I will perform at [facility] on _2024_. Please respond to this request noting your recommendations regarding clearance from a Cardiac  standpoint.  You may contact our office at [phone] Option 3 with any questions. I appreciate your prompt response in this matter. Please return this form to our office as soon as possible to [phone].    ____ I approve my patient from a Cardiac  standpoint    ____ I do NOT approve my patient from a Cardiac  standpoint at this time    Please inform our office if the patient requires additional follow-up from your office prior to scheduled procedure date.      Please specify clearance expiration date:____________________________________      Approving physician name (please print): _____________________________________________      Approving physician signature: ________________________________ Date:________________  Sincerely,  [facility] - Gastroenterology   Dr. Harrell          Please fax approval or denial to our office as soon as possible.

## 2024-11-06 ENCOUNTER — OFFICE VISIT (OUTPATIENT)
Dept: CARDIOLOGY | Facility: CLINIC | Age: 64
End: 2024-11-06
Payer: MEDICAID

## 2024-11-06 ENCOUNTER — TELEPHONE (OUTPATIENT)
Dept: GASTROENTEROLOGY | Facility: CLINIC | Age: 64
End: 2024-11-06
Payer: MEDICAID

## 2024-11-06 VITALS
SYSTOLIC BLOOD PRESSURE: 176 MMHG | DIASTOLIC BLOOD PRESSURE: 78 MMHG | HEART RATE: 75 BPM | WEIGHT: 168.6 LBS | BODY MASS INDEX: 28.79 KG/M2 | HEIGHT: 64 IN

## 2024-11-06 DIAGNOSIS — I10 ESSENTIAL HYPERTENSION: Primary | ICD-10-CM

## 2024-11-06 DIAGNOSIS — R07.89 CHEST PAIN, ATYPICAL: ICD-10-CM

## 2024-11-06 RX ORDER — CARVEDILOL 25 MG/1
25 TABLET ORAL 2 TIMES DAILY
Qty: 60 TABLET | Refills: 3 | Status: SHIPPED | OUTPATIENT
Start: 2024-11-06

## 2024-11-06 RX ORDER — CEPHALEXIN 500 MG/1
CAPSULE ORAL
COMMUNITY
Start: 2024-11-06

## 2024-11-06 NOTE — PROGRESS NOTES
[facility]  Interventional Cardiology Patient Visit Note      Referring Provider:  Adrienne Roe APRN  [address] [facility]  [address] [place] [address]    Reason for Referral:   Chest discomfort    History of Presenting Illness:  History of Present Illness  The patient presents for evaluation of chest discomfort and blood pressure.    She experiences chest pain during panic attacks, which she describes as feeling like indigestion. The pain radiates to her neck, back, and arm, and is accompanied by numbness. She has had two heart attacks in the past but did not have stents placed. She reports no shortness of breath outside of panic attacks and has not noticed any swelling in her legs. She is able to perform her routine activities.     She is currently on amlodipine for blood pressure management. Her blood pressure at home is around 170s over 70 or 80, but it was in the 200s over 100 last week. She is also on hydralazine, Toprol, Norvasc, and Cozaar for blood pressure management. Additionally, she is taking Lokelma and losartan.    She was previously using a fentanyl pump for pain management, for low back pain which was effective. However, due to insurance issues, she had to discontinue its use in 02/2024. The removal of the pump led to a complication where the catheter got stuck in her spine, causing a leak of spinal fluid and resulting in meningitis. 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    She has a history of mental illness, including a suicide attempt and self-mutilation. Currently, she is on Ativan for anxiety, but the dosage is insufficient. She was previously on Klonopin, which was effective. She reports that Zyprexa is not effective for her.    She is currently inactive due to abdominal cramps. She is on peritoneal dialysis for kidney disease and has peripheral artery disease. She is unable to attend therapy sessions for  mental health due to fatigue and lack of transportation.    SOCIAL HISTORY  She is trying to quit smoking.    Past Medical History  Past Medical History:   Diagnosis Date    Acid reflux disease     Anemia     NO RECENT INTERVENTIONS    Anxiety     Arthritis     Asthma     Bipolar disorder     Borderline diabetic     HX OF BUT REPORTS NO ISSUES NOT ON MEDICATION AND IS DIET CONTROLLED    Cataract     Chronic back pain 01/13/2012    Chronic bronchitis     DENIES ANY CURRENT ISSUES    Chronic UTI     NO CURRENT S/S    Colitis     Congestive heart failure (CHF)     FOLLOWED BY DR [facility], NO CURRENT S/S    Depression     Dizziness     Drug dependence     Fibromyalgia     H/O psychiatric care     Heart murmur     Hematological disorder     hemachromatosis     Hemochromatosis 01/13/2012    Hemorrhoids     Hyperlipidemia     Hypertension, essential     Hypoglycemia 01/13/2021    Kidney stones     past     Knee pain     Lack of coordination     Major depression, recurrent     Meningitis     Migraine headache     Mitral valve prolapse     Muscle spasm     Neck pain     Night sweats     HX OF NO CURRENT ISSUES    PAD (peripheral artery disease)     STENTS L AND RIGHT GROIN    Pain management     PAIN PUMP W/FENTANYL/ DR KIRK PAIN PUMP CURRENTLY EMPTY    Peptic ulcer     Peritoneal dialysis catheter in place     PD DOES 5 DAYS/WEEK. PD EXCHANGE 4 TIMES ON THOSE DAYS.    PONV (postoperative nausea and vomiting)     Psoriasis-like skin disease     eczema    PTSD (post-traumatic stress disorder)     Ringing in ears     Seizures 1990    NONE FOR A LONG TIME    Stroke (cerebrum) 2000    TIA, MEMORY ISSUES    Syncope and collapse     REPORTS HAPPENED ABOUT A YEAR AGO    Weakness     Weight loss          Current Outpatient Medications:     cephalexin (KEFLEX) 500 MG capsule, , Disp: , Rfl:     albuterol sulfate  (90 Base) MCG/ACT inhaler, Inhale 2 puffs Every 4 (Four) Hours As Needed for Wheezing., Disp: , Rfl:      amLODIPine (NORVASC) 10 MG tablet, Take 1 tablet by mouth Daily., Disp: , Rfl:     aspirin-acetaminophen-caffeine (EXCEDRIN MIGRAINE) 250-250-65 MG per tablet, Take 1 tablet by mouth Every 6 (Six) Hours As Needed for Headache., Disp: , Rfl:     calcium acetate (PHOS BINDER,) 667 MG capsule capsule, Take 2 capsules by mouth 3 (Three) Times a Day Before Meals., Disp: , Rfl:     calcium acetate (PHOS BINDER,) 667 MG capsule capsule, Take 1 capsule by mouth With Snacks. Take before snack, Disp: , Rfl:     Calcium Carb-Cholecalciferol (GNP Calcium 600 +D3) 600-20 MG-MCG tablet, TAKE 1 TABLET BY MOUTH TWICE DAILY, Disp: 60 tablet, Rfl: 11    Diclofenac Sodium (VOLTAREN) 1 % gel gel, Apply 4 g topically to the appropriate area as directed 4 (Four) Times a Day As Needed (AS NEEDED)., Disp: 150 g, Rfl: 1    diphenhydrAMINE (BENADRYL) 25 MG tablet, Take 2 tablets by mouth Every Night., Disp: , Rfl:     docusate sodium (COLACE) 100 MG capsule, Take 1 capsule by mouth 2 (Two) Times a Day., Disp: , Rfl:     fluticasone (FLONASE) 50 MCG/ACT nasal spray, Administer 2 sprays into the nostril(s) as directed by provider Daily As Needed for Allergies., Disp: , Rfl:     furosemide (LASIX) 80 MG tablet, Take 1 tablet by mouth 2 (Two) Times a Day., Disp: 14 tablet, Rfl: 0    GNP ClearLax 17 GM/SCOOP powder, Take 17 g by mouth Daily., Disp: , Rfl:     Heparin & NaCl Lock Flush (HEPARIN COMBINATION IV), Infuse  into a venous catheter. With dialysis solution FOR PD CATH, TAKES EVERY MONDAY, Disp: , Rfl:     hydrALAZINE (APRESOLINE) 25 MG tablet, Take 1 tablet by mouth Daily for 30 days., Disp: 30 tablet, Rfl: 0    hyoscyamine (LEVSIN) 0.125 MG SL tablet, Place 1 tablet under the tongue Every 6 (Six) Hours As Needed for Cramping or Diarrhea., Disp: 60 tablet, Rfl: 2    Lokelma 10 g pack, Take 20 g by mouth 2 (Two) Times a Week. REPORTS USES 2 PACKS AND ONLY USES ON DAYS SHE DOES NOT DO PD DIALYSIS, Disp: , Rfl:     loratadine (CLARITIN) 10  MG tablet, TAKE 1 TABLET BY MOUTH ONCE DAILY FOR ALLERGIES, Disp: 30 tablet, Rfl: 0    LORazepam (ATIVAN) 0.5 MG tablet, Take 1 tablet by mouth Daily As Needed for Anxiety (Panic attacks)., Disp: 15 tablet, Rfl: 0    losartan (COZAAR) 100 MG tablet, , Disp: , Rfl:     metoprolol succinate XL (TOPROL-XL) 50 MG 24 hr tablet, TAKE 1 TABLET BY MOUTH ONCE DAILY FOR BLOOD PRESSURE OR HEART, Disp: 30 tablet, Rfl: 0    nicotine (Nicoderm CQ) 21 MG/24HR patch, Place 1 patch on the skin as directed by provider Daily., Disp: 28 each, Rfl: 0    OLANZapine (ZyPREXA) 10 MG tablet, Take 1 tablet by mouth Every Night., Disp: 30 tablet, Rfl: 2    pantoprazole (PROTONIX) 40 MG EC tablet, Take 1 tablet by mouth Daily., Disp: 90 tablet, Rfl: 1    polyethylene glycol (GoLYTELY) 236 g solution, Starting at noon on day prior to procedure, drink 8 ounces every 30 minutes until all gone or stools are clear. May add flavor packet., Disp: 4000 mL, Rfl: 0    pregabalin (LYRICA) 150 MG capsule, Take 1 capsule by mouth Every Night., Disp: , Rfl:     promethazine (PHENERGAN) 12.5 MG tablet, Take 1 tablet by mouth Every 6 (Six) Hours As Needed for Nausea or Vomiting., Disp: 15 tablet, Rfl: 1    tiZANidine (ZANAFLEX) 4 MG tablet, Take 1 tablet by mouth Every Night., Disp: , Rfl:   Current outpatient and discharge medications have been reconciled for the patient.  Reviewed by: Preston Recio MD     There are no discontinued medications.  Allergies   Allergen Reactions    Iron Sucrose Unknown - High Severity     HX OF HEMOCHROMATOSIS     Lamotrigine Rash    Latex Hives, Nausea And Vomiting and Rash     blisters      Lamictal Xr [Lamotrigine Er] Rash    Nickel Rash     RASH W/COSMETIC JEWELRY    Sulfa Antibiotics Rash     PT REPORTS SHE HAS NEVER HAD TROUBLE OR NOTED ISSUES WITH SULFA BEFORE      Social History     Tobacco Use    Smoking status: Former     Current packs/day: 0.00     Average packs/day: 0.5 packs/day for 54.0 years (27.0 ttl pk-yrs)      "Types: Cigarettes     Start date:      Quit date:      Years since quittin.8     Passive exposure: Current    Smokeless tobacco: Never   Vaping Use    Vaping status: Never Used   Substance Use Topics    Alcohol use: Not Currently    Drug use: Never     Family History   Problem Relation Age of Onset    Cancer Sister     Cancer Brother     Cancer Paternal Aunt     Cancer Paternal Uncle     Cancer Other     Stroke Other     Diabetes Other     Heart failure Other     Malig Hyperthermia Neg Hx           Objective   There were no vitals taken for this visit.    Wt Readings from Last 3 Encounters:   24 77.2 kg (170 lb 3.2 oz)   24 77.6 kg (171 lb)   10/23/24 77.9 kg (171 lb 12.8 oz)     BP Readings from Last 3 Encounters:   24 140/57   24 156/85   10/23/24 (!) 209/96       Physical Exam  Constitutional:  Awake. Not in acute distress. Normal appearance.   Neck: No carotid bruit, hepatojugular reflux or JVD.   Cardiovascular:      Rate and Rhythm: Normal rate and regular rhythm.      Chest Wall: PMI is not displaced.      Heart sounds: Normal heart sounds, S1 normal and S2 normal. No murmur heard.       No friction rub. No gallop. No S3 or S4 sounds.    Pulmonary: Pulmonary effort is normal. Normal breath sounds. No wheezing, rhonchi or rales.   Extremities: No Bilateral edema is noted.   Skin: Warm and dry. Non cyanotic, No petechiae or rash.   Neurological: Alert and oriented x 3  Psychiatric:  Behavior is cooperative.       Result Review :   The following data was reviewed by Preston Recio MD on 2024   No results found for: \"PROBNP\"  CMP          10/2/2024    04:36 10/9/2024    12:22 10/10/2024    16:04   CMP   Glucose 80  80  93    BUN 65  57  59    Creatinine 6.90  5.96  6.42    EGFR 6.3  7.5  6.9    Sodium 140  136  140    Potassium 5.2  6.4  6.2    Chloride 106  101  104    Calcium 9.5  9.5  9.2    Total Protein  6.2  6.0    Albumin  3.8  3.3    Globulin  2.4  2.7    Total " "Bilirubin  <0.2  <0.2    Alkaline Phosphatase  87  84    AST (SGOT)  27  22    ALT (SGPT)  16  15    Albumin/Globulin Ratio  1.6  1.2    BUN/Creatinine Ratio 9.4  9.6  9.2    Anion Gap 14.8  12.2  13.4      CBC w/diff          10/1/2024    03:18 10/2/2024    04:36 10/10/2024    16:04   CBC w/Diff   WBC 5.97  8.56  6.53    RBC 3.53  3.17  2.86    Hemoglobin 10.9  9.6  9.0    Hematocrit 32.8  30.0  27.1    MCV 92.9  94.6  94.8    MCH 30.9  30.3  31.5    MCHC 33.2  32.0  33.2    RDW 13.4  13.4  13.6    Platelets 210  184  237    Neutrophil Rel %   47.7    Immature Granulocyte Rel %   0.6    Lymphocyte Rel %   35.5    Monocyte Rel %   7.7    Eosinophil Rel %   8.0    Basophil Rel %   0.5       Lab Results   Component Value Date    TSH 2.600 09/27/2024      No results found for: \"FREET4\"   No results found for: \"DDIMERQUANT\"  Magnesium   Date Value Ref Range Status   10/02/2024 2.1 1.6 - 2.4 mg/dL Final      No results found for: \"DIGOXIN\"   Lab Results   Component Value Date    TROPONINT 21 (H) 09/30/2024      No results found for: \"POCTROP\"       A1C Last 3 Results          9/27/2024    11:36 9/30/2024    14:00   HGBA1C Last 3 Results   Hemoglobin A1C 5.10  5.00      Lipid Panel          9/27/2024    11:36   Lipid Panel   Total Cholesterol 153    Triglycerides 81    HDL Cholesterol 63    VLDL Cholesterol 15    LDL Cholesterol  75    LDL/HDL Ratio 1.17      Results for orders placed during the hospital encounter of 09/30/24    Adult Transthoracic Echo Complete W/ Cont if Necessary Per Protocol    Interpretation Summary    Left ventricular systolic function is hyperdynamic (EF > 70%).    Left ventricular diastolic function is consistent with (grade I) impaired relaxation and age.    No significant valvular disease.    Intracavitary gradient at approximately 35 to 40 mmHg secondary to hyperdynamic state.     Results for orders placed during the hospital encounter of 09/30/24    Adult Transthoracic Echo Complete W/ Cont if " Necessary Per Protocol    Interpretation Summary    Left ventricular systolic function is hyperdynamic (EF > 70%).    Left ventricular diastolic function is consistent with (grade I) impaired relaxation and age.    No significant valvular disease.    Intracavitary gradient at approximately 35 to 40 mmHg secondary to hyperdynamic state.     No results found for this or any previous visit.        The ASCVD Risk score (Ingrid KNAPP, et al., 2019) failed to calculate for the following reasons:    Risk score cannot be calculated because patient has a medical history suggesting prior/existing ASCVD        Assessment  Assessment & Plan  1. Progressive Angina  Her chest discomfort appears to be associated with panic attacks and stress. A stress test is recommended to further evaluate her chest discomfort. If the stress test shows any abnormalities, a catheterization procedure will be considered. She is advised to monitor her symptoms and seek medical attention if they worsen. No evidence of prior infarct is noted on EKG.    2. Hypertension.  Her blood pressure has been elevated, with recent readings around 170/70 and occasionally reaching 200/100. Her current medication regimen includes amlodipine, hydralazine, Toprol, and losartan. Metoprolol will be discontinued and replaced with carvedilol 25 mg. She is advised to start with half a pill (12.5 mg) in the morning and half a pill in the evening. If tolerated, the dose can be increased to a full pill the following week. If her blood pressure remains above 130/90, the hydralazine dose will be increased to 50 mg. She is instructed to contact the office for refills if needed.    3. Peripheral Artery Disease.  She has a history of peripheral artery disease with stents placed in her legs. No new symptoms or concerns were reported during this visit. She is advised to continue her current management plan and monitor for any changes.           Plan                There are no diagnoses  linked to this encounter.  Follow Up     No follow-ups on file.      Preston Recio MD  Interventional Cardiology  11/06/2024  15:20 EST      Patient was given instructions and counseling regarding her condition or for health maintenance advice. Please see specific information pulled into the AVS if appropriate.     Please note that portions of this document were completed using a voice recognition program.     Patient or patient representative verbalized consent for the use of Ambient Listening during the visit with  Preston Recio MD for chart documentation. 11/6/2024  18:41 EST

## 2024-11-06 NOTE — PATIENT INSTRUCTIONS
Your blood pressure goal is less than 130/90.    Start taking half dose of coreg 25 mg pill, if you are able to tolerate it, increase it to full pill.  If despite this your blood pressure is not at goal, increase hydralazine to 50 mg Po TID.

## 2024-11-15 DIAGNOSIS — I10 PRIMARY HYPERTENSION: ICD-10-CM

## 2024-11-15 DIAGNOSIS — E87.5 HYPERKALEMIA: ICD-10-CM

## 2024-11-15 RX ORDER — HYDRALAZINE HYDROCHLORIDE 25 MG/1
25 TABLET, FILM COATED ORAL DAILY
Qty: 30 TABLET | Refills: 0 | Status: SHIPPED | OUTPATIENT
Start: 2024-11-15

## 2024-11-16 RX ORDER — FUROSEMIDE 80 MG/1
80 TABLET ORAL 2 TIMES DAILY
Qty: 60 TABLET | Refills: 5 | Status: SHIPPED | OUTPATIENT
Start: 2024-11-16

## 2024-11-19 DIAGNOSIS — F43.21 COMPLICATED GRIEVING: ICD-10-CM

## 2024-11-19 DIAGNOSIS — F41.1 GENERALIZED ANXIETY DISORDER: ICD-10-CM

## 2024-11-19 DIAGNOSIS — F17.200 TOBACCO USE DISORDER: ICD-10-CM

## 2024-11-19 DIAGNOSIS — F31.13 BIPOLAR DISORDER, CURRENT EPISODE MANIC WITHOUT PSYCHOTIC FEATURES, SEVERE: ICD-10-CM

## 2024-11-19 DIAGNOSIS — F41.0 PANIC ATTACKS: ICD-10-CM

## 2024-11-19 RX ORDER — CLOPIDOGREL BISULFATE 75 MG/1
TABLET ORAL
Qty: 30 TABLET | Refills: 5 | Status: SHIPPED | OUTPATIENT
Start: 2024-11-19

## 2024-11-19 RX ORDER — LORAZEPAM 0.5 MG/1
0.5 TABLET ORAL DAILY PRN
Qty: 15 TABLET | Refills: 0 | Status: SHIPPED | OUTPATIENT
Start: 2024-11-28

## 2024-11-19 RX ORDER — ATORVASTATIN CALCIUM 40 MG/1
40 TABLET, FILM COATED ORAL EVERY EVENING
Qty: 30 TABLET | Refills: 5 | Status: SHIPPED | OUTPATIENT
Start: 2024-11-19

## 2024-11-19 RX ORDER — NICOTINE 21 MG/24HR
1 PATCH, TRANSDERMAL 24 HOURS TRANSDERMAL EVERY 24 HOURS
Qty: 28 EACH | Refills: 0 | Status: SHIPPED | OUTPATIENT
Start: 2024-11-19

## 2024-11-19 RX ORDER — LORATADINE 10 MG/1
TABLET ORAL
Qty: 30 TABLET | Refills: 0 | Status: SHIPPED | OUTPATIENT
Start: 2024-11-19

## 2024-11-19 NOTE — TELEPHONE ENCOUNTER
REFILL REQUEST:     LORazepam (ATIVAN) 0.5 MG tablet (11/05/2024)     F/UP- 12/30/2024.  LOV: 10/04/2024.

## 2024-11-20 ENCOUNTER — TELEPHONE (OUTPATIENT)
Dept: ORTHOPEDIC SURGERY | Facility: CLINIC | Age: 64
End: 2024-11-20
Payer: MEDICAID

## 2024-11-20 ENCOUNTER — OFFICE VISIT (OUTPATIENT)
Dept: FAMILY MEDICINE CLINIC | Facility: CLINIC | Age: 64
End: 2024-11-20
Payer: MEDICAID

## 2024-11-20 VITALS
HEIGHT: 64 IN | BODY MASS INDEX: 30.05 KG/M2 | OXYGEN SATURATION: 97 % | SYSTOLIC BLOOD PRESSURE: 149 MMHG | WEIGHT: 176 LBS | HEART RATE: 73 BPM | DIASTOLIC BLOOD PRESSURE: 87 MMHG | TEMPERATURE: 98.6 F

## 2024-11-20 DIAGNOSIS — I25.10 CARDIOVASCULAR DISEASE: ICD-10-CM

## 2024-11-20 DIAGNOSIS — I63.9 CEREBROVASCULAR ACCIDENT (CVA), UNSPECIFIED MECHANISM: ICD-10-CM

## 2024-11-20 DIAGNOSIS — E11.43 TYPE 2 DIABETES MELLITUS WITH DIABETIC AUTONOMIC NEUROPATHY, WITHOUT LONG-TERM CURRENT USE OF INSULIN: Primary | Chronic | ICD-10-CM

## 2024-11-20 DIAGNOSIS — I10 PRIMARY HYPERTENSION: Chronic | ICD-10-CM

## 2024-11-20 NOTE — TELEPHONE ENCOUNTER
HUB OK TO RELAY MSG/APPT (11-20-24)  CALLED AND LVM TO THE PT REGARDING APPT.    APPT: LT KNEE EUFLEXXA INJ WITH [facility]    12-12 AT 1:15 PM  12-19 AT 1:15 PM  12-26 AT 1:15 PM    LAST LT KNEE STEROID INJ:  10-22    SUN LEON=NO PA REQ

## 2024-11-20 NOTE — TELEPHONE ENCOUNTER
----- Message from Cris CRUZ sent at 11/19/2024  2:52 PM EST -----  No Pa Required for Left Knee Euflexxa.  Okay to schedule when appropriate.

## 2024-11-20 NOTE — PROGRESS NOTES
Chief Complaint     Hypertension (Saw cardiology and having stress test on Dec 8th followed by angiogram at some pt after ) and Chronic Kidney Disease    History of Present Illness     Cecilia Sparks is a 64 y.o. female who presents to [facility] for evaluation of hypertension, chronic kidney disease, coronary artery disease, and stroke history.      She has quit smoking but she is having really bad cravings. She has gained weight because the only thing that has helped her with there cravings is food.     History      Past Medical History:   Diagnosis Date    Acid reflux disease     Anemia     NO RECENT INTERVENTIONS    Anxiety     Arthritis     Asthma     Bipolar disorder     Borderline diabetic     HX OF BUT REPORTS NO ISSUES NOT ON MEDICATION AND IS DIET CONTROLLED    Cataract     Chronic back pain 01/13/2012    Chronic bronchitis     DENIES ANY CURRENT ISSUES    Chronic UTI     NO CURRENT S/S    Colitis     Congestive heart failure (CHF)     FOLLOWED BY DR [facility], NO CURRENT S/S    Depression     Dizziness     Drug dependence     Fibromyalgia     H/O psychiatric care     Heart murmur     Hematological disorder     hemachromatosis     Hemochromatosis 01/13/2012    Hemorrhoids     Hyperlipidemia     Hypertension, essential     Hypoglycemia 01/13/2021    Kidney stones     past     Knee pain     Lack of coordination     Major depression, recurrent     Meningitis     Migraine headache     Mitral valve prolapse     Muscle spasm     Neck pain     Night sweats     HX OF NO CURRENT ISSUES    PAD (peripheral artery disease)     STENTS L AND RIGHT GROIN    Pain management     PAIN PUMP W/FENTANYL/ DR KIRK PAIN PUMP CURRENTLY EMPTY    Peptic ulcer     Peritoneal dialysis catheter in place     PD DOES 5 DAYS/WEEK. PD EXCHANGE 4 TIMES ON THOSE DAYS.    PONV (postoperative nausea and vomiting)     Psoriasis-like skin disease     eczema    PTSD (post-traumatic stress disorder)      Ringing in ears     Seizures     NONE FOR A LONG TIME    Stroke (cerebrum)     TIA, MEMORY ISSUES    Syncope and collapse     REPORTS HAPPENED ABOUT A YEAR AGO    Weakness     Weight loss        Past Surgical History:   Procedure Laterality Date    APPENDECTOMY       SECTION      X4    COLONOSCOPY      COLONOSCOPY N/A 2021    Procedure: COLONOSCOPY;  Surgeon: Aston Fernandez MD;  Location: [facility] ENDOSCOPY;  Service: Gastroenterology;  Laterality: N/A;  COLITIS    ENDOSCOPY N/A 2022    Procedure: ESOPHAGOGASTRODUODENOSCOPY WITH BIOPSIES;  Surgeon: Dione Harrell MD;  Location: [facility] ENDOSCOPY;  Service: Gastroenterology;  Laterality: N/A;  GASTRITIS    FEMORAL ARTERY STENT Bilateral     HYSTERECTOMY  ,    partial/total     INSERTION HEMODIALYSIS CATHETER Right 2023    Procedure: HEMODIALYSIS CATHETER INSERTION;  Surgeon: Suman Butler MD;  Location: [facility] MAIN OR;  Service: General;  Laterality: Right;  PER DR VILLEDA    INSERTION PERITONEAL DIALYSIS CATHETER      REVISION    INSERTION PERITONEAL DIALYSIS CATHETER N/A 2023    Procedure: INSERTION PERITONEAL DIALYSIS CATHETER LAPAROSCOPIC;  Surgeon: Suman Butler MD;  Location: [facility] MAIN OR;  Service: General;  Laterality: N/A;  PER DR BUTLER    INTERVENTIONAL RADIOLOGY PROCEDURE Right 2022    Procedure: Abdominal Aortagram with Runoff;  Surgeon: Aspen Couch MD;  Location: [facility] CATH INVASIVE LOCATION;  Service: Peripheral Vascular;  Laterality: Right;    NECK SURGERY Left     KNOW REMOVED    PAIN PUMP INSERTION/REVISION       FENTANYL INSERTED     PAIN PUMP INSERTION/REVISION Left 2024    Procedure: PAIN PUMP REMOVAL, left lower abdomen;  Surgeon: Semaj Blackwood MD;  Location: [facility] MAIN OR;  Service: Neurosurgery;  Laterality: Left;    REMOVAL HEMODIALYSIS CATHETER      TONSILLECTOMY      VOCAL CORD MASS EXCISION      WOUND EXPLORATION LEG N/A 2024     Procedure: LUMBAR WOUND EXPLORATION WITH REMOVAL OF RETAINED IT PUMP TUBING AND REPAIR OF CSF LEAK;  Surgeon: Semaj Blackwood MD;  Location: [facility] MAIN OR;  Service: Neurosurgery;  Laterality: N/A;       Family History   Problem Relation Age of Onset    Cancer Sister     Cancer Brother     Cancer Paternal Aunt     Cancer Paternal Uncle     Cancer Other     Stroke Other     Diabetes Other     Heart failure Other     Malig Hyperthermia Neg Hx         Current Medications        Current Outpatient Medications:     albuterol sulfate  (90 Base) MCG/ACT inhaler, Inhale 2 puffs Every 4 (Four) Hours As Needed for Wheezing., Disp: , Rfl:     amLODIPine (NORVASC) 10 MG tablet, Take 1 tablet by mouth Daily., Disp: , Rfl:     aspirin-acetaminophen-caffeine (EXCEDRIN MIGRAINE) 250-250-65 MG per tablet, Take 1 tablet by mouth Every 6 (Six) Hours As Needed for Headache., Disp: , Rfl:     atorvastatin (LIPITOR) 40 MG tablet, TAKE 1 TABLET BY MOUTH EVERY EVENING FOR CHOLESTEROL, Disp: 30 tablet, Rfl: 5    calcium acetate (PHOS BINDER,) 667 MG capsule capsule, Take 2 capsules by mouth 3 (Three) Times a Day Before Meals., Disp: , Rfl:     calcium acetate (PHOS BINDER,) 667 MG capsule capsule, Take 1 capsule by mouth With Snacks. Take before snack, Disp: , Rfl:     Calcium Carb-Cholecalciferol (GNP Calcium 600 +D3) 600-20 MG-MCG tablet, TAKE 1 TABLET BY MOUTH TWICE DAILY, Disp: 60 tablet, Rfl: 11    carvedilol (COREG) 25 MG tablet, Take 1 tablet by mouth 2 (Two) Times a Day., Disp: 60 tablet, Rfl: 3    cephalexin (KEFLEX) 500 MG capsule, , Disp: , Rfl:     clopidogrel (PLAVIX) 75 MG tablet, TAKE 1 TABLET BY MOUTH ONCE DAILY FOR HEART, Disp: 30 tablet, Rfl: 5    Diclofenac Sodium (VOLTAREN) 1 % gel gel, Apply 4 g topically to the appropriate area as directed 4 (Four) Times a Day As Needed (AS NEEDED)., Disp: 150 g, Rfl: 1    diphenhydrAMINE (BENADRYL) 25 MG tablet, Take 2 tablets by mouth Every Night., Disp: , Rfl:     docusate  sodium (COLACE) 100 MG capsule, Take 1 capsule by mouth 2 (Two) Times a Day., Disp: , Rfl:     fluticasone (FLONASE) 50 MCG/ACT nasal spray, Administer 2 sprays into the nostril(s) as directed by provider Daily As Needed for Allergies., Disp: , Rfl:     furosemide (LASIX) 80 MG tablet, TAKE 1 TABLET BY MOUTH TWICE DAILY, Disp: 60 tablet, Rfl: 5    GNP ClearLax 17 GM/SCOOP powder, Take 17 g by mouth Daily., Disp: , Rfl:     Heparin & NaCl Lock Flush (HEPARIN COMBINATION IV), Infuse  into a venous catheter. With dialysis solution FOR PD CATH, TAKES EVERY MONDAY, Disp: , Rfl:     hydrALAZINE (APRESOLINE) 25 MG tablet, TAKE 1 TABLET BY MOUTH ONCE DAILY, Disp: 30 tablet, Rfl: 0    hyoscyamine (LEVSIN) 0.125 MG SL tablet, Place 1 tablet under the tongue Every 6 (Six) Hours As Needed for Cramping or Diarrhea., Disp: 60 tablet, Rfl: 2    Lokelma 10 g pack, Take 20 g by mouth 2 (Two) Times a Week. REPORTS USES 2 PACKS AND ONLY USES ON DAYS SHE DOES NOT DO PD DIALYSIS, Disp: , Rfl:     loratadine (CLARITIN) 10 MG tablet, TAKE 1 TABLET BY MOUTH ONCE DAILY FOR ALLERGIES, Disp: 30 tablet, Rfl: 0    [START ON 11/28/2024] LORazepam (ATIVAN) 0.5 MG tablet, Take 1 tablet by mouth Daily As Needed for Anxiety (Panic attacks)., Disp: 15 tablet, Rfl: 0    losartan (COZAAR) 100 MG tablet, , Disp: , Rfl:     nicotine (NICODERM CQ) 21 MG/24HR patch, PLACE 1 PATCH ON THE SKIN AS DIRECTED BY PROVIDER DAILY., Disp: 28 each, Rfl: 0    OLANZapine (ZyPREXA) 10 MG tablet, Take 1 tablet by mouth Every Night., Disp: 30 tablet, Rfl: 2    pantoprazole (PROTONIX) 40 MG EC tablet, Take 1 tablet by mouth Daily., Disp: 90 tablet, Rfl: 1    polyethylene glycol (GoLYTELY) 236 g solution, Starting at noon on day prior to procedure, drink 8 ounces every 30 minutes until all gone or stools are clear. May add flavor packet., Disp: 4000 mL, Rfl: 0    pregabalin (LYRICA) 150 MG capsule, Take 1 capsule by mouth Every Night., Disp: , Rfl:     promethazine  "(PHENERGAN) 12.5 MG tablet, Take 1 tablet by mouth Every 6 (Six) Hours As Needed for Nausea or Vomiting., Disp: 15 tablet, Rfl: 1    tiZANidine (ZANAFLEX) 4 MG tablet, Take 1 tablet by mouth Every Night., Disp: , Rfl:     Semaglutide,0.25 or 0.5MG/DOS, (OZEMPIC) 2 MG/3ML solution pen-injector, Inject 0.25 mg under the skin into the appropriate area as directed 1 (One) Time Per Week., Disp: 3 mL, Rfl: 1     Allergies     Allergies   Allergen Reactions    Iron Sucrose Unknown - High Severity     HX OF HEMOCHROMATOSIS     Lamotrigine Rash    Latex Hives, Nausea And Vomiting and Rash     blisters      Lamictal Xr [Lamotrigine Er] Rash    Nickel Rash     RASH W/COSMETIC JEWELRY    Sulfa Antibiotics Rash     PT REPORTS SHE HAS NEVER HAD TROUBLE OR NOTED ISSUES WITH SULFA BEFORE       Social History       Social History     Social History Narrative    Not on file       Immunizations     Immunization:  Immunization History   Administered Date(s) Administered    COVID-19 (NephroGenex) 06/08/2021    Fluzone (or Fluarix & Flulaval for VFC) >6mos 10/01/2018, 11/15/2023    Influenza Injectable Mdck Pf Quad 11/01/2017, 11/05/2019, 10/19/2021    Pneumococcal Conjugate 13-Valent (PCV13) 11/01/2017          Objective     Objective     Vital Signs:   /87 (BP Location: Left arm, Patient Position: Sitting, Cuff Size: Adult)   Pulse 73   Temp 98.6 °F (37 °C) (Temporal)   Ht 162.6 cm (64\")   Wt 79.8 kg (176 lb)   SpO2 97%   BMI 30.21 kg/m²       Physical Exam  Constitutional:       Appearance: Normal appearance.   HENT:      Nose: Nose normal.      Mouth/Throat:      Mouth: Mucous membranes are moist.   Cardiovascular:      Rate and Rhythm: Normal rate and regular rhythm.      Pulses: Normal pulses.      Heart sounds: Normal heart sounds.   Pulmonary:      Effort: Pulmonary effort is normal.      Breath sounds: Normal breath sounds.   Skin:     General: Skin is warm and dry.   Neurological:      General: No focal deficit present. "      Mental Status: She is alert and oriented to person, place, and time.   Psychiatric:         Mood and Affect: Mood normal.         Behavior: Behavior normal.         Results    The following data was reviewed by: ELOINA Persaud on 11/20/2024:    Common labs          10/9/2024    12:22 10/10/2024    16:04 10/23/2024    15:56   Common Labs   Glucose 80  93     BUN 57  59     Creatinine 5.96  6.42     Sodium 136  140     Potassium 6.4  6.2     Chloride 101  104     Calcium 9.5  9.2     Albumin 3.8  3.3     Total Bilirubin <0.2  <0.2     Alkaline Phosphatase 87  84     AST (SGOT) 27  22     ALT (SGPT) 16  15     WBC  6.53     Hemoglobin  9.0     Hematocrit  27.1     Platelets  237     Microalbumin, Urine   2.2             Assessment and Plan        Assessment and Plan    Diagnoses and all orders for this visit:    1. Type 2 diabetes mellitus with diabetic autonomic neuropathy, without long-term current use of insulin (Primary)  Assessment & Plan:  Diabetes is stable.   Continue current treatment regimen.  Diabetes will be reassessed in 3 months    Orders:  -     Semaglutide,0.25 or 0.5MG/DOS, (OZEMPIC) 2 MG/3ML solution pen-injector; Inject 0.25 mg under the skin into the appropriate area as directed 1 (One) Time Per Week.  Dispense: 3 mL; Refill: 1    2. Cardiovascular disease  -     Semaglutide,0.25 or 0.5MG/DOS, (OZEMPIC) 2 MG/3ML solution pen-injector; Inject 0.25 mg under the skin into the appropriate area as directed 1 (One) Time Per Week.  Dispense: 3 mL; Refill: 1    3. Cerebrovascular accident (CVA), unspecified mechanism  -     Semaglutide,0.25 or 0.5MG/DOS, (OZEMPIC) 2 MG/3ML solution pen-injector; Inject 0.25 mg under the skin into the appropriate area as directed 1 (One) Time Per Week.  Dispense: 3 mL; Refill: 1    4. Primary hypertension  Assessment & Plan:  Hypertension is stable and controlled  Continue current treatment regimen.  Blood pressure will be reassessed in 3  months.                Follow Up        Follow Up   Return if symptoms worsen or fail to improve.  Patient was given instructions and counseling regarding her condition or for health maintenance advice. Please see specific information pulled into the AVS if appropriate.

## 2024-11-22 ENCOUNTER — DOCUMENTATION (OUTPATIENT)
Dept: FAMILY MEDICINE CLINIC | Facility: CLINIC | Age: 64
End: 2024-11-22
Payer: MEDICAID

## 2024-11-22 ENCOUNTER — TELEPHONE (OUTPATIENT)
Dept: GASTROENTEROLOGY | Facility: CLINIC | Age: 64
End: 2024-11-22
Payer: MEDICAID

## 2024-11-22 NOTE — TELEPHONE ENCOUNTER
Attempted to contact Dr Crabtree to follow up on clearance and recommendations about peritoneal dialysis and pre procedure antibiotics. Office does not open until 9.

## 2024-11-22 NOTE — TELEPHONE ENCOUNTER
11/22/2024    Dear Ellen Strickland DO,     Patient: Cecilia Sparks   YOB: 1960        This patient is waiting to have a Colonoscopy and/or Esophagogastroduodenoscopy which I will perform at [facility] on 12/13/2024 .     Our records indicate this patient is currently taking plavix. This procedure requires the patient to suspend their anticoagulant medication prior to surgery.     Please respond to this request noting your recommendations. You may contact our office at [phone] Option 3 with any questions. I appreciate your prompt response in this matter.     Please return this form to our office no later than two weeks prior to the procedure date listed above. Please return form to [phone]. Please inform our office if the patient requires additional follow-up from your office prior to scheduled procedure date.     ____ I approve my patient to stop taking their Anticoagulant Therapy medication 5 days prior to the scheduled procedure.    ____ I do NOT approve my patient to stop taking their Anticoagulant Therapy medication at this time.      Please specify clearance expiration date:_____________________________    Approving physician name (please print):     _____________________________________________    Approving physician signature:     ________________________________    Date:________________        Sincerely,  [facility]   Gastroenterology -

## 2024-11-26 ENCOUNTER — TELEPHONE (OUTPATIENT)
Dept: GASTROENTEROLOGY | Facility: CLINIC | Age: 64
End: 2024-11-26
Payer: MEDICAID

## 2024-11-26 NOTE — TELEPHONE ENCOUNTER
Cecilia BALLESTEROS Bridger  1960    Patient requested to Reschedule their Colonoscopy. I have offered to reschedule this patient and patient has agreed Patient has been rescheduled to 1/15/2025 .    Reason for cancelling/rescheduling: cannot get stress test completed before procedure    This procedure was ordered by Dione Harrell MD for an important reason. We want to inform you that there are risks associated with not proceeding with the procedure at this time such as a delay in diagnosis, risk of incurable disease, or cancer.    Patient plans to call us back to reschedule: No    Updated clearance needed?: No    If yes, clearance request has been submitted to:     Is the patient currently on any injectable medications for weight loss or diabetes? No    If yes, are they aware that they will need to discontinue this 7 days prior to their procedure? No    Patient verbalized understanding for all of the above information.

## 2024-12-12 ENCOUNTER — OFFICE VISIT (OUTPATIENT)
Dept: ORTHOPEDIC SURGERY | Facility: CLINIC | Age: 64
End: 2024-12-12
Payer: MEDICAID

## 2024-12-12 VITALS
HEART RATE: 79 BPM | BODY MASS INDEX: 30.07 KG/M2 | HEIGHT: 64 IN | SYSTOLIC BLOOD PRESSURE: 124 MMHG | DIASTOLIC BLOOD PRESSURE: 76 MMHG | OXYGEN SATURATION: 96 % | WEIGHT: 176.15 LBS

## 2024-12-12 DIAGNOSIS — M17.12 OSTEOARTHRITIS OF LEFT KNEE, UNSPECIFIED OSTEOARTHRITIS TYPE: ICD-10-CM

## 2024-12-12 DIAGNOSIS — Z86.73 HISTORY OF CEREBROVASCULAR ACCIDENT (CVA) IN ADULTHOOD: ICD-10-CM

## 2024-12-12 DIAGNOSIS — M25.562 LEFT KNEE PAIN, UNSPECIFIED CHRONICITY: Primary | ICD-10-CM

## 2024-12-12 RX ORDER — HYALURONATE SODIUM 10 MG/ML
20 SYRINGE (ML) INTRAARTICULAR
Status: COMPLETED | OUTPATIENT
Start: 2024-12-12 | End: 2024-12-12

## 2024-12-12 RX ADMIN — Medication 20 MG: at 13:24

## 2024-12-12 NOTE — PROGRESS NOTES
Chief Complaint  Pain and Follow-up of the Left Knee     Subjective      Cecilia Sparks is a 64 y.o. female who presents to [facility] for follow up on left knee. Patient has left knee osteoarthritis that we have been managing conservatively with injections.  She notes clicking and grinding of the knee with swelling for which she ices at night.  Patient's left left knee steroid injection was on 10/22/2024 which only lasted a couple of days.  Patient's is here today for Euflexxa series shot # 1 of 3.     Of note, patient has history of stroke back in 2024, from which she denies any physical deficits.  She reports just a foggy memory and difficulty with short-term such as appointment dates.  She is on anticoagulation.  She has a history of stents on Plavix and does dialysis at home via PD catheter.    Allergies   Allergen Reactions    Iron Sucrose Unknown - High Severity     HX OF HEMOCHROMATOSIS     Lamotrigine Rash    Latex Hives, Nausea And Vomiting and Rash     blisters      Lamictal Xr [Lamotrigine Er] Rash    Nickel Rash     RASH W/COSMETIC JEWELRY    Sulfa Antibiotics Rash     PT REPORTS SHE HAS NEVER HAD TROUBLE OR NOTED ISSUES WITH SULFA BEFORE        Social History     Socioeconomic History    Marital status:    Tobacco Use    Smoking status: Former     Current packs/day: 0.00     Average packs/day: 0.5 packs/day for 54.0 years (27.0 ttl pk-yrs)     Types: Cigarettes     Start date:      Quit date:      Years since quittin.9     Passive exposure: Current    Smokeless tobacco: Never   Vaping Use    Vaping status: Never Used   Substance and Sexual Activity    Alcohol use: Not Currently    Drug use: Never    Sexual activity: Defer        Tobacco Use: Medium Risk (2024)    Patient History     Smoking Tobacco Use: Former     Smokeless Tobacco Use: Never     Passive Exposure: Current     Patient reports they have a history of tobacco use;  "encouraged continued tobacco cessation for further health benefits.     I reviewed the patient's chief complaint, history of present illness, review of systems, past medical history, surgical history, family history, social history, medications, and allergy list.     Review of Systems     Constitutional: Denies fevers, chills, weight loss  Cardiovascular: Denies chest pain, shortness of breath  Skin: Denies rashes, acute skin changes  Neurologic: Denies headache, loss of consciousness    Vital Signs:   /76   Pulse 79   Ht 162.6 cm (64\")   Wt 79.9 kg (176 lb 2.4 oz)   SpO2 96%   BMI 30.24 kg/m²          Physical Exam  General: Alert. No acute distress    Ortho Exam    Left knee: Stable valgus/varus.  Full extension.  120 degrees flexion.  Moderate swelling noted.       Large Joint Arthrocentesis: L knee  Date/Time: 12/12/2024 1:24 PM  Consent given by: patient  Site marked: site marked  Timeout: Immediately prior to procedure a time out was called to verify the correct patient, procedure, equipment, support staff and site/side marked as required   Supporting Documentation  Indications: pain   Procedure Details  Location: knee - L knee  Preparation: Patient was prepped and draped in the usual sterile fashion  Needle gauge: 21 G.  Approach: lateral  Medications administered: 20 mg Sodium Hyaluronate (Viscosup) 20 MG/2ML  Patient tolerance: patient tolerated the procedure well with no immediate complications      This injection documentation was Scribed for Glenis Calabrese PA-C by Shannan Snowden.  12/12/24   13:25 EST        Imaging Results (Most Recent)       None             Result Review :       No results found.           Assessment and Plan     Diagnoses and all orders for this visit:    1. Left knee pain, unspecified chronicity (Primary)    2. Osteoarthritis of left knee, unspecified osteoarthritis type    3. History of cerebrovascular accident (CVA) in adulthood    Other orders  -     Large Joint " Arthrocentesis: L knee        Discussed the risk, benefits and alternatives of injection.  Discussed potential complications such as increased pain, swelling and tenderness at the injection site.  Possibility of reaction.  Possibility that injection may not improve pain. Risk of infection.  Possibility of worsening pain after injection.  Patient verbalized understanding and gives verbal consent to proceed.     First  left knee  Euflexxa injection administered in office today.  Follow-up in 1 week for next injection.    Advised with patient to continue with home exercises/OTC Tylenol/Ibuprofen as needed. May also receive steroid injection 6 weeks after gel as long as it is 3 months between last steroid injection.   Call with changes or concerns.    Follow Up     Next week for second Euflexxa injection.      Patient was given instructions and counseling regarding her condition or for health maintenance advice. Please see specific information pulled into the AVS if appropriate.     Glenis Calabrese PA-C   12/12/2024  10:11 EST    Dictated Utilizing Dragon Dictation. Please note that portions of this note were completed with a voice recognition program. Part of this note may be an electronic transcription/translation of spoken language to printed text using the Dragon Dictation System.

## 2024-12-19 ENCOUNTER — OFFICE VISIT (OUTPATIENT)
Dept: ORTHOPEDIC SURGERY | Facility: CLINIC | Age: 64
End: 2024-12-19
Payer: MEDICAID

## 2024-12-19 VITALS
SYSTOLIC BLOOD PRESSURE: 117 MMHG | WEIGHT: 176.15 LBS | BODY MASS INDEX: 30.07 KG/M2 | OXYGEN SATURATION: 95 % | HEIGHT: 64 IN | DIASTOLIC BLOOD PRESSURE: 74 MMHG | HEART RATE: 81 BPM

## 2024-12-19 DIAGNOSIS — F41.0 PANIC ATTACKS: ICD-10-CM

## 2024-12-19 DIAGNOSIS — F41.1 GENERALIZED ANXIETY DISORDER: ICD-10-CM

## 2024-12-19 DIAGNOSIS — F31.13 BIPOLAR DISORDER, CURRENT EPISODE MANIC WITHOUT PSYCHOTIC FEATURES, SEVERE: ICD-10-CM

## 2024-12-19 DIAGNOSIS — M17.12 PRIMARY OSTEOARTHRITIS OF LEFT KNEE: Primary | ICD-10-CM

## 2024-12-19 DIAGNOSIS — F17.200 TOBACCO USE DISORDER: ICD-10-CM

## 2024-12-19 DIAGNOSIS — F43.21 COMPLICATED GRIEVING: ICD-10-CM

## 2024-12-19 DIAGNOSIS — I10 PRIMARY HYPERTENSION: ICD-10-CM

## 2024-12-19 RX ORDER — HYALURONATE SODIUM 10 MG/ML
20 SYRINGE (ML) INTRAARTICULAR
Status: COMPLETED | OUTPATIENT
Start: 2024-12-19 | End: 2024-12-19

## 2024-12-19 RX ORDER — HYDRALAZINE HYDROCHLORIDE 25 MG/1
25 TABLET, FILM COATED ORAL DAILY
Qty: 30 TABLET | Refills: 0 | Status: SHIPPED | OUTPATIENT
Start: 2024-12-19

## 2024-12-19 RX ORDER — LORATADINE 10 MG/1
TABLET ORAL
Qty: 30 TABLET | Refills: 0 | Status: SHIPPED | OUTPATIENT
Start: 2024-12-19

## 2024-12-19 RX ORDER — LORAZEPAM 0.5 MG/1
TABLET ORAL
Qty: 15 TABLET | Refills: 0 | Status: SHIPPED | OUTPATIENT
Start: 2024-12-19

## 2024-12-19 RX ORDER — NICOTINE 21 MG/24HR
1 PATCH, TRANSDERMAL 24 HOURS TRANSDERMAL EVERY 24 HOURS
Qty: 28 EACH | Refills: 0 | Status: SHIPPED | OUTPATIENT
Start: 2024-12-19 | End: 2024-12-20

## 2024-12-19 RX ADMIN — Medication 20 MG: at 13:19

## 2024-12-19 NOTE — PROGRESS NOTES
"Chief Complaint  Pain and Follow-up of the Left Knee    Subjective      Cecilia Sparks presents to [facility] for follow-up of left knee osteoarthritis she is been managing conservatively with intermittent injections.  She is here today to receive her second Euflexxa injection.  Reports that she did not notice much improvement after the first shot.  She last received a steroid injection on 10/22/2024.  Patient is currently taking an anticoagulant due to a stroke in October 2024.    Objective   Allergies   Allergen Reactions    Iron Sucrose Unknown - High Severity     HX OF HEMOCHROMATOSIS     Lamotrigine Rash    Latex Hives, Nausea And Vomiting and Rash     blisters      Lamictal Xr [Lamotrigine Er] Rash    Nickel Rash     RASH W/COSMETIC JEWELRY    Sulfa Antibiotics Rash     PT REPORTS SHE HAS NEVER HAD TROUBLE OR NOTED ISSUES WITH SULFA BEFORE       Vital Signs:   /74   Pulse 81   Ht 162.6 cm (64\")   Wt 79.9 kg (176 lb 2.4 oz)   SpO2 95%   BMI 30.24 kg/m²       Physical Exam    Constitutional: Awake, alert. Well nourished appearance.    Integumentary: Warm, dry, intact. No obvious rashes.    HENT: Atraumatic, normocephalic.   Respiratory: Non labored respirations .   Cardiovascular: Intact peripheral pulses.    Psychiatric: Normal mood and affect. A&O X3    Ortho Exam  Left knee: Range of motion 0 to 120 degrees.  Mild to moderate effusion noted.  Stable to varus and valgus stress.  Stable to anterior posterior drawer test.  Knee extensor mechanism is intact.  Neurovascular intact.  Tender to palpation on the medial lateral joint line.    Imaging Results (Most Recent)       None             Large Joint Arthrocentesis: L knee  Date/Time: 12/19/2024 1:19 PM  Consent given by: patient  Site marked: site marked  Timeout: Immediately prior to procedure a time out was called to verify the correct patient, procedure, equipment, support staff and site/side marked as required "   Supporting Documentation  Indications: pain   Procedure Details  Location: knee - L knee  Preparation: Patient was prepped and draped in the usual sterile fashion  Needle gauge: 21 G.  Approach: lateral  Medications administered: 20 mg Sodium Hyaluronate (Viscosup) 20 MG/2ML  Patient tolerance: patient tolerated the procedure well with no immediate complications       This injection documentation was Scribed for ELOINA Lanza by Shannan Snowden.  12/19/24   13:20 EST         Assessment and Plan   Problem List Items Addressed This Visit          Musculoskeletal and Injuries    Primary osteoarthritis of left knee - Primary (Chronic)    Relevant Orders    Large Joint Arthrocentesis: L knee       Follow Up   Return in about 1 week (around 12/26/2024).    Tobacco Use: Medium Risk (12/19/2024)    Patient History     Smoking Tobacco Use: Former     Smokeless Tobacco Use: Never     Passive Exposure: Current       Patient is a non-smoker.    Patient Instructions   Patient received second Euflexxa injection today in office into the left knee and tolerated the procedure well without complication.  Counseled that these injections can be given every 6 months and 1 day with insurance approval. Pt can also receive steroid injections intermittently between these doses.  Steroid injections can be given every 3 months.    Discussed the risk, benefits and alternatives of injection.  Discussed potential complications such as increased pain, swelling and tenderness at the injection site.  Possibility of reaction.  Possibility that injection may not improve pain. Risk of infection.  Possibility of worsening pain after injection.  Patient verbalized understanding and gives verbal consent to proceed.     Follow up in 1 week for third Euflexxa injection. Call with questions, concerns, or worsening symptoms.    Patient was given instructions and counseling regarding her condition or for health maintenance advice. Please see specific  information pulled into the AVS if appropriate.

## 2024-12-20 ENCOUNTER — OFFICE VISIT (OUTPATIENT)
Dept: FAMILY MEDICINE CLINIC | Facility: CLINIC | Age: 64
End: 2024-12-20
Payer: MEDICAID

## 2024-12-20 ENCOUNTER — LAB (OUTPATIENT)
Dept: LAB | Facility: HOSPITAL | Age: 64
End: 2024-12-20
Payer: MEDICAID

## 2024-12-20 VITALS
HEIGHT: 64 IN | OXYGEN SATURATION: 99 % | DIASTOLIC BLOOD PRESSURE: 74 MMHG | BODY MASS INDEX: 30.9 KG/M2 | WEIGHT: 181 LBS | SYSTOLIC BLOOD PRESSURE: 119 MMHG | HEART RATE: 82 BPM | TEMPERATURE: 98.9 F

## 2024-12-20 DIAGNOSIS — I10 PRIMARY HYPERTENSION: Chronic | ICD-10-CM

## 2024-12-20 DIAGNOSIS — N18.6 END STAGE RENAL DISEASE: ICD-10-CM

## 2024-12-20 DIAGNOSIS — E11.43 TYPE 2 DIABETES MELLITUS WITH DIABETIC AUTONOMIC NEUROPATHY, WITHOUT LONG-TERM CURRENT USE OF INSULIN: Chronic | ICD-10-CM

## 2024-12-20 DIAGNOSIS — N18.6 END STAGE RENAL DISEASE: Primary | ICD-10-CM

## 2024-12-20 LAB
ALBUMIN SERPL-MCNC: 3.7 G/DL (ref 3.5–5.2)
ALBUMIN/GLOB SERPL: 1.1 G/DL
ALP SERPL-CCNC: 103 U/L (ref 39–117)
ALT SERPL W P-5'-P-CCNC: 10 U/L (ref 1–33)
ANION GAP SERPL CALCULATED.3IONS-SCNC: 15.4 MMOL/L (ref 5–15)
AST SERPL-CCNC: 15 U/L (ref 1–32)
BASOPHILS # BLD AUTO: 0.03 10*3/MM3 (ref 0–0.2)
BASOPHILS NFR BLD AUTO: 0.4 % (ref 0–1.5)
BILIRUB SERPL-MCNC: 0.2 MG/DL (ref 0–1.2)
BUN SERPL-MCNC: 71 MG/DL (ref 8–23)
BUN/CREAT SERPL: 9.4 (ref 7–25)
CALCIUM SPEC-SCNC: 11 MG/DL (ref 8.6–10.5)
CHLORIDE SERPL-SCNC: 100 MMOL/L (ref 98–107)
CO2 SERPL-SCNC: 20.6 MMOL/L (ref 22–29)
CREAT SERPL-MCNC: 7.55 MG/DL (ref 0.57–1)
DEPRECATED RDW RBC AUTO: 43.9 FL (ref 37–54)
EGFRCR SERPLBLD CKD-EPI 2021: 5.6 ML/MIN/1.73
EOSINOPHIL # BLD AUTO: 0.4 10*3/MM3 (ref 0–0.4)
EOSINOPHIL NFR BLD AUTO: 5.7 % (ref 0.3–6.2)
ERYTHROCYTE [DISTWIDTH] IN BLOOD BY AUTOMATED COUNT: 12.6 % (ref 12.3–15.4)
GLOBULIN UR ELPH-MCNC: 3.4 GM/DL
GLUCOSE SERPL-MCNC: 89 MG/DL (ref 65–99)
HCT VFR BLD AUTO: 28.4 % (ref 34–46.6)
HGB BLD-MCNC: 9.4 G/DL (ref 12–15.9)
IMM GRANULOCYTES # BLD AUTO: 0.02 10*3/MM3 (ref 0–0.05)
IMM GRANULOCYTES NFR BLD AUTO: 0.3 % (ref 0–0.5)
LYMPHOCYTES # BLD AUTO: 2.5 10*3/MM3 (ref 0.7–3.1)
LYMPHOCYTES NFR BLD AUTO: 35.9 % (ref 19.6–45.3)
MCH RBC QN AUTO: 31.6 PG (ref 26.6–33)
MCHC RBC AUTO-ENTMCNC: 33.1 G/DL (ref 31.5–35.7)
MCV RBC AUTO: 95.6 FL (ref 79–97)
MONOCYTES # BLD AUTO: 0.56 10*3/MM3 (ref 0.1–0.9)
MONOCYTES NFR BLD AUTO: 8 % (ref 5–12)
NEUTROPHILS NFR BLD AUTO: 3.46 10*3/MM3 (ref 1.7–7)
NEUTROPHILS NFR BLD AUTO: 49.7 % (ref 42.7–76)
NRBC BLD AUTO-RTO: 0 /100 WBC (ref 0–0.2)
PLATELET # BLD AUTO: 383 10*3/MM3 (ref 140–450)
PMV BLD AUTO: 11.3 FL (ref 6–12)
POTASSIUM SERPL-SCNC: 5.9 MMOL/L (ref 3.5–5.2)
PROT SERPL-MCNC: 7.1 G/DL (ref 6–8.5)
RBC # BLD AUTO: 2.97 10*6/MM3 (ref 3.77–5.28)
SODIUM SERPL-SCNC: 136 MMOL/L (ref 136–145)
WBC NRBC COR # BLD AUTO: 6.97 10*3/MM3 (ref 3.4–10.8)

## 2024-12-20 PROCEDURE — 85025 COMPLETE CBC W/AUTO DIFF WBC: CPT

## 2024-12-20 PROCEDURE — 80053 COMPREHEN METABOLIC PANEL: CPT

## 2024-12-20 PROCEDURE — 36415 COLL VENOUS BLD VENIPUNCTURE: CPT

## 2024-12-20 RX ORDER — PEN NEEDLE, DIABETIC 31 G X1/4"
NEEDLE, DISPOSABLE MISCELLANEOUS
Qty: 4 EACH | Refills: 1 | Status: SHIPPED | OUTPATIENT
Start: 2024-12-20 | End: 2025-12-20

## 2024-12-20 NOTE — PROGRESS NOTES
Chief Complaint     Diabetes    History of Present Illness     Cecilia Sparks is a 64 y.o. female who presents to [facility] for follow up on chronic conditions.      She has chronic kidney disease and has been doing peritoneal dialysis at home. She does frequent follow ups with Nephrology as well. She is doing well overall and is taking ozempic. She took it wrong the first few weeks so she wasn't getting any medication. She is now doing it correctly.      History      Past Medical History:   Diagnosis Date    Acid reflux disease     Anemia     NO RECENT INTERVENTIONS    Anxiety     Arthritis     Asthma     Bipolar disorder     Borderline diabetic     HX OF BUT REPORTS NO ISSUES NOT ON MEDICATION AND IS DIET CONTROLLED    Cataract     Chronic back pain 01/13/2012    Chronic bronchitis     DENIES ANY CURRENT ISSUES    Chronic UTI     NO CURRENT S/S    Colitis     Congestive heart failure (CHF)     FOLLOWED BY DR [facility], NO CURRENT S/S    Depression     Dizziness     Drug dependence     Fibromyalgia     H/O psychiatric care     Heart murmur     Hematological disorder     hemachromatosis     Hemochromatosis 01/13/2012    Hemorrhoids     Hyperlipidemia     Hypertension, essential     Hypoglycemia 01/13/2021    Kidney stones     past     Knee pain     Lack of coordination     Major depression, recurrent     Meningitis     Migraine headache     Mitral valve prolapse     Muscle spasm     Neck pain     Night sweats     HX OF NO CURRENT ISSUES    PAD (peripheral artery disease)     STENTS L AND RIGHT GROIN    Pain management     PAIN PUMP W/FENTANYL/ DR KIRK PAIN PUMP CURRENTLY EMPTY    Peptic ulcer     Peritoneal dialysis catheter in place     PD DOES 5 DAYS/WEEK. PD EXCHANGE 4 TIMES ON THOSE DAYS.    PONV (postoperative nausea and vomiting)     Psoriasis-like skin disease     eczema    PTSD (post-traumatic stress disorder)     Ringing in ears     Seizures 1990    NONE FOR A  LONG TIME    Stroke (cerebrum)     TIA, MEMORY ISSUES    Syncope and collapse     REPORTS HAPPENED ABOUT A YEAR AGO    Weakness     Weight loss        Past Surgical History:   Procedure Laterality Date    APPENDECTOMY       SECTION      X4    COLONOSCOPY      COLONOSCOPY N/A 2021    Procedure: COLONOSCOPY;  Surgeon: Aston Fernandez MD;  Location: [facility] ENDOSCOPY;  Service: Gastroenterology;  Laterality: N/A;  COLITIS    ENDOSCOPY N/A 2022    Procedure: ESOPHAGOGASTRODUODENOSCOPY WITH BIOPSIES;  Surgeon: Dione Harrell MD;  Location: [facility] ENDOSCOPY;  Service: Gastroenterology;  Laterality: N/A;  GASTRITIS    FEMORAL ARTERY STENT Bilateral     HYSTERECTOMY  ,    partial/total     INSERTION HEMODIALYSIS CATHETER Right 2023    Procedure: HEMODIALYSIS CATHETER INSERTION;  Surgeon: Suman Butler MD;  Location: [facility] MAIN OR;  Service: General;  Laterality: Right;  PER DR VILLEDA    INSERTION PERITONEAL DIALYSIS CATHETER      REVISION    INSERTION PERITONEAL DIALYSIS CATHETER N/A 2023    Procedure: INSERTION PERITONEAL DIALYSIS CATHETER LAPAROSCOPIC;  Surgeon: Suman Butler MD;  Location: [facility] MAIN OR;  Service: General;  Laterality: N/A;  PER DR BUTLER    INTERVENTIONAL RADIOLOGY PROCEDURE Right 2022    Procedure: Abdominal Aortagram with Runoff;  Surgeon: Aspen Couch MD;  Location: [facility] CATH INVASIVE LOCATION;  Service: Peripheral Vascular;  Laterality: Right;    NECK SURGERY Left     KNOW REMOVED    PAIN PUMP INSERTION/REVISION       FENTANYL INSERTED     PAIN PUMP INSERTION/REVISION Left 2024    Procedure: PAIN PUMP REMOVAL, left lower abdomen;  Surgeon: Semaj Blackwood MD;  Location: [facility] MAIN OR;  Service: Neurosurgery;  Laterality: Left;    REMOVAL HEMODIALYSIS CATHETER      TONSILLECTOMY      VOCAL CORD MASS EXCISION      WOUND EXPLORATION LEG N/A 2024    Procedure: LUMBAR WOUND EXPLORATION WITH REMOVAL OF  RETAINED IT PUMP TUBING AND REPAIR OF CSF LEAK;  Surgeon: Semaj Blackwood MD;  Location: [facility] MAIN OR;  Service: Neurosurgery;  Laterality: N/A;       Family History   Problem Relation Age of Onset    Cancer Sister     Cancer Brother     Cancer Paternal Aunt     Cancer Paternal Uncle     Cancer Other     Stroke Other     Diabetes Other     Heart failure Other     Malig Hyperthermia Neg Hx         Current Medications        Current Outpatient Medications:     albuterol sulfate  (90 Base) MCG/ACT inhaler, Inhale 2 puffs Every 4 (Four) Hours As Needed for Wheezing., Disp: , Rfl:     amLODIPine (NORVASC) 10 MG tablet, Take 1 tablet by mouth Daily., Disp: , Rfl:     aspirin-acetaminophen-caffeine (EXCEDRIN MIGRAINE) 250-250-65 MG per tablet, Take 1 tablet by mouth Every 6 (Six) Hours As Needed for Headache., Disp: , Rfl:     atorvastatin (LIPITOR) 40 MG tablet, TAKE 1 TABLET BY MOUTH EVERY EVENING FOR CHOLESTEROL, Disp: 30 tablet, Rfl: 5    calcium acetate (PHOS BINDER,) 667 MG capsule capsule, Take 2 capsules by mouth 3 (Three) Times a Day Before Meals., Disp: , Rfl:     calcium acetate (PHOS BINDER,) 667 MG capsule capsule, Take 1 capsule by mouth With Snacks. Take before snack, Disp: , Rfl:     Calcium Carb-Cholecalciferol (GNP Calcium 600 +D3) 600-20 MG-MCG tablet, TAKE 1 TABLET BY MOUTH TWICE DAILY, Disp: 60 tablet, Rfl: 11    carvedilol (COREG) 25 MG tablet, Take 1 tablet by mouth 2 (Two) Times a Day., Disp: 60 tablet, Rfl: 3    cephalexin (KEFLEX) 500 MG capsule, , Disp: , Rfl:     clopidogrel (PLAVIX) 75 MG tablet, TAKE 1 TABLET BY MOUTH ONCE DAILY FOR HEART, Disp: 30 tablet, Rfl: 5    Diclofenac Sodium (VOLTAREN) 1 % gel gel, Apply 4 g topically to the appropriate area as directed 4 (Four) Times a Day As Needed (AS NEEDED)., Disp: 150 g, Rfl: 1    diphenhydrAMINE (BENADRYL) 25 MG tablet, Take 2 tablets by mouth Every Night., Disp: , Rfl:     docusate sodium (COLACE) 100 MG capsule, Take 1 capsule by  mouth 2 (Two) Times a Day., Disp: , Rfl:     fluticasone (FLONASE) 50 MCG/ACT nasal spray, Administer 2 sprays into the nostril(s) as directed by provider Daily As Needed for Allergies., Disp: , Rfl:     furosemide (LASIX) 80 MG tablet, TAKE 1 TABLET BY MOUTH TWICE DAILY, Disp: 60 tablet, Rfl: 5    GNP ClearLax 17 GM/SCOOP powder, Take 17 g by mouth Daily., Disp: , Rfl:     Heparin & NaCl Lock Flush (HEPARIN COMBINATION IV), Infuse  into a venous catheter. With dialysis solution FOR PD CATH, TAKES EVERY MONDAY, Disp: , Rfl:     hydrALAZINE (APRESOLINE) 25 MG tablet, TAKE 1 TABLET BY MOUTH ONCE DAILY, Disp: 30 tablet, Rfl: 0    hyoscyamine (LEVSIN) 0.125 MG SL tablet, Place 1 tablet under the tongue Every 6 (Six) Hours As Needed for Cramping or Diarrhea., Disp: 60 tablet, Rfl: 2    Lokelma 10 g pack, Take 20 g by mouth 2 (Two) Times a Week. REPORTS USES 2 PACKS AND ONLY USES ON DAYS SHE DOES NOT DO PD DIALYSIS, Disp: , Rfl:     loratadine (CLARITIN) 10 MG tablet, TAKE 1 TABLET BY MOUTH ONCE DAILY FOR ALLERGIES, Disp: 30 tablet, Rfl: 0    LORazepam (ATIVAN) 0.5 MG tablet, TAKE 1 TABLET BY MOUTH ONCE DAILY AS NEEDED FOR ANXIETY ( PANIC ATTACKS), Disp: 15 tablet, Rfl: 0    losartan (COZAAR) 100 MG tablet, , Disp: , Rfl:     OLANZapine (ZyPREXA) 10 MG tablet, Take 1 tablet by mouth Every Night., Disp: 30 tablet, Rfl: 2    pantoprazole (PROTONIX) 40 MG EC tablet, Take 1 tablet by mouth Daily., Disp: 90 tablet, Rfl: 1    polyethylene glycol (GoLYTELY) 236 g solution, Starting at noon on day prior to procedure, drink 8 ounces every 30 minutes until all gone or stools are clear. May add flavor packet., Disp: 4000 mL, Rfl: 0    pregabalin (LYRICA) 150 MG capsule, Take 1 capsule by mouth Every Night., Disp: , Rfl:     promethazine (PHENERGAN) 12.5 MG tablet, Take 1 tablet by mouth Every 6 (Six) Hours As Needed for Nausea or Vomiting., Disp: 15 tablet, Rfl: 1    Semaglutide,0.25 or 0.5MG/DOS, (OZEMPIC) 2 MG/3ML solution  "pen-injector, Inject 0.25 mg under the skin into the appropriate area as directed 1 (One) Time Per Week., Disp: 3 mL, Rfl: 1    tiZANidine (ZANAFLEX) 4 MG tablet, Take 1 tablet by mouth Every Night., Disp: , Rfl:     Insulin Pen Needle (Ultracare Pen Needles) 32G X 4 MM misc, Use to inject ozempic 1 time per week., Disp: 4 each, Rfl: 1     Allergies     Allergies   Allergen Reactions    Iron Sucrose Unknown - High Severity     HX OF HEMOCHROMATOSIS     Lamotrigine Rash    Latex Hives, Nausea And Vomiting and Rash     blisters      Lamictal Xr [Lamotrigine Er] Rash    Nickel Rash     RASH W/COSMETIC JEWELRY    Sulfa Antibiotics Rash     PT REPORTS SHE HAS NEVER HAD TROUBLE OR NOTED ISSUES WITH SULFA BEFORE       Social History       Social History     Social History Narrative    Not on file       Immunizations     Immunization:  Immunization History   Administered Date(s) Administered    COVID-19 ([facility]) 06/08/2021    Fluzone (or Fluarix & Flulaval for VFC) >6mos 10/01/2018, 11/15/2023    Influenza Injectable Mdck Pf Quad 11/01/2017, 11/05/2019, 10/19/2021    Pneumococcal Conjugate 13-Valent (PCV13) 11/01/2017          Objective     Objective     Vital Signs:   /74 (BP Location: Left arm, Patient Position: Sitting, Cuff Size: Adult)   Pulse 82   Temp 98.9 °F (37.2 °C) (Temporal)   Ht 162.6 cm (64\")   Wt 82.1 kg (181 lb)   SpO2 99%   BMI 31.07 kg/m²       Physical Exam  Constitutional:       Appearance: Normal appearance.   HENT:      Nose: Nose normal.      Mouth/Throat:      Mouth: Mucous membranes are moist.   Cardiovascular:      Rate and Rhythm: Normal rate and regular rhythm.      Pulses: Normal pulses.      Heart sounds: Normal heart sounds.   Pulmonary:      Effort: Pulmonary effort is normal.      Breath sounds: Normal breath sounds.   Skin:     General: Skin is warm and dry.   Neurological:      General: No focal deficit present.      Mental Status: She is alert and oriented to person, place, " and time.   Psychiatric:         Mood and Affect: Mood normal.         Behavior: Behavior normal.         Results    The following data was reviewed by: ELOINA Persaud on 12/20/2024:             Assessment and Plan        Assessment and Plan    Diagnoses and all orders for this visit:    1. End stage renal disease (Primary)  -     CBC w AUTO Differential; Future  -     Comprehensive metabolic panel; Future    2. Type 2 diabetes mellitus with diabetic autonomic neuropathy, without long-term current use of insulin  Assessment & Plan:  Diabetes is stable.   Continue current treatment regimen.  Diabetes will be reassessed in 6 months    Orders:  -     Insulin Pen Needle (Ultracare Pen Needles) 32G X 4 MM misc; Use to inject ozempic 1 time per week.  Dispense: 4 each; Refill: 1    3. Primary hypertension  Assessment & Plan:  Hypertension is stable and controlled  Continue current treatment regimen.  Blood pressure will be reassessed in 6 months.                Follow Up        Follow Up   Return if symptoms worsen or fail to improve.  Patient was given instructions and counseling regarding her condition or for health maintenance advice. Please see specific information pulled into the AVS if appropriate.

## 2024-12-23 ENCOUNTER — HOSPITAL ENCOUNTER (OUTPATIENT)
Dept: NUCLEAR MEDICINE | Facility: HOSPITAL | Age: 64
Discharge: HOME | End: 2024-12-23
Payer: MEDICAID

## 2024-12-23 DIAGNOSIS — R07.89 CHEST PAIN, ATYPICAL: ICD-10-CM

## 2024-12-23 PROCEDURE — 93017 CV STRESS TEST TRACING ONLY: CPT

## 2024-12-23 PROCEDURE — 25010000002 REGADENOSON 0.4 MG/5ML SOLUTION: Performed by: STUDENT IN AN ORGANIZED HEALTH CARE EDUCATION/TRAINING PROGRAM

## 2024-12-23 PROCEDURE — 34310000005 TECHNETIUM TETROFOSMIN KIT: Performed by: STUDENT IN AN ORGANIZED HEALTH CARE EDUCATION/TRAINING PROGRAM

## 2024-12-23 PROCEDURE — A9502 TC99M TETROFOSMIN: HCPCS | Performed by: STUDENT IN AN ORGANIZED HEALTH CARE EDUCATION/TRAINING PROGRAM

## 2024-12-23 PROCEDURE — 78452 HT MUSCLE IMAGE SPECT MULT: CPT

## 2024-12-23 RX ORDER — REGADENOSON 0.08 MG/ML
0.4 INJECTION, SOLUTION INTRAVENOUS
Status: COMPLETED | OUTPATIENT
Start: 2024-12-23 | End: 2024-12-23

## 2024-12-23 RX ADMIN — TETROFOSMIN 1 DOSE: 1.38 INJECTION, POWDER, LYOPHILIZED, FOR SOLUTION INTRAVENOUS at 10:01

## 2024-12-23 RX ADMIN — REGADENOSON 0.4 MG: 0.08 INJECTION, SOLUTION INTRAVENOUS at 10:57

## 2024-12-23 RX ADMIN — TETROFOSMIN 1 DOSE: 1.38 INJECTION, POWDER, LYOPHILIZED, FOR SOLUTION INTRAVENOUS at 10:58

## 2024-12-24 LAB
BH CV IMMEDIATE POST TECH DATA BLOOD PRESSURE: NORMAL MMHG
BH CV IMMEDIATE POST TECH DATA HEART RATE: 96 BPM
BH CV IMMEDIATE POST TECH DATA OXYGEN SATS: 99 %
BH CV REST NUCLEAR ISOTOPE DOSE: 8.9 MCI
BH CV SIX MINUTE RECOVERY TECH DATA BLOOD PRESSURE: NORMAL
BH CV SIX MINUTE RECOVERY TECH DATA HEART RATE: 90 BPM
BH CV SIX MINUTE RECOVERY TECH DATA OXYGEN SATURATION: 98 %
BH CV STRESS BP STAGE 1: NORMAL
BH CV STRESS COMMENTS STAGE 1: NORMAL
BH CV STRESS DOSE REGADENOSON STAGE 1: 0.4
BH CV STRESS DURATION MIN STAGE 1: 0
BH CV STRESS DURATION SEC STAGE 1: 10
BH CV STRESS HR STAGE 1: 90
BH CV STRESS NUCLEAR ISOTOPE DOSE: 33.7 MCI
BH CV STRESS O2 STAGE 1: 94
BH CV STRESS PROTOCOL 1: NORMAL
BH CV STRESS RECOVERY BP: NORMAL MMHG
BH CV STRESS RECOVERY HR: 90 BPM
BH CV STRESS RECOVERY O2: 98 %
BH CV STRESS STAGE 1: 1
BH CV THREE MINUTE POST TECH DATA BLOOD PRESSURE: NORMAL MMHG
BH CV THREE MINUTE POST TECH DATA HEART RATE: 92 BPM
BH CV THREE MINUTE POST TECH DATA OXYGEN SATURATION: 98 %
MAXIMAL PREDICTED HEART RATE: 157 BPM
PERCENT MAX PREDICTED HR: 61.15 %
SPECT HRT GATED+EF W RNC IV: 74 %
STRESS BASELINE BP: NORMAL MMHG
STRESS BASELINE HR: 78 BPM
STRESS O2 SAT REST: 97 %
STRESS PERCENT HR: 72 %
STRESS POST O2 SAT PEAK: 99 %
STRESS POST PEAK BP: NORMAL MMHG
STRESS POST PEAK HR: 96 BPM
STRESS TARGET HR: 133 BPM

## 2024-12-26 ENCOUNTER — OFFICE VISIT (OUTPATIENT)
Dept: ORTHOPEDIC SURGERY | Facility: CLINIC | Age: 64
End: 2024-12-26
Payer: MEDICAID

## 2024-12-26 VITALS
WEIGHT: 181 LBS | SYSTOLIC BLOOD PRESSURE: 146 MMHG | BODY MASS INDEX: 30.9 KG/M2 | HEART RATE: 80 BPM | DIASTOLIC BLOOD PRESSURE: 79 MMHG | HEIGHT: 64 IN | OXYGEN SATURATION: 98 %

## 2024-12-26 DIAGNOSIS — M17.12 PRIMARY OSTEOARTHRITIS OF LEFT KNEE: Primary | ICD-10-CM

## 2024-12-26 DIAGNOSIS — M25.562 LEFT KNEE PAIN, UNSPECIFIED CHRONICITY: ICD-10-CM

## 2024-12-26 RX ORDER — HYALURONATE SODIUM 10 MG/ML
20 SYRINGE (ML) INTRAARTICULAR
Status: COMPLETED | OUTPATIENT
Start: 2024-12-26 | End: 2024-12-26

## 2024-12-26 RX ADMIN — Medication 20 MG: at 13:04

## 2024-12-26 NOTE — PROGRESS NOTES
Chief Complaint  Pain and Follow-up of the Left Knee     Subjective      Cecilia Sparks is a 64 y.o. female with history of stroke in 2024 without physical deficits, on Plavix for history of stents, and does home dialysis via PD catheter who presents to [facility] for follow up on left knee. Patient has left knee osteoarthritis that we have been managing conservatively with injections. Patient's is here today for Euflexxa series shot # 3 of 3.  Of note, her last steroid injection was on 10/22/2024 which only provided a couple days of relief.    Patient reports since the second injection she has not noticed any improvement in the pain.  Before she began the series her pain was primarily on the medial side, now is more lateral.  But she does have pain all over the knee in general.  Denies any falls or injuries since last visit.      Allergies   Allergen Reactions    Iron Sucrose Unknown - High Severity     HX OF HEMOCHROMATOSIS     Lamotrigine Rash    Latex Hives, Nausea And Vomiting and Rash     blisters      Lamictal Xr [Lamotrigine Er] Rash    Nickel Rash     RASH W/COSMETIC JEWELRY    Sulfa Antibiotics Rash     PT REPORTS SHE HAS NEVER HAD TROUBLE OR NOTED ISSUES WITH SULFA BEFORE        Social History     Socioeconomic History    Marital status:    Tobacco Use    Smoking status: Former     Current packs/day: 0.00     Average packs/day: 0.5 packs/day for 54.0 years (27.0 ttl pk-yrs)     Types: Cigarettes     Start date:      Quit date:      Years since quittin.9     Passive exposure: Current    Smokeless tobacco: Never   Vaping Use    Vaping status: Never Used   Substance and Sexual Activity    Alcohol use: Not Currently    Drug use: Never    Sexual activity: Defer        Tobacco Use: Medium Risk (2024)    Patient History     Smoking Tobacco Use: Former     Smokeless Tobacco Use: Never     Passive Exposure: Current     Patient reports they have a  "history of tobacco use; encouraged continued tobacco cessation for further health benefits.     I reviewed the patient's chief complaint, history of present illness, review of systems, past medical history, surgical history, family history, social history, medications, and allergy list.     Review of Systems     Constitutional: Denies fevers, chills, weight loss  Cardiovascular: Denies chest pain, shortness of breath  Skin: Denies rashes, acute skin changes  Neurologic: Denies headache, loss of consciousness    Vital Signs:   /79   Pulse 80   Ht 162.6 cm (64\")   Wt 82.1 kg (181 lb)   SpO2 98%   BMI 31.07 kg/m²          Physical Exam  General: Alert. No acute distress    Ortho Exam    Left knee: Stable valgus/varus.  Full extension.  125 degrees flexion.  Moderate swelling noted.  Knee extensor mechanism intact.  Neurovascularly intact.  Patient is tender to palpation over the medial and lateral joint lines as well as over the patella.      Large Joint Arthrocentesis: L knee  Date/Time: 12/26/2024 1:04 PM  Consent given by: patient  Site marked: site marked  Timeout: Immediately prior to procedure a time out was called to verify the correct patient, procedure, equipment, support staff and site/side marked as required   Supporting Documentation  Indications: pain   Procedure Details  Location: knee - L knee  Preparation: Patient was prepped and draped in the usual sterile fashion  Needle gauge: 21 G.  Approach: lateral  Medications administered: 20 mg Sodium Hyaluronate (Viscosup) 20 MG/2ML  Patient tolerance: patient tolerated the procedure well with no immediate complications        This injection documentation was Scribed for Glenis Calabrese PA-C by Shannan Snowden.  12/26/24   13:05 EST      Imaging Results (Most Recent)       None             Result Review :       Stress Test With Myocardial Perfusion One Day    Result Date: 12/24/2024  Narrative: Low risk, regadenoson myocardial perfusion imaging " study. There is uniform tracer uptake noticed throughout.  No reversible ischemia is noted.  No transient ischemic dilatation noted. Calculated LVEF is greater than 60%.  No regional wall motion abnormalities are noted. Normal hemodynamic response. No ST changes noted concerning for ischemia.  No significant arrhythmias during recovery.             Assessment and Plan     Diagnoses and all orders for this visit:    1. Primary osteoarthritis of left knee (Primary)    2. Left knee pain, unspecified chronicity    Other orders  -     Large Joint Arthrocentesis: L knee        Discussed the risk, benefits and alternatives of injection.  Discussed potential complications such as increased pain, swelling and tenderness at the injection site.  Possibility of reaction.  Possibility that injection may not improve pain. Risk of infection.  Possibility of worsening pain after injection.  Patient verbalized understanding and gives verbal consent to proceed.     Third left knee  Euflexxa injection administered in office today.  Follow-up in 6 weeks to consider steroid injection.  If persistent pain after that steroid injection, she is unable to take anti-inflammatories secondary to her Plavix use so at that juncture would have to discuss with Dr. Ram operative versus imaging as neck step based on her x-rays.  Advised with patient to continue with home exercises/OTC Tylenol as needed.    Call with changes or concerns.    Follow Up   Patient Instructions   Patient may return in 6 weeks to consider steroid injection if she is still having pain after completing Euflexxa series.  If she is not having pain at that time she may certainly call and cancel the appointment.    Patient is a candidate for repeat steroid injection on/after 2/7/2025.    Patient is a candidate for repeat gel injection on/after 6/27/2025.        Patient was given instructions and counseling regarding her condition or for health maintenance advice. Please see  specific information pulled into the AVS if appropriate.     Glenis Calabrese PA-C   12/26/2024  12:24 EST    Dictated Utilizing Dragon Dictation. Please note that portions of this note were completed with a voice recognition program. Part of this note may be an electronic transcription/translation of spoken language to printed text using the Dragon Dictation System.

## 2024-12-26 NOTE — PATIENT INSTRUCTIONS
Patient may return in 6 weeks to consider steroid injection if she is still having pain after completing Euflexxa series.  If she is not having pain at that time she may certainly call and cancel the appointment.    Patient is a candidate for repeat steroid injection on/after 2/7/2025.    Patient is a candidate for repeat gel injection on/after 6/27/2025.

## 2025-01-09 NOTE — PRE-PROCEDURE INSTRUCTIONS
"PAT call attempted.  No answer.  Detailed message with date and arrival time of 1000 given.  Instructed that arrival time is not procedure time but allows time to prepare for procedure. Come to entrance \"C\"; must have adult  for transportation home; may have two visitors; however, children under 12 must remain in waiting area; instructed on diet/clear liquids/NPO/bowel prep, if needed; may take normal meds two hours prior to arrival time except for blood thinners, antidiabetics, diuretics, and weight loss meds.  Instructed to return call to confirm receipt of instructions and for any questions.  Hold Ozempic for one week prior to procedure.  Hold Plavix for 5 days prior to procedure.  Cardiac, nephrology, and blood thinner clearances noted in chart.  "

## 2025-01-14 ENCOUNTER — TELEPHONE (OUTPATIENT)
Dept: GASTROENTEROLOGY | Facility: CLINIC | Age: 65
End: 2025-01-14
Payer: MEDICAID

## 2025-01-14 ENCOUNTER — ANESTHESIA EVENT (OUTPATIENT)
Dept: GASTROENTEROLOGY | Facility: HOSPITAL | Age: 65
End: 2025-01-14
Payer: MEDICAID

## 2025-01-14 NOTE — ANESTHESIA PREPROCEDURE EVALUATION
Anesthesia Evaluation     Patient summary reviewed and Nursing notes reviewed   history of anesthetic complications:  PONV  NPO Solid Status: > 8 hours  NPO Liquid Status: > 8 hours           Airway   Mallampati: II  TM distance: >3 FB  Neck ROM: full  No difficulty expected  Comment: Neck surgery, vocal cord mass excision  Dental    (+) edentulous    Pulmonary     breath sounds clear to auscultation  (+) COPD mild, asthma (mild , intermittent),  Cardiovascular - normal exam  Exercise tolerance: good (4-7 METS)    ECG reviewed  PT is on anticoagulation therapy  Patient on routine beta blocker  Rhythm: regular  Rate: normal    (+) hypertension well controlled, valvular problems/murmurs MVP, CHF , PVD, hyperlipidemia    ROS comment: Takes carvedilol - last dose on 03/18    Neuro/Psych  (+) seizures (1990's - no meds) well controlled, TIA, CVA (Sept 2024 - milld memory loss), headaches, dizziness/light headedness, syncope, weakness, numbness, psychiatric history Anxiety, Depression, Bipolar and PTSD  GI/Hepatic/Renal/Endo    (+) obesity, GERD well controlled, PUD, renal disease (PDdoes peritoneal dialysis - performed this am 03/19)- ESRD, stones and dialysis, diabetes mellitus type 2 well controlled    Musculoskeletal     (+) neck pain  Abdominal    Substance History      OB/GYN          Other   arthritis, blood dyscrasia,     ROS/Med Hx Other: GERD, lower abd pain    Stress test 12/23/24:   Interpretation Summary  Low risk, regadenoson myocardial perfusion imaging study.  There is uniform tracer uptake noticed throughout.  No reversible ischemia is noted.  No transient ischemic dilatation noted.  Calculated LVEF is greater than 60%.  No regional wall motion abnormalities are noted.  Normal hemodynamic response.  No ST changes noted concerning for ischemia.  No significant arrhythmias during recovery.      ECHO 11/01/24:   ·  Left ventricular systolic function is hyperdynamic (EF > 70%).  ·  Left ventricular diastolic  function is consistent with (grade I) impaired relaxation and age.  ·  No significant valvular disease.  ·  Intracavitary gradient at approximately 35 to 40 mmHg secondary to hyperdynamic state.    EKG 10/23/24: HR 83, SR, minimal ST elevation, anterior leads     Last dose  Ozempic- 03/02  Plavix 03/02                      Anesthesia Plan    ASA 4     general   total IV anesthesia  (Total IV Anesthesia    Patient understands anesthesia not responsible for dental damage.      Discussed risks with pt including aspiration, allergic reactions, apnea, advanced airway placement. Pt verbalized understanding. All questions answered.   )  intravenous induction     Anesthetic plan, risks, benefits, and alternatives have been provided, discussed and informed consent has been obtained with: patient and child.  Pre-procedure education provided  Plan discussed with CRNA.        CODE STATUS:

## 2025-01-14 NOTE — TELEPHONE ENCOUNTER
Attempted to call the patient to get the procedure rescheduled. The phone rang and then the line picked up but now one answered when I said hello. Spoke 3 times with out response, ended the call. Will try calling patient again tomorrow.

## 2025-01-14 NOTE — TELEPHONE ENCOUNTER
Hub staff attempted to follow warm transfer process and was unsuccessful     Caller: REJI SOTO    Relationship to patient: SELF    Best call back number: [phone]     Patient is needing: MS. SOTO NEEDS TO RESCHEDULE PROCEDURE FOR 1/15/25. PLEASE REVIEW AND ADVISE.    OK TO CALL BACK ANYTIME. OK TO LEAVE VM.

## 2025-01-15 ENCOUNTER — ANESTHESIA (OUTPATIENT)
Dept: GASTROENTEROLOGY | Facility: HOSPITAL | Age: 65
End: 2025-01-15
Payer: MEDICAID

## 2025-01-16 ENCOUNTER — OFFICE VISIT (OUTPATIENT)
Dept: CARDIOLOGY | Facility: CLINIC | Age: 65
End: 2025-01-16
Payer: MEDICAID

## 2025-01-16 VITALS
WEIGHT: 180 LBS | SYSTOLIC BLOOD PRESSURE: 169 MMHG | HEIGHT: 64 IN | HEART RATE: 82 BPM | DIASTOLIC BLOOD PRESSURE: 82 MMHG | BODY MASS INDEX: 30.73 KG/M2

## 2025-01-16 DIAGNOSIS — R07.89 CHEST PAIN, ATYPICAL: ICD-10-CM

## 2025-01-16 DIAGNOSIS — I10 ESSENTIAL HYPERTENSION: Primary | ICD-10-CM

## 2025-01-16 NOTE — PROGRESS NOTES
[facility]  Interventional Cardiology Patient Visit Note      Referring Provider:  No referring provider defined for this encounter.    Reason for Referral:   Chest discomfort    History of Presenting Illness:  History of Present Illness    Mr. Sparks is a 64-year-old female who was referred to me initially for evaluation of chest discomfort.  She has a history of anxiety disorder, end-stage renal disease on hemodialysis, hypertension and HLD she is visiting today for follow-up.    He underwent stress testing for risk stratification of chest pain and was noted to have low risk MPI.  Her antihypertensives were modified and now she has optimally controlled blood pressure.  She is able to perform her routine activities.  She does not report shortness of breath, orthopnea, PND or peripheral edema.  Her blood pressure ranges from 120s to 130s over 70s to 80s.  Her current regimen continues amlodipine 10, carvedilol 25 twice daily, Lasix 80 mg twice daily, hydralazine 25 mg p.o. once daily, losartan 100 mg once daily.    She has a history of anxiety disorder, including a suicide attempt and self-mutilation. Currently, she is on Ativan for anxiety, but the dosage is insufficient. She was previously on Klonopin, which was effective. She reports that Zyprexa is not effective for her.  She stated that her anxiety flares up due to stressors at home.     She is on peritoneal dialysis for kidney disease and has peripheral artery disease. She is unable to attend therapy sessions for mental health due to fatigue and lack of transportation.    SOCIAL HISTORY  She is trying to quit smoking.    Past Medical History  Past Medical History:   Diagnosis Date    Acid reflux disease     Anemia     NO RECENT INTERVENTIONS    Anxiety     Arthritis     Asthma     Bipolar disorder     Borderline diabetic     HX OF BUT REPORTS NO ISSUES NOT ON MEDICATION AND IS DIET CONTROLLED    Cataract     Chronic back  pain 01/13/2012    Chronic bronchitis     DENIES ANY CURRENT ISSUES    Chronic UTI     NO CURRENT S/S    Colitis     Congestive heart failure (CHF)     FOLLOWED BY DR [facility], NO CURRENT S/S    Depression     Dizziness     Drug dependence     Fibromyalgia     H/O psychiatric care     Heart murmur     Hematological disorder     hemachromatosis     Hemochromatosis 01/13/2012    Hemorrhoids     Hyperlipidemia     Hypertension, essential     Hypoglycemia 01/13/2021    Kidney stones     past     Knee pain     Lack of coordination     Major depression, recurrent     Meningitis     Migraine headache     Mitral valve prolapse     Muscle spasm     Neck pain     Night sweats     HX OF NO CURRENT ISSUES    PAD (peripheral artery disease)     STENTS L AND RIGHT GROIN    Pain management     PAIN PUMP W/FENTANYL/ DR KIRK PAIN PUMP CURRENTLY EMPTY    Peptic ulcer     Peritoneal dialysis catheter in place     PD DOES 5 DAYS/WEEK. PD EXCHANGE 4 TIMES ON THOSE DAYS.    PONV (postoperative nausea and vomiting)     Psoriasis-like skin disease     eczema    PTSD (post-traumatic stress disorder)     Ringing in ears     Seizures 1990    NONE FOR A LONG TIME    Stroke (cerebrum) 2000    TIA, MEMORY ISSUES    Syncope and collapse     REPORTS HAPPENED ABOUT A YEAR AGO    Weakness     Weight loss          Current Outpatient Medications:     albuterol sulfate  (90 Base) MCG/ACT inhaler, Inhale 2 puffs Every 4 (Four) Hours As Needed for Wheezing., Disp: , Rfl:     amLODIPine (NORVASC) 10 MG tablet, Take 1 tablet by mouth Daily., Disp: , Rfl:     aspirin-acetaminophen-caffeine (EXCEDRIN MIGRAINE) 250-250-65 MG per tablet, Take 1 tablet by mouth Every 6 (Six) Hours As Needed for Headache., Disp: , Rfl:     atorvastatin (LIPITOR) 40 MG tablet, TAKE 1 TABLET BY MOUTH EVERY EVENING FOR CHOLESTEROL, Disp: 30 tablet, Rfl: 5    calcium acetate (PHOS BINDER,) 667 MG capsule capsule, Take 2 capsules by mouth 3 (Three) Times a Day  Before Meals., Disp: , Rfl:     calcium acetate (PHOS BINDER,) 667 MG capsule capsule, Take 1 capsule by mouth With Snacks. Take before snack, Disp: , Rfl:     Calcium Carb-Cholecalciferol (GNP Calcium 600 +D3) 600-20 MG-MCG tablet, TAKE 1 TABLET BY MOUTH TWICE DAILY, Disp: 60 tablet, Rfl: 11    carvedilol (COREG) 25 MG tablet, Take 1 tablet by mouth 2 (Two) Times a Day., Disp: 60 tablet, Rfl: 3    cephalexin (KEFLEX) 500 MG capsule, , Disp: , Rfl:     clopidogrel (PLAVIX) 75 MG tablet, TAKE 1 TABLET BY MOUTH ONCE DAILY FOR HEART, Disp: 30 tablet, Rfl: 5    diphenhydrAMINE (BENADRYL) 25 MG tablet, Take 2 tablets by mouth Every Night., Disp: , Rfl:     docusate sodium (COLACE) 100 MG capsule, Take 1 capsule by mouth 2 (Two) Times a Day., Disp: , Rfl:     fluticasone (FLONASE) 50 MCG/ACT nasal spray, Administer 2 sprays into the nostril(s) as directed by provider Daily As Needed for Allergies., Disp: , Rfl:     furosemide (LASIX) 80 MG tablet, TAKE 1 TABLET BY MOUTH TWICE DAILY, Disp: 60 tablet, Rfl: 5    GNP ClearLax 17 GM/SCOOP powder, Take 17 g by mouth Daily., Disp: , Rfl:     Heparin & NaCl Lock Flush (HEPARIN COMBINATION IV), Infuse  into a venous catheter. With dialysis solution FOR PD CATH, TAKES EVERY MONDAY, Disp: , Rfl:     hydrALAZINE (APRESOLINE) 25 MG tablet, TAKE 1 TABLET BY MOUTH ONCE DAILY, Disp: 30 tablet, Rfl: 0    hyoscyamine (LEVSIN) 0.125 MG SL tablet, Place 1 tablet under the tongue Every 6 (Six) Hours As Needed for Cramping or Diarrhea., Disp: 60 tablet, Rfl: 2    Insulin Pen Needle (Ultracare Pen Needles) 32G X 4 MM misc, Use to inject ozempic 1 time per week., Disp: 4 each, Rfl: 1    Lokelma 10 g pack, Take 20 g by mouth 2 (Two) Times a Week. REPORTS USES 2 PACKS AND ONLY USES ON DAYS SHE DOES NOT DO PD DIALYSIS, Disp: , Rfl:     loratadine (CLARITIN) 10 MG tablet, TAKE 1 TABLET BY MOUTH ONCE DAILY FOR ALLERGIES, Disp: 30 tablet, Rfl: 0    LORazepam (ATIVAN) 0.5 MG tablet, TAKE 1 TABLET BY  MOUTH ONCE DAILY AS NEEDED FOR ANXIETY ( PANIC ATTACKS), Disp: 15 tablet, Rfl: 0    losartan (COZAAR) 100 MG tablet, , Disp: , Rfl:     OLANZapine (ZyPREXA) 10 MG tablet, Take 1 tablet by mouth Every Night., Disp: 30 tablet, Rfl: 2    pantoprazole (PROTONIX) 40 MG EC tablet, Take 1 tablet by mouth Daily., Disp: 90 tablet, Rfl: 1    pregabalin (LYRICA) 150 MG capsule, Take 1 capsule by mouth Every Night., Disp: , Rfl:     promethazine (PHENERGAN) 12.5 MG tablet, Take 1 tablet by mouth Every 6 (Six) Hours As Needed for Nausea or Vomiting., Disp: 15 tablet, Rfl: 1    Semaglutide,0.25 or 0.5MG/DOS, (OZEMPIC) 2 MG/3ML solution pen-injector, Inject 0.25 mg under the skin into the appropriate area as directed 1 (One) Time Per Week., Disp: 3 mL, Rfl: 1    tiZANidine (ZANAFLEX) 4 MG tablet, Take 1 tablet by mouth Every Night., Disp: , Rfl:     Diclofenac Sodium (VOLTAREN) 1 % gel gel, Apply 4 g topically to the appropriate area as directed 4 (Four) Times a Day As Needed (AS NEEDED). (Patient not taking: Reported on 1/16/2025), Disp: 150 g, Rfl: 1    polyethylene glycol (GoLYTELY) 236 g solution, Starting at noon on day prior to procedure, drink 8 ounces every 30 minutes until all gone or stools are clear. May add flavor packet. (Patient not taking: Reported on 1/16/2025), Disp: 4000 mL, Rfl: 0  Current outpatient and discharge medications have been reconciled for the patient.  Reviewed by: Preston Recio MD     There are no discontinued medications.  Allergies   Allergen Reactions    Iron Sucrose Unknown - High Severity     HX OF HEMOCHROMATOSIS     Lamotrigine Rash    Latex Hives, Nausea And Vomiting and Rash     blisters      Lamictal Xr [Lamotrigine Er] Rash    Nickel Rash     RASH W/COSMETIC JEWELRY    Sulfa Antibiotics Rash     PT REPORTS SHE HAS NEVER HAD TROUBLE OR NOTED ISSUES WITH SULFA BEFORE      Social History     Tobacco Use    Smoking status: Former     Current packs/day: 0.00     Average packs/day: 0.5  "packs/day for 54.0 years (27.0 ttl pk-yrs)     Types: Cigarettes     Start date:      Quit date:      Years since quittin.0     Passive exposure: Current    Smokeless tobacco: Never   Vaping Use    Vaping status: Never Used   Substance Use Topics    Alcohol use: Not Currently    Drug use: Never     Family History   Problem Relation Age of Onset    Cancer Sister     Cancer Brother     Cancer Paternal Aunt     Cancer Paternal Uncle     Cancer Other     Stroke Other     Diabetes Other     Heart failure Other     Malig Hyperthermia Neg Hx           Objective   /82 (BP Location: Right arm, Patient Position: Sitting, Cuff Size: Large Adult)   Pulse 82   Ht 162.6 cm (64\")   Wt 81.6 kg (180 lb)   BMI 30.90 kg/m²     Wt Readings from Last 3 Encounters:   25 81.6 kg (180 lb)   24 82.1 kg (181 lb)   24 82.1 kg (181 lb)     BP Readings from Last 3 Encounters:   25 169/82   24 146/79   24 119/74       Physical Exam  Constitutional:  Awake. Not in acute distress. Normal appearance.   Neck: No carotid bruit, hepatojugular reflux or JVD.   Cardiovascular:      Rate and Rhythm: Normal rate and regular rhythm.      Chest Wall: PMI is not displaced.      Heart sounds: Normal heart sounds, S1 normal and S2 normal. No murmur heard.       No friction rub. No gallop. No S3 or S4 sounds.    Pulmonary: Pulmonary effort is normal. Normal breath sounds. No wheezing, rhonchi or rales.   Extremities: No Bilateral edema is noted.   Skin: Warm and dry. Non cyanotic, No petechiae or rash.   Neurological: Alert and oriented x 3  Psychiatric:  Behavior is cooperative.       Result Review :   The following data was reviewed by Preston Recio MD on 2025   No results found for: \"PROBNP\"  CMP          10/9/2024    12:22 10/10/2024    16:04 2024    14:07   CMP   Glucose 80  93  89    BUN 57  59  71    Creatinine 5.96  6.42  7.55    EGFR 7.5  6.9  5.6    Sodium 136  140  136    Potassium " "6.4  6.2  5.9    Chloride 101  104  100    Calcium 9.5  9.2  11.0    Total Protein 6.2  6.0  7.1    Albumin 3.8  3.3  3.7    Globulin 2.4  2.7  3.4    Total Bilirubin <0.2  <0.2  0.2    Alkaline Phosphatase 87  84  103    AST (SGOT) 27  22  15    ALT (SGPT) 16  15  10    Albumin/Globulin Ratio 1.6  1.2  1.1    BUN/Creatinine Ratio 9.6  9.2  9.4    Anion Gap 12.2  13.4  15.4      CBC w/diff          10/1/2024    03:18 10/2/2024    04:36 10/10/2024    16:04   CBC w/Diff   WBC 5.97  8.56  6.53    RBC 3.53  3.17  2.86    Hemoglobin 10.9  9.6  9.0    Hematocrit 32.8  30.0  27.1    MCV 92.9  94.6  94.8    MCH 30.9  30.3  31.5    MCHC 33.2  32.0  33.2    RDW 13.4  13.4  13.6    Platelets 210  184  237    Neutrophil Rel %   47.7    Immature Granulocyte Rel %   0.6    Lymphocyte Rel %   35.5    Monocyte Rel %   7.7    Eosinophil Rel %   8.0    Basophil Rel %   0.5       Lab Results   Component Value Date    TSH 2.600 09/27/2024      No results found for: \"FREET4\"   No results found for: \"DDIMERQUANT\"  Magnesium   Date Value Ref Range Status   10/02/2024 2.1 1.6 - 2.4 mg/dL Final      No results found for: \"DIGOXIN\"   Lab Results   Component Value Date    TROPONINT 21 (H) 09/30/2024      No results found for: \"POCTROP\"       A1C Last 3 Results          9/27/2024    11:36 9/30/2024    14:00   HGBA1C Last 3 Results   Hemoglobin A1C 5.10  5.00      Lipid Panel          9/27/2024    11:36   Lipid Panel   Total Cholesterol 153    Triglycerides 81    HDL Cholesterol 63    VLDL Cholesterol 15    LDL Cholesterol  75    LDL/HDL Ratio 1.17      Results for orders placed during the hospital encounter of 09/30/24    Adult Transthoracic Echo Complete W/ Cont if Necessary Per Protocol    Interpretation Summary    Left ventricular systolic function is hyperdynamic (EF > 70%).    Left ventricular diastolic function is consistent with (grade I) impaired relaxation and age.    No significant valvular disease.    Intracavitary gradient at " approximately 35 to 40 mmHg secondary to hyperdynamic state.     Results for orders placed during the hospital encounter of 09/30/24    Adult Transthoracic Echo Complete W/ Cont if Necessary Per Protocol    Interpretation Summary    Left ventricular systolic function is hyperdynamic (EF > 70%).    Left ventricular diastolic function is consistent with (grade I) impaired relaxation and age.    No significant valvular disease.    Intracavitary gradient at approximately 35 to 40 mmHg secondary to hyperdynamic state.     No results found for this or any previous visit.     Results for orders placed during the hospital encounter of 12/23/24    Stress Test With Myocardial Perfusion One Day    Interpretation Summary  Low risk, regadenoson myocardial perfusion imaging study.  There is uniform tracer uptake noticed throughout.  No reversible ischemia is noted.  No transient ischemic dilatation noted.  Calculated LVEF is greater than 60%.  No regional wall motion abnormalities are noted.  Normal hemodynamic response.  No ST changes noted concerning for ischemia.  No significant arrhythmias during recovery.     The ASCVD Risk score (Ingrid KNAPP, et al., 2019) failed to calculate for the following reasons:    Risk score cannot be calculated because patient has a medical history suggesting prior/existing ASCVD        Assessment  Assessment & Plan  1.  Chest discomfort likely related to anxiety disorder   A stress test was performed recently and resulted back unremarkable for significant ischemia.  Patient is at low risk of cardiovascular events for one year.  No evidence of prior infarct is noted on EKG.  Echocardiogram shows preserved wall motion and preserved systolic function.    2. Hypertension.  Optimally controlled on amlodipine 10 mg carvedilol 25 mg p.o. twice daily, losartan 100 mg p.o. daily, hydralazine 25 mg p.o. daily.  Continue current regimen.    3. Peripheral Artery Disease.  She has a history of peripheral artery  disease with stents placed in her legs. No new symptoms or concerns were reported during this visit. She is advised to continue her current management plan and monitor for any changes.  Currently not established with vascular care and plans not to see vascular team.    4.  Anxiety disorder  Support groups, stress relievers, adoption of a hobby and different coping mechanisms were discussed with the patient in the presence of her granddaughter.  Patient mentioned that she has a lot of stressors at home so therefore these therapies are minimally helpful what has worked best for her is Ativan that gives her temporary relief.  She has quit smoking feels the urge to go back to smoking however would avoid it since it increases her risk of cardiovascular event.  She we will start gardening or will engage herself into something to keep her busy.  Currently she spends a lot of time in book reading.       I have seen and examined the patient. I spent 30 min  caring for the patient on this date of service. This time includes time spent by me in the following activities as necessary: face to face encounter, preparing for the visit, reviewing tests, specialists records and previous visits, obtaining and/or reviewing a separately obtained history, performing a medically appropriate exam and/or evaluation, counseling and educating the patient, family, caregiver, referring and/or communicating with other healthcare professionals, documenting information in the medical record, independently interpreting results and communicating that information with the patient, family, caregiver, and developing a medically appropriate treatment plan with consideration of other conditions, medications, and treatments. More than 50 % time was spent in counselling and patient education.         Plan                There are no diagnoses linked to this encounter.  Follow Up     No follow-ups on file.      Preston Recio MD  Interventional  Cardiology  01/16/2025  11:55 EST      Patient was given instructions and counseling regarding her condition or for health maintenance advice. Please see specific information pulled into the AVS if appropriate.     Please note that portions of this document were completed using a voice recognition program.     Patient or patient representative verbalized consent for the use of Ambient Listening during the visit with  Preston Recio MD for chart documentation. 1/16/2025  18:41 EST

## 2025-01-20 DIAGNOSIS — F41.1 GENERALIZED ANXIETY DISORDER: ICD-10-CM

## 2025-01-20 DIAGNOSIS — F31.13 BIPOLAR DISORDER, CURRENT EPISODE MANIC WITHOUT PSYCHOTIC FEATURES, SEVERE: ICD-10-CM

## 2025-01-20 DIAGNOSIS — F41.0 PANIC ATTACKS: ICD-10-CM

## 2025-01-20 DIAGNOSIS — I10 PRIMARY HYPERTENSION: ICD-10-CM

## 2025-01-20 DIAGNOSIS — F43.21 COMPLICATED GRIEVING: ICD-10-CM

## 2025-01-20 RX ORDER — OLANZAPINE 10 MG/1
10 TABLET ORAL EVERY EVENING
Qty: 30 TABLET | Refills: 1 | Status: SHIPPED | OUTPATIENT
Start: 2025-01-20

## 2025-01-20 RX ORDER — LORATADINE 10 MG/1
TABLET ORAL
Qty: 30 TABLET | Refills: 0 | Status: SHIPPED | OUTPATIENT
Start: 2025-01-20

## 2025-01-20 RX ORDER — LORAZEPAM 0.5 MG/1
TABLET ORAL
Qty: 15 TABLET | Refills: 0 | Status: SHIPPED | OUTPATIENT
Start: 2025-01-20

## 2025-01-20 RX ORDER — HYDRALAZINE HYDROCHLORIDE 25 MG/1
25 TABLET, FILM COATED ORAL DAILY
Qty: 30 TABLET | Refills: 0 | Status: SHIPPED | OUTPATIENT
Start: 2025-01-20

## 2025-01-20 NOTE — TELEPHONE ENCOUNTER
Appointment with Barby Marr APRN (02/13/2025)     POC Medline 12 Panel Urine Drug Screen (10/04/2024 12:49)       CONTROLLED SUBSTANCE AGREEMENT - SCAN -  CONTROLLED SUBSTANE ABUSE AGREEMENT BEHAVIORAL HEALTH 10/04/2024 (10/04/2024)       Patient needs a refill.  Order pended

## 2025-01-24 NOTE — TELEPHONE ENCOUNTER
Patient needs the bowel prep send into the pharmacy. She completed most of it then the hospital cancelled procedure due to Ozempic.  Also can we make sure the clearances in the chart are still up to date.

## 2025-01-24 NOTE — TELEPHONE ENCOUNTER
Cecilia Sparks  1960    Patient requested to Reschedule their EGD & Colonoscopy. I have offered to reschedule this patient and patient has agreed. Patient has been rescheduled to 02/06/2025 at 12:30 pm.    Reason for cancelling/rescheduling: patient took her ozempic    This procedure was ordered by Dione Harrell MD for an important reason. We want to inform you that there are risks associated with not proceeding with the procedure at this time such as a delay in diagnosis, risk of incurable disease, or cancer.    Patient plans to call us back to reschedule: No    Updated clearance needed?: Unsure will check on the cardiac    If yes, clearance request has been submitted to: Provider Name    Is the patient currently on any injectable medications for weight loss or diabetes? Yes    If yes, are they aware that they will need to discontinue this 7 days prior to their procedure? Yes    Has endo scheduling been notified? Yes    If yes, who did you speak to? Isidra    Has the case request been updated? Yes    Patient verbalized understanding for all of the above information.

## 2025-01-28 NOTE — PRE-PROCEDURE INSTRUCTIONS
Instructed on date and arrival time of 1200 Come to entrance 'C'. Must have a  over age of [address] to drive home.May have two visitors; however children under the age of 12 must stay in waiting room. Discussed clear liquid diet(no red or purple), bowel prep,and NPO. May take medications as usual except for blood thinners,diabetic medications, and weight loss medications.verbalizes understanding of instructions given.Instructed to call for questions or concerns.

## 2025-01-29 DIAGNOSIS — K58.1 IRRITABLE BOWEL SYNDROME WITH CONSTIPATION: Primary | ICD-10-CM

## 2025-01-29 NOTE — TELEPHONE ENCOUNTER
Bowel prep sent to patients pharmacy, patient aware.   Will need an updated cardiac clearance  Clearance faxed.

## 2025-02-04 ENCOUNTER — TELEPHONE (OUTPATIENT)
Dept: GASTROENTEROLOGY | Facility: CLINIC | Age: 65
End: 2025-02-04
Payer: MEDICAID

## 2025-02-04 NOTE — TELEPHONE ENCOUNTER
Can you please verify if a new clearance would need to be submitted due to the reschedule of scope.

## 2025-02-04 NOTE — TELEPHONE ENCOUNTER
Cecilia Sparks  1960     Patient requested to reschedule their EGD & Colonoscopy. I have offered to reschedule this patient and patient has agreed. Patient has been rescheduled to 03/19/2025 at 0930 am.    Reason for cancelling: patient is currently sick    Original date of case request: 02/06/2025     This procedure was ordered by Dinoe Harrell MD for an important reason. I have thoroughly discussed with the patient that there are risks associated with not proceeding with the procedure at this time such as a delay in diagnosis, risk of incurable disease, or cancer. Patient verbalized understanding the risks discussed.    Patient plans to call us back to reschedule: No    Updated clearance needed?: No    Is there a follow up appointment that needs to be rescheduled after the procedure date? No     If yes, clearance request has been submitted to: Provider Name    Is the patient currently on any injectable medications for weight loss or diabetes? Yes    If yes, are they aware that they will need to discontinue this 7 days prior to their procedure? Yes    Has endo scheduling been notified? Yes    If yes, who did you speak to? Judith    Has the case request been updated? Yes        CARDIAC CLEARACE    NEPHREOLOGY CLEARANCE    OZEMPIC, 7 DAY HOLD

## 2025-02-13 ENCOUNTER — OFFICE VISIT (OUTPATIENT)
Dept: BEHAVIORAL HEALTH | Facility: CLINIC | Age: 65
End: 2025-02-13
Payer: MEDICAID

## 2025-02-13 VITALS
WEIGHT: 187 LBS | HEART RATE: 81 BPM | DIASTOLIC BLOOD PRESSURE: 76 MMHG | HEIGHT: 64 IN | SYSTOLIC BLOOD PRESSURE: 120 MMHG | BODY MASS INDEX: 31.92 KG/M2

## 2025-02-13 DIAGNOSIS — F41.1 GENERALIZED ANXIETY DISORDER: ICD-10-CM

## 2025-02-13 DIAGNOSIS — F43.21 COMPLICATED GRIEVING: ICD-10-CM

## 2025-02-13 DIAGNOSIS — F41.0 PANIC ATTACKS: ICD-10-CM

## 2025-02-13 DIAGNOSIS — F31.13 BIPOLAR DISORDER, CURRENT EPISODE MANIC WITHOUT PSYCHOTIC FEATURES, SEVERE: Primary | ICD-10-CM

## 2025-02-13 RX ORDER — OLANZAPINE 15 MG/1
15 TABLET ORAL NIGHTLY
Qty: 30 TABLET | Refills: 1 | Status: SHIPPED | OUTPATIENT
Start: 2025-02-13

## 2025-02-13 RX ORDER — DOXYCYCLINE 100 MG/1
CAPSULE ORAL
COMMUNITY
Start: 2025-01-30

## 2025-02-13 RX ORDER — NEBULIZER AND COMPRESSOR
EACH MISCELLANEOUS
COMMUNITY
Start: 2025-01-30

## 2025-02-13 RX ORDER — LORAZEPAM 0.5 MG/1
0.5 TABLET ORAL DAILY PRN
Qty: 15 TABLET | Refills: 0 | Status: SHIPPED | OUTPATIENT
Start: 2025-02-13

## 2025-02-13 RX ORDER — METHYLPREDNISOLONE 4 MG/1
TABLET ORAL
COMMUNITY
Start: 2025-01-30 | End: 2025-02-13
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
## 2025-02-17 ENCOUNTER — TELEPHONE (OUTPATIENT)
Dept: NEUROLOGY | Facility: CLINIC | Age: 65
End: 2025-02-17

## 2025-02-17 NOTE — TELEPHONE ENCOUNTER
Caller: Cecilia Sparks    Relationship:  Self    Best call back number: [phone]    PATIENT CALLED REQUESTING TO CANCEL SAME DAY APPT.    Did the patient call AFTER the start time of their scheduled appointment?  []YES  [x]NO    Was the patient's appointment rescheduled? [x]YES  []NO    Any additional information: PT R/S FOR NEXT AVAILABLE APPT

## 2025-02-21 DIAGNOSIS — I10 PRIMARY HYPERTENSION: ICD-10-CM

## 2025-02-21 RX ORDER — HYDRALAZINE HYDROCHLORIDE 25 MG/1
25 TABLET, FILM COATED ORAL DAILY
Qty: 30 TABLET | Refills: 0 | Status: SHIPPED | OUTPATIENT
Start: 2025-02-21

## 2025-02-21 RX ORDER — LORATADINE 10 MG/1
TABLET ORAL
Qty: 30 TABLET | Refills: 0 | Status: SHIPPED | OUTPATIENT
Start: 2025-02-21

## 2025-02-27 NOTE — PATIENT INSTRUCTIONS
1.  Please return to clinic at your next scheduled visit.  Please contact the clinic ([phone]) at least 24 hours prior in the event you need to cancel.  2.  Do no harm to yourself or others.    3.  Avoid alcohol and drugs.    4.  Take all medications as prescribed.  Please contact the clinic with any concerns. If you are in need of medication refills, please call the clinic at [phone].    5. Should you want to get in touch with your provider, ELOINA Velarde, please contact the office ([phone]), and staff will be able to page [facility] directly.  6. In the event you have personal crisis, contact the following crisis numbers: Suicide Prevention Hotline 1-304.803.1450; [facility] Helpline 8-254-906-OFSO; [facility] Emergency Room [phone]; text HELLO to 537604; or 292.     SPECIFIC RECOMMENDATIONS:     1.      Medications discussed at this encounter:     New Medications Ordered This Visit   Medications    OLANZapine (zyPREXA) 15 MG tablet     Sig: Take 1 tablet by mouth Every Night.     Dispense:  30 tablet     Refill:  1    LORazepam (ATIVAN) 0.5 MG tablet     Sig: Take 1 tablet by mouth Daily As Needed for Anxiety.     Dispense:  15 tablet     Refill:  0                       2.      Psychotherapy recommendations: We will continue therapy at future visits.     3.     Return to clinic: Return in about 6 weeks (around 3/27/2025).

## 2025-03-05 ENCOUNTER — TELEPHONE (OUTPATIENT)
Dept: GASTROENTEROLOGY | Facility: CLINIC | Age: 65
End: 2025-03-05
Payer: MEDICAID

## 2025-03-05 NOTE — TELEPHONE ENCOUNTER
Verify source of procedure(s): Office visit  TIME OUT-CONFIRM CORRECT PROCEDURE: egd/colon  Cardiology: Hemal  Pulmonology: no  Blood thinner: Plavix  GLP-1: no    Cardiac clearance received 2/6/25 (TE 11/5/24), expires in 60 days.   Plavix clearance received from Ellen Strickland DO in media 2/28/25, expires 4/19/25.  Nephrology clearance received from Dr Bird 1/3/25 (media) expires in 90 days.

## 2025-03-10 ENCOUNTER — PATIENT ROUNDING (BHMG ONLY) (OUTPATIENT)
Dept: NEUROLOGY | Facility: CLINIC | Age: 65
End: 2025-03-10
Payer: MEDICAID

## 2025-03-10 ENCOUNTER — OFFICE VISIT (OUTPATIENT)
Dept: NEUROLOGY | Facility: CLINIC | Age: 65
End: 2025-03-10
Payer: MEDICAID

## 2025-03-10 VITALS
WEIGHT: 192 LBS | BODY MASS INDEX: 32.96 KG/M2 | SYSTOLIC BLOOD PRESSURE: 114 MMHG | DIASTOLIC BLOOD PRESSURE: 61 MMHG | HEART RATE: 73 BPM

## 2025-03-10 DIAGNOSIS — R41.3 MEMORY LOSS: ICD-10-CM

## 2025-03-10 DIAGNOSIS — I69.30 SEQUELAE, POST-STROKE: Primary | ICD-10-CM

## 2025-03-10 PROBLEM — G45.9 TRANSIENT ISCHEMIC ATTACK (TIA): Status: RESOLVED | Noted: 2024-09-30 | Resolved: 2025-03-10

## 2025-03-10 PROCEDURE — 3078F DIAST BP <80 MM HG: CPT | Performed by: PSYCHIATRY & NEUROLOGY

## 2025-03-10 PROCEDURE — 3074F SYST BP LT 130 MM HG: CPT | Performed by: PSYCHIATRY & NEUROLOGY

## 2025-03-10 PROCEDURE — 99215 OFFICE O/P EST HI 40 MIN: CPT | Performed by: PSYCHIATRY & NEUROLOGY

## 2025-03-10 NOTE — PROGRESS NOTES
Chief Complaint  Neurologic Problem ([facility] [place] CVA [facility]- 10/02/24/HAVING PROBLEMS WITH SHORT TERM MEMORY)    Subjective          Cecilia Sparks is a 64 y.o. female who presents to [facility]  History of Present Illness  64-year-old woman evaluated for small vessel stroke, memory loss.  She was admitted to the hospital in September of last year for hypertensive emergency and was found to have stroke on the posterior left corona radiata.  MRA of the brain and neck was unremarkable.  She has chronic hypertension, end-stage renal disease on peritoneal dialysis.  She is here today with her great granddaughter.  Patient is independent with activities of daily living.  She states that she has a colonoscopy to be performed next week and she is off Plavix.  She stopped taking aspirin.  She has had bipolar disorder and used to be on lithium in the past.  She is presently on Zyprexa and lorazepam.  She states that she has racing thoughts at night and she cannot sleep.  She is not driving.    I reviewed with her the MRI of the brain showing minimal white matter changes and the acute stroke that was seen in September.    She stopped smoking about 3 months ago.      Objective   Vital Signs:   /61   Pulse 73   Wt 87.1 kg (192 lb)   BMI 32.96 kg/m²     Physical Exam   She is alert, fluent, phasic, follows commands well.  Mini-Mental Status score is 29 out of 30.  She missed 1 or 3 recent objects.  She is able to give me the history as noted above without difficulty.  Visual fields full, EMs full directions gaze, facial strength is full, soft palate elevation and tongue are normal.  There is no weakness of the upper or lower extremities and vision muscle testing.  Cerebellar testing is intact.  Station gait is unremarkable.        Assessment and Plan  Diagnoses and all orders for this visit:    1. Sequelae, post-stroke (Primary)  Assessment & Plan:  Discussed with her that she  needs to continue taking Plavix after she has a colonoscopy.  She needs to make up her mind that she is no longer going to smoke to decrease her risk factors of recurrent stroke.  She is at risk for recurrent strokes secondary to small vessel disease and her long history of hypertension.      2. Memory loss  Assessment & Plan:  I discussed with her that her memory loss is secondary to her psychiatric condition.  She has a long history of bipolar disorder and she states that she continues to have significant anxiety and racing thoughts.  She is presently on Zyprexa and lorazepam.  I would recommend for her to be evaluated by Dr. Mora for a second opinion.  She is to make a follow-up visit with me in the next 6 months if there is no improvement of her memory.  Thank you for letting me persuade her care.           Total time spent with the patient and coordinating patient care was 45 minutes.    Follow Up  No follow-ups on file.  Patient was given instructions and counseling regarding her condition or for health maintenance advice. Please see specific information pulled into the AVS if appropriate.

## 2025-03-10 NOTE — ASSESSMENT & PLAN NOTE
I discussed with her that her memory loss is secondary to her psychiatric condition.  She has a long history of bipolar disorder and she states that she continues to have significant anxiety and racing thoughts.  She is presently on Zyprexa and lorazepam.  I would recommend for her to be evaluated by Dr. Mora for a second opinion.  She is to make a follow-up visit with me in the next 6 months if there is no improvement of her memory.  Thank you for letting me persuade her care.

## 2025-03-10 NOTE — ASSESSMENT & PLAN NOTE
Discussed with her that she needs to continue taking Plavix after she has a colonoscopy.  She needs to make up her mind that she is no longer going to smoke to decrease her risk factors of recurrent stroke.  She is at risk for recurrent strokes secondary to small vessel disease and her long history of hypertension.

## 2025-03-13 ENCOUNTER — OFFICE VISIT (OUTPATIENT)
Dept: BEHAVIORAL HEALTH | Facility: CLINIC | Age: 65
End: 2025-03-13
Payer: MEDICAID

## 2025-03-13 VITALS
OXYGEN SATURATION: 98 % | HEART RATE: 67 BPM | WEIGHT: 197.4 LBS | SYSTOLIC BLOOD PRESSURE: 101 MMHG | HEIGHT: 64 IN | BODY MASS INDEX: 33.7 KG/M2 | DIASTOLIC BLOOD PRESSURE: 55 MMHG

## 2025-03-13 DIAGNOSIS — F41.0 PANIC ATTACKS: ICD-10-CM

## 2025-03-13 DIAGNOSIS — F41.1 GENERALIZED ANXIETY DISORDER: ICD-10-CM

## 2025-03-13 DIAGNOSIS — F31.13 BIPOLAR DISORDER, CURRENT EPISODE MANIC WITHOUT PSYCHOTIC FEATURES, SEVERE: Primary | ICD-10-CM

## 2025-03-13 DIAGNOSIS — F43.21 COMPLICATED GRIEVING: ICD-10-CM

## 2025-03-13 DIAGNOSIS — F31.13 BIPOLAR DISORDER, CURRENT EPISODE MANIC WITHOUT PSYCHOTIC FEATURES, SEVERE: ICD-10-CM

## 2025-03-13 RX ORDER — LORAZEPAM 0.5 MG/1
0.5 TABLET ORAL DAILY PRN
Qty: 15 TABLET | Refills: 2 | Status: SHIPPED | OUTPATIENT
Start: 2025-03-13

## 2025-03-13 RX ORDER — OLANZAPINE 15 MG/1
15 TABLET ORAL NIGHTLY
Qty: 30 TABLET | Refills: 1 | Status: SHIPPED | OUTPATIENT
Start: 2025-03-13

## 2025-03-13 RX ORDER — ESCITALOPRAM OXALATE 10 MG/1
10 TABLET ORAL DAILY
Qty: 30 TABLET | Refills: 2 | Status: SHIPPED | OUTPATIENT
Start: 2025-03-13 | End: 2026-03-13

## 2025-03-13 RX ORDER — LORAZEPAM 0.5 MG/1
0.5 TABLET ORAL DAILY PRN
Qty: 15 TABLET | Refills: 0 | OUTPATIENT
Start: 2025-03-13

## 2025-03-13 NOTE — PRE-PROCEDURE INSTRUCTIONS
Spoke with sister:   Reminded of arrival time at 0930        , Entrance C of the [facility] hospital. Instructed to bring or have a  over the age of 18 set up to drive you home the day of procedure.    Instructed on clear liquid diet the day before, nothing red or purple. Call with any questions about the prep or if in need of the prep.  Reminded them not to eat or drink anything am of procedure unless its a sip of water with medications. Hold ozempic for 7 days prior to procedure. Hold lasix am of procedure. Hold plavix for 5 days prior to procedure.

## 2025-03-13 NOTE — TELEPHONE ENCOUNTER
REFILL REQUEST:     LORazepam (ATIVAN) 0.5 MG tablet (02/13/2025)     F/UP- TODAY 03/13/2025.  LOV: 02/13/2025.    LAST UDS:  POC Medline 12 Panel Urine Drug Screen (10/04/2024 12:49)

## 2025-03-13 NOTE — PATIENT INSTRUCTIONS
1.  Please return to clinic at your next scheduled visit.  Please contact the clinic ([phone]) at least 24 hours prior in the event you need to cancel.  2.  Do no harm to yourself or others.    3.  Avoid alcohol and drugs.    4.  Take all medications as prescribed.  Please contact the clinic with any concerns. If you are in need of medication refills, please call the clinic at [phone].    5. Should you want to get in touch with your provider, ELOINA Velarde, please contact the office ([phone]), and staff will be able to page [facility] directly.  6. In the event you have personal crisis, contact the following crisis numbers: Suicide Prevention Hotline 1-392.878.2235; [facility] Helpline 2-299-944-VVQB; [facility] Emergency Room [phone]; text HELLO to 110248; or 309.     SPECIFIC RECOMMENDATIONS:     1.      Medications discussed at this encounter:     New Medications Ordered This Visit   Medications    escitalopram (Lexapro) 10 MG tablet     Sig: Take 1 tablet by mouth Daily.     Dispense:  30 tablet     Refill:  2    LORazepam (ATIVAN) 0.5 MG tablet     Sig: Take 1 tablet by mouth Daily As Needed for Anxiety.     Dispense:  15 tablet     Refill:  2    OLANZapine (zyPREXA) 15 MG tablet     Sig: Take 1 tablet by mouth Every Night.     Dispense:  30 tablet     Refill:  1                       2.      Psychotherapy recommendations: We will continue therapy at future visits.     3.     Return to clinic: Return in about 2 months (around 5/13/2025).

## 2025-03-13 NOTE — PROGRESS NOTES
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 restore adaptive functioning, and provide symptom relief. Esteem building was enhanced through praise, reassurance, normalizing and encouragement. Coping skills were enhanced to build distress tolerance skills and emotional regulation. Allowed patient to freely discuss issues without interruption or judgement with unconditional positive regard, active listening skills, and empathy. Provided a safe, confidential environment to facilitate the development of a positive therapeutic relationship and encourage open, honest communication. Assisted patient in processing session content, acknowledged and normalized patient’s thoughts, feelings, and concerns by utilizing a person-centered approach in efforts to build appropriate rapport and a positive therapeutic relationship with open and honest communication. Plan to continue supportive psychotherapy in next appointment to provide symptom relief.    Functional status: Moderate symptoms OR moderate difficulty in social occupational or social functioning (51-60)  Prognosis: Good chance of responding well to treatment   Progress: waxing and waning but worse overall      Follow-up: Return in about 2 months (around 5/13/2025).     This document has been electronically signed by ELOINA Velarde  March 22, 2025 12:24  EDT  Please note that portions of this note were completed with a voice recognition program.  Copied text in this note has been reviewed and is accurate as of 03/22/25

## 2025-03-19 ENCOUNTER — TELEPHONE (OUTPATIENT)
Dept: GASTROENTEROLOGY | Facility: CLINIC | Age: 65
End: 2025-03-19
Payer: MEDICAID

## 2025-03-19 ENCOUNTER — HOSPITAL ENCOUNTER (OUTPATIENT)
Facility: HOSPITAL | Age: 65
Setting detail: HOSPITAL OUTPATIENT SURGERY
Discharge: HOME | End: 2025-03-19
Attending: INTERNAL MEDICINE | Admitting: INTERNAL MEDICINE
Payer: MEDICAID

## 2025-03-19 VITALS
BODY MASS INDEX: 33.74 KG/M2 | RESPIRATION RATE: 19 BRPM | SYSTOLIC BLOOD PRESSURE: 118 MMHG | TEMPERATURE: 97.9 F | OXYGEN SATURATION: 95 % | WEIGHT: 196.65 LBS | DIASTOLIC BLOOD PRESSURE: 73 MMHG | HEART RATE: 73 BPM

## 2025-03-19 DIAGNOSIS — R10.30 LOWER ABDOMINAL PAIN: ICD-10-CM

## 2025-03-19 DIAGNOSIS — K21.9 GASTROESOPHAGEAL REFLUX DISEASE, UNSPECIFIED WHETHER ESOPHAGITIS PRESENT: ICD-10-CM

## 2025-03-19 PROCEDURE — 25010000002 AMPICILLIN PER 500 MG: Performed by: INTERNAL MEDICINE

## 2025-03-19 PROCEDURE — 25010000002 PROPOFOL 10 MG/ML EMULSION: Performed by: NURSE ANESTHETIST, CERTIFIED REGISTERED

## 2025-03-19 PROCEDURE — 25810000003 LACTATED RINGERS PER 1000 ML

## 2025-03-19 PROCEDURE — 25010000002 LIDOCAINE PF 2% 2 % SOLUTION: Performed by: NURSE ANESTHETIST, CERTIFIED REGISTERED

## 2025-03-19 PROCEDURE — 45378 DIAGNOSTIC COLONOSCOPY: CPT | Performed by: INTERNAL MEDICINE

## 2025-03-19 PROCEDURE — 25810000003 SODIUM CHLORIDE 0.9 % SOLUTION

## 2025-03-19 PROCEDURE — 88305 TISSUE EXAM BY PATHOLOGIST: CPT | Performed by: INTERNAL MEDICINE

## 2025-03-19 RX ORDER — PHENYLEPHRINE HCL IN 0.9% NACL 1 MG/10 ML
SYRINGE (ML) INTRAVENOUS AS NEEDED
Status: DISCONTINUED | OUTPATIENT
Start: 2025-03-19 | End: 2025-03-19 | Stop reason: SURG

## 2025-03-19 RX ORDER — SODIUM CHLORIDE, SODIUM LACTATE, POTASSIUM CHLORIDE, CALCIUM CHLORIDE 600; 310; 30; 20 MG/100ML; MG/100ML; MG/100ML; MG/100ML
30 INJECTION, SOLUTION INTRAVENOUS CONTINUOUS
Status: DISCONTINUED | OUTPATIENT
Start: 2025-03-19 | End: 2025-03-19 | Stop reason: HOSPADM

## 2025-03-19 RX ORDER — SODIUM CHLORIDE 9 MG/ML
30 INJECTION, SOLUTION INTRAVENOUS CONTINUOUS
Status: DISCONTINUED | OUTPATIENT
Start: 2025-03-19 | End: 2025-03-19 | Stop reason: HOSPADM

## 2025-03-19 RX ORDER — LIDOCAINE HYDROCHLORIDE 20 MG/ML
INJECTION, SOLUTION EPIDURAL; INFILTRATION; INTRACAUDAL; PERINEURAL AS NEEDED
Status: DISCONTINUED | OUTPATIENT
Start: 2025-03-19 | End: 2025-03-19 | Stop reason: SURG

## 2025-03-19 RX ORDER — PROPOFOL 10 MG/ML
VIAL (ML) INTRAVENOUS AS NEEDED
Status: DISCONTINUED | OUTPATIENT
Start: 2025-03-19 | End: 2025-03-19 | Stop reason: SURG

## 2025-03-19 RX ADMIN — SODIUM CHLORIDE, POTASSIUM CHLORIDE, SODIUM LACTATE AND CALCIUM CHLORIDE: 600; 310; 30; 20 INJECTION, SOLUTION INTRAVENOUS at 11:54

## 2025-03-19 RX ADMIN — LIDOCAINE HYDROCHLORIDE 100 MG: 20 INJECTION, SOLUTION EPIDURAL; INFILTRATION; INTRACAUDAL; PERINEURAL at 11:23

## 2025-03-19 RX ADMIN — Medication 200 MCG: at 11:49

## 2025-03-19 RX ADMIN — AMPICILLIN SODIUM 1 G: 1 INJECTION, POWDER, FOR SOLUTION INTRAMUSCULAR; INTRAVENOUS at 10:54

## 2025-03-19 RX ADMIN — PROPOFOL 200 MCG/KG/MIN: 10 INJECTION, EMULSION INTRAVENOUS at 11:23

## 2025-03-19 RX ADMIN — PROPOFOL 100 MG: 10 INJECTION, EMULSION INTRAVENOUS at 11:23

## 2025-03-19 RX ADMIN — SODIUM CHLORIDE 500 ML: 9 INJECTION, SOLUTION INTRAVENOUS at 10:13

## 2025-03-19 NOTE — ANESTHESIA POSTPROCEDURE EVALUATION
Patient: Cecilia Sparks    Procedure Summary       Date: 03/19/25 Room / Location: [facility] ENDOSCOPY 3 / [facility]COPY    Anesthesia Start: 1119 Anesthesia Stop: 1158    Procedures:       ESOPHAGOGASTRODUODENOSCOPY with biopsies      COLONOSCOPY Diagnosis:       Gastroesophageal reflux disease, unspecified whether esophagitis present      Lower abdominal pain      (Gastroesophageal reflux disease, unspecified whether esophagitis present [K21.9])      (Lower abdominal pain [R10.30])    Surgeons: Dione Harrell MD Provider: Jayme Khanna CRNA    Anesthesia Type: general ASA Status: 4            Anesthesia Type: general    Vitals  Vitals Value Taken Time   /73 03/19/25 12:11   Temp 36.6 °C (97.9 °F) 03/19/25 12:11   Pulse 75 03/19/25 12:12   Resp 19 03/19/25 12:11   SpO2 96 % 03/19/25 12:12   Vitals shown include unfiled device data.        Post Anesthesia Care and Evaluation    Post-procedure mental status: acceptable.  Pain management: satisfactory to patient    Airway patency: patent  Anesthetic complications: No anesthetic complications    Cardiovascular status: acceptable  Respiratory status: acceptable    Comments: Per chart review

## 2025-03-19 NOTE — H&P
Pre Procedure History & Physical    Chief Complaint:   GERD, nausea, vomiting, lower abd pain, constipation    Subjective     HPI:   63 yo F with history of CKD on PD here for eval of GERD, nausea, vomiting, lower abd pain, constipation.  Pt reports she emptied her dialysate this AM.    Past Medical History:   Past Medical History:   Diagnosis Date    Acid reflux disease     Anemia     NO RECENT INTERVENTIONS    Anxiety     Arthritis     Asthma     Bipolar disorder     Borderline diabetic     HX OF BUT REPORTS NO ISSUES NOT ON MEDICATION AND IS DIET CONTROLLED    Cataract     Chronic back pain 01/13/2012    Chronic bronchitis     DENIES ANY CURRENT ISSUES    Chronic UTI     NO CURRENT S/S    Colitis     Congestive heart failure (CHF)     FOLLOWED BY DR [facility], NO CURRENT S/S    Depression     Dizziness     Drug dependence     Fibromyalgia     H/O psychiatric care     Heart murmur     Hematological disorder     hemachromatosis     Hemochromatosis 01/13/2012    Hemorrhoids     Hyperlipidemia     Hypertension, essential     Hypoglycemia 01/13/2021    Kidney stones     past     Knee pain     Lack of coordination     Major depression, recurrent     Meningitis     Migraine headache     Mitral valve prolapse     Muscle spasm     Neck pain     Night sweats     HX OF NO CURRENT ISSUES    PAD (peripheral artery disease)     STENTS L AND RIGHT GROIN    Pain management     PAIN PUMP W/FENTANYL/ DR KIRK PAIN PUMP CURRENTLY EMPTY    Peptic ulcer     Peritoneal dialysis catheter in place     PD DOES 5 DAYS/WEEK. PD EXCHANGE 4 TIMES ON THOSE DAYS.    PONV (postoperative nausea and vomiting)     Psoriasis-like skin disease     eczema    PTSD (post-traumatic stress disorder)     Ringing in ears     Seizures 1990    NONE FOR A LONG TIME    Stroke (cerebrum) 2000    TIA, MEMORY ISSUES    Syncope and collapse     REPORTS HAPPENED ABOUT A YEAR AGO    Weakness     Weight loss        Past Surgical History:  Past Surgical  History:   Procedure Laterality Date    APPENDECTOMY       SECTION      X4    COLONOSCOPY      COLONOSCOPY N/A 2021    Procedure: COLONOSCOPY;  Surgeon: Aston Fernandez MD;  Location: [facility] ENDOSCOPY;  Service: Gastroenterology;  Laterality: N/A;  COLITIS    ENDOSCOPY N/A 2022    Procedure: ESOPHAGOGASTRODUODENOSCOPY WITH BIOPSIES;  Surgeon: Dione Harrell MD;  Location: [facility] ENDOSCOPY;  Service: Gastroenterology;  Laterality: N/A;  GASTRITIS    FEMORAL ARTERY STENT Bilateral     HYSTERECTOMY  ,    partial/total     INSERTION HEMODIALYSIS CATHETER Right 2023    Procedure: HEMODIALYSIS CATHETER INSERTION;  Surgeon: Suman Butler MD;  Location: [facility] MAIN OR;  Service: General;  Laterality: Right;  PER DR VILLEDA    INSERTION PERITONEAL DIALYSIS CATHETER      REVISION    INSERTION PERITONEAL DIALYSIS CATHETER N/A 2023    Procedure: INSERTION PERITONEAL DIALYSIS CATHETER LAPAROSCOPIC;  Surgeon: Suman Butler MD;  Location: [facility] MAIN OR;  Service: General;  Laterality: N/A;  PER DR BUTLER    INTERVENTIONAL RADIOLOGY PROCEDURE Right 2022    Procedure: Abdominal Aortagram with Runoff;  Surgeon: Aspen Couch MD;  Location: [facility] CATH INVASIVE LOCATION;  Service: Peripheral Vascular;  Laterality: Right;    NECK SURGERY Left     KNOW REMOVED    PAIN PUMP INSERTION/REVISION       FENTANYL INSERTED     PAIN PUMP INSERTION/REVISION Left 2024    Procedure: PAIN PUMP REMOVAL, left lower abdomen;  Surgeon: Semaj Blackwood MD;  Location: [facility] MAIN OR;  Service: Neurosurgery;  Laterality: Left;    REMOVAL HEMODIALYSIS CATHETER      TONSILLECTOMY  1998    VOCAL CORD MASS EXCISION      WOUND EXPLORATION LEG N/A 2024    Procedure: LUMBAR WOUND EXPLORATION WITH REMOVAL OF RETAINED IT PUMP TUBING AND REPAIR OF CSF LEAK;  Surgeon: Semaj Blackwood MD;  Location: [facility] MAIN OR;  Service: Neurosurgery;  Laterality: N/A;       Family  History:  Family History   Problem Relation Age of Onset    Cancer Sister     Cancer Brother     Cancer Paternal Aunt     Cancer Paternal Uncle     Cancer Other     Stroke Other     Diabetes Other     Heart failure Other     Malig Hyperthermia Neg Hx        Social History:   reports that she quit smoking about 14 months ago. Her smoking use included cigarettes. She started smoking about 55 years ago. She has a 27 pack-year smoking history. She has been exposed to tobacco smoke. She has never used smokeless tobacco. She reports that she does not currently use alcohol. She reports that she does not use drugs.    Medications:   Medications Prior to Admission   Medication Sig Dispense Refill Last Dose/Taking    albuterol sulfate  (90 Base) MCG/ACT inhaler Inhale 2 puffs Every 4 (Four) Hours As Needed for Wheezing.   Past Week    amLODIPine (NORVASC) 10 MG tablet Take 1 tablet by mouth Daily.   3/18/2025    atorvastatin (LIPITOR) 40 MG tablet TAKE 1 TABLET BY MOUTH EVERY EVENING FOR CHOLESTEROL 30 tablet 5 3/18/2025    calcium acetate (PHOS BINDER,) 667 MG capsule capsule Take 2 capsules by mouth 3 (Three) Times a Day Before Meals.   3/18/2025    calcium acetate (PHOS BINDER,) 667 MG capsule capsule Take 1 capsule by mouth With Snacks. Take before snack   3/18/2025    Calcium Carb-Cholecalciferol (GNP Calcium 600 +D3) 600-20 MG-MCG tablet TAKE 1 TABLET BY MOUTH TWICE DAILY 60 tablet 11 3/18/2025    carvedilol (COREG) 25 MG tablet Take 1 tablet by mouth 2 (Two) Times a Day. 60 tablet 3 3/18/2025    docusate sodium (COLACE) 100 MG capsule Take 1 capsule by mouth 2 (Two) Times a Day.   3/18/2025    escitalopram (Lexapro) 10 MG tablet Take 1 tablet by mouth Daily. 30 tablet 2 3/18/2025    furosemide (LASIX) 80 MG tablet TAKE 1 TABLET BY MOUTH TWICE DAILY 60 tablet 5 3/18/2025    GNP ClearLax 17 GM/SCOOP powder Take 17 g by mouth Daily.   3/18/2025    Heparin & NaCl Lock Flush (HEPARIN COMBINATION IV) Infuse  into a  venous catheter. With dialysis solution FOR PD CATH, TAKES EVERY MONDAY   Past Week    hydrALAZINE (APRESOLINE) 25 MG tablet TAKE 1 TABLET BY MOUTH ONCE DAILY 30 tablet 0 3/18/2025    hyoscyamine (LEVSIN) 0.125 MG SL tablet TAKE 1 TABLET UNDER TONGUE EVERY SIX HOURS AS NEEDED FOR CRAMPING OR DIARRHEA 60 tablet 1 3/18/2025    Lokelma 10 g pack Take 20 g by mouth 2 (Two) Times a Week. REPORTS USES 2 PACKS AND ONLY USES ON DAYS SHE DOES NOT DO PD DIALYSIS   3/18/2025    loratadine (CLARITIN) 10 MG tablet TAKE 1 TABLET BY MOUTH ONCE DAILY FOR ALLERGIES 30 tablet 0 3/18/2025    LORazepam (ATIVAN) 0.5 MG tablet Take 1 tablet by mouth Daily As Needed for Anxiety. 15 tablet 2 3/18/2025    losartan (COZAAR) 100 MG tablet    3/18/2025    OLANZapine (zyPREXA) 15 MG tablet Take 1 tablet by mouth Every Night. 30 tablet 1 3/18/2025    pantoprazole (PROTONIX) 40 MG EC tablet Take 1 tablet by mouth Daily. 90 tablet 1 3/18/2025    pregabalin (LYRICA) 150 MG capsule Take 1 capsule by mouth Every Night.   3/18/2025    promethazine (PHENERGAN) 12.5 MG tablet Take 1 tablet by mouth Every 6 (Six) Hours As Needed for Nausea or Vomiting. 15 tablet 1 3/18/2025    tiZANidine (ZANAFLEX) 4 MG tablet Take 1 tablet by mouth Every Night.   3/18/2025    cephalexin (KEFLEX) 500 MG capsule    Unknown    clopidogrel (PLAVIX) 75 MG tablet TAKE 1 TABLET BY MOUTH ONCE DAILY FOR HEART 30 tablet 5 3/2/2025    diphenhydrAMINE (BENADRYL) 25 MG tablet Take 2 tablets by mouth Every Night.   More than a month    doxycycline (VIBRAMYCIN) 100 MG capsule    Unknown    fluticasone (FLONASE) 50 MCG/ACT nasal spray Administer 2 sprays into the nostril(s) as directed by provider Daily As Needed for Allergies.   More than a month    Insulin Pen Needle (Ultracare Pen Needles) 32G X 4 MM misc Use to inject ozempic 1 time per week. 4 each 1     Nebulizers (Comp Air Compressor Nebulizer) misc        Semaglutide,0.25 or 0.5MG/DOS, (OZEMPIC) 2 MG/3ML solution pen-injector  Inject 0.25 mg under the skin into the appropriate area as directed 1 (One) Time Per Week. (Patient not taking: Reported on 3/13/2025) 3 mL 1 3/2/2025       Allergies:  Iron sucrose, Lamotrigine, Latex, Lamictal xr [lamotrigine er], Nickel, and Sulfa antibiotics    ROS:    Pertinent items are noted in HPI     Objective     Blood pressure 157/80, pulse 71, temperature 97.2 °F (36.2 °C), temperature source Temporal, resp. rate 12, weight 89.2 kg (196 lb 10.4 oz), SpO2 96%, not currently breastfeeding.    Physical Exam   Constitutional: Pt is oriented to person, place, and time and well-developed, well-nourished, and in no distress.   Mouth/Throat: Oropharynx is clear and moist.   Neck: Normal range of motion.   Cardiovascular: Normal rate, regular rhythm and normal heart sounds.    Pulmonary/Chest: Effort normal and breath sounds normal.   Abdominal: Soft. Nontender  Skin: Skin is warm and dry.   Psychiatric: Mood, memory, affect and judgment normal.     Assessment & Plan     Diagnosis:  GERD, nausea, vomiting, lower abd pain, constipation    Anticipated Surgical Procedure:  EGD/colonoscopy  Will given ampicillin 1 gram IV per nephrology recs given patient's history of peritoneal dialysis.    The risks, benefits, and alternatives of this procedure have been discussed with the patient or the responsible party- the patient understands and agrees to proceed.

## 2025-03-21 ENCOUNTER — RESULTS FOLLOW-UP (OUTPATIENT)
Dept: GASTROENTEROLOGY | Facility: HOSPITAL | Age: 65
End: 2025-03-21
Payer: MEDICAID

## 2025-03-21 DIAGNOSIS — Z12.11 ENCOUNTER FOR SCREENING FOR MALIGNANT NEOPLASM OF COLON: ICD-10-CM

## 2025-03-22 NOTE — PLAN OF CARE
"CBT/Supportive  Allowed patient to process topics such as anxiety, especially from chronic pain. \"This is not the worst that I have been through\" but she is experiencing struggles with getting rides to get to her appointments. Individual psychotherapy was provided utilizing solution focused techniques to restore adaptive functioning, provide symptom relief, discuss values and strengths, manage stress, identify triggers, recognize patterns of behavior, acknowledge sources of feelings and behaviors, assess symptoms, provide support, and discuss interpersonal conflicts. The therapeutic alliance was strengthened to encourage the patient to express their thoughts and feelings.     Psychotherapy time 20 minutes.  This time is exclusive to the therapy session and separate from the time spent on activities used to meet the criteria for the E/M service (history, exam, medical decision-making).  Goal is to strengthen defenses, promote problems solving, restore adaptive functioning, and provide symptom relief. Esteem building was enhanced through praise, reassurance, normalizing and encouragement. Coping skills were enhanced to build distress tolerance skills and emotional regulation. Allowed patient to freely discuss issues without interruption or judgement with unconditional positive regard, active listening skills, and empathy. Provided a safe, confidential environment to facilitate the development of a positive therapeutic relationship and encourage open, honest communication. Assisted patient in processing session content, acknowledged and normalized patient’s thoughts, feelings, and concerns by utilizing a person-centered approach in efforts to build appropriate rapport and a positive therapeutic relationship with open and honest communication. Plan to continue supportive psychotherapy in next appointment to provide symptom relief.  "

## 2025-03-22 NOTE — TREATMENT PLAN
Multi-Disciplinary Problems (from Behavioral Health Treatment Plan)      Active Problems       Problem: Anxiety  Start Date: 03/13/25      Problem Details: The patient self-scales this problem as a 6 with 10 being the worst.          Goal Priority Start Date Expected End Date End Date    Patient will develop and implement behavioral and cognitive strategies to reduce anxiety and irrational fears. -- 03/13/25 09/20/25 --    Goal Details: Functional status: Moderate symptoms OR moderate difficulty in social occupational or social functioning (51-60)  Prognosis: Good chance of responding well to treatment   Progress: waxing and waning but worse overall        Goal Intervention Frequency Start Date End Date    Help patient explore past emotional issues in relation to present anxiety. Q Month 03/13/25 --    Intervention Details: Duration of treatment until remission of symptoms.        Goal Intervention Frequency Start Date End Date    Help patient develop an awareness of their cognitive and physical responses to anxiety. Q Month 03/13/25 --    Intervention Details: Duration of treatment until remission of symptoms.                        Reviewed By       Barby Marr APRN 03/22/25 [id number]                     I have discussed and reviewed this treatment plan with the patient.

## 2025-03-24 ENCOUNTER — TELEPHONE (OUTPATIENT)
Dept: GASTROENTEROLOGY | Facility: CLINIC | Age: 65
End: 2025-03-24
Payer: MEDICAID

## 2025-03-24 DIAGNOSIS — I10 PRIMARY HYPERTENSION: ICD-10-CM

## 2025-03-24 DIAGNOSIS — I10 ESSENTIAL HYPERTENSION: ICD-10-CM

## 2025-03-24 RX ORDER — HYDRALAZINE HYDROCHLORIDE 25 MG/1
25 TABLET, FILM COATED ORAL DAILY
Qty: 30 TABLET | Refills: 0 | Status: SHIPPED | OUTPATIENT
Start: 2025-03-24

## 2025-03-24 RX ORDER — LORATADINE 10 MG/1
TABLET ORAL
Qty: 30 TABLET | Refills: 0 | Status: SHIPPED | OUTPATIENT
Start: 2025-03-24

## 2025-03-24 RX ORDER — CARVEDILOL 25 MG/1
25 TABLET ORAL 2 TIMES DAILY
Qty: 180 TABLET | Refills: 3 | Status: SHIPPED | OUTPATIENT
Start: 2025-03-24

## 2025-03-24 NOTE — TELEPHONE ENCOUNTER
Caller: Cecilia   Relationship: self   Best call back number:      Who are you requesting to speak with (clinical staff, provider, specific staff member): M/A    What was the call regarding:    Patient was returning call from [facility] regarding results, please advise. Thanks!

## 2025-03-27 RX ORDER — CARVEDILOL 25 MG/1
25 TABLET ORAL 2 TIMES DAILY
Qty: 60 TABLET | Refills: 2 | OUTPATIENT
Start: 2025-03-27

## 2025-04-03 ENCOUNTER — OFFICE VISIT (OUTPATIENT)
Dept: ORTHOPEDIC SURGERY | Facility: CLINIC | Age: 65
End: 2025-04-03
Payer: MEDICAID

## 2025-04-03 VITALS
WEIGHT: 196.65 LBS | DIASTOLIC BLOOD PRESSURE: 50 MMHG | HEART RATE: 79 BPM | OXYGEN SATURATION: 94 % | HEIGHT: 64 IN | SYSTOLIC BLOOD PRESSURE: 112 MMHG | BODY MASS INDEX: 33.57 KG/M2

## 2025-04-03 DIAGNOSIS — R29.6 FALLS FREQUENTLY: ICD-10-CM

## 2025-04-03 DIAGNOSIS — M17.12 PRIMARY OSTEOARTHRITIS OF LEFT KNEE: Primary | ICD-10-CM

## 2025-04-03 DIAGNOSIS — M25.562 LEFT KNEE PAIN, UNSPECIFIED CHRONICITY: ICD-10-CM

## 2025-04-03 RX ORDER — TRIAMCINOLONE ACETONIDE 40 MG/ML
40 INJECTION, SUSPENSION INTRA-ARTICULAR; INTRAMUSCULAR
Status: COMPLETED | OUTPATIENT
Start: 2025-04-03 | End: 2025-04-03

## 2025-04-03 RX ORDER — LIDOCAINE HYDROCHLORIDE 10 MG/ML
5 INJECTION, SOLUTION INFILTRATION; PERINEURAL
Status: COMPLETED | OUTPATIENT
Start: 2025-04-03 | End: 2025-04-03

## 2025-04-03 RX ADMIN — TRIAMCINOLONE ACETONIDE 40 MG: 40 INJECTION, SUSPENSION INTRA-ARTICULAR; INTRAMUSCULAR at 14:10

## 2025-04-03 RX ADMIN — LIDOCAINE HYDROCHLORIDE 5 ML: 10 INJECTION, SOLUTION INFILTRATION; PERINEURAL at 14:10

## 2025-04-03 NOTE — PROGRESS NOTES
Chief Complaint  Pain and Follow-up of the Left Knee     Subjective      Cecilia Sparks is a 64 y.o. female who presents to [facility]  follow up on left knee. Patient has left knee osteoarthritis that we have been managing conservatively with injections.  Patient completed her Euflexxa series injections on 12/26/2024.  Prior to the injection she had more medial pain, after the second injection reported generalized knee pain worse in the lateral compartment.  She is here today to evaluate efficacy of the Euflexxa series and consider steroid injection.    History of Present Illness  She reports no improvement in her left knee pain following a gel injection administered in 12/2024. She has been receiving cortisone injections, which provide temporary relief for a few days. She is interested in exploring the possibility of a longer-acting steroid injection, pending insurance approval. She also reports significant discomfort in her right knee, which she plans to address in a future appointment. She has not previously sought treatment for her right knee.      In regards to consideration of knee replacement, her overall health status is poor, with a history of stroke, near-fatal meningitis last year, and peripheral artery disease affecting her lower body. She is currently on Plavix due to her stroke history. She has experienced multiple falls due to her inability to bend her knee without severe pain, resulting in an abnormal gait and frequent tripping. She is considering the use of a cane for support.         Allergies   Allergen Reactions    Iron Sucrose Unknown - High Severity     HX OF HEMOCHROMATOSIS     Lamotrigine Rash    Latex Hives, Nausea And Vomiting and Rash     blisters      Lamictal Xr [Lamotrigine Er] Rash    Nickel Rash     RASH W/COSMETIC JEWELRY    Sulfa Antibiotics Rash     PT REPORTS SHE HAS NEVER HAD TROUBLE OR NOTED ISSUES WITH SULFA BEFORE        Social History  "    Socioeconomic History    Marital status:    Tobacco Use    Smoking status: Former     Current packs/day: 0.00     Average packs/day: 0.5 packs/day for 54.0 years (27.0 ttl pk-yrs)     Types: Cigarettes     Start date:      Quit date:      Years since quittin.2     Passive exposure: Current    Smokeless tobacco: Never   Vaping Use    Vaping status: Never Used   Substance and Sexual Activity    Alcohol use: Not Currently    Drug use: Never    Sexual activity: Defer        Tobacco Use: Medium Risk (4/3/2025)    Patient History     Smoking Tobacco Use: Former     Smokeless Tobacco Use: Never     Passive Exposure: Current     Patient reports they have a history of tobacco use; encouraged continued tobacco cessation for further health benefits.     I reviewed the patient's chief complaint, history of present illness, review of systems, past medical history, surgical history, family history, social history, medications, and allergy list.     Review of Systems     Constitutional: Denies fevers, chills, weight loss  Cardiovascular: Denies chest pain, shortness of breath  Skin: Denies rashes, acute skin changes  Neurologic: Denies headache, loss of consciousness    Vital Signs:   /50   Pulse 79   Ht 162.6 cm (64\")   Wt 89.2 kg (196 lb 10.4 oz)   SpO2 94%   BMI 33.76 kg/m²          Physical Exam  General: Alert. No acute distress    Ortho Exam    General: Alert, no acute distress  Left lower extremity: Mild to moderate knee effusion.  Positive tenderness to the medial joint line.  Positive tenderness to the lateral joint line.  115 degrees of flexion.  Full degrees extension. Knee extensor mechanism intact. Knee stable to varus and valgus stress. Calf soft, nontender. Demonstrates active ankle plantarflexion and dorsiflexion. Sensation intact over the dorsal and plantar foot.  Palpable pedal pulses.      Physical Exam        Large Joint Arthrocentesis: L knee  Date/Time: 4/3/2025 2:10 " PM  Consent given by: patient  Site marked: site marked  Timeout: Immediately prior to procedure a time out was called to verify the correct patient, procedure, equipment, support staff and site/side marked as required   Supporting Documentation  Indications: pain   Procedure Details  Location: knee - L knee  Preparation: Patient was prepped and draped in the usual sterile fashion  Needle gauge: 21 G.  Approach: lateral  Medications administered: 5 mL lidocaine 1 %; 40 mg triamcinolone acetonide 40 MG/ML  Patient tolerance: patient tolerated the procedure well with no immediate complications      This injection documentation was Scribed for Glenis Calabrese PA-C by Shannan Snowden.  04/03/25   14:11 EDT          Assessment and Plan     Diagnoses and all orders for this visit:    1. Primary osteoarthritis of left knee (Primary)  -     Large Joint Arthrocentesis: L knee  -     Miscellaneous DME    2. Left knee pain, unspecified chronicity  -     Large Joint Arthrocentesis: L knee  -     Miscellaneous DME    3. Falls frequently  -     Miscellaneous DME      Left knee steroid injection administered in office today and tolerated the procedure well without complications.  Patient advised on 3 months duration between injections.     Advised of diabetic to monitor blood glucose levels closely for the next 24 to 48 hours.    Discussed the risk, benefits and alternatives of injection.  Discussed potential complications such as increased pain, swelling and tenderness at the injection site.  Possibility of reaction.  Possibility that injection may not improve pain. Risk of infection.  Possibility of worsening pain after injection. Steroid injections can increase blood glucose levels and may be damaged bone if given over prolonged period of time.  Patient verbalized understanding and gives verbal consent to proceed.    DME order placed for 3 or 4 pronged cane for better stability/support with ambulation given her frequent  falls.    Submit for Zilretta approval.  Will plan on Zilretta injection in 3 months pending insurance approval.    Follow-up in 3 months  Call with questions, concerns or worsening symptoms.    Assessment & Plan   Given her complex medical history, including PD dialysis, a stroke, meningitis, and peripheral artery disease, a knee replacement is not currently advisable, which she agrees.  Advised if we ever get into a position that injections are not working, we could consider pain management route if Dr. Ram agreed patient is to have a surgical risk for joint replacement      Follow Up   There are no Patient Instructions on file for this visit.        Patient was given instructions and counseling regarding her condition or for health maintenance advice. Please see specific information pulled into the AVS if appropriate.     Glenis Calabrese PA-C   04/03/2025  12:51 EDT    Patient or patient representative verbalized consent for the use of Ambient Listening during the visit with  Glenis Calabrese PA-C for chart documentation. 4/3/2025  15:02 EDT    Dictated Utilizing Dragon Dictation. Please note that portions of this note were completed with a voice recognition program. Part of this note may be an electronic transcription/translation of spoken language to printed text using the Dragon Dictation System.

## 2025-04-09 ENCOUNTER — TELEPHONE (OUTPATIENT)
Dept: ORTHOPEDIC SURGERY | Facility: CLINIC | Age: 65
End: 2025-04-09
Payer: MEDICAID

## 2025-04-09 NOTE — TELEPHONE ENCOUNTER
[facility], Approved a left knee zilretta injection per Cris. Auth is good from 04/08/25 - 05/08/25 . Please call and schedule an appointment for this injection. Thanks

## 2025-04-10 NOTE — TELEPHONE ENCOUNTER
Auth expires 5-8-25 and she is not eligible for Zilretta until after 7-3-25, just got a steroid on 4-3. Sent back to Cris.

## 2025-04-15 ENCOUNTER — OFFICE VISIT (OUTPATIENT)
Dept: ORTHOPEDIC SURGERY | Facility: CLINIC | Age: 65
End: 2025-04-15
Payer: MEDICAID

## 2025-04-15 VITALS
BODY MASS INDEX: 33.46 KG/M2 | SYSTOLIC BLOOD PRESSURE: 102 MMHG | WEIGHT: 196 LBS | HEART RATE: 77 BPM | DIASTOLIC BLOOD PRESSURE: 65 MMHG | OXYGEN SATURATION: 92 % | HEIGHT: 64 IN

## 2025-04-15 DIAGNOSIS — M17.11 OSTEOARTHRITIS OF RIGHT KNEE, UNSPECIFIED OSTEOARTHRITIS TYPE: ICD-10-CM

## 2025-04-15 DIAGNOSIS — M25.561 RIGHT KNEE PAIN, UNSPECIFIED CHRONICITY: Primary | ICD-10-CM

## 2025-04-15 RX ORDER — LIDOCAINE HYDROCHLORIDE 10 MG/ML
5 INJECTION, SOLUTION INFILTRATION; PERINEURAL
Status: COMPLETED | OUTPATIENT
Start: 2025-04-15 | End: 2025-04-15

## 2025-04-15 RX ORDER — TRIAMCINOLONE ACETONIDE 40 MG/ML
40 INJECTION, SUSPENSION INTRA-ARTICULAR; INTRAMUSCULAR
Status: COMPLETED | OUTPATIENT
Start: 2025-04-15 | End: 2025-04-15

## 2025-04-15 RX ADMIN — TRIAMCINOLONE ACETONIDE 40 MG: 40 INJECTION, SUSPENSION INTRA-ARTICULAR; INTRAMUSCULAR at 15:18

## 2025-04-15 RX ADMIN — LIDOCAINE HYDROCHLORIDE 5 ML: 10 INJECTION, SOLUTION INFILTRATION; PERINEURAL at 15:18

## 2025-04-15 NOTE — PROGRESS NOTES
"Chief Complaint  Initial Evaluation of the Right Knee     Subjective      Cecilia Sparks presents to [facility] for initial evaluation of the right knee.  She notes the left knee is worse then the right.  She has had injections in the right knee in the past.  She falls a lot due to the left knee and always hits the right knee.  She has pain with prolonged standing, ambulation and stairs.  She notes the injection helps for a week in the left knee.      Allergies   Allergen Reactions    Iron Sucrose Unknown - High Severity     HX OF HEMOCHROMATOSIS     Lamotrigine Rash    Latex Hives, Nausea And Vomiting and Rash     blisters      Lamictal Xr [Lamotrigine Er] Rash    Nickel Rash     RASH W/COSMETIC JEWELRY    Sulfa Antibiotics Rash     PT REPORTS SHE HAS NEVER HAD TROUBLE OR NOTED ISSUES WITH SULFA BEFORE        Social History     Socioeconomic History    Marital status:    Tobacco Use    Smoking status: Former     Current packs/day: 0.00     Average packs/day: 0.5 packs/day for 54.0 years (27.0 ttl pk-yrs)     Types: Cigarettes     Start date:      Quit date:      Years since quittin.2     Passive exposure: Current    Smokeless tobacco: Never   Vaping Use    Vaping status: Never Used   Substance and Sexual Activity    Alcohol use: Not Currently    Drug use: Never    Sexual activity: Defer        I reviewed the patient's chief complaint, history of present illness, review of systems, past medical history, surgical history, family history, social history, medications, and allergy list.     Review of Systems     Constitutional: Denies fevers, chills, weight loss  Cardiovascular: Denies chest pain, shortness of breath  Skin: Denies rashes, acute skin changes  Neurologic: Denies headache, loss of consciousness        Vital Signs:   /65   Pulse 77   Ht 162.6 cm (64\")   Wt 88.9 kg (196 lb)   SpO2 92%   BMI 33.64 kg/m²          Physical Exam  General: Alert. No " acute distress    Ortho Exam        RIGHT KNEE Flexion 110. Extension 0. Stable to varus/valgus stress. Stable to anterior/posterior drawer. Neurovascularly intact. Negative Jelly. Positive Lachman. Positive EHL, FHL, HS and TA. Sensation intact to light touch all 5 nerves of the foot. Ambulates with Antalgic gait. Patella is well tracking. Calf supple, non-tender. Positive tenderness to the medial joint line. Positive tenderness to the lateral joint line. Positive Crepitus. Good strength to hamstrings, quadriceps, dorsiflexors, and plantar flexors.  Knee Extensor Mechanism intact Mild swelling.         Large Joint Arthrocentesis: R knee  Date/Time: 4/15/2025 3:18 PM  Consent given by: patient  Site marked: site marked  Timeout: Immediately prior to procedure a time out was called to verify the correct patient, procedure, equipment, support staff and site/side marked as required   Supporting Documentation  Indications: pain   Procedure Details  Location: knee - R knee  Preparation: Patient was prepped and draped in the usual sterile fashion  Needle gauge: 21 G.  Medications administered: 5 mL lidocaine 1 %; 40 mg triamcinolone acetonide 40 MG/ML  Patient tolerance: patient tolerated the procedure well with no immediate complications    This injection documentation was Scribed for Shahla Ram MD by Cris Suresh.  04/15/25   15:18 EDT      Imaging Results (Most Recent)       Procedure Component Value Units Date/Time    XR Knee 3 View Right [[id number]] Resulted: 04/15/25 1444     Updated: 04/15/25 1446             Result Review :       X-Ray Report:  Right knee X-Ray  Indication: Evaluation of the right knee  AP/Lateral and [place] view(s)  Findings: Moderately advanced degenerative changes with osteophyte formation noted.   Prior studies available for comparison: no             Assessment and Plan     Diagnoses and all orders for this visit:    1. Right knee pain, unspecified chronicity (Primary)  -      XR Knee 3 View Right    2. Osteoarthritis of right knee, unspecified osteoarthritis type        Discussed the treatment plan with the patient. I reviewed the X-rays that were obtained today with the patient.     Discussed the treatment options with the patient, operative vs non-operative. The patient is not a good candidate for a total knee arthroplasty of either knee but she does dialysis treatment at home 4 times a day 5 days a week.     Discussed the risks and benefits of a right knee steroid injection.  She tolerated the injection well.      Discussed with the patient that due to the steroid injection given today in the office they may see an increase in blood sugar for a few days. Advised patient to monitor sugar after receiving the injection.      Discussed possibility of a reaction from the injection.  Discussed the possibility that the injection may not completely improve or remove the pain.  Discussed the risk of infection.        Call or return if worsening symptoms.    Follow Up     PRN      Patient was given instructions and counseling regarding her condition or for health maintenance advice. Please see specific information pulled into the AVS if appropriate.     Scribed for Shahla Ram MD by Heidi Ch MA.  04/15/25   14:52 EDT    I have personally performed the services described in this document as scribed by the above individual and it is both accurate and complete. Shahla Ram MD 04/15/25

## 2025-04-17 DIAGNOSIS — I10 PRIMARY HYPERTENSION: ICD-10-CM

## 2025-04-17 RX ORDER — HYDRALAZINE HYDROCHLORIDE 25 MG/1
25 TABLET, FILM COATED ORAL DAILY
Qty: 30 TABLET | Refills: 0 | Status: SHIPPED | OUTPATIENT
Start: 2025-04-17

## 2025-04-17 RX ORDER — LORATADINE 10 MG/1
TABLET ORAL
Qty: 30 TABLET | Refills: 0 | Status: SHIPPED | OUTPATIENT
Start: 2025-04-17

## 2025-04-20 ENCOUNTER — APPOINTMENT (OUTPATIENT)
Dept: GENERAL RADIOLOGY | Facility: HOSPITAL | Age: 65
End: 2025-04-20
Payer: MEDICAID

## 2025-04-20 ENCOUNTER — HOSPITAL ENCOUNTER (EMERGENCY)
Facility: HOSPITAL | Age: 65
Discharge: HOME | End: 2025-04-20
Attending: EMERGENCY MEDICINE
Payer: MEDICAID

## 2025-04-20 VITALS
DIASTOLIC BLOOD PRESSURE: 69 MMHG | TEMPERATURE: 97.8 F | BODY MASS INDEX: 32.63 KG/M2 | RESPIRATION RATE: 13 BRPM | HEIGHT: 64 IN | OXYGEN SATURATION: 94 % | WEIGHT: 191.14 LBS | HEART RATE: 76 BPM | SYSTOLIC BLOOD PRESSURE: 124 MMHG

## 2025-04-20 DIAGNOSIS — K21.00 GASTROESOPHAGEAL REFLUX DISEASE WITH ESOPHAGITIS WITHOUT HEMORRHAGE: Primary | ICD-10-CM

## 2025-04-20 DIAGNOSIS — E86.0 DEHYDRATION: ICD-10-CM

## 2025-04-20 DIAGNOSIS — E83.110 HEREDITARY HEMOCHROMATOSIS: ICD-10-CM

## 2025-04-20 LAB
ALBUMIN SERPL-MCNC: 3.7 G/DL (ref 3.5–5.2)
ALBUMIN/GLOB SERPL: 1.4 G/DL
ALP SERPL-CCNC: 72 U/L (ref 39–117)
ALT SERPL W P-5'-P-CCNC: 9 U/L (ref 1–33)
ANION GAP SERPL CALCULATED.3IONS-SCNC: 15.3 MMOL/L (ref 5–15)
AST SERPL-CCNC: 14 U/L (ref 1–32)
BACTERIA UR QL AUTO: ABNORMAL /HPF
BASOPHILS # BLD AUTO: 0.02 10*3/MM3 (ref 0–0.2)
BASOPHILS NFR BLD AUTO: 0.3 % (ref 0–1.5)
BILIRUB SERPL-MCNC: 0.2 MG/DL (ref 0–1.2)
BILIRUB UR QL STRIP: NEGATIVE
BUN SERPL-MCNC: 88 MG/DL (ref 8–23)
BUN/CREAT SERPL: 8.5 (ref 7–25)
CALCIUM SPEC-SCNC: 12.3 MG/DL (ref 8.6–10.5)
CHLORIDE SERPL-SCNC: 92 MMOL/L (ref 98–107)
CLARITY UR: CLEAR
CO2 SERPL-SCNC: 31.7 MMOL/L (ref 22–29)
COLOR UR: YELLOW
CREAT SERPL-MCNC: 10.3 MG/DL (ref 0.57–1)
DEPRECATED RDW RBC AUTO: 47.7 FL (ref 37–54)
EGFRCR SERPLBLD CKD-EPI 2021: 3.9 ML/MIN/1.73
EOSINOPHIL # BLD AUTO: 0.13 10*3/MM3 (ref 0–0.4)
EOSINOPHIL NFR BLD AUTO: 1.8 % (ref 0.3–6.2)
ERYTHROCYTE [DISTWIDTH] IN BLOOD BY AUTOMATED COUNT: 14.4 % (ref 12.3–15.4)
GEN 5 1HR TROPONIN T REFLEX: 36 NG/L
GLOBULIN UR ELPH-MCNC: 2.7 GM/DL
GLUCOSE SERPL-MCNC: 107 MG/DL (ref 65–99)
GLUCOSE UR STRIP-MCNC: NEGATIVE MG/DL
HCT VFR BLD AUTO: 27.3 % (ref 34–46.6)
HEMOCCULT STL QL IA: NEGATIVE
HGB BLD-MCNC: 8.9 G/DL (ref 12–15.9)
HGB UR QL STRIP.AUTO: NEGATIVE
HOLD SPECIMEN: NORMAL
HOLD SPECIMEN: NORMAL
HYALINE CASTS UR QL AUTO: ABNORMAL /LPF
IMM GRANULOCYTES # BLD AUTO: 0.02 10*3/MM3 (ref 0–0.05)
IMM GRANULOCYTES NFR BLD AUTO: 0.3 % (ref 0–0.5)
KETONES UR QL STRIP: ABNORMAL
LEUKOCYTE ESTERASE UR QL STRIP.AUTO: ABNORMAL
LYMPHOCYTES # BLD AUTO: 2.4 10*3/MM3 (ref 0.7–3.1)
LYMPHOCYTES NFR BLD AUTO: 32.8 % (ref 19.6–45.3)
MAGNESIUM SERPL-MCNC: 2 MG/DL (ref 1.6–2.4)
MCH RBC QN AUTO: 29.9 PG (ref 26.6–33)
MCHC RBC AUTO-ENTMCNC: 32.6 G/DL (ref 31.5–35.7)
MCV RBC AUTO: 91.6 FL (ref 79–97)
MONOCYTES # BLD AUTO: 0.7 10*3/MM3 (ref 0.1–0.9)
MONOCYTES NFR BLD AUTO: 9.6 % (ref 5–12)
NEUTROPHILS NFR BLD AUTO: 4.04 10*3/MM3 (ref 1.7–7)
NEUTROPHILS NFR BLD AUTO: 55.2 % (ref 42.7–76)
NITRITE UR QL STRIP: NEGATIVE
NRBC BLD AUTO-RTO: 0 /100 WBC (ref 0–0.2)
PH UR STRIP.AUTO: 7 [PH] (ref 5–8)
PLATELET # BLD AUTO: 218 10*3/MM3 (ref 140–450)
PMV BLD AUTO: 12 FL (ref 6–12)
POTASSIUM SERPL-SCNC: 4.1 MMOL/L (ref 3.5–5.2)
PROT SERPL-MCNC: 6.4 G/DL (ref 6–8.5)
PROT UR QL STRIP: ABNORMAL
RBC # BLD AUTO: 2.98 10*6/MM3 (ref 3.77–5.28)
RBC # UR STRIP: ABNORMAL /HPF
REF LAB TEST METHOD: ABNORMAL
SODIUM SERPL-SCNC: 139 MMOL/L (ref 136–145)
SP GR UR STRIP: 1.01 (ref 1–1.03)
SQUAMOUS #/AREA URNS HPF: ABNORMAL /HPF
TROPONIN T % DELTA: -8
TROPONIN T NUMERIC DELTA: -3 NG/L
TROPONIN T SERPL HS-MCNC: 39 NG/L
UROBILINOGEN UR QL STRIP: ABNORMAL
WBC # UR STRIP: ABNORMAL /HPF
WBC NRBC COR # BLD AUTO: 7.31 10*3/MM3 (ref 3.4–10.8)
WHOLE BLOOD HOLD COAG: NORMAL
WHOLE BLOOD HOLD SPECIMEN: NORMAL

## 2025-04-20 PROCEDURE — 81001 URINALYSIS AUTO W/SCOPE: CPT | Performed by: EMERGENCY MEDICINE

## 2025-04-20 PROCEDURE — 93010 ELECTROCARDIOGRAM REPORT: CPT | Performed by: INTERNAL MEDICINE

## 2025-04-20 PROCEDURE — 96374 THER/PROPH/DIAG INJ IV PUSH: CPT

## 2025-04-20 PROCEDURE — 84484 ASSAY OF TROPONIN QUANT: CPT | Performed by: EMERGENCY MEDICINE

## 2025-04-20 PROCEDURE — 83540 ASSAY OF IRON: CPT | Performed by: NURSE PRACTITIONER

## 2025-04-20 PROCEDURE — 93005 ELECTROCARDIOGRAM TRACING: CPT | Performed by: EMERGENCY MEDICINE

## 2025-04-20 PROCEDURE — 85025 COMPLETE CBC W/AUTO DIFF WBC: CPT

## 2025-04-20 PROCEDURE — 93005 ELECTROCARDIOGRAM TRACING: CPT

## 2025-04-20 PROCEDURE — 83735 ASSAY OF MAGNESIUM: CPT | Performed by: EMERGENCY MEDICINE

## 2025-04-20 PROCEDURE — 80053 COMPREHEN METABOLIC PANEL: CPT | Performed by: EMERGENCY MEDICINE

## 2025-04-20 PROCEDURE — 99284 EMERGENCY DEPT VISIT MOD MDM: CPT

## 2025-04-20 PROCEDURE — 82728 ASSAY OF FERRITIN: CPT | Performed by: NURSE PRACTITIONER

## 2025-04-20 PROCEDURE — 25010000002 FAMOTIDINE 10 MG/ML SOLUTION: Performed by: EMERGENCY MEDICINE

## 2025-04-20 PROCEDURE — 96375 TX/PRO/DX INJ NEW DRUG ADDON: CPT

## 2025-04-20 PROCEDURE — 82274 ASSAY TEST FOR BLOOD FECAL: CPT | Performed by: EMERGENCY MEDICINE

## 2025-04-20 PROCEDURE — 84466 ASSAY OF TRANSFERRIN: CPT | Performed by: NURSE PRACTITIONER

## 2025-04-20 PROCEDURE — 36415 COLL VENOUS BLD VENIPUNCTURE: CPT | Performed by: EMERGENCY MEDICINE

## 2025-04-20 PROCEDURE — 25810000003 SODIUM CHLORIDE 0.9 % SOLUTION: Performed by: EMERGENCY MEDICINE

## 2025-04-20 PROCEDURE — 71045 X-RAY EXAM CHEST 1 VIEW: CPT

## 2025-04-20 PROCEDURE — 25010000002 ONDANSETRON PER 1 MG: Performed by: EMERGENCY MEDICINE

## 2025-04-20 RX ORDER — ONDANSETRON 4 MG/1
4 TABLET, ORALLY DISINTEGRATING ORAL EVERY 6 HOURS PRN
Qty: 15 TABLET | Refills: 0 | Status: SHIPPED | OUTPATIENT
Start: 2025-04-20

## 2025-04-20 RX ORDER — ONDANSETRON 2 MG/ML
4 INJECTION INTRAMUSCULAR; INTRAVENOUS ONCE
Status: COMPLETED | OUTPATIENT
Start: 2025-04-20 | End: 2025-04-20

## 2025-04-20 RX ORDER — PANTOPRAZOLE SODIUM 40 MG/1
40 TABLET, DELAYED RELEASE ORAL DAILY
Qty: 14 TABLET | Refills: 0 | Status: SHIPPED | OUTPATIENT
Start: 2025-04-20 | End: 2025-04-22

## 2025-04-20 RX ORDER — SODIUM CHLORIDE 0.9 % (FLUSH) 0.9 %
10 SYRINGE (ML) INJECTION AS NEEDED
Status: DISCONTINUED | OUTPATIENT
Start: 2025-04-20 | End: 2025-04-20 | Stop reason: HOSPADM

## 2025-04-20 RX ORDER — FAMOTIDINE 10 MG/ML
20 INJECTION, SOLUTION INTRAVENOUS ONCE
Status: COMPLETED | OUTPATIENT
Start: 2025-04-20 | End: 2025-04-20

## 2025-04-20 RX ADMIN — ONDANSETRON 4 MG: 2 INJECTION INTRAMUSCULAR; INTRAVENOUS at 18:11

## 2025-04-20 RX ADMIN — SODIUM CHLORIDE 1000 ML: 9 INJECTION, SOLUTION INTRAVENOUS at 18:10

## 2025-04-20 RX ADMIN — FAMOTIDINE 20 MG: 10 INJECTION INTRAVENOUS at 18:10

## 2025-04-20 NOTE — ED PROVIDER NOTES
"Time: 5:06 PM EDT  Date of encounter:  4/20/2025  Independent Historian/Clinical History and Information was obtained by:   Patient    History is limited by: N/A    Chief Complaint: Syncope      History of Present Illness:  Patient is a 64 y.o. year old female who presents to the emergency department for evaluation of possible syncopal versus near syncopal event yesterday.  Patient states she was at an Easter event today and was tilted in the coming to the hospital when she told her family she passed out yesterday.  She reports that in the past few weeks her acid reflux has been much worse and she has had some recent vomiting.  Has been dizzy for several months \"since I had my stroke\".  This is not a new issue today and the patient also states that it is not uncommon for her to pass out.  She presents today primarily for the syncopal event yesterday and family noting that her blood pressure is running low today.  She has no complaints of new focal numbness tingling or weakness today.  Her dizziness is worse with standing and she states she feels very dehydrated.  She does peritoneal dialysis at home and does still make urine.  No reported fevers.  Has not vomited today.      Patient Care Team  Primary Care Provider: Ellen Strickland DO    Past Medical History:     Allergies   Allergen Reactions    Iron Sucrose Unknown - High Severity     HX OF HEMOCHROMATOSIS     Lamotrigine Rash    Latex Hives, Nausea And Vomiting and Rash     blisters      Lamictal Xr [Lamotrigine Er] Rash    Nickel Rash     RASH W/COSMETIC JEWELRY    Sulfa Antibiotics Rash     PT REPORTS SHE HAS NEVER HAD TROUBLE OR NOTED ISSUES WITH SULFA BEFORE     Past Medical History:   Diagnosis Date    Acid reflux disease     Anemia     NO RECENT INTERVENTIONS    Anxiety     Arthritis     Asthma     Bipolar disorder     Borderline diabetic     HX OF BUT REPORTS NO ISSUES NOT ON MEDICATION AND IS DIET CONTROLLED    Cataract     Chronic back pain 01/13/2012    " Chronic bronchitis     DENIES ANY CURRENT ISSUES    Chronic UTI     NO CURRENT S/S    Colitis     Congestive heart failure (CHF)     FOLLOWED BY DR [facility]IS [facility], NO CURRENT S/S    Depression     Dizziness     Drug dependence     Fibromyalgia     H/O psychiatric care     Heart murmur     Hematological disorder     hemachromatosis     Hemochromatosis 2012    Hemorrhoids     Hyperlipidemia     Hypertension, essential     Hypoglycemia 2021    Kidney stones     past     Knee pain     Lack of coordination     Major depression, recurrent     Meningitis     Migraine headache     Mitral valve prolapse     Muscle spasm     Neck pain     Night sweats     HX OF NO CURRENT ISSUES    PAD (peripheral artery disease)     STENTS L AND RIGHT GROIN    Pain management     PAIN PUMP W/FENTANYL/ DR KIRK PAIN PUMP CURRENTLY EMPTY    Peptic ulcer     Peritoneal dialysis catheter in place     PD DOES 5 DAYS/WEEK. PD EXCHANGE 4 TIMES ON THOSE DAYS.    PONV (postoperative nausea and vomiting)     Psoriasis-like skin disease     eczema    PTSD (post-traumatic stress disorder)     Ringing in ears     Seizures     NONE FOR A LONG TIME    Stroke (cerebrum)     TIA, MEMORY ISSUES    Syncope and collapse     REPORTS HAPPENED ABOUT A YEAR AGO    Weakness     Weight loss      Past Surgical History:   Procedure Laterality Date    APPENDECTOMY       SECTION      X4    COLONOSCOPY      COLONOSCOPY N/A 2021    Procedure: COLONOSCOPY;  Surgeon: Aston Fernandez MD;  Location: [facility] ENDOSCOPY;  Service: Gastroenterology;  Laterality: N/A;  COLITIS    COLONOSCOPY N/A 3/19/2025    Procedure: COLONOSCOPY;  Surgeon: Dione Harrell MD;  Location: [facility] ENDOSCOPY;  Service: Gastroenterology;  Laterality: N/A;  hemorrhoids    ENDOSCOPY N/A 2022    Procedure: ESOPHAGOGASTRODUODENOSCOPY WITH BIOPSIES;  Surgeon: Dione Harrell MD;  Location: [facility] ENDOSCOPY;  Service: Gastroenterology;   Laterality: N/A;  GASTRITIS    ENDOSCOPY N/A 3/19/2025    Procedure: ESOPHAGOGASTRODUODENOSCOPY with biopsies;  Surgeon: Dione Harrell MD;  Location: [facility] ENDOSCOPY;  Service: Gastroenterology;  Laterality: N/A;  gastritis    FEMORAL ARTERY STENT Bilateral     HYSTERECTOMY  1984,2008    partial/total     INSERTION HEMODIALYSIS CATHETER Right 03/13/2023    Procedure: HEMODIALYSIS CATHETER INSERTION;  Surgeon: Suman Butler MD;  Location: [facility] MAIN OR;  Service: General;  Laterality: Right;  PER DR VILLEDA    INSERTION PERITONEAL DIALYSIS CATHETER      REVISION    INSERTION PERITONEAL DIALYSIS CATHETER N/A 03/13/2023    Procedure: INSERTION PERITONEAL DIALYSIS CATHETER LAPAROSCOPIC;  Surgeon: Suman Butler MD;  Location: [facility] MAIN OR;  Service: General;  Laterality: N/A;  PER DR BUTLER    INTERVENTIONAL RADIOLOGY PROCEDURE Right 02/14/2022    Procedure: Abdominal Aortagram with Runoff;  Surgeon: Aspen Couch MD;  Location: [facility] CATH INVASIVE LOCATION;  Service: Peripheral Vascular;  Laterality: Right;    NECK SURGERY Left     KNOW REMOVED    PAIN PUMP INSERTION/REVISION       FENTANYL INSERTED     PAIN PUMP INSERTION/REVISION Left 5/31/2024    Procedure: PAIN PUMP REMOVAL, left lower abdomen;  Surgeon: Semaj Blackwood MD;  Location: [facility] MAIN OR;  Service: Neurosurgery;  Laterality: Left;    REMOVAL HEMODIALYSIS CATHETER      TONSILLECTOMY  1998    VOCAL CORD MASS EXCISION      WOUND EXPLORATION LEG N/A 6/5/2024    Procedure: LUMBAR WOUND EXPLORATION WITH REMOVAL OF RETAINED IT PUMP TUBING AND REPAIR OF CSF LEAK;  Surgeon: Semaj Blackwood MD;  Location: [facility] MAIN OR;  Service: Neurosurgery;  Laterality: N/A;     Family History   Problem Relation Age of Onset    Cancer Sister     Cancer Brother     Cancer Paternal Aunt     Cancer Paternal Uncle     Cancer Other     Stroke Other     Diabetes Other     Heart failure Other     Malig Hyperthermia Neg Hx        Home Medications:  Prior  to Admission medications    Medication Sig Start Date End Date Taking? Authorizing Provider   albuterol sulfate  (90 Base) MCG/ACT inhaler Inhale 2 puffs Every 4 (Four) Hours As Needed for Wheezing.    Reynold Candelario MD   amLODIPine (NORVASC) 10 MG tablet Take 1 tablet by mouth Daily.    Reynold Candelario MD   atorvastatin (LIPITOR) 40 MG tablet TAKE 1 TABLET BY MOUTH EVERY EVENING FOR CHOLESTEROL 11/19/24   Ellen Strickland DO   calcium acetate (PHOS BINDER,) 667 MG capsule capsule Take 2 capsules by mouth 3 (Three) Times a Day Before Meals.    Reynold Candelario MD   calcium acetate (PHOS BINDER,) 667 MG capsule capsule Take 1 capsule by mouth With Snacks. Take before snack    Reynold Candelario MD   Calcium Carb-Cholecalciferol (GNP Calcium 600 +D3) 600-20 MG-MCG tablet TAKE 1 TABLET BY MOUTH TWICE DAILY 8/22/24   Ellen Strickland DO   carvedilol (COREG) 25 MG tablet Take 1 tablet by mouth 2 (Two) Times a Day. 3/24/25   Quintin Smiley MD   clopidogrel (PLAVIX) 75 MG tablet TAKE 1 TABLET BY MOUTH ONCE DAILY FOR HEART 11/19/24   Ellen Strickland DO   diphenhydrAMINE (BENADRYL) 25 MG tablet Take 2 tablets by mouth Every Night.    Reynold Candelario MD   docusate sodium (COLACE) 100 MG capsule Take 1 capsule by mouth 2 (Two) Times a Day.    Reynold Candelario MD   escitalopram (Lexapro) 10 MG tablet Take 1 tablet by mouth Daily. 3/13/25 3/13/26  Barby Marr APRN   fluticasone (FLONASE) 50 MCG/ACT nasal spray Administer 2 sprays into the nostril(s) as directed by provider Daily As Needed for Allergies.    Reynold Candelario MD   furosemide (LASIX) 80 MG tablet TAKE 1 TABLET BY MOUTH TWICE DAILY 11/16/24   Ellen Strickland DO   GNP ClearLax 17 GM/SCOOP powder Take 17 g by mouth Daily. 10/20/21   Reynold Candelario MD   Heparin & NaCl Lock Flush (HEPARIN COMBINATION IV) Infuse  into a venous catheter. With dialysis solution FOR PD CATH, TAKES EVERY MONDAY    Reynold Candelario MD    hydrALAZINE (APRESOLINE) 25 MG tablet TAKE 1 TABLET BY MOUTH ONCE DAILY 4/17/25   Adrienne Roe APRN   hyoscyamine (LEVSIN) 0.125 MG SL tablet TAKE 1 TABLET UNDER TONGUE EVERY SIX HOURS AS NEEDED FOR CRAMPING OR DIARRHEA 1/20/25   Dione Harrell MD   Insulin Pen Needle (Ultracare Pen Needles) 32G X 4 MM misc Use to inject ozempic 1 time per week. 12/20/24 12/20/25  Adrienne Roe APRN   Lokelma 10 g pack Take 20 g by mouth 2 (Two) Times a Week. REPORTS USES 2 PACKS AND ONLY USES ON DAYS SHE DOES NOT DO PD DIALYSIS 12/5/22   Reynold Candelario MD   loratadine (CLARITIN) 10 MG tablet TAKE 1 TABLET BY MOUTH ONCE DAILY FOR ALLERGIES 4/17/25   Adrienne Roe APRN   LORazepam (ATIVAN) 0.5 MG tablet Take 1 tablet by mouth Daily As Needed for Anxiety. 3/13/25   Barby Marr APRN   losartan (COZAAR) 100 MG tablet  10/22/24   Reynold Candelario MD   Nebulizers (Comp Air Compressor Nebulizer) misc  1/30/25   Reynold Candelario MD   OLANZapine (zyPREXA) 15 MG tablet Take 1 tablet by mouth Every Night. 3/13/25   Barby Marr APRN   pantoprazole (PROTONIX) 40 MG EC tablet Take 1 tablet by mouth Daily. 11/5/24   Dione Harrell MD   pregabalin (LYRICA) 150 MG capsule Take 1 capsule by mouth Every Night.    ProviderReynold MD   promethazine (PHENERGAN) 12.5 MG tablet Take 1 tablet by mouth Every 6 (Six) Hours As Needed for Nausea or Vomiting. 4/11/24   Dione Harrell MD   Semaglutide,0.25 or 0.5MG/DOS, (OZEMPIC) 2 MG/3ML solution pen-injector Inject 0.25 mg under the skin into the appropriate area as directed 1 (One) Time Per Week. 11/20/24   Adrienne Roe APRN   tiZANidine (ZANAFLEX) 4 MG tablet Take 1 tablet by mouth Every Night.    ProviderReynold MD        Social History:   Social History     Tobacco Use    Smoking status: Former     Current packs/day: 0.00     Average packs/day: 0.5 packs/day for 54.0 years (27.0 ttl pk-yrs)     Types: Cigarettes      "Start date:      Quit date:      Years since quittin.3     Passive exposure: Current    Smokeless tobacco: Never   Vaping Use    Vaping status: Never Used   Substance Use Topics    Alcohol use: Not Currently    Drug use: Never         Review of Systems:  Review of Systems   Constitutional:  Positive for fatigue. Negative for chills and fever.   HENT:  Negative for congestion, rhinorrhea and sore throat.    Eyes:  Negative for photophobia.   Respiratory:  Negative for apnea, cough, chest tightness and shortness of breath.    Cardiovascular:  Negative for chest pain and palpitations.   Gastrointestinal:  Negative for abdominal pain, diarrhea, nausea and vomiting.   Endocrine: Negative.    Genitourinary:  Negative for difficulty urinating and dysuria.   Musculoskeletal:  Negative for back pain, joint swelling and myalgias.   Skin:  Negative for color change and wound.   Allergic/Immunologic: Negative.    Neurological:  Positive for dizziness and syncope. Negative for seizures and headaches.   Psychiatric/Behavioral: Negative.     All other systems reviewed and are negative.       Physical Exam:  /69 (BP Location: Right arm, Patient Position: Lying)   Pulse 76   Temp 97.8 °F (36.6 °C) (Oral)   Resp 13   Ht 162.6 cm (64\")   Wt 86.7 kg (191 lb 2.2 oz)   LMP  (LMP Unknown)   SpO2 94%   BMI 32.81 kg/m²     Physical Exam  Vitals and nursing note reviewed.   Constitutional:       General: She is awake.      Appearance: Normal appearance.   HENT:      Head: Normocephalic and atraumatic.      Nose: Nose normal.      Mouth/Throat:      Mouth: Mucous membranes are moist.   Eyes:      Extraocular Movements: Extraocular movements intact.      Pupils: Pupils are equal, round, and reactive to light.   Cardiovascular:      Rate and Rhythm: Normal rate and regular rhythm.      Heart sounds: Normal heart sounds.   Pulmonary:      Effort: Pulmonary effort is normal. No respiratory distress.      Breath sounds: " "Normal breath sounds. No wheezing, rhonchi or rales.   Abdominal:      General: Bowel sounds are normal.      Palpations: Abdomen is soft.      Tenderness: There is no abdominal tenderness. There is no guarding or rebound.      Comments: No rigidity   Musculoskeletal:         General: No tenderness. Normal range of motion.      Cervical back: Normal range of motion and neck supple.   Skin:     General: Skin is warm and dry.      Coloration: Skin is not jaundiced.   Neurological:      General: No focal deficit present.      Mental Status: She is alert and oriented to person, place, and time. Mental status is at baseline.      Cranial Nerves: No cranial nerve deficit.      Motor: No weakness.      Coordination: Coordination normal.   Psychiatric:         Mood and Affect: Mood normal.                    Medical Decision Making:      Comorbidities that affect care:    Bipolar disorder, asthma, drug dependence, fibromyalgia, \"dizziness\", PTSD, seizures, depression    External Notes reviewed:    Previous Clinic Note: Orthopedics office visit 4/15/2025.  Description: Right knee pain    Previous radiology: MRA head 10/1/24 as follows: No evidence of proximal LVO.  Moderate stenosis of M1 segment of the left MCA.        The following orders were placed and all results were independently analyzed by me:  Orders Placed This Encounter   Procedures    XR Chest 1 View    [place] Draw    Comprehensive Metabolic Panel    High Sensitivity Troponin T    Magnesium    Urinalysis With Microscopic If Indicated (No Culture) - Urine, Clean Catch    CBC Auto Differential    Urinalysis, Microscopic Only - Urine, Clean Catch    High Sensitivity Troponin T 1Hr    Occult Blood, Fecal By Immunoassay - Stool, Per Rectum    Undress & Gown    Continuous Pulse Oximetry    Vital Signs    Orthostatic Vitals (Blood Pressure & Heart Rate)    ECG 12 Lead Syncope    CBC & Differential    Green Top (Gel)    Lavender Top    Gold Top - SST    Light Blue Top "       Medications Given in the Emergency Department:  Medications   sodium chloride 0.9 % bolus 1,000 mL (0 mL Intravenous Stopped 4/20/25 2045)   ondansetron (ZOFRAN) injection 4 mg (4 mg Intravenous Given 4/20/25 1811)   famotidine (PEPCID) injection 20 mg (20 mg Intravenous Given 4/20/25 1810)        ED Course:    ED Course as of 04/21/25 0207   Sun Apr 20, 2025   1723 I have personally interpreted the EKG today and it shows no evidence of any acute ischemia or heart arrhythmia. [RP]      ED Course User Index  [RP] Anthony Carter MD       Labs:    Lab Results (last 24 hours)       Procedure Component Value Units Date/Time    CBC & Differential [[id number]]  (Abnormal) Collected: 04/20/25 1648    Specimen: Blood Updated: 04/20/25 1658    Narrative:      The following orders were created for panel order CBC & Differential.  Procedure                               Abnormality         Status                     ---------                               -----------         ------                     CBC Auto Differential[[id number]]        Abnormal            Final result                 Please view results for these tests on the individual orders.    Comprehensive Metabolic Panel [[id number]]  (Abnormal) Collected: 04/20/25 1648    Specimen: Blood Updated: 04/20/25 1722     Glucose 107 mg/dL      BUN 88 mg/dL      Creatinine 10.30 mg/dL      Sodium 139 mmol/L      Potassium 4.1 mmol/L      Chloride 92 mmol/L      CO2 31.7 mmol/L      Calcium 12.3 mg/dL      Total Protein 6.4 g/dL      Albumin 3.7 g/dL      ALT (SGPT) 9 U/L      AST (SGOT) 14 U/L      Alkaline Phosphatase 72 U/L      Total Bilirubin 0.2 mg/dL      Globulin 2.7 gm/dL      A/G Ratio 1.4 g/dL      BUN/Creatinine Ratio 8.5     Anion Gap 15.3 mmol/L      eGFR 3.9 mL/min/1.73     Narrative:      GFR Categories in Chronic Kidney Disease (CKD)      GFR Category          GFR (mL/min/1.73)    Interpretation  G1                     90 or greater         Normal or  high (1)  G2                      60-89                Mild decrease (1)  G3a                   45-59                Mild to moderate decrease  G3b                   30-44                Moderate to severe decrease  G4                    15-29                Severe decrease  G5                    14 or less           Kidney failure          (1)In the absence of evidence of kidney disease, neither GFR category G1 or G2 fulfill the criteria for CKD.    eGFR calculation 2021 CKD-EPI creatinine equation, which does not include race as a factor    High Sensitivity Troponin T [[id number]]  (Abnormal) Collected: 04/20/25 1648    Specimen: Blood Updated: 04/20/25 1722     HS Troponin T 39 ng/L     Narrative:      High Sensitive Troponin T Reference Range:  <14.0 ng/L- Negative Female for AMI  <22.0 ng/L- Negative Male for AMI  >=14 - Abnormal Female indicating possible myocardial injury.  >=22 - Abnormal Male indicating possible myocardial injury.   Clinicians would have to utilize clinical acumen, EKG, Troponin, and serial changes to determine if it is an Acute Myocardial Infarction or myocardial injury due to an underlying chronic condition.         Magnesium [[id number]]  (Normal) Collected: 04/20/25 1648    Specimen: Blood Updated: 04/20/25 1722     Magnesium 2.0 mg/dL     CBC Auto Differential [[id number]]  (Abnormal) Collected: 04/20/25 1648    Specimen: Blood Updated: 04/20/25 1658     WBC 7.31 10*3/mm3      RBC 2.98 10*6/mm3      Hemoglobin 8.9 g/dL      Hematocrit 27.3 %      MCV 91.6 fL      MCH 29.9 pg      MCHC 32.6 g/dL      RDW 14.4 %      RDW-SD 47.7 fl      MPV 12.0 fL      Platelets 218 10*3/mm3      Neutrophil % 55.2 %      Lymphocyte % 32.8 %      Monocyte % 9.6 %      Eosinophil % 1.8 %      Basophil % 0.3 %      Immature Grans % 0.3 %      Neutrophils, Absolute 4.04 10*3/mm3      Lymphocytes, Absolute 2.40 10*3/mm3      Monocytes, Absolute 0.70 10*3/mm3      Eosinophils, Absolute 0.13 10*3/mm3       Basophils, Absolute 0.02 10*3/mm3      Immature Grans, Absolute 0.02 10*3/mm3      nRBC 0.0 /100 WBC     Urinalysis With Microscopic If Indicated (No Culture) - Urine, Clean Catch [[id number]]  (Abnormal) Collected: 04/20/25 1657    Specimen: Urine, Clean Catch Updated: 04/20/25 1711     Color, UA Yellow     Appearance, UA Clear     pH, UA 7.0     Specific Gravity, UA 1.012     Glucose, UA Negative     Ketones, UA Trace     Bilirubin, UA Negative     Blood, UA Negative     Protein, UA 30 mg/dL (1+)     Leuk Esterase, UA Trace     Nitrite, UA Negative     Urobilinogen, UA 0.2 E.U./dL    Urinalysis, Microscopic Only - Urine, Clean Catch [[id number]]  (Abnormal) Collected: 04/20/25 1657    Specimen: Urine, Clean Catch Updated: 04/20/25 1711     RBC, UA None Seen /HPF      WBC, UA 3-5 /HPF      Bacteria, UA None Seen /HPF      Squamous Epithelial Cells, UA 0-2 /HPF      Hyaline Casts, UA 7-12 /LPF      Methodology Automated Microscopy    High Sensitivity Troponin T 1Hr [[id number]]  (Abnormal) Collected: 04/20/25 1809    Specimen: Blood Updated: 04/20/25 1836     HS Troponin T 36 ng/L      Troponin T Numeric Delta -3 ng/L      Troponin T % Delta -8    Narrative:      High Sensitive Troponin T Reference Range:  <14.0 ng/L- Negative Female for AMI  <22.0 ng/L- Negative Male for AMI  >=14 - Abnormal Female indicating possible myocardial injury.  >=22 - Abnormal Male indicating possible myocardial injury.   Clinicians would have to utilize clinical acumen, EKG, Troponin, and serial changes to determine if it is an Acute Myocardial Infarction or myocardial injury due to an underlying chronic condition.         Occult Blood, Fecal By Immunoassay - Stool, Per Rectum [[id number]]  (Normal) Collected: 04/20/25 2045    Specimen: Stool from Per Rectum Updated: 04/20/25 2109     Occult Blood, Fecal by Immunoassay Negative             Imaging:    XR Chest 1 View  Result Date: 4/20/2025  XR CHEST 1 VW Date of Exam: 4/20/2025 4:45 PM EDT  Indication: Weak/Dizzy/AMS triage protocol Comparison: Chest x-ray 9/30/2024 Findings: Normal cardiomediastinal silhouette. The lungs are clear. No pleural effusion or pneumothorax. No acute osseous findings.     Impression: No acute cardiopulmonary findings. Electronically Signed: Valerio Adan MD  4/20/2025 4:57 PM EDT  Workstation ID: [id number]        Differential Diagnosis and Discussion:    Dizziness: Based on the patient's history, signs, and symptoms, the diffential diagnosis includes but is not limited to meningitis, stroke, sepsis, subarachnoid hemorrhage, intracranial bleeding, encephalitis, vertigo, electrolyte imbalance, and metabolic disorders.    PROCEDURES:    Labs were collected in the emergency department and all labs were reviewed and interpreted by me.  X-ray were performed in the emergency department and all X-ray impressions were independently interpreted by me.  An EKG was performed and the EKG was interpreted by me.    ECG 12 Lead Syncope   Preliminary Result   HEART RATE=80  bpm   RR Xppkbyml=113  ms   TX Enllondd=531  ms   P Horizontal Axis=12  deg   P Front Axis=69  deg   QRSD Interval=96  ms   QT Wcotnfoi=898  ms   ZGgQ=677  ms   QRS Axis=-13  deg   T Wave Axis=61  deg   - OTHERWISE NORMAL ECG -   Sinus rhythm   Low voltage, precordial leads   Abnormal R-wave progression, early transition   Date and Time of Study:2025-04-20 17:00:17          Procedures    MDM     Amount and/or Complexity of Data Reviewed  Clinical lab tests: reviewed  Tests in the radiology section of CPT®: reviewed  Tests in the medicine section of CPT®: reviewed                       Patient Care Considerations:    SEPSIS was considered but is NOT present in the emergency department as SIRS criteria is not present.      Consultants/Shared Management Plan:    None    Social Determinants of Health:    Patient is independent, reliable, and has access to care.       Disposition and Care Coordination:    Discharged: I  considered escalation of care by admitting this patient to the hospital, however patient feels better after fluids and is hemodynamically stable    I have explained the patient´s condition, diagnoses and treatment plan based on the information available to me at this time. I have answered questions and addressed any concerns. The patient has a good  understanding of the patient´s diagnosis, condition, and treatment plan as can be expected at this point. The vital signs have been stable. The patient´s condition is stable and appropriate for discharge from the emergency department.      The patient will pursue further outpatient evaluation with the primary care physician or other designated or consulting physician as outlined in the discharge instructions. They are agreeable to this plan of care and follow-up instructions have been explained in detail. The patient has received these instructions in written format and has expressed an understanding of the discharge instructions. The patient is aware that any significant change in condition or worsening of symptoms should prompt an immediate return to this or the closest emergency department or call to 911.    Final diagnoses:   Gastroesophageal reflux disease with esophagitis without hemorrhage   Dehydration        ED Disposition       ED Disposition   Discharge    Condition   Stable    Comment   --               This medical record created using voice recognition software.             Anthony Carter MD  04/21/25 0209

## 2025-04-21 ENCOUNTER — TELEPHONE (OUTPATIENT)
Dept: GASTROENTEROLOGY | Facility: CLINIC | Age: 65
End: 2025-04-21
Payer: MEDICAID

## 2025-04-21 NOTE — TELEPHONE ENCOUNTER
Contacted patient from the cancellation list to see if she would like a sooner appointment. Patient has rescheduled her appointment from 10/15/25 to 4/22/25@1:30 with Marcie Jones.

## 2025-04-22 ENCOUNTER — OFFICE VISIT (OUTPATIENT)
Dept: GASTROENTEROLOGY | Facility: CLINIC | Age: 65
End: 2025-04-22
Payer: MEDICAID

## 2025-04-22 ENCOUNTER — RESULTS FOLLOW-UP (OUTPATIENT)
Dept: GASTROENTEROLOGY | Facility: CLINIC | Age: 65
End: 2025-04-22

## 2025-04-22 VITALS
BODY MASS INDEX: 32.71 KG/M2 | WEIGHT: 191.6 LBS | HEIGHT: 64 IN | HEART RATE: 86 BPM | DIASTOLIC BLOOD PRESSURE: 56 MMHG | SYSTOLIC BLOOD PRESSURE: 110 MMHG

## 2025-04-22 DIAGNOSIS — E83.110 HEREDITARY HEMOCHROMATOSIS: ICD-10-CM

## 2025-04-22 DIAGNOSIS — K29.50 CHRONIC GASTRITIS WITHOUT BLEEDING, UNSPECIFIED GASTRITIS TYPE: ICD-10-CM

## 2025-04-22 DIAGNOSIS — D50.9 IRON DEFICIENCY ANEMIA, UNSPECIFIED IRON DEFICIENCY ANEMIA TYPE: Primary | ICD-10-CM

## 2025-04-22 DIAGNOSIS — R11.2 NAUSEA AND VOMITING, UNSPECIFIED VOMITING TYPE: Primary | ICD-10-CM

## 2025-04-22 DIAGNOSIS — R10.30 LOWER ABDOMINAL PAIN: ICD-10-CM

## 2025-04-22 DIAGNOSIS — D64.9 ANEMIA, UNSPECIFIED TYPE: ICD-10-CM

## 2025-04-22 LAB
FERRITIN SERPL-MCNC: 18.8 NG/ML (ref 13–150)
IRON 24H UR-MRATE: 58 MCG/DL (ref 37–145)
IRON SATN MFR SERPL: 18 % (ref 20–50)
TIBC SERPL-MCNC: 322 MCG/DL (ref 298–536)
TRANSFERRIN SERPL-MCNC: 216 MG/DL (ref 200–360)

## 2025-04-22 PROCEDURE — 99214 OFFICE O/P EST MOD 30 MIN: CPT | Performed by: NURSE PRACTITIONER

## 2025-04-22 PROCEDURE — 1159F MED LIST DOCD IN RCRD: CPT | Performed by: NURSE PRACTITIONER

## 2025-04-22 PROCEDURE — 3078F DIAST BP <80 MM HG: CPT | Performed by: NURSE PRACTITIONER

## 2025-04-22 PROCEDURE — 3074F SYST BP LT 130 MM HG: CPT | Performed by: NURSE PRACTITIONER

## 2025-04-22 PROCEDURE — 1160F RVW MEDS BY RX/DR IN RCRD: CPT | Performed by: NURSE PRACTITIONER

## 2025-04-22 RX ORDER — SUCRALFATE 1 G/1
1 TABLET ORAL 4 TIMES DAILY
Qty: 56 TABLET | Refills: 0 | Status: SHIPPED | OUTPATIENT
Start: 2025-04-22

## 2025-04-22 RX ORDER — METOLAZONE 5 MG/1
TABLET ORAL
COMMUNITY
Start: 2025-04-17

## 2025-04-22 NOTE — TELEPHONE ENCOUNTER
Left voicemail requesting a returned call.     Clarified with Marcie Jones she would like pt to complete a capsule endoscopy.

## 2025-04-22 NOTE — TELEPHONE ENCOUNTER
Gave patient results: her iron saturation is low. Given her episode of coffee ground emesis, Marcie recommends a Pillcam. Patient is agreeable. Pillcam is scheduled for 05/01/2025 at 7:45 am. Patient is aware preparation instructions the day before this test.

## 2025-04-22 NOTE — PROGRESS NOTES
Chief Complaint     EGD, Vomiting, and Heartburn    Patient or patient representative verbalized consent for the use of Ambient Listening during the visit with  ELOINA Tovar for chart documentation. 4/22/2025  13:28 EDT      History of Present Illness     History of Present Illness  She presents for f/u of nausea and vomiting, GERD, lower abdominal pain and hemochromatosis.    She has been experiencing episodes of vomiting and reflux, even when in a seated position, for the past few weeks. The reflux has been particularly severe over the past 2 weeks, prompting her to reduce her food intake. An episode of coffee ground emesis occurred approximately 3 to 4 days ago, which was an isolated incident. She also experienced a 40-minute episode of dry heaving. She was evaluated in the ER.  Anemia was present, but stable with a hemoglobin level of 8.9.     She is a peritoneal dialysis patient and has been advised to receive blood transfusions, which she has declined due to her hemochromatosis. Consultation with a hematologist has not occurred in several years.    She has a history of hemochromatosis - confirmed with liver biopsy.           History      Past Medical History:   Diagnosis Date    Acid reflux disease     Anemia     NO RECENT INTERVENTIONS    Anxiety     Arthritis     Asthma     Bipolar disorder     Borderline diabetic     HX OF BUT REPORTS NO ISSUES NOT ON MEDICATION AND IS DIET CONTROLLED    Cataract     Chronic back pain 01/13/2012    Chronic bronchitis     DENIES ANY CURRENT ISSUES    Chronic UTI     NO CURRENT S/S    Colitis     Congestive heart failure (CHF)     FOLLOWED BY DR DAVIS [facility], NO CURRENT S/S    Depression     Dizziness     Drug dependence     Fibromyalgia     H/O psychiatric care     Heart murmur     Hematological disorder     hemachromatosis     Hemochromatosis 01/13/2012    Hemorrhoids     Hyperlipidemia     Hypertension, essential     Hypoglycemia 01/13/2021    Kidney  stones     past     Knee pain     Lack of coordination     Major depression, recurrent     Meningitis     Migraine headache     Mitral valve prolapse     Muscle spasm     Neck pain     Night sweats     HX OF NO CURRENT ISSUES    PAD (peripheral artery disease)     STENTS L AND RIGHT GROIN    Pain management     PAIN PUMP W/FENTANYL/ DR KIRK PAIN PUMP CURRENTLY EMPTY    Peptic ulcer     Peritoneal dialysis catheter in place     PD DOES 5 DAYS/WEEK. PD EXCHANGE 4 TIMES ON THOSE DAYS.    PONV (postoperative nausea and vomiting)     Psoriasis-like skin disease     eczema    PTSD (post-traumatic stress disorder)     Ringing in ears     Seizures     NONE FOR A LONG TIME    Stroke (cerebrum)     TIA, MEMORY ISSUES    Syncope and collapse     REPORTS HAPPENED ABOUT A YEAR AGO    Weakness     Weight loss      Past Surgical History:   Procedure Laterality Date    APPENDECTOMY       SECTION      X4    COLONOSCOPY      COLONOSCOPY N/A 2021    Procedure: COLONOSCOPY;  Surgeon: Aston Fernandez MD;  Location: [facility] ENDOSCOPY;  Service: Gastroenterology;  Laterality: N/A;  COLITIS    COLONOSCOPY N/A 3/19/2025    Procedure: COLONOSCOPY;  Surgeon: Dione Harrell MD;  Location: [facility] ENDOSCOPY;  Service: Gastroenterology;  Laterality: N/A;  hemorrhoids    ENDOSCOPY N/A 2022    Procedure: ESOPHAGOGASTRODUODENOSCOPY WITH BIOPSIES;  Surgeon: Dione Harrell MD;  Location: [facility] ENDOSCOPY;  Service: Gastroenterology;  Laterality: N/A;  GASTRITIS    ENDOSCOPY N/A 3/19/2025    Procedure: ESOPHAGOGASTRODUODENOSCOPY with biopsies;  Surgeon: Dione Harrell MD;  Location: [facility] ENDOSCOPY;  Service: Gastroenterology;  Laterality: N/A;  gastritis    FEMORAL ARTERY STENT Bilateral     HYSTERECTOMY  ,    partial/total     INSERTION HEMODIALYSIS CATHETER Right 2023    Procedure: HEMODIALYSIS CATHETER INSERTION;  Surgeon: Suman Butler MD;  Location: [facility] MAIN OR;   Service: General;  Laterality: Right;  PER DR VILLEDA    INSERTION PERITONEAL DIALYSIS CATHETER      REVISION    INSERTION PERITONEAL DIALYSIS CATHETER N/A 03/13/2023    Procedure: INSERTION PERITONEAL DIALYSIS CATHETER LAPAROSCOPIC;  Surgeon: Suman Butler MD;  Location: [facility] MAIN OR;  Service: General;  Laterality: N/A;  PER DR BUTLER    INTERVENTIONAL RADIOLOGY PROCEDURE Right 02/14/2022    Procedure: Abdominal Aortagram with Runoff;  Surgeon: Aspen Couch MD;  Location: [facility] CATH INVASIVE LOCATION;  Service: Peripheral Vascular;  Laterality: Right;    NECK SURGERY Left     KNOW REMOVED    PAIN PUMP INSERTION/REVISION       FENTANYL INSERTED     PAIN PUMP INSERTION/REVISION Left 5/31/2024    Procedure: PAIN PUMP REMOVAL, left lower abdomen;  Surgeon: Semaj Blackwood MD;  Location: [facility] MAIN OR;  Service: Neurosurgery;  Laterality: Left;    REMOVAL HEMODIALYSIS CATHETER      TONSILLECTOMY  1998    VOCAL CORD MASS EXCISION      WOUND EXPLORATION LEG N/A 6/5/2024    Procedure: LUMBAR WOUND EXPLORATION WITH REMOVAL OF RETAINED IT PUMP TUBING AND REPAIR OF CSF LEAK;  Surgeon: Semaj Blackwood MD;  Location: [facility] MAIN OR;  Service: Neurosurgery;  Laterality: N/A;     Family History   Problem Relation Age of Onset    Cancer Sister     Cancer Brother     Cancer Paternal Aunt     Cancer Paternal Uncle     Cancer Other     Stroke Other     Diabetes Other     Heart failure Other     Malig Hyperthermia Neg Hx         Current Medications       Current Outpatient Medications:     albuterol sulfate  (90 Base) MCG/ACT inhaler, Inhale 2 puffs Every 4 (Four) Hours As Needed for Wheezing., Disp: , Rfl:     amLODIPine (NORVASC) 10 MG tablet, Take 1 tablet by mouth Daily., Disp: , Rfl:     atorvastatin (LIPITOR) 40 MG tablet, TAKE 1 TABLET BY MOUTH EVERY EVENING FOR CHOLESTEROL, Disp: 30 tablet, Rfl: 5    calcium acetate (PHOS BINDER,) 667 MG capsule capsule, Take 2 capsules by mouth 3 (Three) Times a Day  Before Meals., Disp: , Rfl:     calcium acetate (PHOS BINDER,) 667 MG capsule capsule, Take 1 capsule by mouth With Snacks. Take before snack, Disp: , Rfl:     Calcium Carb-Cholecalciferol (GNP Calcium 600 +D3) 600-20 MG-MCG tablet, TAKE 1 TABLET BY MOUTH TWICE DAILY, Disp: 60 tablet, Rfl: 11    carvedilol (COREG) 25 MG tablet, Take 1 tablet by mouth 2 (Two) Times a Day., Disp: 180 tablet, Rfl: 3    clopidogrel (PLAVIX) 75 MG tablet, TAKE 1 TABLET BY MOUTH ONCE DAILY FOR HEART, Disp: 30 tablet, Rfl: 5    diphenhydrAMINE (BENADRYL) 25 MG tablet, Take 2 tablets by mouth Every Night., Disp: , Rfl:     docusate sodium (COLACE) 100 MG capsule, Take 1 capsule by mouth 2 (Two) Times a Day., Disp: , Rfl:     escitalopram (Lexapro) 10 MG tablet, Take 1 tablet by mouth Daily., Disp: 30 tablet, Rfl: 2    fluticasone (FLONASE) 50 MCG/ACT nasal spray, Administer 2 sprays into the nostril(s) as directed by provider Daily As Needed for Allergies., Disp: , Rfl:     furosemide (LASIX) 80 MG tablet, TAKE 1 TABLET BY MOUTH TWICE DAILY, Disp: 60 tablet, Rfl: 5    GNP ClearLax 17 GM/SCOOP powder, Take 17 g by mouth Daily., Disp: , Rfl:     Heparin & NaCl Lock Flush (HEPARIN COMBINATION IV), Infuse  into a venous catheter. With dialysis solution FOR PD CATH, TAKES EVERY MONDAY, Disp: , Rfl:     hydrALAZINE (APRESOLINE) 25 MG tablet, TAKE 1 TABLET BY MOUTH ONCE DAILY, Disp: 30 tablet, Rfl: 0    hyoscyamine (LEVSIN) 0.125 MG SL tablet, TAKE 1 TABLET UNDER TONGUE EVERY SIX HOURS AS NEEDED FOR CRAMPING OR DIARRHEA, Disp: 60 tablet, Rfl: 1    Insulin Pen Needle (Ultracare Pen Needles) 32G X 4 MM misc, Use to inject ozempic 1 time per week., Disp: 4 each, Rfl: 1    Lokelma 10 g pack, Take 20 g by mouth 2 (Two) Times a Week. REPORTS USES 2 PACKS AND ONLY USES ON DAYS SHE DOES NOT DO PD DIALYSIS, Disp: , Rfl:     loratadine (CLARITIN) 10 MG tablet, TAKE 1 TABLET BY MOUTH ONCE DAILY FOR ALLERGIES, Disp: 30 tablet, Rfl: 0    LORazepam (ATIVAN) 0.5  "MG tablet, Take 1 tablet by mouth Daily As Needed for Anxiety., Disp: 15 tablet, Rfl: 2    losartan (COZAAR) 100 MG tablet, , Disp: , Rfl:     metOLazone (ZAROXOLYN) 5 MG tablet, , Disp: , Rfl:     Nebulizers (Comp Air Compressor Nebulizer) misc, , Disp: , Rfl:     OLANZapine (zyPREXA) 15 MG tablet, Take 1 tablet by mouth Every Night., Disp: 30 tablet, Rfl: 1    ondansetron ODT (ZOFRAN-ODT) 4 MG disintegrating tablet, Place 1 tablet on the tongue Every 6 (Six) Hours As Needed for Nausea or Vomiting., Disp: 15 tablet, Rfl: 0    pantoprazole (PROTONIX) 40 MG EC tablet, Take 1 tablet by mouth Daily., Disp: 90 tablet, Rfl: 1    pregabalin (LYRICA) 150 MG capsule, Take 1 capsule by mouth Every Night., Disp: , Rfl:     promethazine (PHENERGAN) 12.5 MG tablet, Take 1 tablet by mouth Every 6 (Six) Hours As Needed for Nausea or Vomiting., Disp: 15 tablet, Rfl: 1    Semaglutide,0.25 or 0.5MG/DOS, (OZEMPIC) 2 MG/3ML solution pen-injector, Inject 0.25 mg under the skin into the appropriate area as directed 1 (One) Time Per Week., Disp: 3 mL, Rfl: 1    tiZANidine (ZANAFLEX) 4 MG tablet, Take 1 tablet by mouth Every Night., Disp: , Rfl:     sucralfate (Carafate) 1 g tablet, Take 1 tablet by mouth 4 (Four) Times a Day., Disp: 56 tablet, Rfl: 0     Allergies     Allergies   Allergen Reactions    Iron Sucrose Unknown - High Severity     HX OF HEMOCHROMATOSIS     Lamotrigine Rash    Latex Hives, Nausea And Vomiting and Rash     blisters      Lamictal Xr [Lamotrigine Er] Rash    Nickel Rash     RASH W/COSMETIC JEWELRY    Sulfa Antibiotics Rash     PT REPORTS SHE HAS NEVER HAD TROUBLE OR NOTED ISSUES WITH SULFA BEFORE       Social History       Social History     Social History Narrative    Not on file         Objective       /56 (BP Location: Left arm, Patient Position: Sitting, Cuff Size: Adult)   Pulse 86   Ht 162.6 cm (64.02\")   Wt 86.9 kg (191 lb 9.6 oz)   BMI 32.87 kg/m²       Physical Exam  Constitutional:       " General: She is not in acute distress.     Appearance: Normal appearance. She is well-developed and normal weight.   HENT:      Head: Normocephalic and atraumatic.   Eyes:      Conjunctiva/sclera: Conjunctivae normal.      Pupils: Pupils are equal, round, and reactive to light.      Visual Fields: Right eye visual fields normal and left eye visual fields normal.   Cardiovascular:      Rate and Rhythm: Normal rate.   Pulmonary:      Effort: Pulmonary effort is normal. No respiratory distress or retractions.      Breath sounds: Normal air entry.   Abdominal:      General: There is no distension.      Tenderness: There is no abdominal tenderness.   Musculoskeletal:         General: Normal range of motion.      Right lower leg: No edema.      Left lower leg: No edema.   Skin:     General: Skin is warm and dry.      Findings: No lesion.   Neurological:      General: No focal deficit present.      Mental Status: She is alert and oriented to person, place, and time.   Psychiatric:         Mood and Affect: Mood and affect normal.         Behavior: Behavior normal.         Results       Result Review :    The following data was reviewed by: ELOINA Tovar on 04/22/2025:    CBC w/diff          10/10/2024    16:04 12/20/2024    14:07 4/20/2025    16:48   CBC w/Diff   WBC 6.53  6.97  7.31    RBC 2.86  2.97  2.98    Hemoglobin 9.0  9.4  8.9    Hematocrit 27.1  28.4  27.3    MCV 94.8  95.6  91.6    MCH 31.5  31.6  29.9    MCHC 33.2  33.1  32.6    RDW 13.6  12.6  14.4    Platelets 237  383  218    Neutrophil Rel % 47.7  49.7  55.2    Immature Granulocyte Rel % 0.6  0.3  0.3    Lymphocyte Rel % 35.5  35.9  32.8    Monocyte Rel % 7.7  8.0  9.6    Eosinophil Rel % 8.0  5.7  1.8    Basophil Rel % 0.5  0.4  0.3      CMP          10/10/2024    16:04 12/20/2024    14:07 4/20/2025    16:48   CMP   Glucose 93  89  107    BUN 59  71  88    Creatinine 6.42  7.55  10.30    EGFR 6.9  5.6  3.9    Sodium 140  136  139    Potassium 6.2   5.9  4.1    Chloride 104  100  92    Calcium 9.2  11.0  12.3    Total Protein 6.0  7.1  6.4    Albumin 3.3  3.7  3.7    Globulin 2.7  3.4  2.7    Total Bilirubin <0.2  0.2  0.2    Alkaline Phosphatase 84  103  72    AST (SGOT) 22  15  14    ALT (SGPT) 15  10  9    Albumin/Globulin Ratio 1.2  1.1  1.4    BUN/Creatinine Ratio 9.2  9.4  8.5    Anion Gap 13.4  15.4  15.3        Iron Profile   Iron   Date Value Ref Range Status   04/20/2025 58 37 - 145 mcg/dL Final     TIBC   Date Value Ref Range Status   04/20/2025 322 298 - 536 mcg/dL Final     Iron Saturation (TSAT)   Date Value Ref Range Status   04/20/2025 18 (L) 20 - 50 % Final     Transferrin   Date Value Ref Range Status   04/20/2025 216 200 - 360 mg/dL Final       Results  Labs   - Hemoglobin: 8.9 g/dL   - Iron levels: 10/2024, High  - occult blood stool, negative     Diagnostic Testing   - EGD: 03/19/2025, Irregular Z-line found 39 cm from the incisors, four quadrant biopsies obtained were negative for intestinal metaplasia, erythema in the entire stomach, gastric antrum biopsy with mild reactive gastropathy, negative for H. pylori, first and second portion of the duodenum were normal.   - Colonoscopy: Hemorrhoids found on perianal exam, colon otherwise appeared normal.           Assessment and Plan              Diagnoses and all orders for this visit:    1. Nausea and vomiting, unspecified vomiting type (Primary)    2. Hereditary hemochromatosis  -     Ambulatory Referral to Hematology / Oncology  -     Cancel: Iron Profile; Future  -     Cancel: Ferritin  -     Ferritin  -     Iron Profile; Future    3. Lower abdominal pain    4. Anemia, unspecified type    5. Chronic gastritis without bleeding, unspecified gastritis type  -     sucralfate (Carafate) 1 g tablet; Take 1 tablet by mouth 4 (Four) Times a Day.  Dispense: 56 tablet; Refill: 0        Assessment & Plan  1. Anemia:  - Hemoglobin level at 8.9.  - Referral to hematologist for further evaluation and  management.  - Order iron profile, including ferritin, to assess current iron levels.    2. Gastroesophageal Reflux Disease (GERD):  - Initiate a 2-week course of Carafate to coat the stomach and heal gastritis.  - Continue current regimen of pantoprazole.    3. Vomiting:  - Consider capsule endoscopy to rule out bleeding in the small bowel, pending iron and ferritin level results.                Follow Up     Follow Up   Return in about 6 months (around 10/22/2025) for IBS, gastritis, N/V.  Patient was given instructions and counseling regarding her condition or for health maintenance advice. Please see specific information pulled into the AVS if appropriate.

## 2025-04-24 LAB
QT INTERVAL: 360 MS
QTC INTERVAL: 415 MS

## 2025-04-28 ENCOUNTER — TELEPHONE (OUTPATIENT)
Dept: GASTROENTEROLOGY | Facility: CLINIC | Age: 65
End: 2025-04-28
Payer: MEDICAID

## 2025-04-28 NOTE — TELEPHONE ENCOUNTER
Patient contacted the office about the scheduled Pillcam for 05/01/2025 and needing her prep. Verbally went over that the day before she is on a liquid diet and then 6 pm the night before take the 10 ounce bottle of mag cirtate. Confirmed also that she will be here between 7:45-8 am the test date.

## 2025-05-01 ENCOUNTER — TELEPHONE (OUTPATIENT)
Dept: GASTROENTEROLOGY | Facility: CLINIC | Age: 65
End: 2025-05-01
Payer: MEDICAID

## 2025-05-01 ENCOUNTER — PROCEDURE VISIT (OUTPATIENT)
Dept: GASTROENTEROLOGY | Facility: CLINIC | Age: 65
End: 2025-05-01
Payer: MEDICAID

## 2025-05-01 DIAGNOSIS — D50.0 IRON DEFICIENCY ANEMIA SECONDARY TO BLOOD LOSS (CHRONIC): Primary | ICD-10-CM

## 2025-05-01 NOTE — PROGRESS NOTES
We are unable to complete the Pillcam Capsule Endoscopy for Cecilia Sparks 1960 on 5/1/2025 due to Patient did not properly prep   Ordering Provider: Patsy DUVALL  Ordering Provider has been notified: Yes   Patient verbalized understanding that test could not be performed due to reason noted above: Yes   The referral has been updated: Yes  MA verbalized all above has been done: Belkys Del Castillo

## 2025-05-01 NOTE — TELEPHONE ENCOUNTER
Patient contacted the office and stated that she had another appointment on 05/06/2025 and asked to reschedule the Pillcam. Offered the 7 or 8th. Patient requested for the following week due to the prep. Rescheduled for 05/12/2025

## 2025-05-01 NOTE — PROGRESS NOTES
Procedure   Endoscopy, GI With Capsule    Date/Time: 5/1/2025 2:43 PM    Performed by: Marcie Jones APRN  Authorized by: Marcie Jones APRN  Local anesthesia used: no    Anesthesia:  Local anesthesia used: no    Sedation:  Patient sedated: no    Patient tolerance: patient tolerated the procedure well with no immediate complications

## 2025-05-12 ENCOUNTER — PROCEDURE VISIT (OUTPATIENT)
Dept: GASTROENTEROLOGY | Facility: CLINIC | Age: 65
End: 2025-05-12
Payer: MEDICAID

## 2025-05-12 DIAGNOSIS — D64.9 ANEMIA, UNSPECIFIED TYPE: Primary | ICD-10-CM

## 2025-05-12 NOTE — PROGRESS NOTES
Procedure   Endoscopy, GI With Capsule    Date/Time: 5/12/2025 8:12 AM    Performed by: Marcie Jones APRN  Authorized by: Marcie Jones APRN  Local anesthesia used: no    Anesthesia:  Local anesthesia used: no    Sedation:  Patient sedated: no    Patient tolerance: patient tolerated the procedure well with no immediate complications                     Morning of Test:    Patient Consent Signed: Yes    Equipment placed on patient and pill swallowed:  8 am    Any complications Noted: No    Consent Signed by Provider and Scanned into chart: pending   Medical Assistant: Jada Epperson    Afternoon of Test:     Device Returned and Downloaded: Yes    Encounter signed & closed: Yes    Morning after Test:     Exported from [facility] to Zip File: Yes    Sent to [email or web address]: Yes    Once E-mail confirmation received:      Report printed/Patsy Signed: yes     Scanned/Routed to Ordering Provider: was given to Belkys PIERRE MA, she states she would scan it in.     Medical Assistant:Jada Epperson    Addendum closed & signed: Yes

## 2025-05-14 ENCOUNTER — CONSULT (OUTPATIENT)
Dept: ONCOLOGY | Facility: HOSPITAL | Age: 65
End: 2025-05-14
Payer: MEDICAID

## 2025-05-14 ENCOUNTER — LAB (OUTPATIENT)
Dept: ONCOLOGY | Facility: HOSPITAL | Age: 65
End: 2025-05-14
Payer: MEDICAID

## 2025-05-14 VITALS
OXYGEN SATURATION: 95 % | BODY MASS INDEX: 33.57 KG/M2 | RESPIRATION RATE: 16 BRPM | HEART RATE: 80 BPM | DIASTOLIC BLOOD PRESSURE: 99 MMHG | HEIGHT: 64 IN | SYSTOLIC BLOOD PRESSURE: 174 MMHG | TEMPERATURE: 98 F | WEIGHT: 196.6 LBS

## 2025-05-14 DIAGNOSIS — D64.9 CHRONIC ANEMIA: Primary | ICD-10-CM

## 2025-05-14 DIAGNOSIS — N18.5 CKD (CHRONIC KIDNEY DISEASE) STAGE 5, GFR LESS THAN 15 ML/MIN: ICD-10-CM

## 2025-05-14 DIAGNOSIS — E83.110 HEREDITARY HEMOCHROMATOSIS: ICD-10-CM

## 2025-05-14 DIAGNOSIS — D64.9 ANEMIA, UNSPECIFIED TYPE: Primary | ICD-10-CM

## 2025-05-14 LAB
ALBUMIN SERPL-MCNC: 3.6 G/DL (ref 3.5–5.2)
ALBUMIN/GLOB SERPL: 1.4 G/DL
ALP SERPL-CCNC: 113 U/L (ref 39–117)
ALT SERPL W P-5'-P-CCNC: 12 U/L (ref 1–33)
ANION GAP SERPL CALCULATED.3IONS-SCNC: 11.8 MMOL/L (ref 5–15)
AST SERPL-CCNC: 13 U/L (ref 1–32)
BASOPHILS # BLD MANUAL: 0.08 10*3/MM3 (ref 0–0.2)
BASOPHILS NFR BLD MANUAL: 1 % (ref 0–1.5)
BILIRUB SERPL-MCNC: 0.2 MG/DL (ref 0–1.2)
BUN SERPL-MCNC: 68 MG/DL (ref 8–23)
BUN/CREAT SERPL: 8 (ref 7–25)
CALCIUM SPEC-SCNC: 9.5 MG/DL (ref 8.6–10.5)
CHLORIDE SERPL-SCNC: 104 MMOL/L (ref 98–107)
CO2 SERPL-SCNC: 24.2 MMOL/L (ref 22–29)
CREAT SERPL-MCNC: 8.45 MG/DL (ref 0.57–1)
DEPRECATED RDW RBC AUTO: 47.6 FL (ref 37–54)
EGFRCR SERPLBLD CKD-EPI 2021: 4.9 ML/MIN/1.73
EOSINOPHIL # BLD MANUAL: 0.16 10*3/MM3 (ref 0–0.4)
EOSINOPHIL NFR BLD MANUAL: 2 % (ref 0.3–6.2)
ERYTHROCYTE [DISTWIDTH] IN BLOOD BY AUTOMATED COUNT: 14.5 % (ref 12.3–15.4)
FERRITIN SERPL-MCNC: 16.98 NG/ML (ref 13–150)
FOLATE SERPL-MCNC: 7.74 NG/ML (ref 4.78–24.2)
GLOBULIN UR ELPH-MCNC: 2.5 GM/DL
GLUCOSE SERPL-MCNC: 98 MG/DL (ref 65–99)
HCT VFR BLD AUTO: 27.8 % (ref 34–46.6)
HGB BLD-MCNC: 8.9 G/DL (ref 12–15.9)
IRON 24H UR-MRATE: 38 MCG/DL (ref 37–145)
IRON SATN MFR SERPL: 12 % (ref 20–50)
LARGE PLATELETS: NORMAL
LYMPHOCYTES # BLD MANUAL: 2.49 10*3/MM3 (ref 0.7–3.1)
LYMPHOCYTES NFR BLD MANUAL: 8 % (ref 5–12)
MCH RBC QN AUTO: 29.4 PG (ref 26.6–33)
MCHC RBC AUTO-ENTMCNC: 32 G/DL (ref 31.5–35.7)
MCV RBC AUTO: 91.7 FL (ref 79–97)
MONOCYTES # BLD: 0.62 10*3/MM3 (ref 0.1–0.9)
NEUTROPHILS # BLD AUTO: 4.43 10*3/MM3 (ref 1.7–7)
NEUTROPHILS NFR BLD MANUAL: 57 % (ref 42.7–76)
PATHOLOGY REVIEW: YES
PLATELET # BLD AUTO: 254 10*3/MM3 (ref 140–450)
PMV BLD AUTO: 11.5 FL (ref 6–12)
POTASSIUM SERPL-SCNC: 4.5 MMOL/L (ref 3.5–5.2)
PROT SERPL-MCNC: 6.1 G/DL (ref 6–8.5)
RBC # BLD AUTO: 3.03 10*6/MM3 (ref 3.77–5.28)
RBC MORPH BLD: NORMAL
RETICS # AUTO: 0.09 10*6/MM3 (ref 0.02–0.13)
RETICS/RBC NFR AUTO: 2.94 % (ref 0.7–1.9)
SCAN SLIDE: NORMAL
SODIUM SERPL-SCNC: 140 MMOL/L (ref 136–145)
TIBC SERPL-MCNC: 316 MCG/DL (ref 298–536)
TRANSFERRIN SERPL-MCNC: 212 MG/DL (ref 200–360)
VARIANT LYMPHS NFR BLD MANUAL: 32 % (ref 19.6–45.3)
VIT B12 BLD-MCNC: 432 PG/ML (ref 211–946)
WBC MORPH BLD: NORMAL
WBC NRBC COR # BLD AUTO: 7.78 10*3/MM3 (ref 3.4–10.8)

## 2025-05-14 PROCEDURE — 85045 AUTOMATED RETICULOCYTE COUNT: CPT | Performed by: NURSE PRACTITIONER

## 2025-05-14 PROCEDURE — 84165 PROTEIN E-PHORESIS SERUM: CPT | Performed by: NURSE PRACTITIONER

## 2025-05-14 PROCEDURE — 86334 IMMUNOFIX E-PHORESIS SERUM: CPT | Performed by: NURSE PRACTITIONER

## 2025-05-14 PROCEDURE — 85025 COMPLETE CBC W/AUTO DIFF WBC: CPT | Performed by: NURSE PRACTITIONER

## 2025-05-14 PROCEDURE — 80053 COMPREHEN METABOLIC PANEL: CPT | Performed by: NURSE PRACTITIONER

## 2025-05-14 PROCEDURE — 84466 ASSAY OF TRANSFERRIN: CPT | Performed by: NURSE PRACTITIONER

## 2025-05-14 PROCEDURE — 82607 VITAMIN B-12: CPT | Performed by: NURSE PRACTITIONER

## 2025-05-14 PROCEDURE — 82728 ASSAY OF FERRITIN: CPT | Performed by: NURSE PRACTITIONER

## 2025-05-14 PROCEDURE — 83521 IG LIGHT CHAINS FREE EACH: CPT | Performed by: NURSE PRACTITIONER

## 2025-05-14 PROCEDURE — 83540 ASSAY OF IRON: CPT | Performed by: NURSE PRACTITIONER

## 2025-05-14 PROCEDURE — 82784 ASSAY IGA/IGD/IGG/IGM EACH: CPT | Performed by: NURSE PRACTITIONER

## 2025-05-14 PROCEDURE — 36415 COLL VENOUS BLD VENIPUNCTURE: CPT | Performed by: NURSE PRACTITIONER

## 2025-05-14 PROCEDURE — 85007 BL SMEAR W/DIFF WBC COUNT: CPT | Performed by: NURSE PRACTITIONER

## 2025-05-14 PROCEDURE — 82746 ASSAY OF FOLIC ACID SERUM: CPT | Performed by: NURSE PRACTITIONER

## 2025-05-14 PROCEDURE — G0463 HOSPITAL OUTPT CLINIC VISIT: HCPCS | Performed by: NURSE PRACTITIONER

## 2025-05-14 RX ORDER — FLUCONAZOLE 100 MG/1
TABLET ORAL
COMMUNITY
Start: 2025-05-06

## 2025-05-14 NOTE — PROGRESS NOTES
Chief Complaint/Reason for Referral:  Establish Care and hemochromatosis    Marcie Jones A*  Ellen Strickland DO    Records Obtained:  Records of the patients history including those obtained from [facility] and patient information were reviewed and summarized in detail.    Subjective    History of Present Illness  The patient is a very pleasant 64-year-old  female who presents for a new patient evaluation for hereditary hemochromatosis.     Hereditary hemochromatosis was diagnosed in August 2023. She has two copies of the C282Y hemochromatosis gene mutation. She has a brother who hemochromatosis as well and may have other siblings with hemochromatosis.      Other past medical history includes peripheral vascular disease, osteoarthritis, hypertension, stage 5 ESRD, lupus, migraine, depression, epilepsy, GERD, chronic UTI, hyperlipidemia, history of stroke, asthma, history of gout, history of CHF and kidney stones. Recent hemoglobin was in the 8.9 range recently done on April 12, 2025.    Has required previous phlebotomy but none recently due to the chronic anemia. She requires PD 4-5 days a week at home. She avoids oral iron. Used to take oral iron when she was pregnant.     Results  Laboratory Studies  Hemoglobin was 8.9 on 04/12/2025.   8/29/2023: HFE gene mutation:     Results:  c.845G>A (p.Bzv851Hyy) - Detected, homozygous  c.187C>G (p.Nyd31Bhy) - Not Detected  c.193A>T (p.Tib32Pav) - Not Detected       Oncology/Hematology History    No history exists.       Review of Systems   Constitutional:  Positive for fatigue.   HENT: Negative.     Eyes: Negative.    Respiratory: Negative.     Cardiovascular: Negative.    Gastrointestinal: Negative.    Endocrine: Negative.    Genitourinary: Negative.    Allergic/Immunologic: Negative.    Neurological: Negative.    Hematological: Negative.    Psychiatric/Behavioral: Negative.     All other systems reviewed and are negative.      Current Outpatient Medications on  File Prior to Visit   Medication Sig Dispense Refill    albuterol sulfate  (90 Base) MCG/ACT inhaler Inhale 2 puffs Every 4 (Four) Hours As Needed for Wheezing.      amLODIPine (NORVASC) 10 MG tablet Take 1 tablet by mouth Daily.      atorvastatin (LIPITOR) 40 MG tablet TAKE 1 TABLET BY MOUTH EVERY EVENING FOR CHOLESTEROL 30 tablet 5    calcium acetate (PHOS BINDER,) 667 MG capsule capsule Take 2 capsules by mouth 3 (Three) Times a Day Before Meals.      calcium acetate (PHOS BINDER,) 667 MG capsule capsule Take 1 capsule by mouth With Snacks. Take before snack      Calcium Carb-Cholecalciferol (GNP Calcium 600 +D3) 600-20 MG-MCG tablet TAKE 1 TABLET BY MOUTH TWICE DAILY 60 tablet 11    carvedilol (COREG) 25 MG tablet Take 1 tablet by mouth 2 (Two) Times a Day. 180 tablet 3    clopidogrel (PLAVIX) 75 MG tablet TAKE 1 TABLET BY MOUTH ONCE DAILY FOR HEART 30 tablet 5    diphenhydrAMINE (BENADRYL) 25 MG tablet Take 2 tablets by mouth Every Night.      docusate sodium (COLACE) 100 MG capsule Take 1 capsule by mouth 2 (Two) Times a Day.      escitalopram (Lexapro) 10 MG tablet Take 1 tablet by mouth Daily. 30 tablet 2    fluconazole (DIFLUCAN) 100 MG tablet       fluticasone (FLONASE) 50 MCG/ACT nasal spray Administer 2 sprays into the nostril(s) as directed by provider Daily As Needed for Allergies.      furosemide (LASIX) 80 MG tablet TAKE 1 TABLET BY MOUTH TWICE DAILY 60 tablet 5    GNP ClearLax 17 GM/SCOOP powder Take 17 g by mouth Daily.      Heparin & NaCl Lock Flush (HEPARIN COMBINATION IV) Infuse  into a venous catheter. With dialysis solution FOR PD CATH, TAKES EVERY MONDAY      hydrALAZINE (APRESOLINE) 25 MG tablet TAKE 1 TABLET BY MOUTH ONCE DAILY 30 tablet 0    hyoscyamine (LEVSIN) 0.125 MG SL tablet TAKE 1 TABLET UNDER TONGUE EVERY SIX HOURS AS NEEDED FOR CRAMPING OR DIARRHEA 60 tablet 1    Insulin Pen Needle (Ultracare Pen Needles) 32G X 4 MM misc Use to inject ozempic 1 time per week. 4 each 1     Lokelma 10 g pack Take 20 g by mouth 2 (Two) Times a Week. REPORTS USES 2 PACKS AND ONLY USES ON DAYS SHE DOES NOT DO PD DIALYSIS      loratadine (CLARITIN) 10 MG tablet TAKE 1 TABLET BY MOUTH ONCE DAILY FOR ALLERGIES 30 tablet 0    LORazepam (ATIVAN) 0.5 MG tablet Take 1 tablet by mouth Daily As Needed for Anxiety. 15 tablet 2    losartan (COZAAR) 100 MG tablet       metOLazone (ZAROXOLYN) 5 MG tablet       Nebulizers (Comp Air Compressor Nebulizer) misc       OLANZapine (zyPREXA) 15 MG tablet Take 1 tablet by mouth Every Night. 30 tablet 1    ondansetron ODT (ZOFRAN-ODT) 4 MG disintegrating tablet Place 1 tablet on the tongue Every 6 (Six) Hours As Needed for Nausea or Vomiting. 15 tablet 0    pantoprazole (PROTONIX) 40 MG EC tablet Take 1 tablet by mouth Daily. 90 tablet 1    pregabalin (LYRICA) 150 MG capsule Take 1 capsule by mouth Every Night.      promethazine (PHENERGAN) 12.5 MG tablet Take 1 tablet by mouth Every 6 (Six) Hours As Needed for Nausea or Vomiting. 15 tablet 1    Semaglutide,0.25 or 0.5MG/DOS, (OZEMPIC) 2 MG/3ML solution pen-injector Inject 0.25 mg under the skin into the appropriate area as directed 1 (One) Time Per Week. 3 mL 1    sucralfate (Carafate) 1 g tablet Take 1 tablet by mouth 4 (Four) Times a Day. 56 tablet 0    tiZANidine (ZANAFLEX) 4 MG tablet Take 1 tablet by mouth Every Night.       No current facility-administered medications on file prior to visit.       Allergies   Allergen Reactions    Iron Sucrose Unknown - High Severity     HX OF HEMOCHROMATOSIS     Lamotrigine Rash    Latex Hives, Nausea And Vomiting and Rash     blisters      Lamictal Xr [Lamotrigine Er] Rash    Nickel Rash     RASH W/COSMETIC JEWELRY    Sulfa Antibiotics Rash     PT REPORTS SHE HAS NEVER HAD TROUBLE OR NOTED ISSUES WITH SULFA BEFORE     Past Medical History:   Diagnosis Date    Acid reflux disease     Anemia     NO RECENT INTERVENTIONS    Anxiety     Arthritis     Asthma     Bipolar disorder      Borderline diabetic     HX OF BUT REPORTS NO ISSUES NOT ON MEDICATION AND IS DIET CONTROLLED    Cataract     Chronic back pain 2012    Chronic bronchitis     DENIES ANY CURRENT ISSUES    Chronic UTI     NO CURRENT S/S    Colitis     Congestive heart failure (CHF)     FOLLOWED BY DR DAVIS [facility], NO CURRENT S/S    Depression     Dizziness     Drug dependence     Fibromyalgia     H/O psychiatric care     Heart murmur     Hematological disorder     hemachromatosis     Hemochromatosis 2012    Hemorrhoids     Hyperlipidemia     Hypertension, essential     Hypoglycemia 2021    Kidney stones     past     Knee pain     Lack of coordination     Major depression, recurrent     Meningitis     Migraine headache     Mitral valve prolapse     Muscle spasm     Neck pain     Night sweats     HX OF NO CURRENT ISSUES    PAD (peripheral artery disease)     STENTS L AND RIGHT GROIN    Pain management     PAIN PUMP W/FENTANYL/ DR KIRK PAIN PUMP CURRENTLY EMPTY    Peptic ulcer     Peritoneal dialysis catheter in place     PD DOES 5 DAYS/WEEK. PD EXCHANGE 4 TIMES ON THOSE DAYS.    PONV (postoperative nausea and vomiting)     Psoriasis-like skin disease     eczema    PTSD (post-traumatic stress disorder)     Ringing in ears     Seizures     NONE FOR A LONG TIME    Stroke (cerebrum)     TIA, MEMORY ISSUES    Syncope and collapse     REPORTS HAPPENED ABOUT A YEAR AGO    Weakness     Weight loss      Past Surgical History:   Procedure Laterality Date    APPENDECTOMY       SECTION      X4    COLONOSCOPY      COLONOSCOPY N/A 2021    Procedure: COLONOSCOPY;  Surgeon: Aston Fernandez MD;  Location: [facility] ENDOSCOPY;  Service: Gastroenterology;  Laterality: N/A;  COLITIS    COLONOSCOPY N/A 3/19/2025    Procedure: COLONOSCOPY;  Surgeon: Dione Harrell MD;  Location: [facility] ENDOSCOPY;  Service: Gastroenterology;  Laterality: N/A;  hemorrhoids    ENDOSCOPY N/A 2022    Procedure:  ESOPHAGOGASTRODUODENOSCOPY WITH BIOPSIES;  Surgeon: Dione Harrell MD;  Location: [facility] ENDOSCOPY;  Service: Gastroenterology;  Laterality: N/A;  GASTRITIS    ENDOSCOPY N/A 3/19/2025    Procedure: ESOPHAGOGASTRODUODENOSCOPY with biopsies;  Surgeon: Dione Harrell MD;  Location: [facility] ENDOSCOPY;  Service: Gastroenterology;  Laterality: N/A;  gastritis    FEMORAL ARTERY STENT Bilateral     HYSTERECTOMY  1984,2008    partial/total     INSERTION HEMODIALYSIS CATHETER Right 03/13/2023    Procedure: HEMODIALYSIS CATHETER INSERTION;  Surgeon: Suman Butler MD;  Location: [facility] MAIN OR;  Service: General;  Laterality: Right;  PER DR VILLEDA    INSERTION PERITONEAL DIALYSIS CATHETER      REVISION    INSERTION PERITONEAL DIALYSIS CATHETER N/A 03/13/2023    Procedure: INSERTION PERITONEAL DIALYSIS CATHETER LAPAROSCOPIC;  Surgeon: Suman Butler MD;  Location: [facility] MAIN OR;  Service: General;  Laterality: N/A;  PER DR BUTLER    INTERVENTIONAL RADIOLOGY PROCEDURE Right 02/14/2022    Procedure: Abdominal Aortagram with Runoff;  Surgeon: Aspen Couch MD;  Location: [facility] CATH INVASIVE LOCATION;  Service: Peripheral Vascular;  Laterality: Right;    NECK SURGERY Left     KNOW REMOVED    PAIN PUMP INSERTION/REVISION       FENTANYL INSERTED     PAIN PUMP INSERTION/REVISION Left 5/31/2024    Procedure: PAIN PUMP REMOVAL, left lower abdomen;  Surgeon: Semaj Blackwood MD;  Location: [facility] MAIN OR;  Service: Neurosurgery;  Laterality: Left;    REMOVAL HEMODIALYSIS CATHETER      TONSILLECTOMY  1998    VOCAL CORD MASS EXCISION      WOUND EXPLORATION LEG N/A 6/5/2024    Procedure: LUMBAR WOUND EXPLORATION WITH REMOVAL OF RETAINED IT PUMP TUBING AND REPAIR OF CSF LEAK;  Surgeon: Semaj Blackwood MD;  Location: [facility] MAIN OR;  Service: Neurosurgery;  Laterality: N/A;     Social History     Socioeconomic History    Marital status:    Tobacco Use    Smoking status: Former     Current packs/day:  0.00     Average packs/day: 0.5 packs/day for 54.0 years (27.0 ttl pk-yrs)     Types: Cigarettes     Start date:      Quit date:      Years since quittin.3     Passive exposure: Current    Smokeless tobacco: Never   Vaping Use    Vaping status: Never Used   Substance and Sexual Activity    Alcohol use: Not Currently    Drug use: Never    Sexual activity: Defer     Family History   Problem Relation Age of Onset    Cancer Sister     Cancer Brother     Cancer Paternal Aunt     Cancer Paternal Uncle     Cancer Other     Stroke Other     Diabetes Other     Heart failure Other     Malig Hyperthermia Neg Hx      Immunization History   Administered Date(s) Administered    COVID-19 (FAIZAN) 2021    Fluzone (or Fluarix & Flulaval for VFC) >6mos 10/01/2018, 11/15/2023    Influenza Injectable Mdck Pf Quad 2017, 2019, 10/19/2021    Pneumococcal Conjugate 13-Valent (PCV13) 2017       Tobacco Use: Medium Risk (2025)    Patient History     Smoking Tobacco Use: Former     Smokeless Tobacco Use: Never     Passive Exposure: Current       Objective     Physical Exam  Vitals and nursing note reviewed.   Constitutional:       Appearance: Normal appearance.   HENT:      Mouth/Throat:      Mouth: Mucous membranes are moist.   Eyes:      Extraocular Movements: Extraocular movements intact.   Cardiovascular:      Rate and Rhythm: Normal rate.   Pulmonary:      Effort: Pulmonary effort is normal. No respiratory distress.   Musculoskeletal:         General: Normal range of motion.      Cervical back: Normal range of motion and neck supple.   Skin:     General: Skin is warm and dry.   Neurological:      General: No focal deficit present.      Mental Status: She is alert and oriented to person, place, and time.   Psychiatric:         Mood and Affect: Mood normal.         Behavior: Behavior normal.         Thought Content: Thought content normal.         Judgment: Judgment normal.         Vitals:     "05/14/25 1405 05/14/25 1414   BP: 158/93 174/99   Pulse: 80    Resp: 16    Temp: 98 °F (36.7 °C)    TempSrc: Temporal    SpO2: 95%    Weight: 89.2 kg (196 lb 9.6 oz)    Height: 162.6 cm (64.02\")    PainSc: 0-No pain        Wt Readings from Last 3 Encounters:   05/14/25 89.2 kg (196 lb 9.6 oz)   04/22/25 86.9 kg (191 lb 9.6 oz)   04/20/25 86.7 kg (191 lb 2.2 oz)      ECOG score: 0         ECOG: (0) Fully Active - Able to Carry On All Pre-disease Performance Without Restriction  Fall Risk Assessment was completed, and patient is at low risk for falls.  PHQ-9 Total Score:         The patient is  experiencing fatigue. Fatigue score: 8    PT/OT Functional Screening: PT fx screen : No needs identified  Speech Functional Screening: Speech fx screen : No needs identified  Rehab to be ordered: Rehab to be ordered : No needs identified        Result Review :  The following data was reviewed by: ELOINA Sarkar on 05/14/2025:  Lab Results   Component Value Date    HGB 8.9 (L) 04/20/2025    HCT 27.3 (L) 04/20/2025    MCV 91.6 04/20/2025     04/20/2025    WBC 7.31 04/20/2025    NEUTROABS 4.04 04/20/2025    LYMPHSABS 2.40 04/20/2025    MONOSABS 0.70 04/20/2025    EOSABS 0.13 04/20/2025    BASOSABS 0.02 04/20/2025     Lab Results   Component Value Date    GLUCOSE 107 (H) 04/20/2025    BUN 88 (H) 04/20/2025    CREATININE 10.30 (H) 04/20/2025     04/20/2025    K 4.1 04/20/2025    CL 92 (L) 04/20/2025    CO2 31.7 (H) 04/20/2025    CALCIUM 12.3 (H) 04/20/2025    PROTEINTOT 6.4 04/20/2025    ALBUMIN 3.7 04/20/2025    BILITOT 0.2 04/20/2025    ALKPHOS 72 04/20/2025    AST 14 04/20/2025    ALT 9 04/20/2025     Lab Results   Component Value Date    Ferritin 18.80 04/20/2025     Lab Results   Component Value Date    IRON 58 04/20/2025    LABIRON 18 (L) 04/20/2025    TRANSFERRIN 216 04/20/2025    TIBC 322 04/20/2025     Lab Results   Component Value Date    FERRITIN 18.80 04/20/2025     No results found for: \"PSA\", " "\"CEA\", \"AFP\", \"\", \"\"         Assessment and Plan:  Diagnoses and all orders for this visit:    1. Chronic anemia (Primary)  -     CBC & Differential; Future  -     Comprehensive Metabolic Panel; Future  -     Iron Profile; Future  -     Ferritin; Future  -     Vitamin B12  -     Folate  -     Reticulocytes; Future  -     Peripheral Blood Smear; Future  -     Lactate Dehydrogenase; Future  -     Haptoglobin; Future  -     MATTHEW, PE & Free LT Chains, Ser; Future  -     Protein Electrophoresis, Random Urine - Urine, Clean Catch; Future  -     CBC & Differential  -     Comprehensive Metabolic Panel  -     Iron Profile  -     Ferritin  -     Reticulocytes  -     Vitamin B12  -     Folate  -     Peripheral Blood Smear  -     MATTHEW, PE & Free LT Chains, Ser  -     Protein Electrophoresis, Random Urine - Urine, Clean Catch    2. Hereditary hemochromatosis  -     CBC & Differential; Future  -     Comprehensive Metabolic Panel; Future  -     Iron Profile; Future  -     Ferritin; Future  -     Reticulocytes; Future  -     Peripheral Blood Smear; Future  -     Lactate Dehydrogenase; Future  -     Haptoglobin; Future  -     CBC & Differential  -     Comprehensive Metabolic Panel  -     Iron Profile  -     Ferritin  -     Reticulocytes  -     Vitamin B12  -     Folate  -     Peripheral Blood Smear  -     MATTHEW, PE & Free LT Chains, Ser  -     Protein Electrophoresis, Random Urine - Urine, Clean Catch    3. CKD (chronic kidney disease) stage 5, GFR less than 15 ml/min  -     CBC & Differential  -     Comprehensive Metabolic Panel  -     Iron Profile  -     Ferritin  -     Reticulocytes  -     Vitamin B12  -     Folate  -     Peripheral Blood Smear  -     MATTHEW, PE & Free LT Chains, Ser  -     Protein Electrophoresis, Random Urine - Urine, Clean Catch      Hereditary Hemochromatosis: 2 copies of C282Y mutation: per the patient report diagnosed in 1996. Previously did phlebotomy. Has not had to do any recent phlebotomy due the " chronic anemia secondary to the stage 5 CKD. Does not need to take any oral iron tablets or IV iron infusions. Given information from up to date on hereditary hemochromatosis.     CKD: ESRD: stage 5. Follows with Dr. Bird office. She is on home dialysis 4-5 times a week.     Chronic anemia: Has had chronic anemia in the range of 8.9 to 11.7  since at least June of 2024. Last normal hemoglobin in March of 2024 of 12.8. Can consider HARINI agent if hemoglobin continues to be less than 10 in the setting of the chronic kidney disease.         I spent 31 minutes caring for Cecilia on this date of service. This time includes time spent by me in the following activities:preparing for the visit, reviewing tests, obtaining and/or reviewing a separately obtained history, performing a medically appropriate examination and/or evaluation , counseling and educating the patient/family/caregiver, ordering medications, tests, or procedures, referring and communicating with other health care professionals , documenting information in the medical record, and independently interpreting results and communicating that information with the patient/family/caregiver    Patient Follow Up: labs in 3 months and follow up with NP.     Patient was given instructions and counseling regarding her condition or for health maintenance advice. Please see specific information pulled into the AVS if appropriate.     ELOINA Sarkar    5/14/2025    .tob    Patient or patient representative verbalized consent for the use of Ambient Listening during the visit with  ELOINA Sarkar for chart documentation. 5/14/2025  16:02 EDT

## 2025-05-15 ENCOUNTER — OFFICE VISIT (OUTPATIENT)
Dept: BEHAVIORAL HEALTH | Facility: CLINIC | Age: 65
End: 2025-05-15
Payer: MEDICAID

## 2025-05-15 ENCOUNTER — RESULTS FOLLOW-UP (OUTPATIENT)
Dept: GASTROENTEROLOGY | Facility: CLINIC | Age: 65
End: 2025-05-15
Payer: MEDICAID

## 2025-05-15 ENCOUNTER — LAB (OUTPATIENT)
Dept: LAB | Facility: HOSPITAL | Age: 65
End: 2025-05-15
Payer: MEDICAID

## 2025-05-15 VITALS
HEIGHT: 64 IN | BODY MASS INDEX: 34.49 KG/M2 | SYSTOLIC BLOOD PRESSURE: 108 MMHG | HEART RATE: 79 BPM | DIASTOLIC BLOOD PRESSURE: 57 MMHG | WEIGHT: 202 LBS

## 2025-05-15 DIAGNOSIS — F41.1 GENERALIZED ANXIETY DISORDER: Primary | ICD-10-CM

## 2025-05-15 DIAGNOSIS — E87.5 HYPERKALEMIA: ICD-10-CM

## 2025-05-15 DIAGNOSIS — D64.9 CHRONIC ANEMIA: ICD-10-CM

## 2025-05-15 DIAGNOSIS — F41.0 PANIC ATTACKS: ICD-10-CM

## 2025-05-15 DIAGNOSIS — I10 PRIMARY HYPERTENSION: ICD-10-CM

## 2025-05-15 DIAGNOSIS — F31.13 BIPOLAR DISORDER, CURRENT EPISODE MANIC WITHOUT PSYCHOTIC FEATURES, SEVERE: ICD-10-CM

## 2025-05-15 PROCEDURE — 84166 PROTEIN E-PHORESIS/URINE/CSF: CPT

## 2025-05-15 PROCEDURE — 84156 ASSAY OF PROTEIN URINE: CPT

## 2025-05-15 RX ORDER — PANTOPRAZOLE SODIUM 40 MG/1
TABLET, DELAYED RELEASE ORAL
Qty: 90 TABLET | Refills: 1 | Status: SHIPPED | OUTPATIENT
Start: 2025-05-15

## 2025-05-15 RX ORDER — LORATADINE 10 MG/1
TABLET ORAL
Qty: 30 TABLET | Refills: 0 | Status: SHIPPED | OUTPATIENT
Start: 2025-05-15

## 2025-05-15 RX ORDER — CLOPIDOGREL BISULFATE 75 MG/1
TABLET ORAL
Qty: 30 TABLET | Refills: 4 | Status: SHIPPED | OUTPATIENT
Start: 2025-05-15

## 2025-05-15 RX ORDER — HYDRALAZINE HYDROCHLORIDE 25 MG/1
25 TABLET, FILM COATED ORAL DAILY
Qty: 30 TABLET | Refills: 0 | Status: SHIPPED | OUTPATIENT
Start: 2025-05-15

## 2025-05-15 RX ORDER — ATORVASTATIN CALCIUM 40 MG/1
40 TABLET, FILM COATED ORAL EVERY EVENING
Qty: 30 TABLET | Refills: 4 | Status: SHIPPED | OUTPATIENT
Start: 2025-05-15

## 2025-05-15 RX ORDER — FUROSEMIDE 80 MG/1
80 TABLET ORAL EVERY 12 HOURS SCHEDULED
Qty: 60 TABLET | Refills: 4 | Status: SHIPPED | OUTPATIENT
Start: 2025-05-15

## 2025-05-15 NOTE — PROGRESS NOTES
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 and side effects.  Patient is agreeable to call the office with any worsening of symptoms or onset of side effects.   Patient is agreeable to call 911 or go to the nearest ER should he/she begin having SI/HI.   Patient acknowledged, is agreeable to continue with current treatment plan, and was educated on the importance of compliance with treatment and follow-up appointments.  Addressed all questions and concerns.     Psychotherapy:      Psychotherapy time 20 minutes.  This time is exclusive to the therapy session and separate from the time spent on activities used to meet the criteria for the E/M service (history, exam, medical decision-making).  Goal is to strengthen defenses, promote problems solving, restore adaptive functioning, and provide symptom relief. Esteem building was enhanced through praise, reassurance, normalizing and encouragement. Coping skills were enhanced to build distress tolerance skills and emotional regulation. Allowed patient to freely discuss issues without interruption or judgement with unconditional positive regard, active listening skills, and empathy. Provided a safe, confidential environment to facilitate the development of a positive therapeutic relationship and encourage open, honest  communication. Assisted patient in processing session content, acknowledged and normalized patient’s thoughts, feelings, and concerns by utilizing a person-centered approach in efforts to build appropriate rapport and a positive therapeutic relationship with open and honest communication. Plan to continue supportive psychotherapy in next appointment to provide symptom relief.    Functional status: Moderate symptoms OR moderate difficulty in social occupational or social functioning (51-60)  Prognosis: Fair with expectation for some response to treatment  Progress: waxing and waning but worse overall      Follow-up: Return in about 1 month (around 6/15/2025).     This document has been electronically signed by ELOINA Velarde  May 28, 2025 12:49 EDT  Please note that portions of this note were completed with a voice recognition program.  Copied text in this note has been reviewed and is accurate as of 05/28/25

## 2025-05-15 NOTE — PATIENT INSTRUCTIONS
1.  Please return to clinic at your next scheduled visit.  Please contact the clinic ([phone]) at least 24 hours prior in the event you need to cancel.  2.  Do no harm to yourself or others.    3.  Avoid alcohol and drugs.    4.  Take all medications as prescribed.  Please contact the clinic with any concerns. If you are in need of medication refills, please call the clinic at [phone].    5. Should you want to get in touch with your provider, ELOINA Velarde, please contact the office ([phone]), and staff will be able to page Barby directly.  6. In the event you have personal crisis, contact the following crisis numbers: Suicide Prevention Hotline 1-374.941.4242; [facility] Helpline 6-112-954-AFMU; [facility] [phone]; text HELLO to [phone]; or 646.     SPECIFIC RECOMMENDATIONS:     1.      Medications discussed at this encounter:     No orders of the defined types were placed in this encounter.                      2.      Psychotherapy recommendations: We will continue therapy at future visits.     3.     Return to clinic: Return in about 1 month (around 6/15/2025).

## 2025-05-16 LAB
ALBUMIN SERPL ELPH-MCNC: 2.8 G/DL (ref 2.9–4.4)
ALBUMIN/GLOB SERPL: 1.1 {RATIO} (ref 0.7–1.7)
ALPHA1 GLOB SERPL ELPH-MCNC: 0.2 G/DL (ref 0–0.4)
ALPHA2 GLOB SERPL ELPH-MCNC: 1 G/DL (ref 0.4–1)
B-GLOBULIN SERPL ELPH-MCNC: 0.8 G/DL (ref 0.7–1.3)
GAMMA GLOB SERPL ELPH-MCNC: 0.8 G/DL (ref 0.4–1.8)
GLOBULIN SER-MCNC: 2.8 G/DL (ref 2.2–3.9)
IGA SERPL-MCNC: 128 MG/DL (ref 87–352)
IGG SERPL-MCNC: 777 MG/DL (ref 586–1602)
IGM SERPL-MCNC: 90 MG/DL (ref 26–217)
INTERPRETATION SERPL IEP-IMP: ABNORMAL
KAPPA LC FREE SER-MCNC: 110.5 MG/L (ref 3.3–19.4)
KAPPA LC FREE/LAMBDA FREE SER: 1.28 {RATIO} (ref 0.26–1.65)
LABORATORY COMMENT REPORT: ABNORMAL
LAMBDA LC FREE SERPL-MCNC: 86.5 MG/L (ref 5.7–26.3)
M PROTEIN SERPL ELPH-MCNC: ABNORMAL G/DL
PROT SERPL-MCNC: 5.6 G/DL (ref 6–8.5)

## 2025-05-20 ENCOUNTER — TELEPHONE (OUTPATIENT)
Dept: ONCOLOGY | Facility: HOSPITAL | Age: 65
End: 2025-05-20
Payer: MEDICAID

## 2025-05-20 DIAGNOSIS — D64.9 CHRONIC ANEMIA: ICD-10-CM

## 2025-05-20 DIAGNOSIS — N18.5 CKD (CHRONIC KIDNEY DISEASE) STAGE 5, GFR LESS THAN 15 ML/MIN: Primary | ICD-10-CM

## 2025-05-20 LAB
ALBUMIN MFR UR ELPH: 6.7 %
ALPHA1 GLOB MFR UR ELPH: 6.3 %
ALPHA2 GLOB MFR UR ELPH: 34 %
B-GLOBULIN MFR UR ELPH: 34.5 %
GAMMA GLOB MFR UR ELPH: 18.4 %
LABORATORY COMMENT REPORT: NORMAL
M PROTEIN MFR UR ELPH: NORMAL %
PROT UR-MCNC: 9 MG/DL

## 2025-05-20 NOTE — TELEPHONE ENCOUNTER
I HAVE SENT A [employer] MESSAGE LETTING PT KNOW THAT GLENDY HAS ORDERED A 24 HOUR URINE COLLECTION TEST AND THAT SHE NEEDS TO  THE COLLECTION JUG FOR IT. THANKS

## 2025-05-28 NOTE — TREATMENT PLAN
Multi-Disciplinary Problems (from Behavioral Health Treatment Plan)      Active Problems       Problem: Anxiety  Start Date: 05/15/25      Problem Details: The patient self-scales this problem as a 9 with 10 being the worst.          Goal Priority Start Date Expected End Date End Date    Patient will develop and implement behavioral and cognitive strategies to reduce anxiety and irrational fears. -- 05/15/25 11/26/25 --    Goal Details:   Functional status: Moderate symptoms OR moderate difficulty in social occupational or social functioning (51-60)  Prognosis: Fair with expectation for some response to treatment  Progress: waxing and waning but worse overall          Goal Intervention Frequency Start Date End Date    Help patient explore past emotional issues in relation to present anxiety. Q Month 05/15/25 --    Intervention Details: Duration of treatment until remission of symptoms.        Goal Intervention Frequency Start Date End Date    Help patient develop an awareness of their cognitive and physical responses to anxiety. Q Month 05/15/25 --    Intervention Details: Duration of treatment until remission of symptoms.                        Reviewed By       Barby Marr APRN 05/28/25 [id number]                     I have discussed and reviewed this treatment plan with the patient.

## 2025-05-28 NOTE — PLAN OF CARE
CBT/Supportive  Allowed patient to process topics such as dealing with several medial issues and complaints, continues to struggle with needing to perform peritoneal dialysis 4 times daily. . Individual psychotherapy was provided utilizing solution focused techniques to restore adaptive functioning, provide symptom relief, discuss values and strengths, manage stress, identify triggers, recognize patterns of behavior, acknowledge sources of feelings and behaviors, assess symptoms, provide support, and discuss interpersonal conflicts. The therapeutic alliance was strengthened to encourage the patient to express their thoughts and feelings.     Psychotherapy time 20 minutes.  This time is exclusive to the therapy session and separate from the time spent on activities used to meet the criteria for the E/M service (history, exam, medical decision-making).  Goal is to strengthen defenses, promote problems solving, restore adaptive functioning, and provide symptom relief. Esteem building was enhanced through praise, reassurance, normalizing and encouragement. Coping skills were enhanced to build distress tolerance skills and emotional regulation. Allowed patient to freely discuss issues without interruption or judgement with unconditional positive regard, active listening skills, and empathy. Provided a safe, confidential environment to facilitate the development of a positive therapeutic relationship and encourage open, honest communication. Assisted patient in processing session content, acknowledged and normalized patient’s thoughts, feelings, and concerns by utilizing a person-centered approach in efforts to build appropriate rapport and a positive therapeutic relationship with open and honest communication. Plan to continue supportive psychotherapy in next appointment to provide symptom relief.

## 2025-06-12 ENCOUNTER — HOSPITAL ENCOUNTER (OUTPATIENT)
Facility: HOSPITAL | Age: 65
Setting detail: OBSERVATION
Discharge: HOME | End: 2025-06-15
Attending: EMERGENCY MEDICINE | Admitting: INTERNAL MEDICINE
Payer: MEDICAID

## 2025-06-12 ENCOUNTER — APPOINTMENT (OUTPATIENT)
Dept: GENERAL RADIOLOGY | Facility: HOSPITAL | Age: 65
End: 2025-06-12
Payer: MEDICAID

## 2025-06-12 DIAGNOSIS — Z78.9 DECREASED ACTIVITIES OF DAILY LIVING (ADL): ICD-10-CM

## 2025-06-12 DIAGNOSIS — J44.1 COPD EXACERBATION: ICD-10-CM

## 2025-06-12 DIAGNOSIS — J96.01 ACUTE RESPIRATORY FAILURE WITH HYPOXIA: ICD-10-CM

## 2025-06-12 DIAGNOSIS — J20.9 ACUTE BRONCHITIS, UNSPECIFIED ORGANISM: Primary | ICD-10-CM

## 2025-06-12 DIAGNOSIS — J98.01 BRONCHOSPASM: ICD-10-CM

## 2025-06-12 LAB
ALBUMIN SERPL-MCNC: 3.7 G/DL (ref 3.5–5.2)
ALBUMIN/GLOB SERPL: 1.1 G/DL
ALP SERPL-CCNC: 112 U/L (ref 39–117)
ALT SERPL W P-5'-P-CCNC: 12 U/L (ref 1–33)
ANION GAP SERPL CALCULATED.3IONS-SCNC: 14.1 MMOL/L (ref 5–15)
AST SERPL-CCNC: 13 U/L (ref 1–32)
BASOPHILS # BLD AUTO: 0.06 10*3/MM3 (ref 0–0.2)
BASOPHILS NFR BLD AUTO: 0.7 % (ref 0–1.5)
BILIRUB SERPL-MCNC: <0.2 MG/DL (ref 0–1.2)
BUN SERPL-MCNC: 66.6 MG/DL (ref 8–23)
BUN/CREAT SERPL: 8 (ref 7–25)
CALCIUM SPEC-SCNC: 10.2 MG/DL (ref 8.6–10.5)
CHLORIDE SERPL-SCNC: 98 MMOL/L (ref 98–107)
CO2 SERPL-SCNC: 26.9 MMOL/L (ref 22–29)
CREAT SERPL-MCNC: 8.32 MG/DL (ref 0.57–1)
DEPRECATED RDW RBC AUTO: 47.8 FL (ref 37–54)
EGFRCR SERPLBLD CKD-EPI 2021: 5 ML/MIN/1.73
EOSINOPHIL # BLD AUTO: 0.8 10*3/MM3 (ref 0–0.4)
EOSINOPHIL NFR BLD AUTO: 9.1 % (ref 0.3–6.2)
ERYTHROCYTE [DISTWIDTH] IN BLOOD BY AUTOMATED COUNT: 14.6 % (ref 12.3–15.4)
GEN 5 1HR TROPONIN T REFLEX: 28 NG/L
GLOBULIN UR ELPH-MCNC: 3.5 GM/DL
GLUCOSE SERPL-MCNC: 107 MG/DL (ref 65–99)
HCT VFR BLD AUTO: 31 % (ref 34–46.6)
HGB BLD-MCNC: 9.9 G/DL (ref 12–15.9)
HOLD SPECIMEN: NORMAL
HOLD SPECIMEN: NORMAL
IMM GRANULOCYTES # BLD AUTO: 0.04 10*3/MM3 (ref 0–0.05)
IMM GRANULOCYTES NFR BLD AUTO: 0.5 % (ref 0–0.5)
LYMPHOCYTES # BLD AUTO: 2.65 10*3/MM3 (ref 0.7–3.1)
LYMPHOCYTES NFR BLD AUTO: 30.2 % (ref 19.6–45.3)
MCH RBC QN AUTO: 28.9 PG (ref 26.6–33)
MCHC RBC AUTO-ENTMCNC: 31.9 G/DL (ref 31.5–35.7)
MCV RBC AUTO: 90.6 FL (ref 79–97)
MONOCYTES # BLD AUTO: 0.59 10*3/MM3 (ref 0.1–0.9)
MONOCYTES NFR BLD AUTO: 6.7 % (ref 5–12)
NEUTROPHILS NFR BLD AUTO: 4.63 10*3/MM3 (ref 1.7–7)
NEUTROPHILS NFR BLD AUTO: 52.8 % (ref 42.7–76)
NRBC BLD AUTO-RTO: 0 /100 WBC (ref 0–0.2)
NT-PROBNP SERPL-MCNC: 2373 PG/ML (ref 0–900)
PLATELET # BLD AUTO: 280 10*3/MM3 (ref 140–450)
PMV BLD AUTO: 11.8 FL (ref 6–12)
POTASSIUM SERPL-SCNC: 4.1 MMOL/L (ref 3.5–5.2)
PROT SERPL-MCNC: 7.2 G/DL (ref 6–8.5)
QT INTERVAL: 374 MS
QTC INTERVAL: 441 MS
RBC # BLD AUTO: 3.42 10*6/MM3 (ref 3.77–5.28)
SODIUM SERPL-SCNC: 139 MMOL/L (ref 136–145)
TROPONIN T % DELTA: 0
TROPONIN T NUMERIC DELTA: 0 NG/L
TROPONIN T SERPL HS-MCNC: 28 NG/L
WBC NRBC COR # BLD AUTO: 8.77 10*3/MM3 (ref 3.4–10.8)
WHOLE BLOOD HOLD COAG: NORMAL
WHOLE BLOOD HOLD SPECIMEN: NORMAL

## 2025-06-12 PROCEDURE — 93005 ELECTROCARDIOGRAM TRACING: CPT | Performed by: EMERGENCY MEDICINE

## 2025-06-12 PROCEDURE — 94640 AIRWAY INHALATION TREATMENT: CPT

## 2025-06-12 PROCEDURE — G0378 HOSPITAL OBSERVATION PER HR: HCPCS

## 2025-06-12 PROCEDURE — 84484 ASSAY OF TROPONIN QUANT: CPT | Performed by: EMERGENCY MEDICINE

## 2025-06-12 PROCEDURE — 36415 COLL VENOUS BLD VENIPUNCTURE: CPT

## 2025-06-12 PROCEDURE — 99223 1ST HOSP IP/OBS HIGH 75: CPT | Performed by: STUDENT IN AN ORGANIZED HEALTH CARE EDUCATION/TRAINING PROGRAM

## 2025-06-12 PROCEDURE — 80053 COMPREHEN METABOLIC PANEL: CPT | Performed by: EMERGENCY MEDICINE

## 2025-06-12 PROCEDURE — 93005 ELECTROCARDIOGRAM TRACING: CPT

## 2025-06-12 PROCEDURE — 94799 UNLISTED PULMONARY SVC/PX: CPT

## 2025-06-12 PROCEDURE — 83880 ASSAY OF NATRIURETIC PEPTIDE: CPT | Performed by: EMERGENCY MEDICINE

## 2025-06-12 PROCEDURE — 96374 THER/PROPH/DIAG INJ IV PUSH: CPT

## 2025-06-12 PROCEDURE — 99285 EMERGENCY DEPT VISIT HI MDM: CPT

## 2025-06-12 PROCEDURE — 85025 COMPLETE CBC W/AUTO DIFF WBC: CPT | Performed by: EMERGENCY MEDICINE

## 2025-06-12 PROCEDURE — 25010000002 METHYLPREDNISOLONE PER 125 MG: Performed by: EMERGENCY MEDICINE

## 2025-06-12 PROCEDURE — 71045 X-RAY EXAM CHEST 1 VIEW: CPT

## 2025-06-12 RX ORDER — ESCITALOPRAM OXALATE 10 MG/1
10 TABLET ORAL DAILY
COMMUNITY
Start: 2025-05-15 | End: 2025-06-17

## 2025-06-12 RX ORDER — IPRATROPIUM BROMIDE AND ALBUTEROL SULFATE 2.5; .5 MG/3ML; MG/3ML
3 SOLUTION RESPIRATORY (INHALATION)
Status: COMPLETED | OUTPATIENT
Start: 2025-06-12 | End: 2025-06-12

## 2025-06-12 RX ORDER — SODIUM CHLORIDE 0.9 % (FLUSH) 0.9 %
10 SYRINGE (ML) INJECTION AS NEEDED
Status: DISCONTINUED | OUTPATIENT
Start: 2025-06-12 | End: 2025-06-15 | Stop reason: HOSPADM

## 2025-06-12 RX ADMIN — IPRATROPIUM BROMIDE AND ALBUTEROL SULFATE 3 ML: .5; 3 SOLUTION RESPIRATORY (INHALATION) at 21:09

## 2025-06-12 RX ADMIN — IPRATROPIUM BROMIDE AND ALBUTEROL SULFATE 3 ML: .5; 3 SOLUTION RESPIRATORY (INHALATION) at 21:10

## 2025-06-12 RX ADMIN — IPRATROPIUM BROMIDE AND ALBUTEROL SULFATE 3 ML: .5; 3 SOLUTION RESPIRATORY (INHALATION) at 21:08

## 2025-06-12 RX ADMIN — METHYLPREDNISOLONE SODIUM SUCCINATE 125 MG: 125 INJECTION, POWDER, LYOPHILIZED, FOR SOLUTION INTRAMUSCULAR; INTRAVENOUS at 21:31

## 2025-06-13 ENCOUNTER — APPOINTMENT (OUTPATIENT)
Dept: CT IMAGING | Facility: HOSPITAL | Age: 65
End: 2025-06-13
Payer: MEDICAID

## 2025-06-13 PROBLEM — J44.9 COPD (CHRONIC OBSTRUCTIVE PULMONARY DISEASE): Status: ACTIVE | Noted: 2025-06-13

## 2025-06-13 LAB
ALBUMIN SERPL-MCNC: 3.4 G/DL (ref 3.5–5.2)
ALBUMIN/GLOB SERPL: 1.1 G/DL
ALP SERPL-CCNC: 96 U/L (ref 39–117)
ALT SERPL W P-5'-P-CCNC: 10 U/L (ref 1–33)
ANION GAP SERPL CALCULATED.3IONS-SCNC: 14 MMOL/L (ref 5–15)
AST SERPL-CCNC: 12 U/L (ref 1–32)
BASOPHILS # BLD AUTO: 0.02 10*3/MM3 (ref 0–0.2)
BASOPHILS NFR BLD AUTO: 0.3 % (ref 0–1.5)
BILIRUB SERPL-MCNC: <0.2 MG/DL (ref 0–1.2)
BUN SERPL-MCNC: 67 MG/DL (ref 8–23)
BUN/CREAT SERPL: 8 (ref 7–25)
CALCIUM SPEC-SCNC: 9.9 MG/DL (ref 8.6–10.5)
CHLORIDE SERPL-SCNC: 98 MMOL/L (ref 98–107)
CO2 SERPL-SCNC: 26 MMOL/L (ref 22–29)
CREAT SERPL-MCNC: 8.4 MG/DL (ref 0.57–1)
DEPRECATED RDW RBC AUTO: 47.2 FL (ref 37–54)
EGFRCR SERPLBLD CKD-EPI 2021: 4.9 ML/MIN/1.73
EOSINOPHIL # BLD AUTO: 0.03 10*3/MM3 (ref 0–0.4)
EOSINOPHIL NFR BLD AUTO: 0.5 % (ref 0.3–6.2)
ERYTHROCYTE [DISTWIDTH] IN BLOOD BY AUTOMATED COUNT: 14.6 % (ref 12.3–15.4)
GLOBULIN UR ELPH-MCNC: 3.1 GM/DL
GLUCOSE BLDC GLUCOMTR-MCNC: 109 MG/DL (ref 70–99)
GLUCOSE BLDC GLUCOMTR-MCNC: 111 MG/DL (ref 70–99)
GLUCOSE BLDC GLUCOMTR-MCNC: 135 MG/DL (ref 70–99)
GLUCOSE BLDC GLUCOMTR-MCNC: 146 MG/DL (ref 70–99)
GLUCOSE SERPL-MCNC: 182 MG/DL (ref 65–99)
HCT VFR BLD AUTO: 28.8 % (ref 34–46.6)
HGB BLD-MCNC: 9.1 G/DL (ref 12–15.9)
IMM GRANULOCYTES # BLD AUTO: 0.1 10*3/MM3 (ref 0–0.05)
IMM GRANULOCYTES NFR BLD AUTO: 1.6 % (ref 0–0.5)
LYMPHOCYTES # BLD AUTO: 1.3 10*3/MM3 (ref 0.7–3.1)
LYMPHOCYTES NFR BLD AUTO: 21 % (ref 19.6–45.3)
MAGNESIUM SERPL-MCNC: 1.8 MG/DL (ref 1.6–2.4)
MCH RBC QN AUTO: 28.3 PG (ref 26.6–33)
MCHC RBC AUTO-ENTMCNC: 31.6 G/DL (ref 31.5–35.7)
MCV RBC AUTO: 89.7 FL (ref 79–97)
MONOCYTES # BLD AUTO: 0.08 10*3/MM3 (ref 0.1–0.9)
MONOCYTES NFR BLD AUTO: 1.3 % (ref 5–12)
NEUTROPHILS NFR BLD AUTO: 4.66 10*3/MM3 (ref 1.7–7)
NEUTROPHILS NFR BLD AUTO: 75.3 % (ref 42.7–76)
NRBC BLD AUTO-RTO: 0 /100 WBC (ref 0–0.2)
PHOSPHATE SERPL-MCNC: 4.4 MG/DL (ref 2.5–4.5)
PLATELET # BLD AUTO: 232 10*3/MM3 (ref 140–450)
PMV BLD AUTO: 12.2 FL (ref 6–12)
POTASSIUM SERPL-SCNC: 4.2 MMOL/L (ref 3.5–5.2)
PROCALCITONIN SERPL-MCNC: 0.08 NG/ML (ref 0–0.25)
PROT SERPL-MCNC: 6.5 G/DL (ref 6–8.5)
RBC # BLD AUTO: 3.21 10*6/MM3 (ref 3.77–5.28)
SODIUM SERPL-SCNC: 138 MMOL/L (ref 136–145)
WBC NRBC COR # BLD AUTO: 6.19 10*3/MM3 (ref 3.4–10.8)

## 2025-06-13 PROCEDURE — 25010000002 HEPARIN (PORCINE) PER 1000 UNITS: Performed by: STUDENT IN AN ORGANIZED HEALTH CARE EDUCATION/TRAINING PROGRAM

## 2025-06-13 PROCEDURE — 80053 COMPREHEN METABOLIC PANEL: CPT | Performed by: STUDENT IN AN ORGANIZED HEALTH CARE EDUCATION/TRAINING PROGRAM

## 2025-06-13 PROCEDURE — 94761 N-INVAS EAR/PLS OXIMETRY MLT: CPT

## 2025-06-13 PROCEDURE — 94799 UNLISTED PULMONARY SVC/PX: CPT

## 2025-06-13 PROCEDURE — G0378 HOSPITAL OBSERVATION PER HR: HCPCS

## 2025-06-13 PROCEDURE — 99232 SBSQ HOSP IP/OBS MODERATE 35: CPT | Performed by: INTERNAL MEDICINE

## 2025-06-13 PROCEDURE — 82948 REAGENT STRIP/BLOOD GLUCOSE: CPT

## 2025-06-13 PROCEDURE — 84145 PROCALCITONIN (PCT): CPT | Performed by: INTERNAL MEDICINE

## 2025-06-13 PROCEDURE — 96372 THER/PROPH/DIAG INJ SC/IM: CPT

## 2025-06-13 PROCEDURE — 84100 ASSAY OF PHOSPHORUS: CPT | Performed by: STUDENT IN AN ORGANIZED HEALTH CARE EDUCATION/TRAINING PROGRAM

## 2025-06-13 PROCEDURE — 82948 REAGENT STRIP/BLOOD GLUCOSE: CPT | Performed by: STUDENT IN AN ORGANIZED HEALTH CARE EDUCATION/TRAINING PROGRAM

## 2025-06-13 PROCEDURE — 87070 CULTURE OTHR SPECIMN AEROBIC: CPT | Performed by: INTERNAL MEDICINE

## 2025-06-13 PROCEDURE — 83735 ASSAY OF MAGNESIUM: CPT | Performed by: STUDENT IN AN ORGANIZED HEALTH CARE EDUCATION/TRAINING PROGRAM

## 2025-06-13 PROCEDURE — 94664 DEMO&/EVAL PT USE INHALER: CPT

## 2025-06-13 PROCEDURE — 96376 TX/PRO/DX INJ SAME DRUG ADON: CPT

## 2025-06-13 PROCEDURE — 25010000002 METHYLPREDNISOLONE PER 125 MG: Performed by: INTERNAL MEDICINE

## 2025-06-13 PROCEDURE — 85025 COMPLETE CBC W/AUTO DIFF WBC: CPT | Performed by: STUDENT IN AN ORGANIZED HEALTH CARE EDUCATION/TRAINING PROGRAM

## 2025-06-13 PROCEDURE — 71250 CT THORAX DX C-: CPT

## 2025-06-13 PROCEDURE — 87205 SMEAR GRAM STAIN: CPT | Performed by: INTERNAL MEDICINE

## 2025-06-13 PROCEDURE — 97165 OT EVAL LOW COMPLEX 30 MIN: CPT

## 2025-06-13 RX ORDER — BUDESONIDE 0.5 MG/2ML
0.5 INHALANT ORAL
Status: DISCONTINUED | OUTPATIENT
Start: 2025-06-13 | End: 2025-06-15 | Stop reason: HOSPADM

## 2025-06-13 RX ORDER — DEXTROSE MONOHYDRATE 25 G/50ML
25 INJECTION, SOLUTION INTRAVENOUS
Status: DISCONTINUED | OUTPATIENT
Start: 2025-06-13 | End: 2025-06-15 | Stop reason: HOSPADM

## 2025-06-13 RX ORDER — INSULIN LISPRO 100 [IU]/ML
2-7 INJECTION, SOLUTION INTRAVENOUS; SUBCUTANEOUS
Status: DISCONTINUED | OUTPATIENT
Start: 2025-06-13 | End: 2025-06-15 | Stop reason: HOSPADM

## 2025-06-13 RX ORDER — IPRATROPIUM BROMIDE AND ALBUTEROL SULFATE 2.5; .5 MG/3ML; MG/3ML
3 SOLUTION RESPIRATORY (INHALATION) EVERY 6 HOURS PRN
Status: DISCONTINUED | OUTPATIENT
Start: 2025-06-13 | End: 2025-06-13

## 2025-06-13 RX ORDER — BENZONATATE 100 MG/1
100 CAPSULE ORAL 3 TIMES DAILY PRN
Status: DISCONTINUED | OUTPATIENT
Start: 2025-06-13 | End: 2025-06-15 | Stop reason: HOSPADM

## 2025-06-13 RX ORDER — BISACODYL 5 MG/1
5 TABLET, DELAYED RELEASE ORAL DAILY PRN
Status: DISCONTINUED | OUTPATIENT
Start: 2025-06-13 | End: 2025-06-15 | Stop reason: HOSPADM

## 2025-06-13 RX ORDER — PANTOPRAZOLE SODIUM 40 MG/1
40 TABLET, DELAYED RELEASE ORAL
Status: DISCONTINUED | OUTPATIENT
Start: 2025-06-13 | End: 2025-06-15 | Stop reason: HOSPADM

## 2025-06-13 RX ORDER — ARFORMOTEROL TARTRATE 15 UG/2ML
15 SOLUTION RESPIRATORY (INHALATION)
Status: DISCONTINUED | OUTPATIENT
Start: 2025-06-13 | End: 2025-06-15 | Stop reason: HOSPADM

## 2025-06-13 RX ORDER — METOLAZONE 2.5 MG/1
5 TABLET ORAL DAILY
Status: DISCONTINUED | OUTPATIENT
Start: 2025-06-13 | End: 2025-06-15 | Stop reason: HOSPADM

## 2025-06-13 RX ORDER — OLANZAPINE 5 MG/1
15 TABLET, FILM COATED ORAL NIGHTLY
Status: DISCONTINUED | OUTPATIENT
Start: 2025-06-13 | End: 2025-06-15 | Stop reason: HOSPADM

## 2025-06-13 RX ORDER — BISACODYL 10 MG
10 SUPPOSITORY, RECTAL RECTAL DAILY PRN
Status: DISCONTINUED | OUTPATIENT
Start: 2025-06-13 | End: 2025-06-15 | Stop reason: HOSPADM

## 2025-06-13 RX ORDER — HYDRALAZINE HYDROCHLORIDE 50 MG/1
25 TABLET, FILM COATED ORAL DAILY
Status: DISCONTINUED | OUTPATIENT
Start: 2025-06-13 | End: 2025-06-15 | Stop reason: HOSPADM

## 2025-06-13 RX ORDER — IBUPROFEN 600 MG/1
1 TABLET ORAL
Status: DISCONTINUED | OUTPATIENT
Start: 2025-06-13 | End: 2025-06-15 | Stop reason: HOSPADM

## 2025-06-13 RX ORDER — SODIUM CHLORIDE 9 MG/ML
40 INJECTION, SOLUTION INTRAVENOUS AS NEEDED
Status: DISCONTINUED | OUTPATIENT
Start: 2025-06-13 | End: 2025-06-15 | Stop reason: HOSPADM

## 2025-06-13 RX ORDER — HEPARIN SODIUM 5000 [USP'U]/ML
5000 INJECTION, SOLUTION INTRAVENOUS; SUBCUTANEOUS EVERY 8 HOURS SCHEDULED
Status: DISCONTINUED | OUTPATIENT
Start: 2025-06-13 | End: 2025-06-15 | Stop reason: HOSPADM

## 2025-06-13 RX ORDER — AMLODIPINE BESYLATE 10 MG/1
10 TABLET ORAL DAILY
Status: DISCONTINUED | OUTPATIENT
Start: 2025-06-13 | End: 2025-06-15 | Stop reason: HOSPADM

## 2025-06-13 RX ORDER — CARVEDILOL 25 MG/1
25 TABLET ORAL 2 TIMES DAILY
Status: DISCONTINUED | OUTPATIENT
Start: 2025-06-13 | End: 2025-06-15 | Stop reason: HOSPADM

## 2025-06-13 RX ORDER — POLYETHYLENE GLYCOL 3350 17 G/17G
17 POWDER, FOR SOLUTION ORAL DAILY PRN
Status: DISCONTINUED | OUTPATIENT
Start: 2025-06-13 | End: 2025-06-15 | Stop reason: HOSPADM

## 2025-06-13 RX ORDER — AMOXICILLIN 250 MG
2 CAPSULE ORAL 2 TIMES DAILY PRN
Status: DISCONTINUED | OUTPATIENT
Start: 2025-06-13 | End: 2025-06-15 | Stop reason: HOSPADM

## 2025-06-13 RX ORDER — NICOTINE POLACRILEX 4 MG
15 LOZENGE BUCCAL
Status: DISCONTINUED | OUTPATIENT
Start: 2025-06-13 | End: 2025-06-15 | Stop reason: HOSPADM

## 2025-06-13 RX ORDER — CLOPIDOGREL BISULFATE 75 MG/1
75 TABLET ORAL DAILY
Status: DISCONTINUED | OUTPATIENT
Start: 2025-06-13 | End: 2025-06-15 | Stop reason: HOSPADM

## 2025-06-13 RX ORDER — LOSARTAN POTASSIUM 50 MG/1
100 TABLET ORAL DAILY
Status: DISCONTINUED | OUTPATIENT
Start: 2025-06-13 | End: 2025-06-15 | Stop reason: HOSPADM

## 2025-06-13 RX ORDER — ESCITALOPRAM OXALATE 10 MG/1
10 TABLET ORAL DAILY
Status: DISCONTINUED | OUTPATIENT
Start: 2025-06-13 | End: 2025-06-15 | Stop reason: HOSPADM

## 2025-06-13 RX ORDER — IPRATROPIUM BROMIDE AND ALBUTEROL SULFATE 2.5; .5 MG/3ML; MG/3ML
3 SOLUTION RESPIRATORY (INHALATION) EVERY 4 HOURS PRN
Status: DISCONTINUED | OUTPATIENT
Start: 2025-06-13 | End: 2025-06-15 | Stop reason: HOSPADM

## 2025-06-13 RX ORDER — HYDROXYZINE HYDROCHLORIDE 25 MG/1
25 TABLET, FILM COATED ORAL 3 TIMES DAILY PRN
Status: DISCONTINUED | OUTPATIENT
Start: 2025-06-13 | End: 2025-06-15 | Stop reason: HOSPADM

## 2025-06-13 RX ORDER — SODIUM CHLORIDE 0.9 % (FLUSH) 0.9 %
10 SYRINGE (ML) INJECTION AS NEEDED
Status: DISCONTINUED | OUTPATIENT
Start: 2025-06-13 | End: 2025-06-15 | Stop reason: HOSPADM

## 2025-06-13 RX ORDER — CALCIUM ACETATE 667 MG/1
1334 CAPSULE ORAL
Status: DISCONTINUED | OUTPATIENT
Start: 2025-06-13 | End: 2025-06-15 | Stop reason: HOSPADM

## 2025-06-13 RX ORDER — ATORVASTATIN CALCIUM 40 MG/1
40 TABLET, FILM COATED ORAL NIGHTLY
Status: DISCONTINUED | OUTPATIENT
Start: 2025-06-13 | End: 2025-06-15 | Stop reason: HOSPADM

## 2025-06-13 RX ORDER — SODIUM CHLORIDE 0.9 % (FLUSH) 0.9 %
10 SYRINGE (ML) INJECTION EVERY 12 HOURS SCHEDULED
Status: DISCONTINUED | OUTPATIENT
Start: 2025-06-13 | End: 2025-06-15 | Stop reason: HOSPADM

## 2025-06-13 RX ADMIN — CARVEDILOL 25 MG: 25 TABLET, FILM COATED ORAL at 01:59

## 2025-06-13 RX ADMIN — HYDROXYZINE HYDROCHLORIDE 25 MG: 25 TABLET, FILM COATED ORAL at 21:19

## 2025-06-13 RX ADMIN — ATORVASTATIN CALCIUM 40 MG: 40 TABLET, FILM COATED ORAL at 21:20

## 2025-06-13 RX ADMIN — ARFORMOTEROL TARTRATE 15 MCG: 15 SOLUTION RESPIRATORY (INHALATION) at 01:29

## 2025-06-13 RX ADMIN — ATORVASTATIN CALCIUM 40 MG: 40 TABLET, FILM COATED ORAL at 01:59

## 2025-06-13 RX ADMIN — OLANZAPINE 15 MG: 5 TABLET, FILM COATED ORAL at 21:18

## 2025-06-13 RX ADMIN — HEPARIN SODIUM 5000 UNITS: 5000 INJECTION INTRAVENOUS; SUBCUTANEOUS at 21:18

## 2025-06-13 RX ADMIN — CLOPIDOGREL BISULFATE 75 MG: 75 TABLET, FILM COATED ORAL at 09:37

## 2025-06-13 RX ADMIN — HEPARIN SODIUM 5000 UNITS: 5000 INJECTION INTRAVENOUS; SUBCUTANEOUS at 14:24

## 2025-06-13 RX ADMIN — CARVEDILOL 25 MG: 25 TABLET, FILM COATED ORAL at 09:44

## 2025-06-13 RX ADMIN — ESCITALOPRAM OXALATE 10 MG: 10 TABLET ORAL at 09:38

## 2025-06-13 RX ADMIN — ARFORMOTEROL TARTRATE 15 MCG: 15 SOLUTION RESPIRATORY (INHALATION) at 18:55

## 2025-06-13 RX ADMIN — ARFORMOTEROL TARTRATE 15 MCG: 15 SOLUTION RESPIRATORY (INHALATION) at 07:59

## 2025-06-13 RX ADMIN — Medication 10 ML: at 02:00

## 2025-06-13 RX ADMIN — CALCIUM ACETATE 1334 MG: 667 CAPSULE ORAL at 09:37

## 2025-06-13 RX ADMIN — CARVEDILOL 25 MG: 25 TABLET, FILM COATED ORAL at 21:19

## 2025-06-13 RX ADMIN — HEPARIN SODIUM 5000 UNITS: 5000 INJECTION INTRAVENOUS; SUBCUTANEOUS at 05:39

## 2025-06-13 RX ADMIN — BUDESONIDE 0.5 MG: 0.5 SUSPENSION RESPIRATORY (INHALATION) at 18:55

## 2025-06-13 RX ADMIN — IPRATROPIUM BROMIDE AND ALBUTEROL SULFATE 3 ML: .5; 3 SOLUTION RESPIRATORY (INHALATION) at 23:03

## 2025-06-13 RX ADMIN — METHYLPREDNISOLONE SODIUM SUCCINATE 40 MG: 125 INJECTION, POWDER, LYOPHILIZED, FOR SOLUTION INTRAMUSCULAR; INTRAVENOUS at 21:36

## 2025-06-13 RX ADMIN — AMLODIPINE BESYLATE 10 MG: 10 TABLET ORAL at 09:38

## 2025-06-13 RX ADMIN — BENZONATATE 100 MG: 100 CAPSULE ORAL at 02:45

## 2025-06-13 RX ADMIN — Medication 10 ML: at 21:20

## 2025-06-13 RX ADMIN — BENZONATATE 100 MG: 100 CAPSULE ORAL at 21:19

## 2025-06-13 RX ADMIN — IPRATROPIUM BROMIDE AND ALBUTEROL SULFATE 3 ML: .5; 3 SOLUTION RESPIRATORY (INHALATION) at 12:59

## 2025-06-13 RX ADMIN — CALCIUM ACETATE 1334 MG: 667 CAPSULE ORAL at 17:08

## 2025-06-13 RX ADMIN — LOSARTAN POTASSIUM 100 MG: 50 TABLET, FILM COATED ORAL at 09:37

## 2025-06-13 RX ADMIN — HYDRALAZINE HYDROCHLORIDE 25 MG: 50 TABLET ORAL at 09:37

## 2025-06-13 RX ADMIN — PANTOPRAZOLE SODIUM 40 MG: 40 TABLET, DELAYED RELEASE ORAL at 05:38

## 2025-06-13 RX ADMIN — BUDESONIDE 0.5 MG: 0.5 SUSPENSION RESPIRATORY (INHALATION) at 08:00

## 2025-06-13 RX ADMIN — METOLAZONE 5 MG: 2.5 TABLET ORAL at 09:36

## 2025-06-13 RX ADMIN — CALCIUM ACETATE 1334 MG: 667 CAPSULE ORAL at 12:20

## 2025-06-13 RX ADMIN — BENZONATATE 100 MG: 100 CAPSULE ORAL at 11:40

## 2025-06-13 RX ADMIN — OLANZAPINE 15 MG: 5 TABLET, FILM COATED ORAL at 02:06

## 2025-06-13 RX ADMIN — Medication 10 ML: at 09:38

## 2025-06-13 RX ADMIN — METHYLPREDNISOLONE SODIUM SUCCINATE 40 MG: 125 INJECTION, POWDER, LYOPHILIZED, FOR SOLUTION INTRAMUSCULAR; INTRAVENOUS at 14:19

## 2025-06-13 RX ADMIN — BUDESONIDE 0.5 MG: 0.5 SUSPENSION RESPIRATORY (INHALATION) at 01:29

## 2025-06-13 NOTE — PLAN OF CARE
Goal Outcome Evaluation:              Outcome Evaluation: Pt is alert and orient x4.  VS WNl for pt norm.  BS all in normal range this shift.  No sliding scale insulin adm.  PRN tesselon pearls adm with good effect.

## 2025-06-13 NOTE — DISCHARGE INSTR - OTHER ORDERS
[employer] is a Medicaid/Passport Program transportation provider for the [facility] [employer] in [place] and for the [employer] [facility] [employer] in [facility]. Clients must call the  in their area at least three (3) days in advance of service and request [employer] as the transportation provider. Benefits can only be used by clients that do not have a working vehicle in the household or can verify in writing that they are not able to use a household vehicle.    [facility]  [phone] ([place]) - Call number listed and ensure you let them know that you have a Medicaid insurance.

## 2025-06-13 NOTE — THERAPY EVALUATION
Patient Name: Cecilia Sparks  : 1960    MRN: [id number]                              Today's Date: 2025       Admit Date: 2025    Visit Dx:     ICD-10-CM ICD-9-CM   1. Acute bronchitis, unspecified organism  J20.9 466.0   2. Bronchospasm  J98.01 519.11   3. Acute respiratory failure with hypoxia  J96.01 518.81   4. Decreased activities of daily living (ADL)  Z78.9 V49.89     Patient Active Problem List   Diagnosis    PVD (peripheral vascular disease) with claudication    Primary osteoarthritis of left knee    Meningitis after procedure    Hypertension    ESRD on peritoneal dialysis    Asthma    Anxiety    Systemic lupus erythematosus    Stroke    Polyneuropathy    Stage 5 chronic kidney disease    Migraine    Major depressive disorder, recurrent severe without psychotic features    Kidney stones    Hereditary hemochromatosis    Gout    Drug dependence    Epilepsy    Gastro-esophageal reflux disease without esophagitis    Chronic obstructive pulmonary disease    Pulmonary emphysema    Chronic UTI    Congestive heart failure    Bipolar disorder    Fibromyalgia    Chronic back pain    Anemia, autoimmune hemolytic    Mixed hyperlipidemia    Hypertensive emergency    Gastroesophageal reflux disease    Lower abdominal pain    Memory loss    Sequelae, post-stroke    COPD exacerbation     Past Medical History:   Diagnosis Date    Acid reflux disease     Anemia     NO RECENT INTERVENTIONS    Anxiety     Arthritis     Asthma     Bipolar disorder     Borderline diabetic     HX OF BUT REPORTS NO ISSUES NOT ON MEDICATION AND IS DIET CONTROLLED    Cataract     Chronic back pain 2012    Chronic bronchitis     DENIES ANY CURRENT ISSUES    Chronic kidney disease     Stage 5    Chronic UTI     NO CURRENT S/S    Colitis     Congestive heart failure (CHF)     FOLLOWED BY DR DAVIS [facility], NO CURRENT S/S    Depression     Dizziness     Drug dependence     Fibromyalgia     H/O psychiatric care     Heart  murmur     Hematological disorder     hemachromatosis     Hemochromatosis 2012    Hemorrhoids     Hyperlipidemia     Hypertension, essential     Hypoglycemia 2021    Kidney stones     past     Knee pain     Lack of coordination     Major depression, recurrent     Meningitis     Migraine headache     Mitral valve prolapse     Muscle spasm     Neck pain     Night sweats     HX OF NO CURRENT ISSUES    PAD (peripheral artery disease)     STENTS L AND RIGHT GROIN    Pain management     PAIN PUMP W/FENTANYL/ DR KIRK PAIN PUMP CURRENTLY EMPTY    Peptic ulcer     Peritoneal dialysis catheter in place     PD DOES 5 DAYS/WEEK. PD EXCHANGE 4 TIMES ON THOSE DAYS.    PONV (postoperative nausea and vomiting)     Psoriasis-like skin disease     eczema    PTSD (post-traumatic stress disorder)     Ringing in ears     Seizures     NONE FOR A LONG TIME    Stroke (cerebrum)     TIA, MEMORY ISSUES    Syncope and collapse     REPORTS HAPPENED ABOUT A YEAR AGO    Weakness     Weight loss      Past Surgical History:   Procedure Laterality Date    APPENDECTOMY       SECTION      X4    COLONOSCOPY      COLONOSCOPY N/A 2021    Procedure: COLONOSCOPY;  Surgeon: Aston Fernandez MD;  Location: [facility] ENDOSCOPY;  Service: Gastroenterology;  Laterality: N/A;  COLITIS    COLONOSCOPY N/A 3/19/2025    Procedure: COLONOSCOPY;  Surgeon: Dione Harrell MD;  Location: [facility] ENDOSCOPY;  Service: Gastroenterology;  Laterality: N/A;  hemorrhoids    ENDOSCOPY N/A 2022    Procedure: ESOPHAGOGASTRODUODENOSCOPY WITH BIOPSIES;  Surgeon: Dione Harrell MD;  Location: [facility] ENDOSCOPY;  Service: Gastroenterology;  Laterality: N/A;  GASTRITIS    ENDOSCOPY N/A 3/19/2025    Procedure: ESOPHAGOGASTRODUODENOSCOPY with biopsies;  Surgeon: Dione Harrell MD;  Location: [facility] ENDOSCOPY;  Service: Gastroenterology;  Laterality: N/A;  gastritis    FEMORAL ARTERY STENT Bilateral     HYSTERECTOMY   1984,2008    partial/total     INSERTION HEMODIALYSIS CATHETER Right 03/13/2023    Procedure: HEMODIALYSIS CATHETER INSERTION;  Surgeon: Suman Butler MD;  Location: [facility] MAIN OR;  Service: General;  Laterality: Right;  PER DR VILLEDA    INSERTION PERITONEAL DIALYSIS CATHETER      REVISION    INSERTION PERITONEAL DIALYSIS CATHETER N/A 03/13/2023    Procedure: INSERTION PERITONEAL DIALYSIS CATHETER LAPAROSCOPIC;  Surgeon: Suman Butler MD;  Location: [facility] MAIN OR;  Service: General;  Laterality: N/A;  PER DR BUTLER    INTERVENTIONAL RADIOLOGY PROCEDURE Right 02/14/2022    Procedure: Abdominal Aortagram with Runoff;  Surgeon: sApen Couch MD;  Location: [facility] CATH INVASIVE LOCATION;  Service: Peripheral Vascular;  Laterality: Right;    NECK SURGERY Left     KNOW REMOVED    PAIN PUMP INSERTION/REVISION       FENTANYL INSERTED     PAIN PUMP INSERTION/REVISION Left 5/31/2024    Procedure: PAIN PUMP REMOVAL, left lower abdomen;  Surgeon: Semaj Blackwood MD;  Location: [facility] MAIN OR;  Service: Neurosurgery;  Laterality: Left;    REMOVAL HEMODIALYSIS CATHETER      TONSILLECTOMY  1998    VOCAL CORD MASS EXCISION      WOUND EXPLORATION LEG N/A 6/5/2024    Procedure: LUMBAR WOUND EXPLORATION WITH REMOVAL OF RETAINED IT PUMP TUBING AND REPAIR OF CSF LEAK;  Surgeon: Semaj Blackwood MD;  Location: [facility] MAIN OR;  Service: Neurosurgery;  Laterality: N/A;      General Information       Row Name 06/13/25 5693          OT Time and Intention    Subjective Information no complaints  -KH     Document Type evaluation  -KH     Mode of Treatment individual therapy;occupational therapy  -KH     Patient Effort good  -KH     Symptoms Noted During/After Treatment none  -       Row Name 06/13/25 0885          General Information    Patient Profile Reviewed yes  -KH     Prior Level of Function independent:;bed mobility;ADL's;all household mobility  Pt reporting IND with ADLs, 2 KALE, tub shower, stands to shower, has a  cane but usually ambulates w/o device, reports 1 recent fall, denies home O2  -KH     Existing Precautions/Restrictions oxygen therapy device and L/min  2L O2  -KH     Barriers to Rehab none identified  -       Row Name 06/13/25 1345          Occupational Profile    Reason for Services/Referral (Occupational Profile) Pt is a 64-year-old female admitted to [facility] on 6/12/2025 with SOB. OT consulted due to recent decline in ADLs/transfers. No previous OT services for current condition.  -       Row Name 06/13/25 1345          Living Environment    Current Living Arrangements home  -     People in Home sibling(s)  sister  -       Row Name 06/13/25 1345          Home Main Entrance    Number of Stairs, Main Entrance two  -       Row Name 06/13/25 1345          Cognition    Orientation Status (Cognition) oriented x 4  -       Row Name 06/13/25 1345          Safety Issues/Impairments Affecting Functional Mobility    Impairments Affecting Function (Mobility) shortness of breath  -               User Key  (r) = Recorded By, (t) = Taken By, (c) = Cosigned By      Initials Name Provider Type    Claudia Snyder, OT Occupational Therapist                     Mobility/ADL's       Row Name 06/13/25 1348          Bed Mobility    Bed Mobility bed mobility (all) activities  -     All Activities, [place] (Bed Mobility) modified independence  -       Row Name 06/13/25 1348          Transfers    Transfers sit-stand transfer;stand-sit transfer  -[facility] Name 06/13/25 1348          Sit-Stand Transfer    Sit-Stand [place] (Transfers) modified independence  -     Comment, (Sit-Stand Transfer) no AD  -       Row Name 06/13/25 1348          Stand-Sit Transfer    Stand-Sit [place] (Transfers) modified independence  -     Comment, (Stand-Sit Transfer) no AD  -[facility] Name 06/13/25 1348          Functional Mobility    Functional Mobility- Ind. Level conditional independence   -     Functional Mobility- Device other (see comments)  no AD  -     Functional Mobility- Comment Pt able to take 3-4 steps toward HOB unsupported with Mod I  -[facility] Name 06/13/25 1348          Activities of Daily Living    BADL Assessment/Intervention bathing;upper body dressing;lower body dressing;grooming;toileting  -[facility] Name 06/13/25 1348          Bathing Assessment/Intervention    [place] Level (Bathing) bathing skills;modified independence  -KH       Row Name 06/13/25 1348          Upper Body Dressing Assessment/Training    [place] Level (Upper Body Dressing) upper body dressing skills;modified independence  -KH       Row Name 06/13/25 1348          Lower Body Dressing Assessment/Training    [place] Level (Lower Body Dressing) lower body dressing skills;modified independence  -KH       Row Name 06/13/25 1348          Grooming Assessment/Training    [place] Level (Grooming) grooming skills;modified [place]  -KH       Row Name 06/13/25 1348          Toileting Assessment/Training    [place] Level (Toileting) toileting skills;modified [facility]               User Key  (r) = Recorded By, (t) = Taken By, (c) = Cosigned By      Initials Name Provider Type    Claudia Snyder OT Occupational Therapist                   Obj/Interventions       [facility] Name 06/13/25 1349          Sensory Assessment (Somatosensory)    Sensory Assessment (Somatosensory) UE sensation intact  -KH       Row Name 06/13/25 1349          Vision Assessment/Intervention    Visual Impairment/Limitations WFL;corrective lenses for reading  -[facility] Name 06/13/25 1349          Range of Motion Comprehensive    General Range of Motion bilateral upper extremity ROM WFL  -KH       Row Name 06/13/25 1349          Motor Skills    Motor Skills coordination;functional endurance  -     Coordination WFL;bilateral;upper extremity  -     Functional Endurance F+  -[facility] Name 06/13/25 1349           Balance    Balance Assessment standing dynamic balance  -     Dynamic Standing Balance modified independence  -     Position/Device Used, Standing Balance unsupported  -               User Key  (r) = Recorded By, (t) = Taken By, (c) = Cosigned By      Initials Name Provider Type    Claudia Snyder, OT Occupational Therapist                   Goals/Plan    No documentation.                  Clinical Impression       Row Name 06/13/25 1349          Plan of Care Review    Plan of Care Reviewed With patient  -     Progress no change  -     Outcome Evaluation Pt presents with no functional impirments that impede the ability to perform ADLs and transfers as prior. Pt has no questions/concerns regarding OT. No skilled OT services requires at this time.  -       Row Name 06/13/25 1349          Therapy Assessment/Plan (OT)    Criteria for Skilled Therapeutic Interventions Met (OT) no;no problems identified which require skilled intervention  -[facility] Name 06/13/25 1349          Therapy Plan Review/Discharge Plan (OT)    Anticipated Discharge Disposition (OT) home with assist  -[facility] Name 06/13/25 1349          Positioning and Restraints    Pre-Treatment Position in bed  -     Post Treatment Position bed  -     In Bed supine;call light within reach;encouraged to call for assist;exit alarm on;with family/caregiver  -               User Key  (r) = Recorded By, (t) = Taken By, (c) = Cosigned By      Initials Name Provider Type    Claudia Snyder OT Occupational Therapist                   Outcome Measures       Row Name 06/13/25 1351          How much help from another is currently needed...    Putting on and taking off regular lower body clothing? 4  -KH     Bathing (including washing, rinsing, and drying) 4  -KH     Toileting (which includes using toilet bed pan or urinal) 4  -KH     Putting on and taking off regular upper body clothing 4  -KH     Taking care of personal  grooming (such as brushing teeth) 4  -KH     Eating meals 4  -KH     AM-PAC 6 Clicks Score (OT) 24  -KH       Row Name 06/13/25 1352          Functional Assessment    Outcome Measure Options AM-PAC 6 Clicks Daily Activity (OT);Optimal Instrument  -       Row Name 06/13/25 1352          Optimal Instrument    Optimal Instrument Optimal - 3  -KH     Bending/Stooping 2  -KH     Standing 1  -KH     Reaching 1  -KH     From the list, choose the 3 activities you would most like to be able to do without any difficulty Bending/stooping;Reaching;Standing  -KH     Total Score Optimal - 3 4  -KH               User Key  (r) = Recorded By, (t) = Taken By, (c) = Cosigned By      Initials Name Provider Type    Claudia Snyder OT Occupational Therapist                    Occupational Therapy Education       Title: PT OT SLP Therapies (Done)       Topic: Occupational Therapy (Done)       Point: ADL training (Done)       Learning Progress Summary            Patient Acceptance, E, VU by  at 6/13/2025 1352                      Point: Home exercise program (Done)       Learning Progress Summary            Patient Acceptance, E, VU by  at 6/13/2025 1352                      Point: Precautions (Done)       Learning Progress Summary            Patient Acceptance, E, VU by  at 6/13/2025 1352                      Point: Body mechanics (Done)       Learning Progress Summary            Patient Acceptance, E, VU by  at 6/13/2025 [facility]                                      User Key       Initials Effective Dates Name Provider Type Fina THORPE 12/17/24 -  Claudia Rivera OT Occupational Therapist OT                  OT Recommendation and Plan     Plan of Care Review  Plan of Care Reviewed With: patient  Progress: no change  Outcome Evaluation: Pt presents with no functional impirments that impede the ability to perform ADLs and transfers as prior. Pt has no questions/concerns regarding OT. No skilled OT services requires at  this time.     Time Calculation:   Evaluation Complexity (OT)  Review Occupational Profile/Medical/Therapy History Complexity: brief/low complexity  Assessment, Occupational Performance/Identification of Deficit Complexity: 1-3 performance deficits  Clinical Decision Making Complexity (OT): problem focused assessment/low complexity  Overall Complexity of Evaluation (OT): low complexity     Time Calculation- OT       Row Name 06/13/25 1353             Time Calculation- OT    OT Received On 06/13/25  -KH         Untimed Charges    OT Eval/Re-eval Minutes 32  -KH         Total Minutes    Untimed Charges Total Minutes 32  -KH       Total Minutes 32  -KH                User Key  (r) = Recorded By, (t) = Taken By, (c) = Cosigned By      Initials Name Provider Type     Claudia Rivera OT Occupational Therapist                  Therapy Charges for Today       Code Description Service Date Service Provider Modifiers Qty    [id number]  OT EVAL LOW COMPLEXITY 3 6/13/2025 Claudia Rivera OT GO 1                 Claudia Rivera OT  6/13/2025

## 2025-06-13 NOTE — CASE MANAGEMENT/SOCIAL WORK
"COPD Education     Pulmonologist:  none    Age: 64 y.o.  Sex: female    BMI:Body mass index is 33 kg/m².     Past Medical Hx: hypertension, hyperlipidemia, type 2 diabetes mellitus, CHF, anxiety, bipolar disorder, ESRD on peritoneal dialysis, COPD, CAD     Smoking Hx: Social History    Tobacco Use      Smoking status: Former        Packs/day: 0.00        Years: 0.5 packs/day for 54.0 years (27.0 ttl pk-yrs)        Types: Cigarettes        Start date:         Quit date:         Years since quittin.4        Passive exposure: Current      Smokeless tobacco: Never    Social History     Substance and Sexual Activity   Drug Use Never     Airway Clearance methods utilized: none    Daily symptoms: SOA at rest & w/exertion, nonproductive cough    Have you ever attended Pulmonary Rehab? no    Last PFT: none on file    Have you ever had a sleep study/PSG? None on file    STOP BANG Screening Questionnaire:     STOP BANG Score Total:     STOP BANG Interpretation    Risk category Risk factors   Low risk Yes to 0 to 2 of the questions   Intermediate risk Yes to 3 to 4 questions   High risk Yes to 5 to 8 questions   High risk Yes to 2 or more of 4 STOP questions and BMI >35 kg/m2   High risk Yes to 2 or more of 4 STOP questions and neck circumference >=16 inches (>=40 cm)   High risk Yes to 2 or more of 4 STOP questions and male gender (biologic sex)         proBNP:   Latest Reference Range & Units 25 19:33   proBNP 0.0 - 900.0 pg/mL 2,373.0 (H)   (H): Data is abnormally high    Eosinophils Absolute:                   Latest Reference Range & Units 25 19:33   Eosinophils Absolute 0.00 - 0.40 10*3/mm3 0.80 (H)   (H): Data is abnormally high    Last Arterial Blood Gas: No results found for: \"PHART\", \"JKE3IJJ\", \"PO2ART\", \"BJJ8IGD\", \"BASEEXCESS\", \"C1IFOSAK\"     Chest X-Ray findings:   No acute cardiopulmonary abnormality.     Chest CT findings: none on file    Home Medications:  Medications Prior to Admission "   Medication Sig Dispense Refill Last Dose/Taking    albuterol sulfate  (90 Base) MCG/ACT inhaler Inhale 2 puffs Every 4 (Four) Hours As Needed for Wheezing.   Taking As Needed    amLODIPine (NORVASC) 10 MG tablet Take 1 tablet by mouth Daily.   6/12/2025    atorvastatin (LIPITOR) 40 MG tablet TAKE 1 TABLET BY MOUTH EVERY EVENING FOR CHOLESTEROL 30 tablet 4 6/11/2025    calcium acetate (PHOS BINDER,) 667 MG capsule capsule Take 2 capsules by mouth 3 (Three) Times a Day Before Meals.   6/12/2025    calcium acetate (PHOS BINDER,) 667 MG capsule capsule Take 1 capsule by mouth With Snacks. Take before snack   Taking As Needed    Calcium Carb-Cholecalciferol (GNP Calcium 600 +D3) 600-20 MG-MCG tablet TAKE 1 TABLET BY MOUTH TWICE DAILY 60 tablet 11 6/12/2025    carvedilol (COREG) 25 MG tablet Take 1 tablet by mouth 2 (Two) Times a Day. 180 tablet 3 6/12/2025    clopidogrel (PLAVIX) 75 MG tablet TAKE 1 TABLET BY MOUTH ONCE DAILY FOR HEART (Patient taking differently: Take 1 tablet by mouth Daily.) 30 tablet 4 6/12/2025    diphenhydrAMINE (BENADRYL) 25 MG tablet Take 2 tablets by mouth Every Night.   6/11/2025    docusate sodium (COLACE) 100 MG capsule Take 1 capsule by mouth 2 (Two) Times a Day.   6/12/2025    escitalopram (LEXAPRO) 10 MG tablet Take 1 tablet by mouth Daily.   6/12/2025    fluticasone (FLONASE) 50 MCG/ACT nasal spray Administer 2 sprays into the nostril(s) as directed by provider Daily As Needed for Allergies.   Taking As Needed    furosemide (LASIX) 80 MG tablet TAKE 1 TABLET BY MOUTH TWICE DAILY 60 tablet 4 6/12/2025 Morning    GNP ClearLax 17 GM/SCOOP powder Take 17 g by mouth Daily.   Taking    hydrALAZINE (APRESOLINE) 25 MG tablet TAKE 1 TABLET BY MOUTH ONCE DAILY 30 tablet 0 6/12/2025    hyoscyamine (LEVSIN) 0.125 MG SL tablet TAKE 1 TABLET UNDER TONGUE EVERY SIX HOURS AS NEEDED FOR CRAMPING OR DIARRHEA (Patient taking differently: Place 1 tablet under the tongue Every 6 (Six) Hours As  Needed.) 60 tablet 1 Taking Differently    Lokelma 10 g pack Take 20 g by mouth 2 (Two) Times a Week. REPORTS USES 2 PACKS AND ONLY USES ON DAYS SHE DOES NOT DO PD DIALYSIS   Taking    loratadine (CLARITIN) 10 MG tablet TAKE 1 TABLET BY MOUTH ONCE DAILY FOR ALLERGIES (Patient taking differently: Take 1 tablet by mouth Daily.) 30 tablet 0 6/12/2025    LORazepam (ATIVAN) 0.5 MG tablet Take 1 tablet by mouth Daily As Needed for Anxiety. 15 tablet 2 Taking As Needed    losartan (COZAAR) 100 MG tablet Take 1 tablet by mouth Daily.   6/12/2025    metOLazone (ZAROXOLYN) 5 MG tablet Take 1 tablet by mouth Daily.   6/12/2025    OLANZapine (zyPREXA) 15 MG tablet Take 1 tablet by mouth Every Night. 30 tablet 1 6/11/2025    ondansetron ODT (ZOFRAN-ODT) 4 MG disintegrating tablet Place 1 tablet on the tongue Every 6 (Six) Hours As Needed for Nausea or Vomiting. 15 tablet 0 Taking As Needed    pantoprazole (PROTONIX) 40 MG EC tablet TAKE 1 TABLET BY MOUTH ONCE DAILY (STOMACH) (Patient taking differently: Take 1 tablet by mouth Daily.) 90 tablet 1 6/12/2025    promethazine (PHENERGAN) 12.5 MG tablet Take 1 tablet by mouth Every 6 (Six) Hours As Needed for Nausea or Vomiting. (Patient taking differently: Take 1 tablet by mouth 2 (Two) Times a Day As Needed for Nausea or Vomiting.) 15 tablet 1 Taking Differently    sucralfate (Carafate) 1 g tablet Take 1 tablet by mouth 4 (Four) Times a Day. 56 tablet 0 Past Month    Heparin & NaCl Lock Flush (HEPARIN COMBINATION IV) Infuse  into a venous catheter. With dialysis solution FOR PD CATH, TAKES EVERY MONDAY       Insulin Pen Needle (Ultracare Pen Needles) 32G X 4 MM misc Use to inject ozempic 1 time per week. 4 each 1      Do you use a Spacer/Holding Chamber with your MDI?: no    Ms. Sparks states she has no previous diagnosis of COPD. Patient quit smoking last October and quit vaping about 30 days ago.  COPD disease process and management discussed with patient and family.  "\"Understanding and Living with COPD\" booklet left with patient.   Meets criteria for low dose lung CT annually  COPD clinic f/u  Complete PFT; per 2025 GOLD guidelines, Spirometry showing the presence of a post-bronchodilator FEV1/FVC <0.7 is mandatory to establish diagnosis of COPD  May benefit from outpatient pulmonary rehab  Airway clearance with deep breathing and cough and aerobika  No COPD maintenance medication noted on PTA meds; may benefit from LABA/LAMA/ICS- absolute EOS elevated   Walking oximetry  Encourage RSV, shingles, COVID, flu, pneumonia vaccines    Marcie Mckeon, RRT   RT   06/13/25  09:17 EDT        "

## 2025-06-13 NOTE — PLAN OF CARE
Problem: Adult Inpatient Plan of Care  Goal: Plan of Care Review  Outcome: Progressing  Flowsheets (Taken 6/13/2025 0425)  Outcome Evaluation: Patient presented to the floor with COPD exacerbation. O2 2L. Patient has been coughing and MD ordered medication that seems to be working. Continue plan of care.  Plan of Care Reviewed With: patient  Goal: Patient-Specific Goal (Individualized)  Outcome: Progressing  Goal: Absence of Hospital-Acquired Illness or Injury  Outcome: Progressing  Intervention: Identify and Manage Fall Risk  Recent Flowsheet Documentation  Taken 6/13/2025 0319 by Salena Doshi RN  Safety Promotion/Fall Prevention: safety round/check completed  Taken 6/13/2025 0100 by Salena Doshi RN  Safety Promotion/Fall Prevention: safety round/check completed  Taken 6/13/2025 0019 by Salena Doshi RN  Safety Promotion/Fall Prevention:   safety round/check completed   room organization consistent   nonskid shoes/slippers when out of bed   mobility aid in reach   lighting adjusted   clutter free environment maintained   assistive device/personal items within reach  Intervention: Prevent Skin Injury  Recent Flowsheet Documentation  Taken 6/13/2025 0019 by Salena Doshi RN  Body Position:   sitting up in bed   position changed independently  Intervention: Prevent and Manage VTE (Venous Thromboembolism) Risk  Recent Flowsheet Documentation  Taken 6/13/2025 0019 by Salena Doshi RN  VTE Prevention/Management: (See Emar) --  Intervention: Prevent Infection  Recent Flowsheet Documentation  Taken 6/13/2025 0019 by Salena Doshi RN  Infection Prevention:   hand hygiene promoted   single patient room provided  Goal: Optimal Comfort and Wellbeing  Outcome: Progressing  Intervention: Monitor Pain and Promote Comfort  Recent Flowsheet Documentation  Taken 6/13/2025 0019 by Salena Doshi RN  Pain Management Interventions:   quiet environment facilitated   relaxation techniques promoted    position adjusted   pillow support provided  Intervention: Provide Person-Centered Care  Recent Flowsheet Documentation  Taken 6/13/2025 0019 by Salena Doshi RN  Trust Relationship/Rapport:   care explained   empathic listening provided   questions answered   questions encouraged   reassurance provided  Goal: Readiness for Transition of Care  Outcome: Progressing  Intervention: Mutually Develop Transition Plan  Recent Flowsheet Documentation  Taken 6/13/2025 0046 by Salena Doshi RN  Transportation Anticipated: family or friend will provide  Transportation Concerns: rides, unreliable from others  Patient/Family Anticipated Services at Transition: respiratory services  Patient/Family Anticipates Transition to: home with family  Taken 6/13/2025 0023 by Salena Doshi RN  Equipment Currently Used at Home:   cane, straight   bp cuff   other (see comments)   nebulizer     Problem: Comorbidity Management  Goal: Maintenance of Asthma Control  Outcome: Progressing  Intervention: Maintain Asthma Symptom Control  Recent Flowsheet Documentation  Taken 6/13/2025 0019 by Salena Doshi RN  Medication Review/Management: medications reviewed  Goal: Maintenance of Behavioral Health Symptom Control  Outcome: Progressing  Intervention: Maintain Behavioral Health Symptom Control  Recent Flowsheet Documentation  Taken 6/13/2025 0019 by Salena Doshi RN  Medication Review/Management: medications reviewed  Goal: Maintenance of COPD Symptom Control  Outcome: Progressing  Intervention: Maintain COPD (Chronic Obstructive Pulmonary Disease) Symptom Control  Recent Flowsheet Documentation  Taken 6/13/2025 0019 by Salena Doshi RN  Breathing Techniques/Airway Clearance: deep/controlled cough encouraged  Medication Review/Management: medications reviewed  Goal: Blood Pressure in Desired Range  Outcome: Progressing  Intervention: Maintain Blood Pressure Management  Recent Flowsheet Documentation  Taken 6/13/2025 0019 by  Salena Doshi, RN  Medication Review/Management: medications reviewed   Goal Outcome Evaluation:  Plan of Care Reviewed With: patient           Outcome Evaluation: Patient presented to the floor with COPD exacerbation. O2 2L. Patient has been coughing and MD ordered medication that seems to be working. Continue plan of care.

## 2025-06-13 NOTE — CONSULTS
[facility]   Consult Note    Patient Name: Cecilia Sparks  : 1960  MRN: [id number]  Primary Care Physician:  Ellen Strickland DO  Referring Physician: No ref. provider found  Date of admission: 2025    Subjective   Subjective     Reason for Consult/ Chief Complaint: ESRD    HPI:  Cecilia Sparks is a 64 y.o. female 64-year-old female with past medical history of ESRD on peritoneal dialysis, hypertension, mood disorder, chronic pain, COPD, coronary artery disease comes in for worsening shortness of breath that has been ongoing for the last 2 weeks however it progressively got worse.  Currently patient is vaping and her last episode was a month ago.  Patient denies any fever chills nausea vomiting.  She does endorse associated cough    In the ER patient's blood pressures and vitals were stable and on 2 L of oxygen with saturation at 92.  Her chemistries are consistent with ESRD.  Her proBNP mild elevated at 2300.  Patient dialyzes 5 days a week.  Chest x-ray was unremarkable.  Patient was admitted for further management of COPD suspicion.  Nephrology has been consulted for ESRD management    Review of Systems  All review of systems negative except as given below.    Personal History     Past Medical History:   Diagnosis Date    Acid reflux disease     Anemia     NO RECENT INTERVENTIONS    Anxiety     Arthritis     Asthma     Bipolar disorder     Borderline diabetic     HX OF BUT REPORTS NO ISSUES NOT ON MEDICATION AND IS DIET CONTROLLED    Cataract     Chronic back pain 2012    Chronic bronchitis     DENIES ANY CURRENT ISSUES    Chronic kidney disease     Stage 5    Chronic UTI     NO CURRENT S/S    Colitis     Congestive heart failure (CHF)     FOLLOWED BY DR [facility], NO CURRENT S/S    Depression     Dizziness     Drug dependence     Fibromyalgia     H/O psychiatric care     Heart murmur     Hematological disorder     hemachromatosis     Hemochromatosis 2012     Hemorrhoids     Hyperlipidemia     Hypertension, essential     Hypoglycemia 2021    Kidney stones     past     Knee pain     Lack of coordination     Major depression, recurrent     Meningitis     Migraine headache     Mitral valve prolapse     Muscle spasm     Neck pain     Night sweats     HX OF NO CURRENT ISSUES    PAD (peripheral artery disease)     STENTS L AND RIGHT GROIN    Pain management     PAIN PUMP W/FENTANYL/ DR KIRK PAIN PUMP CURRENTLY EMPTY    Peptic ulcer     Peritoneal dialysis catheter in place     PD DOES 5 DAYS/WEEK. PD EXCHANGE 4 TIMES ON THOSE DAYS.    PONV (postoperative nausea and vomiting)     Psoriasis-like skin disease     eczema    PTSD (post-traumatic stress disorder)     Ringing in ears     Seizures     NONE FOR A LONG TIME    Stroke (cerebrum)     TIA, MEMORY ISSUES    Syncope and collapse     REPORTS HAPPENED ABOUT A YEAR AGO    Weakness     Weight loss        Past Surgical History:   Procedure Laterality Date    APPENDECTOMY       SECTION      X4    COLONOSCOPY      COLONOSCOPY N/A 2021    Procedure: COLONOSCOPY;  Surgeon: Aston Fernandez MD;  Location: [facility] ENDOSCOPY;  Service: Gastroenterology;  Laterality: N/A;  COLITIS    COLONOSCOPY N/A 3/19/2025    Procedure: COLONOSCOPY;  Surgeon: Dione Harrell MD;  Location: [facility] ENDOSCOPY;  Service: Gastroenterology;  Laterality: N/A;  hemorrhoids    ENDOSCOPY N/A 2022    Procedure: ESOPHAGOGASTRODUODENOSCOPY WITH BIOPSIES;  Surgeon: Dione Harrell MD;  Location: [facility] ENDOSCOPY;  Service: Gastroenterology;  Laterality: N/A;  GASTRITIS    ENDOSCOPY N/A 3/19/2025    Procedure: ESOPHAGOGASTRODUODENOSCOPY with biopsies;  Surgeon: Dione Harrell MD;  Location: [facility] ENDOSCOPY;  Service: Gastroenterology;  Laterality: N/A;  gastritis    FEMORAL ARTERY STENT Bilateral     HYSTERECTOMY  ,    partial/total     INSERTION HEMODIALYSIS CATHETER Right 2023     Procedure: HEMODIALYSIS CATHETER INSERTION;  Surgeon: Suman Butler MD;  Location: [facility] MAIN OR;  Service: General;  Laterality: Right;  PER DR VILLEDA    INSERTION PERITONEAL DIALYSIS CATHETER      REVISION    INSERTION PERITONEAL DIALYSIS CATHETER N/A 03/13/2023    Procedure: INSERTION PERITONEAL DIALYSIS CATHETER LAPAROSCOPIC;  Surgeon: Suman Butler MD;  Location: [facility] MAIN OR;  Service: General;  Laterality: N/A;  PER DR BUTLER    INTERVENTIONAL RADIOLOGY PROCEDURE Right 02/14/2022    Procedure: Abdominal Aortagram with Runoff;  Surgeon: Aspen Couch MD;  Location: [facility] CATH INVASIVE LOCATION;  Service: Peripheral Vascular;  Laterality: Right;    NECK SURGERY Left     KNOW REMOVED    PAIN PUMP INSERTION/REVISION       FENTANYL INSERTED     PAIN PUMP INSERTION/REVISION Left 5/31/2024    Procedure: PAIN PUMP REMOVAL, left lower abdomen;  Surgeon: Semaj Blackwood MD;  Location: [facility] MAIN OR;  Service: Neurosurgery;  Laterality: Left;    REMOVAL HEMODIALYSIS CATHETER      TONSILLECTOMY  1998    VOCAL CORD MASS EXCISION      WOUND EXPLORATION LEG N/A 6/5/2024    Procedure: LUMBAR WOUND EXPLORATION WITH REMOVAL OF RETAINED IT PUMP TUBING AND REPAIR OF CSF LEAK;  Surgeon: Semaj Blackwood MD;  Location: [facility] MAIN OR;  Service: Neurosurgery;  Laterality: N/A;       Family History: family history includes Cancer in her brother, paternal aunt, paternal uncle, sister, and another family member; Diabetes in an other family member; Heart failure in an other family member; Stroke in an other family member. Otherwise pertinent FHx was reviewed and not pertinent to current issue.    Social History:  reports that she quit smoking about 17 months ago. Her smoking use included cigarettes. She started smoking about 55 years ago. She has a 27 pack-year smoking history. She has been exposed to tobacco smoke. She has never used smokeless tobacco. She reports that she does not currently use alcohol. She  reports that she does not use drugs.    Home Medications:  Calcium Carb-Cholecalciferol, Heparin & NaCl Lock Flush, Insulin Pen Needle, LORazepam, OLANZapine, albuterol sulfate HFA, amLODIPine, atorvastatin, calcium acetate, carvedilol, clopidogrel, diphenhydrAMINE, docusate sodium, escitalopram, fluticasone, furosemide, hydrALAZINE, hyoscyamine, loratadine, losartan, metOLazone, ondansetron ODT, pantoprazole, polyethylene glycol, promethazine, sodium zirconium cyclosilicate, and sucralfate    Allergies:  Allergies   Allergen Reactions    Iron Sucrose Unknown - High Severity     HX OF HEMOCHROMATOSIS     Lamotrigine Rash    Latex Hives, Nausea And Vomiting and Rash     blisters      Lamictal Xr [Lamotrigine Er] Rash    Nickel Rash     RASH W/COSMETIC JEWELRY    Sulfa Antibiotics Rash     PT REPORTS SHE HAS NEVER HAD TROUBLE OR NOTED ISSUES WITH SULFA BEFORE       Objective    Objective     Vitals:   Temp:  [97.5 °F (36.4 °C)-97.9 °F (36.6 °C)] 97.9 °F (36.6 °C)  Heart Rate:  [79-91] 79  Resp:  [15-22] 18  BP: (125-170)/(73-94) 158/89  Flow (L/min) (Oxygen Therapy):  [2-3] 2    Physical Exam:             Constitutional:         Awake, alert responsive, conversant, no obvious distress   Eyes:                       PERRLA, sclerae anicteric, no conjunctival injection   HEENT:                   Moist mucous membranes, no nasal or eye discharge, no throat congestion   Neck:                      Supple, no thyromegaly, no lymphadenopathy, trachea midline, no elevated JVD   Respiratory:           Clear to auscultation bilaterally, nonlabored respirations    Cardiovascular:     RRR, no murmurs, rubs, or gallops, palpable pedal pulses bilaterally, No bilateral ankle edema   Gastrointestinal:   Positive bowel sounds, soft, nontender, non-distended, no organomegaly   Musculoskeletal:  No clubbing or cyanosis to extremities, muscle wasting, joint swelling, muscle weakness   Psychiatric:              Appropriate affect,  cooperative   Neurologic:            Awake alert, oriented x 3, strength symmetric in all extremities, Cranial Nerves grossly intact to confrontation, speech clear   Skin:                      No rashes, bruising, skin ulcers, petechiae or ecchymosis    Result Review    Result Review:  I have personally reviewed the results from the time of this admission to 6/13/2025 08:17 EDT and agree with these findings:  []  Laboratory  []  Microbiology  []  Radiology  []  EKG/Telemetry   []  Cardiology/Vascular   []  Pathology  []  Old records  []  Other:    Results from last 7 days   Lab Units 06/13/25  0431 06/12/25  1933   WBC 10*3/mm3 6.19 8.77   HEMOGLOBIN g/dL 9.1* 9.9*   PLATELETS 10*3/mm3 232 280     Results from last 7 days   Lab Units 06/13/25  0431 06/12/25  1933   SODIUM mmol/L 138 139   POTASSIUM mmol/L 4.2 4.1   CHLORIDE mmol/L 98 98   CO2 mmol/L 26.0 26.9   ANION GAP mmol/L 14.0 14.1   BUN mg/dL 67.0* 66.6*   CREATININE mg/dL 8.40* 8.32*   GLUCOSE mg/dL 182* 107*   EGFR mL/min/1.73 4.9* 5.0*   CALCIUM mg/dL 9.9 10.2   MAGNESIUM mg/dL 1.8  --    ALBUMIN g/dL 3.4* 3.7   BILIRUBIN mg/dL <0.2 <0.2   ALK PHOS U/L 96 112   ALT (SGPT) U/L 10 12   AST (SGOT) U/L 12 13       Assessment & Plan   Assessment / Plan     Active Hospital Problems:  Active Hospital Problems    Diagnosis     **COPD exacerbation      64-year-old female with past medical history of ESRD on peritoneal dialysis, hypertension, mood disorder, chronic pain, COPD, coronary artery disease comes in with shortness of breath consistent with COPD exacerbation and no abnormalities on x-ray    Plan:   Patient had peritoneal dialysis done the night prior to admission and will hold off on today and reassess dialysis to be done tomorrow  Renal diet as tolerated  Patient is on steroids for her COPD  Continue anidipine 10 mg, carvedilol 25, losartan 100  We will hold off on hydralazine  PhosLo with meals  Metolazone 5 mg    Electronically signed by Erika CAROLINA  MD Leyda, 06/13/25, 8:17 AM EDT.

## 2025-06-13 NOTE — ED PROVIDER NOTES
Time: 8:48 PM EDT  Date of encounter:  6/12/2025  Independent Historian/Clinical History and Information was obtained by:   Patient and Family    History is limited by: N/A    Chief Complaint: Cough, wheezing, shortness of breath      History of Present Illness:  Patient is a 64 y.o. year old female who presents to the emergency department for evaluation of cough, wheezing, shortness of breath.  This patient is a peritoneal dialysis patient who also has a history of asthma and she has been a heavy smoker up until the past year.  The patient developed a cough with some white sputum production however she has had no fever.  The patient complains of increasing shortness of breath and her oxygen saturation was only 88 to 89% on room air with increased wheezing and shortness of breath.  The patient denies chest pain, vomiting, diarrhea or other acute complaints      Patient Care Team  Primary Care Provider: Ellen Strickland DO    Past Medical History:     Allergies   Allergen Reactions    Iron Sucrose Unknown - High Severity     HX OF HEMOCHROMATOSIS     Lamotrigine Rash    Latex Hives, Nausea And Vomiting and Rash     blisters      Lamictal Xr [Lamotrigine Er] Rash    Nickel Rash     RASH W/COSMETIC JEWELRY    Sulfa Antibiotics Rash     PT REPORTS SHE HAS NEVER HAD TROUBLE OR NOTED ISSUES WITH SULFA BEFORE     Past Medical History:   Diagnosis Date    Acid reflux disease     Anemia     NO RECENT INTERVENTIONS    Anxiety     Arthritis     Asthma     Bipolar disorder     Borderline diabetic     HX OF BUT REPORTS NO ISSUES NOT ON MEDICATION AND IS DIET CONTROLLED    Cataract     Chronic back pain 01/13/2012    Chronic bronchitis     DENIES ANY CURRENT ISSUES    Chronic UTI     NO CURRENT S/S    Colitis     Congestive heart failure (CHF)     FOLLOWED BY DR DAVIS [facility], NO CURRENT S/S    Depression     Dizziness     Drug dependence     Fibromyalgia     H/O psychiatric care     Heart murmur     Hematological disorder      hemachromatosis     Hemochromatosis 2012    Hemorrhoids     Hyperlipidemia     Hypertension, essential     Hypoglycemia 2021    Kidney stones     past     Knee pain     Lack of coordination     Major depression, recurrent     Meningitis     Migraine headache     Mitral valve prolapse     Muscle spasm     Neck pain     Night sweats     HX OF NO CURRENT ISSUES    PAD (peripheral artery disease)     STENTS L AND RIGHT GROIN    Pain management     PAIN PUMP W/FENTANYL/ DR KIRK PAIN PUMP CURRENTLY EMPTY    Peptic ulcer     Peritoneal dialysis catheter in place     PD DOES 5 DAYS/WEEK. PD EXCHANGE 4 TIMES ON THOSE DAYS.    PONV (postoperative nausea and vomiting)     Psoriasis-like skin disease     eczema    PTSD (post-traumatic stress disorder)     Ringing in ears     Seizures     NONE FOR A LONG TIME    Stroke (cerebrum)     TIA, MEMORY ISSUES    Syncope and collapse     REPORTS HAPPENED ABOUT A YEAR AGO    Weakness     Weight loss      Past Surgical History:   Procedure Laterality Date    APPENDECTOMY       SECTION      X4    COLONOSCOPY      COLONOSCOPY N/A 2021    Procedure: COLONOSCOPY;  Surgeon: Aston Fernandez MD;  Location: [facility] ENDOSCOPY;  Service: Gastroenterology;  Laterality: N/A;  COLITIS    COLONOSCOPY N/A 3/19/2025    Procedure: COLONOSCOPY;  Surgeon: Dione Harrell MD;  Location: [facility] ENDOSCOPY;  Service: Gastroenterology;  Laterality: N/A;  hemorrhoids    ENDOSCOPY N/A 2022    Procedure: ESOPHAGOGASTRODUODENOSCOPY WITH BIOPSIES;  Surgeon: Dione Harrell MD;  Location: [facility] ENDOSCOPY;  Service: Gastroenterology;  Laterality: N/A;  GASTRITIS    ENDOSCOPY N/A 3/19/2025    Procedure: ESOPHAGOGASTRODUODENOSCOPY with biopsies;  Surgeon: Dione Harrell MD;  Location: [facility] ENDOSCOPY;  Service: Gastroenterology;  Laterality: N/A;  gastritis    FEMORAL ARTERY STENT Bilateral     HYSTERECTOMY  ,    partial/total      INSERTION HEMODIALYSIS CATHETER Right 03/13/2023    Procedure: HEMODIALYSIS CATHETER INSERTION;  Surgeon: Suman Butler MD;  Location: [facility] MAIN OR;  Service: General;  Laterality: Right;  PER DR VILLEDA    INSERTION PERITONEAL DIALYSIS CATHETER      REVISION    INSERTION PERITONEAL DIALYSIS CATHETER N/A 03/13/2023    Procedure: INSERTION PERITONEAL DIALYSIS CATHETER LAPAROSCOPIC;  Surgeon: Suman Butler MD;  Location: [facility] MAIN OR;  Service: General;  Laterality: N/A;  PER DR BUTLER    INTERVENTIONAL RADIOLOGY PROCEDURE Right 02/14/2022    Procedure: Abdominal Aortagram with Runoff;  Surgeon: Aspen Couch MD;  Location: [facility] CATH INVASIVE LOCATION;  Service: Peripheral Vascular;  Laterality: Right;    NECK SURGERY Left     KNOW REMOVED    PAIN PUMP INSERTION/REVISION       FENTANYL INSERTED     PAIN PUMP INSERTION/REVISION Left 5/31/2024    Procedure: PAIN PUMP REMOVAL, left lower abdomen;  Surgeon: Semaj Blackwood MD;  Location: [facility] MAIN OR;  Service: Neurosurgery;  Laterality: Left;    REMOVAL HEMODIALYSIS CATHETER      TONSILLECTOMY  1998    VOCAL CORD MASS EXCISION      WOUND EXPLORATION LEG N/A 6/5/2024    Procedure: LUMBAR WOUND EXPLORATION WITH REMOVAL OF RETAINED IT PUMP TUBING AND REPAIR OF CSF LEAK;  Surgeon: Semaj Blackwood MD;  Location: [facility] MAIN OR;  Service: Neurosurgery;  Laterality: N/A;     Family History   Problem Relation Age of Onset    Cancer Sister     Cancer Brother     Cancer Paternal Aunt     Cancer Paternal Uncle     Cancer Other     Stroke Other     Diabetes Other     Heart failure Other     Malig Hyperthermia Neg Hx        Home Medications:  Prior to Admission medications    Medication Sig Start Date End Date Taking? Authorizing Provider   albuterol sulfate  (90 Base) MCG/ACT inhaler Inhale 2 puffs Every 4 (Four) Hours As Needed for Wheezing.    Provider, MD Reynold   amLODIPine (NORVASC) 10 MG tablet Take 1 tablet by mouth Daily.    Provider,  MD Reynold   atorvastatin (LIPITOR) 40 MG tablet TAKE 1 TABLET BY MOUTH EVERY EVENING FOR CHOLESTEROL 5/15/25   Ellen Stirckland DO   calcium acetate (PHOS BINDER,) 667 MG capsule capsule Take 2 capsules by mouth 3 (Three) Times a Day Before Meals.    Reynold Candelario MD   calcium acetate (PHOS BINDER,) 667 MG capsule capsule Take 1 capsule by mouth With Snacks. Take before snack    Reynold Candelario MD   Calcium Carb-Cholecalciferol (GNP Calcium 600 +D3) 600-20 MG-MCG tablet TAKE 1 TABLET BY MOUTH TWICE DAILY 8/22/24   Ellen Strickland DO   carvedilol (COREG) 25 MG tablet Take 1 tablet by mouth 2 (Two) Times a Day. 3/24/25   Quintin Smiley MD   clopidogrel (PLAVIX) 75 MG tablet TAKE 1 TABLET BY MOUTH ONCE DAILY FOR HEART 5/15/25   Ellen Strickland DO   diphenhydrAMINE (BENADRYL) 25 MG tablet Take 2 tablets by mouth Every Night.    Reynold Candelario MD   docusate sodium (COLACE) 100 MG capsule Take 1 capsule by mouth 2 (Two) Times a Day.    Reynold Candelario MD   fluconazole (DIFLUCAN) 100 MG tablet  5/6/25   Reynold Candelario MD   fluticasone (FLONASE) 50 MCG/ACT nasal spray Administer 2 sprays into the nostril(s) as directed by provider Daily As Needed for Allergies.    Reynold Candelario MD   furosemide (LASIX) 80 MG tablet TAKE 1 TABLET BY MOUTH TWICE DAILY 5/15/25   Ellen Strickland DO   GNP ClearLax 17 GM/SCOOP powder Take 17 g by mouth Daily. 10/20/21   Reynold Candelario MD   Heparin & NaCl Lock Flush (HEPARIN COMBINATION IV) Infuse  into a venous catheter. With dialysis solution FOR PD CATH, TAKES EVERY MONDAY    Reynold Candelario MD   hydrALAZINE (APRESOLINE) 25 MG tablet TAKE 1 TABLET BY MOUTH ONCE DAILY 5/15/25   Adrienne Roe APRN   hyoscyamine (LEVSIN) 0.125 MG SL tablet TAKE 1 TABLET UNDER TONGUE EVERY SIX HOURS AS NEEDED FOR CRAMPING OR DIARRHEA 1/20/25   Dione Harrell MD   Insulin Pen Needle (Ultracare Pen Needles) 32G X 4 MM misc Use to inject ozempic 1  time per week. 12/20/24 12/20/25  Adrienne Roe APRN   Lokelma 10 g pack Take 20 g by mouth 2 (Two) Times a Week. REPORTS USES 2 PACKS AND ONLY USES ON DAYS SHE DOES NOT DO PD DIALYSIS 12/5/22   Reynold Candelario MD   loratadine (CLARITIN) 10 MG tablet TAKE 1 TABLET BY MOUTH ONCE DAILY FOR ALLERGIES 5/15/25   Adrienne Roe APRN   LORazepam (ATIVAN) 0.5 MG tablet Take 1 tablet by mouth Daily As Needed for Anxiety. 3/13/25   Barby Marr APRN   losartan (COZAAR) 100 MG tablet  10/22/24   Reynold Candelario MD   metOLazone (ZAROXOLYN) 5 MG tablet  4/17/25   Reynold Candelario MD   Nebulizers (Comp Air Compressor Nebulizer) misc  1/30/25   Reynold Candelario MD   OLANZapine (zyPREXA) 15 MG tablet Take 1 tablet by mouth Every Night. 3/13/25   Barby Marr APRN   ondansetron ODT (ZOFRAN-ODT) 4 MG disintegrating tablet Place 1 tablet on the tongue Every 6 (Six) Hours As Needed for Nausea or Vomiting. 4/20/25   Anthony Carter MD   pantoprazole (PROTONIX) 40 MG EC tablet TAKE 1 TABLET BY MOUTH ONCE DAILY (STOMACH) 5/15/25   Marcie Jones APRN   pregabalin (LYRICA) 150 MG capsule Take 1 capsule by mouth Every Night.    Reynold Candelario MD   promethazine (PHENERGAN) 12.5 MG tablet Take 1 tablet by mouth Every 6 (Six) Hours As Needed for Nausea or Vomiting. 4/11/24   Dione Harrell MD   Semaglutide,0.25 or 0.5MG/DOS, (OZEMPIC) 2 MG/3ML solution pen-injector Inject 0.25 mg under the skin into the appropriate area as directed 1 (One) Time Per Week. 11/20/24   Adrienne Roe APRN   sucralfate (Carafate) 1 g tablet Take 1 tablet by mouth 4 (Four) Times a Day. 4/22/25   Marcie Jones APRN   tiZANidine (ZANAFLEX) 4 MG tablet Take 1 tablet by mouth Every Night.    Provider, Historical, MD        Social History:   Social History     Tobacco Use    Smoking status: Former     Current packs/day: 0.00     Average packs/day: 0.5 packs/day for 54.0 years (27.0 ttl pk-yrs)  "    Types: Cigarettes     Start date:      Quit date:      Years since quittin.4     Passive exposure: Current    Smokeless tobacco: Never   Vaping Use    Vaping status: Never Used   Substance Use Topics    Alcohol use: Not Currently    Drug use: Never         Review of Systems:  Review of Systems   Constitutional:  Negative for chills and fever.   HENT:  Negative for congestion, ear pain and sore throat.    Eyes:  Negative for pain.   Respiratory:  Positive for cough, shortness of breath and wheezing. Negative for chest tightness.    Cardiovascular:  Negative for chest pain.   Gastrointestinal:  Negative for abdominal pain, diarrhea, nausea and vomiting.   Genitourinary:  Negative for flank pain and hematuria.   Musculoskeletal:  Negative for joint swelling.   Skin:  Negative for pallor.   Neurological:  Positive for weakness. Negative for seizures and headaches.   All other systems reviewed and are negative.       Physical Exam:  /94   Pulse 91   Temp 97.9 °F (36.6 °C) (Oral)   Resp 15   Ht 162.6 cm (64\")   Wt 88 kg (194 lb 0.1 oz)   LMP  (LMP Unknown)   SpO2 93%   BMI 33.30 kg/m²     Physical Exam  Vitals and nursing note reviewed.   Constitutional:       General: She is in acute distress.      Appearance: Normal appearance. She is not toxic-appearing.   HENT:      Head: Normocephalic and atraumatic.      Mouth/Throat:      Mouth: Mucous membranes are moist.   Eyes:      General: No scleral icterus.  Cardiovascular:      Rate and Rhythm: Normal rate and regular rhythm.      Pulses: Normal pulses.      Heart sounds: Normal heart sounds.   Pulmonary:      Effort: Tachypnea and respiratory distress present.      Breath sounds: Examination of the right-middle field reveals wheezing and rhonchi. Examination of the left-middle field reveals wheezing and rhonchi. Wheezing and rhonchi present.   Abdominal:      General: Abdomen is flat.      Palpations: Abdomen is soft.      Tenderness: There is " no abdominal tenderness.   Musculoskeletal:         General: Normal range of motion.      Cervical back: Normal range of motion and neck supple.   Skin:     General: Skin is warm and dry.   Neurological:      Mental Status: She is alert and oriented to person, place, and time. Mental status is at baseline.                    Medical Decision Making:      Comorbidities that affect care:    Asthma, Chronic Kidney Disease, Smoking    External Notes reviewed:    Previous Clinic Note: Office visit for anemia with oncology      The following orders were placed and all results were independently analyzed by me:  Orders Placed This Encounter   Procedures    XR Chest 1 View    [place] Draw    Comprehensive Metabolic Panel    BNP    High Sensitivity Troponin T    CBC Auto Differential    High Sensitivity Troponin T 1Hr    NPO Diet NPO Type: Strict NPO    Undress & Gown    Continuous Pulse Oximetry    Vital Signs    Hospitalist (on-call MD unless specified)    Oxygen Therapy- Nasal Cannula; Titrate 1-6 LPM Per SpO2; 90 - 95%    ECG 12 Lead ED Triage Standing Order; SOA    Insert Peripheral IV    Inpatient Admission    CBC & Differential    Green Top (Gel)    Lavender Top    Gold Top - SST    Light Blue Top       Medications Given in the Emergency Department:  Medications   sodium chloride 0.9 % flush 10 mL (has no administration in time range)   ipratropium-albuterol (DUO-NEB) nebulizer solution 3 mL (3 mL Nebulization Given 6/12/25 2110)   methylPREDNISolone sodium succinate (SOLU-Medrol) 125 mg in sterile water (preservative free) 2 mL (125 mg Intravenous Given 6/12/25 2131)        ED Course:       EKG: Sinus rhythm with rate 83 bpm  No acute ischemia.    Labs:    Lab Results (last 24 hours)       Procedure Component Value Units Date/Time    CBC & Differential [[id number]]  (Abnormal) Collected: 06/12/25 1933    Specimen: Blood Updated: 06/12/25 1939    Narrative:      The following orders were created for panel order CBC &  Differential.  Procedure                               Abnormality         Status                     ---------                               -----------         ------                     CBC Auto Differential[[id number]]        Abnormal            Final result                 Please view results for these tests on the individual orders.    Comprehensive Metabolic Panel [[id number]]  (Abnormal) Collected: 06/12/25 1933    Specimen: Blood Updated: 06/12/25 1956     Glucose 107 mg/dL      BUN 66.6 mg/dL      Creatinine 8.32 mg/dL      Sodium 139 mmol/L      Potassium 4.1 mmol/L      Chloride 98 mmol/L      CO2 26.9 mmol/L      Calcium 10.2 mg/dL      Total Protein 7.2 g/dL      Albumin 3.7 g/dL      ALT (SGPT) 12 U/L      AST (SGOT) 13 U/L      Alkaline Phosphatase 112 U/L      Total Bilirubin <0.2 mg/dL      Globulin 3.5 gm/dL      A/G Ratio 1.1 g/dL      BUN/Creatinine Ratio 8.0     Anion Gap 14.1 mmol/L      eGFR 5.0 mL/min/1.73     Narrative:      GFR Categories in Chronic Kidney Disease (CKD)              GFR Category          GFR (mL/min/1.73)    Interpretation  G1                    90 or greater        Normal or high (1)  G2                    60-89                Mild decrease (1)  G3a                   45-59                Mild to moderate decrease  G3b                   30-44                Moderate to severe decrease  G4                    15-29                Severe decrease  G5                    14 or less           Kidney failure    (1)In the absence of evidence of kidney disease, neither GFR category G1 or G2 fulfill the criteria for CKD.    eGFR calculation 2021 CKD-EPI creatinine equation, which does not include race as a factor    BNP [[id number]]  (Abnormal) Collected: 06/12/25 1933    Specimen: Blood Updated: 06/12/25 1959     proBNP 2,373.0 pg/mL     Narrative:      This assay is used as an aid in the diagnosis of individuals suspected of having heart failure. It can be used as an aid in the  diagnosis of acute decompensated heart failure (ADHF) in patients presenting with signs and symptoms of ADHF to the emergency department (ED). In addition, NT-proBNP of <300 pg/mL indicates ADHF is not likely.    Age Range Result Interpretation  NT-proBNP Concentration (pg/mL:      <50             Positive            >450                   Gray                 300-450                    Negative             <300    50-75           Positive            >900                  Gray                300-900                  Negative            <300      >75             Positive            >1800                  Gray                300-1800                  Negative            <300    High Sensitivity Troponin T [[id number]]  (Abnormal) Collected: 06/12/25 1933    Specimen: Blood Updated: 06/12/25 1959     HS Troponin T 28 ng/L     Narrative:      High Sensitive Troponin T Reference Range:  <14.0 ng/L- Negative Female for AMI  <22.0 ng/L- Negative Male for AMI  >=14 - Abnormal Female indicating possible myocardial injury.  >=22 - Abnormal Male indicating possible myocardial injury.   Clinicians would have to utilize clinical acumen, EKG, Troponin, and serial changes to determine if it is an Acute Myocardial Infarction or myocardial injury due to an underlying chronic condition.         CBC Auto Differential [[id number]]  (Abnormal) Collected: 06/12/25 1933    Specimen: Blood Updated: 06/12/25 1939     WBC 8.77 10*3/mm3      RBC 3.42 10*6/mm3      Hemoglobin 9.9 g/dL      Hematocrit 31.0 %      MCV 90.6 fL      MCH 28.9 pg      MCHC 31.9 g/dL      RDW 14.6 %      RDW-SD 47.8 fl      MPV 11.8 fL      Platelets 280 10*3/mm3      Neutrophil % 52.8 %      Lymphocyte % 30.2 %      Monocyte % 6.7 %      Eosinophil % 9.1 %      Basophil % 0.7 %      Immature Grans % 0.5 %      Neutrophils, Absolute 4.63 10*3/mm3      Lymphocytes, Absolute 2.65 10*3/mm3      Monocytes, Absolute 0.59 10*3/mm3      Eosinophils, Absolute 0.80 10*3/mm3       Basophils, Absolute 0.06 10*3/mm3      Immature Grans, Absolute 0.04 10*3/mm3      nRBC 0.0 /100 WBC     High Sensitivity Troponin T 1Hr [[id number]]  (Abnormal) Collected: 06/12/25 2106    Specimen: Blood Updated: 06/12/25 2138     HS Troponin T 28 ng/L      Troponin T Numeric Delta 0 ng/L      Troponin T % Delta 0    Narrative:      High Sensitive Troponin T Reference Range:  <14.0 ng/L- Negative Female for AMI  <22.0 ng/L- Negative Male for AMI  >=14 - Abnormal Female indicating possible myocardial injury.  >=22 - Abnormal Male indicating possible myocardial injury.   Clinicians would have to utilize clinical acumen, EKG, Troponin, and serial changes to determine if it is an Acute Myocardial Infarction or myocardial injury due to an underlying chronic condition.                  Imaging:    XR Chest 1 View  Result Date: 6/12/2025  XR CHEST 1 VW Date of Exam: 6/12/2025 7:32 PM EDT Indication: SOA Triage Protocol Comparison: 4/20/2025 Findings: Lines: None Lungs: The lungs are clear. Pleura: No pleural effusion or pneumothorax. Cardiomediastinum: The cardiomediastinal silhouette is normal Soft Tissues: Unremarkable. Bones: No acute osseous abnormality.     Impression: No acute cardiopulmonary abnormality. Electronically Signed: Basilio Galindo DO  6/12/2025 7:49 PM EDT  Workstation ID: [id number]        Differential Diagnosis and Discussion:    Dyspnea: Differential diagnosis includes but is not limited to metabolic acidosis, neurological disorders, psychogenic, asthma, pneumothorax, upper airway obstruction, COPD, pneumonia, noncardiogenic pulmonary edema, interstitial lung disease, anemia, congestive heart failure, and pulmonary embolism    PROCEDURES:    Labs were collected in the emergency department and all labs were reviewed and interpreted by me.  X-ray were performed in the emergency department and all X-ray impressions were independently interpreted by me.  An EKG was performed and the EKG was interpreted  by me.    ECG 12 Lead ED Triage Standing Order; SOA   Preliminary Result   HEART RATE=83  bpm   RR Tgaxdswm=323  ms   CA Knhiapci=691  ms   P Horizontal Axis=2  deg   P Front Axis=-7  deg   QRSD Interval=91  ms   QT Cijkcdgd=985  ms   QJlM=190  ms   QRS Axis=58  deg   T Wave Axis=-7  deg   - ABNORMAL ECG -   Sinus rhythm   Probable inferior infarct, age indeterminate   Date and Time of Study:2025-06-12 19:18:24          Procedures    MDM     Amount and/or Complexity of Data Reviewed  Clinical lab tests: reviewed  Tests in the radiology section of CPT®: reviewed  Tests in the medicine section of CPT®: reviewed             Total Critical Care time of 45 minutes. Total critical care time documented does not include time spent on separately billed procedures for services of nurses or physician assistants. I personally saw and examined the patient. I have reviewed all diagnostic interpretations and treatment plans as written. I was present for the key portions of any procedures performed and the inclusive time noted in any critical care statement. Critical care time includes patient management by me, time spent at the patients bedside,  time to review lab and imaging results, discussing patient care, documentation in the medical record, and time spent with family or caregiver.          Patient Care Considerations:    SEPSIS was considered but is NOT present in the emergency department as SIRS criteria is not present.      Consultants/Shared Management Plan:    Hospitalist: I have discussed the case with hospitalist who agrees to accept the patient for admission.    Social Determinants of Health:    Patient is unable to carry out activities of daily life. Escalation of care is necessary.       Disposition and Care Coordination:    Admit:   Through independent evaluation of the patient's history, physical, and imperical data, the patient meets criteria for inpatient admission to the hospital.        Final diagnoses:    Acute bronchitis, unspecified organism   Bronchospasm   Acute respiratory failure with hypoxia        ED Disposition       ED Disposition   Decision to Admit    Condition   --    Comment   Level of Care: Remote Telemetry [26]   Diagnosis: COPD exacerbation [136638]   Certification: I Certify That Inpatient Hospital Services Are Medically Necessary For Greater Than 2 Midnights                 This medical record created using voice recognition software.             Mio Jason, DO  06/12/25 [id number]

## 2025-06-13 NOTE — ED NOTES
Patient report given to Salena HATCH on 3NT. All questions answered. ED Tech placed the telepack on the patient. Ready for transport to the floor.

## 2025-06-13 NOTE — PROGRESS NOTES
Respiratory Therapist Broncho-Pulmonary Hygiene Progress Note      Patient Name:  Cecilia Sparks  YOB: 1960    Cecilia Sparks meets the qualification for Level 2 of the Bronco-Pulmonary Hygiene Protocol. This was based on my daily patient assessment and includes review of chest x-ray results, cough ability and quality, oxygenation, secretions or risk for secretion development and patient mobility.     Broncho-Pulmonary Hygiene Assessment:    Level of Movement: Actively changing positions without assistance  Alert/ oriented/ cooperative    Breath Sounds: Bilateral localized decreased air exchange and/or coarse rhonchi    Cough: Strong, effective    Chest X-Ray: Possible signs of consolidation and/or atelectasis or clear.     Sputum Productions: None or small amount of thin or watery secretions with effective cough    History and Physical: Chronic condition    SpO2 to Oxygen Need: greater than 92% on room air or  less than 3L nasal canula    Current SpO2 is: 96 on 2 lpm    Based on this information, I have completed the following interventions: Aerobika with bronchodialtor medication or TID      Electronically signed by Elena Jacob RRT, 06/13/25, 5:02 PM EDT.

## 2025-06-13 NOTE — H&P
[facility]   HOSPITALIST HISTORY AND PHYSICAL  Date: 2025   Patient Name: Cecilia Sparks  : 1960  MRN: [id number]  Primary Care Physician:  Ellen Strickland DO  Date of admission: 2025    Subjective   Subjective     Chief Complaint: Shortness of breath    HPI:    Cecilia Sparks is a 64 y.o. female  with a past medical history of hypertension, hyperlipidemia, type 2 diabetes mellitus, CHF, anxiety, bipolar disorder, ESRD on peritoneal dialysis, COPD, CAD presented to the ED for evaluation of shortness of breath since last 2 weeks that has been progressively worsening.  Today it got worse prompting her to come to the ED.  Associated with cough.  Denies any fevers, chills.  Patient used to smoke tobacco, quit in October, was vaping and quit 30 days ago.    Upon arrival, vital signs temperature 97.9, pulse 88, respiratory 22, blood pressure 125/73 on 2 L nasal cannula saturating around 92%.  Initial troponin 28, repeat 28, proBNP 2373, CMP with no significant findings except creatinine 8.32, BUN 66 which secondary to ESRD, normal WBC, hemoglobin 9.9, platelets 280.  Chest x-ray showed no acute abnormality.  Received nebulizer treatments, Solu-Medrol in the ED.  Patient admitted for further evaluation and management of COPD exacerbation      Personal History     Past Medical History:   Diagnosis Date    Acid reflux disease     Anemia     NO RECENT INTERVENTIONS    Anxiety     Arthritis     Asthma     Bipolar disorder     Borderline diabetic     HX OF BUT REPORTS NO ISSUES NOT ON MEDICATION AND IS DIET CONTROLLED    Cataract     Chronic back pain 2012    Chronic bronchitis     DENIES ANY CURRENT ISSUES    Chronic UTI     NO CURRENT S/S    Colitis     Congestive heart failure (CHF)     FOLLOWED BY DR DAVIS [facility], NO CURRENT S/S    Depression     Dizziness     Drug dependence     Fibromyalgia     H/O psychiatric care     Heart murmur     Hematological disorder     hemachromatosis      Hemochromatosis 2012    Hemorrhoids     Hyperlipidemia     Hypertension, essential     Hypoglycemia 2021    Kidney stones     past     Knee pain     Lack of coordination     Major depression, recurrent     Meningitis     Migraine headache     Mitral valve prolapse     Muscle spasm     Neck pain     Night sweats     HX OF NO CURRENT ISSUES    PAD (peripheral artery disease)     STENTS L AND RIGHT GROIN    Pain management     PAIN PUMP W/FENTANYL/ DR KIRK PAIN PUMP CURRENTLY EMPTY    Peptic ulcer     Peritoneal dialysis catheter in place     PD DOES 5 DAYS/WEEK. PD EXCHANGE 4 TIMES ON THOSE DAYS.    PONV (postoperative nausea and vomiting)     Psoriasis-like skin disease     eczema    PTSD (post-traumatic stress disorder)     Ringing in ears     Seizures     NONE FOR A LONG TIME    Stroke (cerebrum)     TIA, MEMORY ISSUES    Syncope and collapse     REPORTS HAPPENED ABOUT A YEAR AGO    Weakness     Weight loss          Past Surgical History:   Procedure Laterality Date    APPENDECTOMY       SECTION      X4    COLONOSCOPY      COLONOSCOPY N/A 2021    Procedure: COLONOSCOPY;  Surgeon: Aston Fernandez MD;  Location: [facility] ENDOSCOPY;  Service: Gastroenterology;  Laterality: N/A;  COLITIS    COLONOSCOPY N/A 3/19/2025    Procedure: COLONOSCOPY;  Surgeon: Dione Harrell MD;  Location: [facility] ENDOSCOPY;  Service: Gastroenterology;  Laterality: N/A;  hemorrhoids    ENDOSCOPY N/A 2022    Procedure: ESOPHAGOGASTRODUODENOSCOPY WITH BIOPSIES;  Surgeon: Dione Harrell MD;  Location: [facility] ENDOSCOPY;  Service: Gastroenterology;  Laterality: N/A;  GASTRITIS    ENDOSCOPY N/A 3/19/2025    Procedure: ESOPHAGOGASTRODUODENOSCOPY with biopsies;  Surgeon: Dione Harrell MD;  Location: [facility] ENDOSCOPY;  Service: Gastroenterology;  Laterality: N/A;  gastritis    FEMORAL ARTERY STENT Bilateral     HYSTERECTOMY  ,    partial/total     INSERTION  HEMODIALYSIS CATHETER Right 2023    Procedure: HEMODIALYSIS CATHETER INSERTION;  Surgeon: Suman Butler MD;  Location: [facility] MAIN OR;  Service: General;  Laterality: Right;  PER DR VILLEDA    INSERTION PERITONEAL DIALYSIS CATHETER      REVISION    INSERTION PERITONEAL DIALYSIS CATHETER N/A 2023    Procedure: INSERTION PERITONEAL DIALYSIS CATHETER LAPAROSCOPIC;  Surgeon: Suman Butler MD;  Location: [facility] MAIN OR;  Service: General;  Laterality: N/A;  PER DR BUTLER    INTERVENTIONAL RADIOLOGY PROCEDURE Right 2022    Procedure: Abdominal Aortagram with Runoff;  Surgeon: Aspen Couch MD;  Location: [facility] CATH INVASIVE LOCATION;  Service: Peripheral Vascular;  Laterality: Right;    NECK SURGERY Left     KNOW REMOVED    PAIN PUMP INSERTION/REVISION       FENTANYL INSERTED     PAIN PUMP INSERTION/REVISION Left 2024    Procedure: PAIN PUMP REMOVAL, left lower abdomen;  Surgeon: Semaj Blackwood MD;  Location: [facility] MAIN OR;  Service: Neurosurgery;  Laterality: Left;    REMOVAL HEMODIALYSIS CATHETER      TONSILLECTOMY  1998    VOCAL CORD MASS EXCISION      WOUND EXPLORATION LEG N/A 2024    Procedure: LUMBAR WOUND EXPLORATION WITH REMOVAL OF RETAINED IT PUMP TUBING AND REPAIR OF CSF LEAK;  Surgeon: Semaj Blackwood MD;  Location: [facility] MAIN OR;  Service: Neurosurgery;  Laterality: N/A;         Family History   Problem Relation Age of Onset    Cancer Sister     Cancer Brother     Cancer Paternal Aunt     Cancer Paternal Uncle     Cancer Other     Stroke Other     Diabetes Other     Heart failure Other     Malig Hyperthermia Neg Hx          Social History     Socioeconomic History    Marital status:    Tobacco Use    Smoking status: Former     Current packs/day: 0.00     Average packs/day: 0.5 packs/day for 54.0 years (27.0 ttl pk-yrs)     Types: Cigarettes     Start date:      Quit date:      Years since quittin.4     Passive exposure: Current    Smokeless  tobacco: Never   Vaping Use    Vaping status: Never Used   Substance and Sexual Activity    Alcohol use: Not Currently    Drug use: Never    Sexual activity: Defer         Home Medications:  Calcium Carb-Cholecalciferol, Heparin & NaCl Lock Flush, Insulin Pen Needle, LORazepam, OLANZapine, albuterol sulfate HFA, amLODIPine, atorvastatin, calcium acetate, carvedilol, clopidogrel, diphenhydrAMINE, docusate sodium, escitalopram, fluticasone, furosemide, hydrALAZINE, hyoscyamine, loratadine, losartan, metOLazone, ondansetron ODT, pantoprazole, polyethylene glycol, promethazine, sodium zirconium cyclosilicate, and sucralfate    Allergies:  Allergies   Allergen Reactions    Iron Sucrose Unknown - High Severity     HX OF HEMOCHROMATOSIS     Lamotrigine Rash    Latex Hives, Nausea And Vomiting and Rash     blisters      Lamictal Xr [Lamotrigine Er] Rash    Nickel Rash     RASH W/COSMETIC JEWELRY    Sulfa Antibiotics Rash     PT REPORTS SHE HAS NEVER HAD TROUBLE OR NOTED ISSUES WITH SULFA BEFORE         Objective   Objective     Vitals:   Temp:  [97.5 °F (36.4 °C)-97.9 °F (36.6 °C)] 97.5 °F (36.4 °C)  Heart Rate:  [83-91] 85  Resp:  [15-22] 18  BP: (125-170)/(73-94) 170/92  Flow (L/min) (Oxygen Therapy):  [2-3] 3    Physical Exam    Constitutional: Awake, alert, no acute distress   Eyes: Pupils equal, sclerae anicteric, no conjunctival injection   HENT: NCAT, mucous membranes moist   Respiratory: Bilateral air entry present, mild diffuse wheezing bilaterally, nonlabored respirations    Cardiovascular: RRR, no murmurs, rubs, or gallops, palpable pedal pulses bilaterally   Gastrointestinal: soft, nontender, nondistended, PD catheter in place   Musculoskeletal: Trace bilateral lower extremity edema, no clubbing or cyanosis to extremities   Neurologic: Oriented x 3, speech clear    Result Review    Result Review:  I have personally reviewed the results from the time of this admission to 6/13/2025 00:28 EDT and agree with these  findings:  [x]  Laboratory  []  Microbiology  [x]  Radiology  [x]  EKG/Telemetry   []  Cardiology/Vascular   []  Pathology  []  Old records  []  Other:        Imaging Results (Last 24 Hours)       Procedure Component Value Units Date/Time    XR Chest 1 View [[id number]] Collected: 06/12/25 1948     Updated: 06/12/25 1951    Narrative:      XR CHEST 1 VW    Date of Exam: 6/12/2025 7:32 PM EDT    Indication: SOA Triage Protocol    Comparison: 4/20/2025    Findings:  Lines: None    Lungs: The lungs are clear.  Pleura: No pleural effusion or pneumothorax.    Cardiomediastinum: The cardiomediastinal silhouette is normal    Soft Tissues: Unremarkable.    Bones: No acute osseous abnormality.      Impression:      Impression:  No acute cardiopulmonary abnormality.        Electronically Signed: Basilio Galindo DO    6/12/2025 7:49 PM EDT    Workstation ID: [id number]             amLODIPine, 10 mg, Oral, Daily  arformoterol, 15 mcg, Nebulization, BID - RT  atorvastatin, 40 mg, Oral, Q PM  budesonide, 0.5 mg, Nebulization, BID - RT  calcium acetate, 1,334 mg, Oral, TID AC  carvedilol, 25 mg, Oral, BID  clopidogrel, 75 mg, Oral, Daily  escitalopram, 10 mg, Oral, Daily  heparin (porcine), 5,000 Units, Subcutaneous, Q8H  hydrALAZINE, 25 mg, Oral, Daily  insulin lispro, 2-7 Units, Subcutaneous, 4x Daily AC & at Bedtime  losartan, 100 mg, Oral, Daily  metOLazone, 5 mg, Oral, Daily  OLANZapine, 15 mg, Oral, Nightly  pantoprazole, 40 mg, Oral, Daily  sodium chloride, 10 mL, Intravenous, Q12H         Assessment & Plan   Assessment / Plan     Assessment/Plan:   Acute hypoxic respiratory failure   COPD exacerbation  ESRD on peritoneal dialysis, follows Dr. Crabtree  CAD  CHF  Hypertension  Hyperlipidemia  Type 2 diabetes mellitus  Anxiety  Bipolar disorder  SLE    Plan  Admit to inpatient, remote telemetry  Gaudencio Bernal  Bronchodilator protocol  DuoNebs every 6 hours as needed  Solu-Medrol IV 40 mg every 8 hours  Consider  pulmonology consult based on the clinical response  Heart healthy, consistent carb diet  Low-dose sliding scale insulin  Nephrology consult in the a.m. Dr. Crabtree  Continue amlodipine, atorvastatin, calcium acetate, Coreg, Plavix, hydralazine, Lexapro, diphenhydramine, Colace, losartan,olanzapine, Protonix, metolazone  Fall precautions  A.m. labs    VTE Prophylaxis:  Pharmacologic VTE prophylaxis orders are present.    CODE STATUS:    Code Status (Patient has no pulse and is not breathing): CPR (Attempt to Resuscitate)  Medical Interventions (Patient has pulse or is breathing): Full Support  Level Of Support Discussed With: Patient      Admission Status:  I believe this patient meets inpatient status.    Part of this note may be an electronic transcription/translation of spoken language to printed text using the Dragon Dictation System    Myah Block MD

## 2025-06-13 NOTE — PROGRESS NOTES
[facility]   Hospitalist Progress Note  Date: 2025  Patient Name: Cecilia Sparks  : 1960  MRN: [id number]  Date of admission: 2025  Room/Bed: [facility]      Subjective   Subjective     Chief Complaint:   Shortness of breath    Summary:  Cecilia Sparks is a 64 y.o. female with a medical history of hypertension, hyperlipidemia, type 2 diabetes mellitus, HFpEF, anxiety, bipolar disorder, ESRD on peritoneal dialysis, COPD, CAD presented to the ED for evaluation of shortness of breath present for the previous 2 weeks, progressively worsening.  Associated symptoms of cough, denies fevers or chills.  Patient was a previous tobacco smoker, quitting in October and has been vaping since.  States she quit vaping around 30 days ago due to worsening cough.  In the emergency department patient requiring 2 L nasal cannula to maintain O2 saturations.  proBNP elevated at 2373.  Chest x-ray with no acute abnormalities.  Patient received nebulized breathing treatments and IV steroids in the emergency department for wheezing noted.    Interval Followup:   Seen on rounds patient complaining of dyspnea, stated earlier rounds and nebulized breathing treatments were helpful.  Patient requesting additional nebulized treatment now.  No other acute issues overnight.  Remains on 2 L nasal cannula currently.    Objective   Objective     Vitals:   Temp:  [97.5 °F (36.4 °C)-97.9 °F (36.6 °C)] 97.5 °F (36.4 °C)  Heart Rate:  [78-91] 89  Resp:  [15-22] 18  BP: (125-170)/(73-94) 140/73  Flow (L/min) (Oxygen Therapy):  [2-3] 2    Physical Exam               Constitutional: Awake, alert, no acute distress              Eyes: Pupils equal, sclerae anicteric, no conjunctival injection              HENT: NCAT, mucous membranes moist              Respiratory: Diffuse expiratory wheezing heard throughout, no crackles              Cardiovascular: RRR, no murmurs, rubs, or gallops, palpable pedal pulses bilaterally               Gastrointestinal: soft, nontender, nondistended, PD catheter in place              Musculoskeletal: Trace bilateral lower extremity edema, no clubbing or cyanosis to extremities              Neurologic: Oriented x 3, speech clear    Result Review    Result Review:  I have personally reviewed these results:  [x]  Laboratory      Lab 06/13/25  0431 06/12/25 1933   WBC 6.19 8.77   HEMOGLOBIN 9.1* 9.9*   HEMATOCRIT 28.8* 31.0*   PLATELETS 232 280   NEUTROS ABS 4.66 4.63   IMMATURE GRANS (ABS) 0.10* 0.04   LYMPHS ABS 1.30 2.65   MONOS ABS 0.08* 0.59   EOS ABS 0.03 0.80*   MCV 89.7 90.6         Lab 06/13/25  0431 06/12/25 1933   SODIUM 138 139   POTASSIUM 4.2 4.1   CHLORIDE 98 98   CO2 26.0 26.9   ANION GAP 14.0 14.1   BUN 67.0* 66.6*   CREATININE 8.40* 8.32*   EGFR 4.9* 5.0*   GLUCOSE 182* 107*   CALCIUM 9.9 10.2   MAGNESIUM 1.8  --    PHOSPHORUS 4.4  --          Lab 06/13/25  0431 06/12/25 1933   TOTAL PROTEIN 6.5 7.2   ALBUMIN 3.4* 3.7   GLOBULIN 3.1 3.5   ALT (SGPT) 10 12   AST (SGOT) 12 13   BILIRUBIN <0.2 <0.2   ALK PHOS 96 112         Lab 06/12/25  2106 06/12/25 1933   PROBNP  --  2,373.0*   HSTROP T 28* 28*                 Brief Urine Lab Results  (Last result in the past 365 days)        Color   Clarity   Blood   Leuk Est   Nitrite   Protein   CREAT   Urine HCG        04/20/25 1657 Yellow   Clear   Negative   Trace   Negative   30 mg/dL (1+)                 [x]  Microbiology   Microbiology Results (last 10 days)       ** No results found for the last 240 hours. **          [x]  Radiology  XR Chest 1 View  Result Date: 6/12/2025  Impression: No acute cardiopulmonary abnormality. Electronically Signed: Basilio Galindo DO  6/12/2025 7:49 PM EDT  Workstation ID: [id number]    []  EKG/Telemetry   []  Cardiology/Vascular   []  Pathology  []  Old records  []  Other:    Assessment & Plan   Assessment / Plan     Assessment:  Acute hypoxic respiratory failure   COPD in acute exacerbation  ESRD on peritoneal dialysis,  follows Dr. Crabtree  CAD  HFpEF  Hypertension  Hyperlipidemia  Type 2 diabetes mellitus  Anxiety  Bipolar disorder  SLE     Plan  Patient remains admitted to the hospital for further care management  Nephrology consulted for peritoneal dialysis, appreciate assistance  Patient remains on scheduled IV steroids, will slowly taper and transition to oral  Continue patient on both scheduled and as needed breathing treatments  Ordering CT chest for further evaluation of lung architecture  Aggressive bronchopulmonary hygiene  Ordering procalcitonin and sputum sample, low suspicion for infection as etiology  Home blood pressure medications resumed  Continue to follow labs daily     Discussed with RN.    VTE Prophylaxis:  Pharmacologic VTE prophylaxis orders are present.        CODE STATUS:   Code Status (Patient has no pulse and is not breathing): CPR (Attempt to Resuscitate)  Medical Interventions (Patient has pulse or is breathing): Full Support  Level Of Support Discussed With: Patient      Electronically signed by Paulino Austin MD, 6/13/2025, 16:14 EDT.

## 2025-06-13 NOTE — PLAN OF CARE
Goal Outcome Evaluation:  Plan of Care Reviewed With: patient        Progress: no change  Outcome Evaluation: Pt presents with no functional impirments that impede the ability to perform ADLs and transfers as prior. Pt has no questions/concerns regarding OT. No skilled OT services requires at this time.    Anticipated Discharge Disposition (OT): home with assist

## 2025-06-14 LAB
ANION GAP SERPL CALCULATED.3IONS-SCNC: 12.1 MMOL/L (ref 5–15)
BUN SERPL-MCNC: 88.7 MG/DL (ref 8–23)
BUN/CREAT SERPL: 9.6 (ref 7–25)
CALCIUM SPEC-SCNC: 10 MG/DL (ref 8.6–10.5)
CHLORIDE SERPL-SCNC: 99 MMOL/L (ref 98–107)
CO2 SERPL-SCNC: 25.9 MMOL/L (ref 22–29)
CREAT SERPL-MCNC: 9.24 MG/DL (ref 0.57–1)
DEPRECATED RDW RBC AUTO: 48.2 FL (ref 37–54)
EGFRCR SERPLBLD CKD-EPI 2021: 4.4 ML/MIN/1.73
ERYTHROCYTE [DISTWIDTH] IN BLOOD BY AUTOMATED COUNT: 14.6 % (ref 12.3–15.4)
GLUCOSE BLDC GLUCOMTR-MCNC: 140 MG/DL (ref 70–99)
GLUCOSE BLDC GLUCOMTR-MCNC: 144 MG/DL (ref 70–99)
GLUCOSE BLDC GLUCOMTR-MCNC: 146 MG/DL (ref 70–99)
GLUCOSE BLDC GLUCOMTR-MCNC: 157 MG/DL (ref 70–99)
GLUCOSE SERPL-MCNC: 180 MG/DL (ref 65–99)
HCT VFR BLD AUTO: 28.8 % (ref 34–46.6)
HGB BLD-MCNC: 9 G/DL (ref 12–15.9)
MAGNESIUM SERPL-MCNC: 2 MG/DL (ref 1.6–2.4)
MCH RBC QN AUTO: 28.6 PG (ref 26.6–33)
MCHC RBC AUTO-ENTMCNC: 31.3 G/DL (ref 31.5–35.7)
MCV RBC AUTO: 91.4 FL (ref 79–97)
PLATELET # BLD AUTO: 231 10*3/MM3 (ref 140–450)
PMV BLD AUTO: 12.6 FL (ref 6–12)
POTASSIUM SERPL-SCNC: 5 MMOL/L (ref 3.5–5.2)
RBC # BLD AUTO: 3.15 10*6/MM3 (ref 3.77–5.28)
SODIUM SERPL-SCNC: 137 MMOL/L (ref 136–145)
WBC NRBC COR # BLD AUTO: 7.55 10*3/MM3 (ref 3.4–10.8)

## 2025-06-14 PROCEDURE — 94618 PULMONARY STRESS TESTING: CPT

## 2025-06-14 PROCEDURE — 94799 UNLISTED PULMONARY SVC/PX: CPT

## 2025-06-14 PROCEDURE — 82948 REAGENT STRIP/BLOOD GLUCOSE: CPT | Performed by: STUDENT IN AN ORGANIZED HEALTH CARE EDUCATION/TRAINING PROGRAM

## 2025-06-14 PROCEDURE — 82948 REAGENT STRIP/BLOOD GLUCOSE: CPT

## 2025-06-14 PROCEDURE — 25010000002 METHYLPREDNISOLONE PER 125 MG: Performed by: INTERNAL MEDICINE

## 2025-06-14 PROCEDURE — 96372 THER/PROPH/DIAG INJ SC/IM: CPT

## 2025-06-14 PROCEDURE — 85027 COMPLETE CBC AUTOMATED: CPT | Performed by: INTERNAL MEDICINE

## 2025-06-14 PROCEDURE — 99232 SBSQ HOSP IP/OBS MODERATE 35: CPT | Performed by: INTERNAL MEDICINE

## 2025-06-14 PROCEDURE — 25010000002 HEPARIN (PORCINE) PER 1000 UNITS: Performed by: STUDENT IN AN ORGANIZED HEALTH CARE EDUCATION/TRAINING PROGRAM

## 2025-06-14 PROCEDURE — 94761 N-INVAS EAR/PLS OXIMETRY MLT: CPT

## 2025-06-14 PROCEDURE — G0378 HOSPITAL OBSERVATION PER HR: HCPCS

## 2025-06-14 PROCEDURE — 80048 BASIC METABOLIC PNL TOTAL CA: CPT | Performed by: INTERNAL MEDICINE

## 2025-06-14 PROCEDURE — 96376 TX/PRO/DX INJ SAME DRUG ADON: CPT

## 2025-06-14 PROCEDURE — 83735 ASSAY OF MAGNESIUM: CPT | Performed by: INTERNAL MEDICINE

## 2025-06-14 PROCEDURE — 94664 DEMO&/EVAL PT USE INHALER: CPT

## 2025-06-14 RX ORDER — SODIUM CHLORIDE, SODIUM LACTATE, CALCIUM CHLORIDE, MAGNESIUM CHLORIDE AND DEXTROSE 1.5; 538; 448; 18.3; 5.08 G/100ML; MG/100ML; MG/100ML; MG/100ML; MG/100ML
2000 INJECTION, SOLUTION INTRAPERITONEAL
Status: DISCONTINUED | OUTPATIENT
Start: 2025-06-14 | End: 2025-06-15 | Stop reason: HOSPADM

## 2025-06-14 RX ORDER — PREDNISONE 20 MG/1
40 TABLET ORAL
Status: DISCONTINUED | OUTPATIENT
Start: 2025-06-15 | End: 2025-06-15 | Stop reason: HOSPADM

## 2025-06-14 RX ADMIN — BENZONATATE 100 MG: 100 CAPSULE ORAL at 23:02

## 2025-06-14 RX ADMIN — BUDESONIDE 0.5 MG: 0.5 SUSPENSION RESPIRATORY (INHALATION) at 08:06

## 2025-06-14 RX ADMIN — ARFORMOTEROL TARTRATE 15 MCG: 15 SOLUTION RESPIRATORY (INHALATION) at 19:55

## 2025-06-14 RX ADMIN — METHYLPREDNISOLONE SODIUM SUCCINATE 40 MG: 125 INJECTION, POWDER, LYOPHILIZED, FOR SOLUTION INTRAMUSCULAR; INTRAVENOUS at 13:50

## 2025-06-14 RX ADMIN — HYDROXYZINE HYDROCHLORIDE 25 MG: 25 TABLET, FILM COATED ORAL at 13:49

## 2025-06-14 RX ADMIN — METHYLPREDNISOLONE SODIUM SUCCINATE 40 MG: 125 INJECTION, POWDER, LYOPHILIZED, FOR SOLUTION INTRAMUSCULAR; INTRAVENOUS at 05:38

## 2025-06-14 RX ADMIN — Medication 10 ML: at 23:03

## 2025-06-14 RX ADMIN — HYDROXYZINE HYDROCHLORIDE 25 MG: 25 TABLET, FILM COATED ORAL at 23:02

## 2025-06-14 RX ADMIN — HEPARIN SODIUM 5000 UNITS: 5000 INJECTION INTRAVENOUS; SUBCUTANEOUS at 13:50

## 2025-06-14 RX ADMIN — Medication 10 ML: at 08:59

## 2025-06-14 RX ADMIN — ARFORMOTEROL TARTRATE 15 MCG: 15 SOLUTION RESPIRATORY (INHALATION) at 08:06

## 2025-06-14 RX ADMIN — CLOPIDOGREL BISULFATE 75 MG: 75 TABLET, FILM COATED ORAL at 08:54

## 2025-06-14 RX ADMIN — OLANZAPINE 15 MG: 5 TABLET, FILM COATED ORAL at 23:02

## 2025-06-14 RX ADMIN — BUDESONIDE 0.5 MG: 0.5 SUSPENSION RESPIRATORY (INHALATION) at 19:55

## 2025-06-14 RX ADMIN — ATORVASTATIN CALCIUM 40 MG: 40 TABLET, FILM COATED ORAL at 23:02

## 2025-06-14 RX ADMIN — CALCIUM ACETATE 1334 MG: 667 CAPSULE ORAL at 08:54

## 2025-06-14 RX ADMIN — CALCIUM ACETATE 1334 MG: 667 CAPSULE ORAL at 12:38

## 2025-06-14 RX ADMIN — HEPARIN SODIUM 5000 UNITS: 5000 INJECTION INTRAVENOUS; SUBCUTANEOUS at 05:38

## 2025-06-14 RX ADMIN — CARVEDILOL 25 MG: 25 TABLET, FILM COATED ORAL at 23:02

## 2025-06-14 RX ADMIN — IPRATROPIUM BROMIDE AND ALBUTEROL SULFATE 3 ML: .5; 3 SOLUTION RESPIRATORY (INHALATION) at 15:23

## 2025-06-14 RX ADMIN — PANTOPRAZOLE SODIUM 40 MG: 40 TABLET, DELAYED RELEASE ORAL at 05:37

## 2025-06-14 RX ADMIN — HEPARIN SODIUM 5000 UNITS: 5000 INJECTION INTRAVENOUS; SUBCUTANEOUS at 23:02

## 2025-06-14 RX ADMIN — CALCIUM ACETATE 1334 MG: 667 CAPSULE ORAL at 18:09

## 2025-06-14 NOTE — PLAN OF CARE
Goal Outcome Evaluation:              Outcome Evaluation: Pt is alert and oriented.  VSS.  SOA reported this shift.  PRN breathing treatment given.  Pt still on 1L and staying around 91%.  Pt continues to itch around IV site from the dressing.  Pt also reported anxiety this shift.  Atarax given.  Continue POC.

## 2025-06-14 NOTE — PROGRESS NOTES
[facility]     Progress Note    Patient Name: Cecilia Sparks  : 1960  MRN: [id number]  Primary Care Physician:  Ellen Strickland DO  Date of admission: 2025    Subjective   No major acute events overnight  Blood pressures and vitals are stable  Continues to feel short of breath    Scheduled Meds:amLODIPine, 10 mg, Oral, Daily  arformoterol, 15 mcg, Nebulization, BID - RT  atorvastatin, 40 mg, Oral, Nightly  budesonide, 0.5 mg, Nebulization, BID - RT  calcium acetate, 1,334 mg, Oral, TID AC  carvedilol, 25 mg, Oral, BID  clopidogrel, 75 mg, Oral, Daily  dianeal lo-birdie 1.5%, 2,000 mL, Intraperitoneal, 4 Exchanges Daily - Dwell Overnight  escitalopram, 10 mg, Oral, Daily  heparin (porcine), 5,000 Units, Subcutaneous, Q8H  [Held by provider] hydrALAZINE, 25 mg, Oral, Daily  insulin lispro, 2-7 Units, Subcutaneous, 4x Daily AC & at Bedtime  losartan, 100 mg, Oral, Daily  methylPREDNISolone sodium succinate, 40 mg, Intravenous, Q8H  metOLazone, 5 mg, Oral, Daily  OLANZapine, 15 mg, Oral, Nightly  pantoprazole, 40 mg, Oral, Q AM  sodium chloride, 10 mL, Intravenous, Q12H      Continuous Infusions:   PRN Meds:.  benzonatate    senna-docusate sodium **AND** polyethylene glycol **AND** bisacodyl **AND** bisacodyl    dextrose    dextrose    glucagon (human recombinant)    hydrOXYzine    ipratropium-albuterol    sodium chloride    sodium chloride    sodium chloride       Review of Systems  Constitutional:        Weakness tiredness fatigue  Eyes:                       No blurry vision, eye discharge, eye irritation, eye pain  HEENT:                   No acute hair loss, earache and discharge, nasal congestion or discharge, sore throat, postnasal drip  Respiratory:           No shortness of breath coughing sputum production wheezing hemoptysis pleuritic chest pain  Cardiovascular:     No chest pain, orthopnea, PND, dizziness, palpitation, lower extremity edema  Gastrointestinal:   No nausea vomiting diarrhea  abdominal pain constipation  Genitourinary:       No urinary incontinence, hesitancy, frequency, urgency, dysuria  Hematologic:         No bruising, bleeding, pallor, lymphadenopathy  Endocrine:            No coldness, hot flashes, polyuria, abnormal hair growth  Musculoskeletal:    No body pains, aches, arthritic pains, muscle pain ,muscle wasting  Psychiatric:          No low or high mood, anxiety, hallucinations, delusions  Skin.                      No rash, ulcers, bruising, itching  Neurological:        No confusion, headache, focal weakness, numbness, dysphasia    Objective   Objective     Vitals:   Temp:  [97.5 °F (36.4 °C)-98.1 °F (36.7 °C)] 98.1 °F (36.7 °C)  Heart Rate:  [73-89] 73  Resp:  [16-18] 18  BP: (115-140)/(63-75) 130/67  Flow (L/min) (Oxygen Therapy):  [1-2] 1  Physical Exam    Constitutional: Awake, alert responsive, conversant, no obvious distress              Psychiatric:  Appropriate affect, cooperative   Neurologic:  Awake alert ,oriented x 3, strength symmetric in all extremities, Cranial Nerves grossly intact to confrontation, speech clear   Eyes:   PERRLA, sclerae anicteric, no conjunctival injection   HEENT:  Moist mucous membranes, no nasal or eye discharge, no throat congestion   Neck:   Supple, no thyromegaly, no lymphadenopathy, trachea midline, no elevated JVD   Respiratory:  Clear to auscultation bilaterally, nonlabored respirations    Cardiovascular: RRR, no murmurs, rubs, or gallops, palpable pedal pulses bilaterally, No bilateral ankle edema   Gastrointestinal: Positive bowel sounds, soft, nontender, nondistended, no organomegaly   Musculoskeletal:  No clubbing or cyanosis to extremities,muscle wasting, joint swelling, muscle weakness             Skin:                      No rashes, bruising, skin ulcers, petechiae or ecchymosis    Result Review    Result Review:  I have personally reviewed the results from the time of this admission to 6/14/2025 08:42 EDT and agree with these  findings:  []  Laboratory  []  Microbiology  []  Radiology  []  EKG/Telemetry   []  Cardiology/Vascular   []  Pathology  []  Old records  []  Other:    Assessment & Plan   Assessment / Plan       Active Hospital Problems:  Active Hospital Problems    Diagnosis     **COPD exacerbation     COPD (chronic obstructive pulmonary disease)      64-year-old female with past medical history of ESRD on peritoneal dialysis, hypertension, mood disorder, chronic pain, COPD, coronary artery disease comes in with shortness of breath consistent with COPD exacerbation and no abnormalities on x-ray     Plan:   Will plan to start PD today if the patient ends up staying on using yellow bags with 2 L  Renal diet as tolerated  Patient is on steroids for her COPD  Continue amlodipine 10 mg, carvedilol 25, losartan 100  We will hold off on hydralazine  PhosLo with meals  Metolazone 5 mg    Electronically signed by Erika Crabtree MD, 06/14/25, 8:42 AM EDT.

## 2025-06-14 NOTE — PROGRESS NOTES
[facility]   Hospitalist Progress Note  Date: 2025  Patient Name: Cecilia Sparks  : 1960  MRN: [id number]  Date of admission: 2025  Room/Bed: [facility]      Subjective   Subjective     Chief Complaint:   Shortness of breath    Summary:  Cecilia Sparks is a 64 y.o. female with a medical history of hypertension, hyperlipidemia, type 2 diabetes mellitus, HFpEF, anxiety, bipolar disorder, ESRD on peritoneal dialysis, COPD, CAD presented to the ED for evaluation of shortness of breath present for the previous 2 weeks, progressively worsening.  Associated symptoms of cough, denies fevers or chills.  Patient was a previous tobacco smoker, quitting in October and has been vaping since.  States she quit vaping around 30 days ago due to worsening cough.  In the emergency department patient requiring 2 L nasal cannula to maintain O2 saturations.  proBNP elevated at 2373.  Chest x-ray with no acute abnormalities.  Patient received nebulized breathing treatments and IV steroids in the emergency department for wheezing noted.    Interval Followup:   Down to 1 L nasal cannula.  However patient this morning ambulating through her room becoming severely dyspneic.  On rounds patient still with significant wheezing, prolonged expiratory phase.  Walk test ordered for later today, patient was able to ambulate without oxygen.    Objective   Objective     Vitals:   Temp:  [97.5 °F (36.4 °C)-99.7 °F (37.6 °C)] 98.2 °F (36.8 °C)  Heart Rate:  [73-93] 81  Resp:  [16-18] 16  BP: (113-140)/(51-77) 140/65  Flow (L/min) (Oxygen Therapy):  [0-2] 1    Physical Exam               Constitutional: Awake, alert, no acute distress              Eyes: Pupils equal, sclerae anicteric, no conjunctival injection              HENT: NCAT, mucous membranes moist              Respiratory: Diffuse expiratory wheezing heard throughout, no crackles              Cardiovascular: RRR, no murmurs, rubs, or gallops, palpable pedal pulses  bilaterally              Gastrointestinal: soft, nontender, nondistended, PD catheter in place              Musculoskeletal: Trace bilateral lower extremity edema, no clubbing or cyanosis to extremities              Neurologic: Oriented x 3, speech clear    Result Review    Result Review:  I have personally reviewed these results:  [x]  Laboratory      Lab 06/14/25 0444 06/13/25 0431 06/12/25 1933   WBC 7.55 6.19 8.77   HEMOGLOBIN 9.0* 9.1* 9.9*   HEMATOCRIT 28.8* 28.8* 31.0*   PLATELETS 231 232 280   NEUTROS ABS  --  4.66 4.63   IMMATURE GRANS (ABS)  --  0.10* 0.04   LYMPHS ABS  --  1.30 2.65   MONOS ABS  --  0.08* 0.59   EOS ABS  --  0.03 0.80*   MCV 91.4 89.7 90.6   PROCALCITONIN  --  0.08  --          Lab 06/14/25 0444 06/13/25 0431 06/12/25 1933   SODIUM 137 138 139   POTASSIUM 5.0 4.2 4.1   CHLORIDE 99 98 98   CO2 25.9 26.0 26.9   ANION GAP 12.1 14.0 14.1   BUN 88.7* 67.0* 66.6*   CREATININE 9.24* 8.40* 8.32*   EGFR 4.4* 4.9* 5.0*   GLUCOSE 180* 182* 107*   CALCIUM 10.0 9.9 10.2   MAGNESIUM 2.0 1.8  --    PHOSPHORUS  --  4.4  --          Lab 06/13/25 0431 06/12/25 1933   TOTAL PROTEIN 6.5 7.2   ALBUMIN 3.4* 3.7   GLOBULIN 3.1 3.5   ALT (SGPT) 10 12   AST (SGOT) 12 13   BILIRUBIN <0.2 <0.2   ALK PHOS 96 112         Lab 06/12/25  2106 06/12/25 1933   PROBNP  --  2,373.0*   HSTROP T 28* 28*                 Brief Urine Lab Results  (Last result in the past 365 days)        Color   Clarity   Blood   Leuk Est   Nitrite   Protein   CREAT   Urine HCG        04/20/25 1657 Yellow   Clear   Negative   Trace   Negative   30 mg/dL (1+)                 [x]  Microbiology   Microbiology Results (last 10 days)       Procedure Component Value - Date/Time    Respiratory Culture - Sputum, Cough [[id number]] Collected: 06/13/25 1834    Lab Status: Preliminary result Specimen: Sputum from Cough Updated: 06/14/25 1056     Respiratory Culture Light growth (2+) The culture consists of normal respiratory nicole. This is a  preliminary report; final report to follow.     Gram Stain Less than 10 Epithelial cells per low power field      Greater than 20 WBCs per low power field      Rare (1+) Gram positive cocci in pairs and clusters      Occasional Gram positive bacilli          [x]  Radiology  CT Chest Without Contrast Diagnostic  Result Date: 6/13/2025  Impression: 1.Mild dependent atelectasis/scarring within the lung bases. Otherwise the lungs are clear. 2.Mild central lobar emphysema. 3.Small amount of free fluid noted within the upper abdomen, nonspecific. Electronically Signed: Basilio Galindo DO  6/13/2025 6:55 PM EDT  Workstation ID: [id number]    XR Chest 1 View  Result Date: 6/12/2025  Impression: No acute cardiopulmonary abnormality. Electronically Signed: Basilio Galindo DO  6/12/2025 7:49 PM EDT  Workstation ID: [id number]    []  EKG/Telemetry   []  Cardiology/Vascular   []  Pathology  []  Old records  []  Other:    Assessment & Plan   Assessment / Plan     Assessment:  Acute hypoxic respiratory failure   COPD in acute exacerbation  ESRD on peritoneal dialysis, follows Dr. Crabtree  CAD  HFpEF  Hypertension  Hyperlipidemia  Type 2 diabetes mellitus  Anxiety  Bipolar disorder  SLE     Plan  Patient remains admitted to the hospital for further care management  Nephrology consulted for peritoneal dialysis, appreciate assistance  Transitioning to oral steroids tomorrow  Continue patient on both scheduled and as needed breathing treatments  CT scan returns with mild dependent atelectasis/scarring, mild central lobar emphysema  Aggressive bronchopulmonary hygiene  Procalcitonin nonconcerning for bacterial infection  Home blood pressure medications resumed  Continue to follow labs daily     Discussed with RN.  Discussed with nephrologist    VTE Prophylaxis:  Pharmacologic VTE prophylaxis orders are present.        CODE STATUS:   Code Status (Patient has no pulse and is not breathing): CPR (Attempt to Resuscitate)  Medical  Interventions (Patient has pulse or is breathing): Full Support  Level Of Support Discussed With: Patient      Electronically signed by Paulino Austin MD, 6/14/2025, 19:04 EDT.

## 2025-06-14 NOTE — PLAN OF CARE
Problem: Adult Inpatient Plan of Care  Goal: Plan of Care Review  Outcome: Progressing  Flowsheets (Taken 6/14/2025 0313)  Progress: no change  Outcome Evaluation: Patient is AOx4. VSS. O2 weaned to 1L and patient is staying at 95%. BS wnl. Coughing episodes minimized with respiratory therapy and tessalon med. Patient was itching around her IV site and stated that Adhesives iritate her. Atarax given per MD order. Cotinue plan of care.  Plan of Care Reviewed With: patient  Goal: Patient-Specific Goal (Individualized)  Outcome: Progressing  Goal: Absence of Hospital-Acquired Illness or Injury  Outcome: Progressing  Intervention: Identify and Manage Fall Risk  Recent Flowsheet Documentation  Taken 6/14/2025 0313 by Salena Doshi RN  Safety Promotion/Fall Prevention: safety round/check completed  Taken 6/14/2025 0115 by Salena Doshi RN  Safety Promotion/Fall Prevention: safety round/check completed  Taken 6/13/2025 2344 by Salena Doshi RN  Safety Promotion/Fall Prevention: safety round/check completed  Taken 6/13/2025 2121 by Salena Doshi RN  Safety Promotion/Fall Prevention: safety round/check completed  Taken 6/13/2025 1933 by Salena Doshi RN  Safety Promotion/Fall Prevention: safety round/check completed  Intervention: Prevent Skin Injury  Recent Flowsheet Documentation  Taken 6/13/2025 1933 by Salena Doshi RN  Body Position: position changed independently  Intervention: Prevent and Manage VTE (Venous Thromboembolism) Risk  Recent Flowsheet Documentation  Taken 6/13/2025 1933 by Salena Doshi RN  VTE Prevention/Management: (See EMAR) other (see comments)  Intervention: Prevent Infection  Recent Flowsheet Documentation  Taken 6/13/2025 1933 by Salena Doshi RN  Infection Prevention: rest/sleep promoted  Goal: Optimal Comfort and Wellbeing  Outcome: Progressing  Intervention: Monitor Pain and Promote Comfort  Recent Flowsheet Documentation  Taken 6/13/2025 1933 by Tico  Salena RN  Pain Management Interventions:   care clustered   position adjusted   no interventions per patient request  Intervention: Provide Person-Centered Care  Recent Flowsheet Documentation  Taken 6/13/2025 1933 by Salena Doshi RN  Trust Relationship/Rapport:   care explained   choices provided   questions answered   questions encouraged   thoughts/feelings acknowledged  Goal: Readiness for Transition of Care  Outcome: Progressing     Problem: Comorbidity Management  Goal: Maintenance of Asthma Control  Outcome: Progressing  Intervention: Maintain Asthma Symptom Control  Recent Flowsheet Documentation  Taken 6/13/2025 1933 by Salena Doshi RN  Medication Review/Management: medications reviewed  Goal: Maintenance of Behavioral Health Symptom Control  Outcome: Progressing  Intervention: Maintain Behavioral Health Symptom Control  Recent Flowsheet Documentation  Taken 6/13/2025 1933 by Salena Doshi RN  Medication Review/Management: medications reviewed  Goal: Maintenance of COPD Symptom Control  Outcome: Progressing  Intervention: Maintain COPD (Chronic Obstructive Pulmonary Disease) Symptom Control  Recent Flowsheet Documentation  Taken 6/13/2025 1933 by Salena Doshi RN  Breathing Techniques/Airway Clearance: deep/controlled cough encouraged  Medication Review/Management: medications reviewed  Goal: Blood Pressure in Desired Range  Outcome: Progressing  Intervention: Maintain Blood Pressure Management  Recent Flowsheet Documentation  Taken 6/13/2025 1933 by Salena Doshi RN  Medication Review/Management: medications reviewed     Problem: Skin Injury Risk Increased  Goal: Skin Health and Integrity  Outcome: Progressing  Intervention: Optimize Skin Protection  Recent Flowsheet Documentation  Taken 6/13/2025 1933 by Salena Doshi RN  Activity Management: ambulated to bathroom  Pressure Reduction Techniques: rest period provided between sit times  Head of Bed (HOB) Positioning: HOB  elevated  Pressure Reduction Devices: pressure-redistributing mattress utilized  Intervention: Promote and Optimize Oral Intake  Recent Flowsheet Documentation  Taken 6/13/2025 1933 by Salena Doshi, RN  Oral Nutrition Promotion: rest periods promoted   Goal Outcome Evaluation:  Plan of Care Reviewed With: patient        Progress: no change  Outcome Evaluation: Patient is AOx4. VSS. O2 weaned to 1L and patient is staying at 95%. BS wnl. Coughing episodes minimized with respiratory therapy and tessalon med. Patient was itching around her IV site and stated that Adhesives iritate her. Atarax given per MD order. Cotinue plan of care.

## 2025-06-14 NOTE — PROGRESS NOTES
Walking Oximetry Progress Note      Patient Name:  Cecilia Sparks  YOB: 1960  Date of Procedure: 06/14/25              ROOM AIR BASELINE   SpO2% 92   Heart Rate 84     EXERCISE ON ROOM AIR SpO2% EXERCISE ON O2 LPM SpO2%   1 MINUTE 91 1 MINUTE     2 MINUTES 90 2 MINUTES     3 MINUTES 91 3 MINUTES     4 MINUTES 90 4 MINUTES     5 MINUTES 92 5 MINUTES     6 MINUTES 90 6 MINUTES                Time to Recovery  N/A   SpO2% Post Exercise  91 on RA.    HR Post Exercise  98     Comments: Pt ambulated for 6 minutes, SpO2 never dropped below 90%.          Electronically signed by Jun Rojas, SUZY, 06/14/25, 10:42 AM EDT.

## 2025-06-15 ENCOUNTER — READMISSION MANAGEMENT (OUTPATIENT)
Dept: CALL CENTER | Facility: HOSPITAL | Age: 65
End: 2025-06-15
Payer: MEDICAID

## 2025-06-15 VITALS
HEIGHT: 64 IN | TEMPERATURE: 97.3 F | WEIGHT: 192.24 LBS | HEART RATE: 79 BPM | DIASTOLIC BLOOD PRESSURE: 72 MMHG | OXYGEN SATURATION: 91 % | BODY MASS INDEX: 32.82 KG/M2 | RESPIRATION RATE: 18 BRPM | SYSTOLIC BLOOD PRESSURE: 109 MMHG

## 2025-06-15 LAB
ANION GAP SERPL CALCULATED.3IONS-SCNC: 14.8 MMOL/L (ref 5–15)
BACTERIA SPEC RESP CULT: NORMAL
BUN SERPL-MCNC: 102.6 MG/DL (ref 8–23)
BUN/CREAT SERPL: 11.4 (ref 7–25)
CALCIUM SPEC-SCNC: 9.7 MG/DL (ref 8.6–10.5)
CHLORIDE SERPL-SCNC: 102 MMOL/L (ref 98–107)
CO2 SERPL-SCNC: 22.2 MMOL/L (ref 22–29)
CREAT SERPL-MCNC: 8.97 MG/DL (ref 0.57–1)
DEPRECATED RDW RBC AUTO: 46.3 FL (ref 37–54)
EGFRCR SERPLBLD CKD-EPI 2021: 4.6 ML/MIN/1.73
ERYTHROCYTE [DISTWIDTH] IN BLOOD BY AUTOMATED COUNT: 14.5 % (ref 12.3–15.4)
GLUCOSE BLDC GLUCOMTR-MCNC: 95 MG/DL (ref 70–99)
GLUCOSE SERPL-MCNC: 108 MG/DL (ref 65–99)
GRAM STN SPEC: NORMAL
HCT VFR BLD AUTO: 26.4 % (ref 34–46.6)
HGB BLD-MCNC: 8.7 G/DL (ref 12–15.9)
MAGNESIUM SERPL-MCNC: 2.1 MG/DL (ref 1.6–2.4)
MCH RBC QN AUTO: 29 PG (ref 26.6–33)
MCHC RBC AUTO-ENTMCNC: 33 G/DL (ref 31.5–35.7)
MCV RBC AUTO: 88 FL (ref 79–97)
PLATELET # BLD AUTO: 204 10*3/MM3 (ref 140–450)
PMV BLD AUTO: 12 FL (ref 6–12)
POTASSIUM SERPL-SCNC: 4.3 MMOL/L (ref 3.5–5.2)
RBC # BLD AUTO: 3 10*6/MM3 (ref 3.77–5.28)
SODIUM SERPL-SCNC: 139 MMOL/L (ref 136–145)
WBC NRBC COR # BLD AUTO: 9.04 10*3/MM3 (ref 3.4–10.8)

## 2025-06-15 PROCEDURE — 80048 BASIC METABOLIC PNL TOTAL CA: CPT | Performed by: INTERNAL MEDICINE

## 2025-06-15 PROCEDURE — 94799 UNLISTED PULMONARY SVC/PX: CPT

## 2025-06-15 PROCEDURE — 25010000002 HEPARIN (PORCINE) PER 1000 UNITS: Performed by: STUDENT IN AN ORGANIZED HEALTH CARE EDUCATION/TRAINING PROGRAM

## 2025-06-15 PROCEDURE — 83735 ASSAY OF MAGNESIUM: CPT | Performed by: INTERNAL MEDICINE

## 2025-06-15 PROCEDURE — 63710000001 PREDNISONE PER 1 MG: Performed by: INTERNAL MEDICINE

## 2025-06-15 PROCEDURE — G0378 HOSPITAL OBSERVATION PER HR: HCPCS

## 2025-06-15 PROCEDURE — 96372 THER/PROPH/DIAG INJ SC/IM: CPT

## 2025-06-15 PROCEDURE — 85027 COMPLETE CBC AUTOMATED: CPT | Performed by: INTERNAL MEDICINE

## 2025-06-15 PROCEDURE — 99239 HOSP IP/OBS DSCHRG MGMT >30: CPT | Performed by: INTERNAL MEDICINE

## 2025-06-15 PROCEDURE — 94761 N-INVAS EAR/PLS OXIMETRY MLT: CPT

## 2025-06-15 PROCEDURE — 94664 DEMO&/EVAL PT USE INHALER: CPT

## 2025-06-15 PROCEDURE — 82948 REAGENT STRIP/BLOOD GLUCOSE: CPT | Performed by: STUDENT IN AN ORGANIZED HEALTH CARE EDUCATION/TRAINING PROGRAM

## 2025-06-15 RX ORDER — HYDROXYZINE HYDROCHLORIDE 25 MG/1
25 TABLET, FILM COATED ORAL 3 TIMES DAILY PRN
Qty: 20 TABLET | Refills: 0 | Status: SHIPPED | OUTPATIENT
Start: 2025-06-15 | End: 2025-06-20 | Stop reason: SDUPTHER

## 2025-06-15 RX ORDER — PREDNISONE 20 MG/1
40 TABLET ORAL
Qty: 10 TABLET | Refills: 0 | Status: SHIPPED | OUTPATIENT
Start: 2025-06-16 | End: 2025-06-21

## 2025-06-15 RX ORDER — TIOTROPIUM BROMIDE 18 UG/1
1 CAPSULE ORAL; RESPIRATORY (INHALATION)
Qty: 30 CAPSULE | Refills: 0 | Status: SHIPPED | OUTPATIENT
Start: 2025-06-15 | End: 2025-07-15

## 2025-06-15 RX ADMIN — ARFORMOTEROL TARTRATE 15 MCG: 15 SOLUTION RESPIRATORY (INHALATION) at 10:18

## 2025-06-15 RX ADMIN — ESCITALOPRAM OXALATE 10 MG: 10 TABLET ORAL at 08:28

## 2025-06-15 RX ADMIN — CALCIUM ACETATE 1334 MG: 667 CAPSULE ORAL at 08:28

## 2025-06-15 RX ADMIN — Medication 10 ML: at 08:28

## 2025-06-15 RX ADMIN — BUDESONIDE 0.5 MG: 0.5 SUSPENSION RESPIRATORY (INHALATION) at 10:18

## 2025-06-15 RX ADMIN — AMLODIPINE BESYLATE 10 MG: 10 TABLET ORAL at 08:28

## 2025-06-15 RX ADMIN — PANTOPRAZOLE SODIUM 40 MG: 40 TABLET, DELAYED RELEASE ORAL at 06:50

## 2025-06-15 RX ADMIN — CLOPIDOGREL BISULFATE 75 MG: 75 TABLET, FILM COATED ORAL at 08:28

## 2025-06-15 RX ADMIN — HEPARIN SODIUM 5000 UNITS: 5000 INJECTION INTRAVENOUS; SUBCUTANEOUS at 06:50

## 2025-06-15 RX ADMIN — METOLAZONE 5 MG: 2.5 TABLET ORAL at 08:28

## 2025-06-15 RX ADMIN — CARVEDILOL 25 MG: 25 TABLET, FILM COATED ORAL at 08:28

## 2025-06-15 RX ADMIN — PREDNISONE 40 MG: 20 TABLET ORAL at 08:28

## 2025-06-15 RX ADMIN — LOSARTAN POTASSIUM 100 MG: 50 TABLET, FILM COATED ORAL at 08:28

## 2025-06-15 NOTE — PROGRESS NOTES
[facility]     Progress Note    Patient Name: Cecilia Sparks  : 1960  MRN: [id number]  Primary Care Physician:  Ellen Strickland DO  Date of admission: 2025    Subjective   Patient was severely dyspneic on moving around yesterday and for that reason she was not discharged and ongoing hospitalization due to minimal functional capacity and mobility limited by dyspnea  Pd was started  No issues on dialysis    Scheduled Meds:amLODIPine, 10 mg, Oral, Daily  arformoterol, 15 mcg, Nebulization, BID - RT  atorvastatin, 40 mg, Oral, Nightly  budesonide, 0.5 mg, Nebulization, BID - RT  calcium acetate, 1,334 mg, Oral, TID AC  carvedilol, 25 mg, Oral, BID  clopidogrel, 75 mg, Oral, Daily  dianeal lo-birdie 1.5%, 2,000 mL, Intraperitoneal, 4 Exchanges Daily - Dwell Overnight  escitalopram, 10 mg, Oral, Daily  heparin (porcine), 5,000 Units, Subcutaneous, Q8H  [Held by provider] hydrALAZINE, 25 mg, Oral, Daily  insulin lispro, 2-7 Units, Subcutaneous, 4x Daily AC & at Bedtime  losartan, 100 mg, Oral, Daily  metOLazone, 5 mg, Oral, Daily  OLANZapine, 15 mg, Oral, Nightly  pantoprazole, 40 mg, Oral, Q AM  predniSONE, 40 mg, Oral, Daily With Breakfast  sodium chloride, 10 mL, Intravenous, Q12H      Continuous Infusions:   PRN Meds:.•  benzonatate  •  senna-docusate sodium **AND** polyethylene glycol **AND** bisacodyl **AND** bisacodyl  •  dextrose  •  dextrose  •  glucagon (human recombinant)  •  hydrOXYzine  •  ipratropium-albuterol  •  sodium chloride  •  sodium chloride  •  sodium chloride       Review of Systems  Constitutional:        Weakness tiredness fatigue  Eyes:                       No blurry vision, eye discharge, eye irritation, eye pain  HEENT:                   No acute hair loss, earache and discharge, nasal congestion or discharge, sore throat, postnasal drip  Respiratory:           No shortness of breath coughing sputum production wheezing hemoptysis pleuritic chest pain  Cardiovascular:     No  chest pain, orthopnea, PND, dizziness, palpitation, lower extremity edema  Gastrointestinal:   No nausea vomiting diarrhea abdominal pain constipation  Genitourinary:       No urinary incontinence, hesitancy, frequency, urgency, dysuria  Hematologic:         No bruising, bleeding, pallor, lymphadenopathy  Endocrine:            No coldness, hot flashes, polyuria, abnormal hair growth  Musculoskeletal:    No body pains, aches, arthritic pains, muscle pain ,muscle wasting  Psychiatric:          No low or high mood, anxiety, hallucinations, delusions  Skin.                      No rash, ulcers, bruising, itching  Neurological:        No confusion, headache, focal weakness, numbness, dysphasia    Objective   Objective     Vitals:   Temp:  [97.7 °F (36.5 °C)-98.4 °F (36.9 °C)] 97.7 °F (36.5 °C)  Heart Rate:  [62-93] 62  Resp:  [16-18] 18  BP: (129-150)/(65-79) 130/67  Flow (L/min) (Oxygen Therapy):  [0-1] 1  Physical Exam    Constitutional: Awake, alert responsive, conversant, no obvious distress              Psychiatric:  Appropriate affect, cooperative   Neurologic:  Awake alert ,oriented x 3, strength symmetric in all extremities, Cranial Nerves grossly intact to confrontation, speech clear   Eyes:   PERRLA, sclerae anicteric, no conjunctival injection   HEENT:  Moist mucous membranes, no nasal or eye discharge, no throat congestion   Neck:   Supple, no thyromegaly, no lymphadenopathy, trachea midline, no elevated JVD   Respiratory:  Clear to auscultation bilaterally, nonlabored respirations    Cardiovascular: RRR, no murmurs, rubs, or gallops, palpable pedal pulses bilaterally, No bilateral ankle edema   Gastrointestinal: Positive bowel sounds, soft, nontender, nondistended, no organomegaly   Musculoskeletal:  No clubbing or cyanosis to extremities,muscle wasting, joint swelling, muscle weakness             Skin:                      No rashes, bruising, skin ulcers, petechiae or ecchymosis    Result Review    Result  Review:  I have personally reviewed the results from the time of this admission to 6/15/2025 08:33 EDT and agree with these findings:  []  Laboratory  []  Microbiology  []  Radiology  []  EKG/Telemetry   []  Cardiology/Vascular   []  Pathology  []  Old records  []  Other:    Assessment & Plan   Assessment / Plan       Active Hospital Problems:  Active Hospital Problems    Diagnosis    • **COPD exacerbation    • COPD (chronic obstructive pulmonary disease)    • ESRD on peritoneal dialysis      64-year-old female with past medical history of ESRD on peritoneal dialysis, hypertension, mood disorder, chronic pain, COPD, coronary artery disease comes in with shortness of breath consistent with COPD exacerbation and no abnormalities on x-ray     Plan:   Will continue with peritoneal dialysis 4 times a day using 2 L bag  Renal diet as tolerated  Patient is on steroids for her COPD  Continue amlodipine 10 mg, carvedilol 25, losartan 100  We will hold off on hydralazine  PhosLo with meals  Metolazone 5 mg

## 2025-06-15 NOTE — NURSING NOTE
At 1137 patient discharged from facility. Patient transported home via granddaughter's personal vehicle. VSS and denies any pain or discomfort at this time. Plan of care reviewed with charge nurse, MAMIE Clark.

## 2025-06-15 NOTE — PLAN OF CARE
Goal Outcome Evaluation:  Plan of Care Reviewed With: patient        Progress: improving  Outcome Evaluation: Patient is A&Ox4. VSS. Patient denies any pain or discomfort at this time. Patient continues to be on room air and is tolerating well and maintaining SATS around 93-94%. Patient will be discharged home per MD order this shift. Plan of care reviewed with charge nurse, MAMIE Clark.

## 2025-06-15 NOTE — OUTREACH NOTE
Prep Survey      Flowsheet Row Responses   [facility] patient discharged from? [place]   Is LACE score < 7 ? No   Eligibility [facility]   Date of Admission 06/12/25   Date of Discharge 06/15/25   Discharge Disposition Home or Self Care   Discharge diagnosis Acute hypoxic respiratory failure   COPD in acute exacerbation   Does the patient have one of the following disease processes/diagnoses(primary or secondary)? COPD   Does the patient have Home health ordered? No   Is there a DME ordered? No   Prep survey completed? Yes            MICHAEL ROBERSON - Registered Nurse

## 2025-06-15 NOTE — DISCHARGE SUMMARY
[facility]         HOSPITALIST  DISCHARGE SUMMARY    Patient Name: Cecilia Sparks  : 1960  MRN: [id number]    Date of Admission: 2025  Date of Discharge:  6/15/2025  Primary Care Physician: Ellen Strickland DO    Consults:  Nephrology    Active and Resolved Hospital Problems:  Acute hypoxic respiratory failure   COPD in acute exacerbation  ESRD on peritoneal dialysis, follows Dr. Crabtree  CAD  HFpEF  Hypertension  Hyperlipidemia  Type 2 diabetes mellitus  Anxiety  Bipolar disorder  SLE    Hospital Course     Hospital Course:  Cecilia Sparks is a 64 y.o. female with a medical history of hypertension, hyperlipidemia, type 2 diabetes mellitus, HFpEF, anxiety, bipolar disorder, ESRD on peritoneal dialysis, COPD, CAD presented to the ED for evaluation of shortness of breath present for the previous 2 weeks, progressively worsening.  Associated symptoms of cough, denies fevers or chills.  Patient was a previous tobacco smoker, quitting in October and has been vaping since.  States she quit vaping around 30 days ago due to worsening cough.  In the emergency department patient requiring 2 L nasal cannula to maintain O2 saturations.  proBNP elevated at 2373.  Chest x-ray with no acute abnormalities.  Patient received nebulized breathing treatments and IV steroids in the emergency department for wheezing noted.  Patient eventually weaned off of supplemental oxygen, walk test performed and able to ambulate without the need of any supplemental oxygen.  Patient discharged with new Spiriva inhaler, referral placed to pulm clinic/COPD clinic.  Patient discharged with remaining doses of prednisone.  Patient CT scan in house returns with mild dependent atelectasis/scarring with mild central lobar emphysema.  Procalcitonin within normal limits therefore not discharged on antibiotics.  Nephrology followed in house for assistance with peritoneal dialysis.  Patient seen on date of discharge,  clinically and hemodynamically stable.  Patient provided concerning signs and symptoms prompting immediate medical attention, patient understanding and agreeable    DISCHARGE Follow Up Recommendations for labs and diagnostics:   Follow-up with primary care physician as soon as possible  Follow-up with COPD clinic  Follow-up with nephrologist      Day of Discharge     Vital Signs:  Temp:  [97.3 °F (36.3 °C)-98.4 °F (36.9 °C)] 97.3 °F (36.3 °C)  Heart Rate:  [62-82] 79  Resp:  [16-18] 18  BP: (109-150)/(67-79) 109/72  Flow (L/min) (Oxygen Therapy):  [1] 1  Physical Exam:               Constitutional: Awake, alert, no acute distress              Eyes: Pupils equal, sclerae anicteric, no conjunctival injection              HENT: NCAT, mucous membranes moist              Respiratory: Proved aeration, minimal wheezing heard, no crackles.  Breathing comfortably on room air              Cardiovascular: RRR, no murmurs, rubs, or gallops, palpable pedal pulses bilaterally              Gastrointestinal: soft, nontender, nondistended, PD catheter in place              Musculoskeletal: Trace bilateral lower extremity edema, no clubbing or cyanosis to extremities              Neurologic: Oriented x 3, speech clear       Discharge Details        Discharge Medications        New Medications        Instructions Start Date   hydrOXYzine 25 MG tablet  Commonly known as: ATARAX   25 mg, Oral, 3 Times Daily PRN      predniSONE 20 MG tablet  Commonly known as: DELTASONE   40 mg, Oral, Daily With Breakfast   Start Date: June 16, 2025     tiotropium 18 MCG per inhalation capsule  Commonly known as: Spiriva HandiHaler   1 capsule, Inhalation, Daily - RT             Changes to Medications        Instructions Start Date   clopidogrel 75 MG tablet  Commonly known as: PLAVIX  What changed: See the new instructions.   TAKE 1 TABLET BY MOUTH ONCE DAILY FOR HEART      hyoscyamine 0.125 MG SL tablet  Commonly known as: LEVSIN  What changed: See the  new instructions.   TAKE 1 TABLET UNDER TONGUE EVERY SIX HOURS AS NEEDED FOR CRAMPING OR DIARRHEA      loratadine 10 MG tablet  Commonly known as: CLARITIN  What changed: See the new instructions.   TAKE 1 TABLET BY MOUTH ONCE DAILY FOR ALLERGIES      pantoprazole 40 MG EC tablet  Commonly known as: PROTONIX  What changed: See the new instructions.   TAKE 1 TABLET BY MOUTH ONCE DAILY (STOMACH)      promethazine 12.5 MG tablet  Commonly known as: PHENERGAN  What changed: when to take this   12.5 mg, Oral, Every 6 Hours PRN             Continue These Medications        Instructions Start Date   albuterol sulfate  (90 Base) MCG/ACT inhaler  Commonly known as: PROVENTIL HFA;VENTOLIN HFA;PROAIR HFA   2 puffs, Every 4 Hours PRN      amLODIPine 10 MG tablet  Commonly known as: NORVASC   10 mg, Daily      atorvastatin 40 MG tablet  Commonly known as: LIPITOR   40 mg, Oral, Every Evening, for cholesterol      calcium acetate 667 MG capsule capsule  Commonly known as: PHOS BINDER)   1,334 mg, 3 Times Daily Before Meals      calcium acetate 667 MG capsule capsule  Commonly known as: PHOS BINDER)   667 mg, With Snacks      carvedilol 25 MG tablet  Commonly known as: COREG   25 mg, Oral, 2 Times Daily      diphenhydrAMINE 25 MG tablet  Commonly known as: BENADRYL   Take 2 tablets by mouth Every Night.      docusate sodium 100 MG capsule  Commonly known as: COLACE   100 mg, 2 Times Daily      escitalopram 10 MG tablet  Commonly known as: LEXAPRO   10 mg, Daily      fluticasone 50 MCG/ACT nasal spray  Commonly known as: FLONASE   2 sprays, Daily PRN      furosemide 80 MG tablet  Commonly known as: LASIX   80 mg, Oral, Every 12 Hours Scheduled      GNP Calcium 600 +D3 600-20 MG-MCG tablet  Generic drug: Calcium Carb-Cholecalciferol   1 tablet, Oral, 2 Times Daily      GNP ClearLax 17 GM/SCOOP powder  Generic drug: polyethylene glycol   17 g, Daily      HEPARIN COMBINATION IV   Infuse  into a venous catheter. With dialysis  solution FOR PD CATH, TAKES EVERY MONDAY      hydrALAZINE 25 MG tablet  Commonly known as: APRESOLINE   25 mg, Oral, Daily      Lokelma 10 g packet  Generic drug: sodium zirconium cyclosilicate   20 g, 2 Times Weekly      LORazepam 0.5 MG tablet  Commonly known as: ATIVAN   0.5 mg, Oral, Daily PRN      losartan 100 MG tablet  Commonly known as: COZAAR   Take 1 tablet by mouth Daily.      metOLazone 5 MG tablet  Commonly known as: ZAROXOLYN   Take 1 tablet by mouth Daily.      OLANZapine 15 MG tablet  Commonly known as: zyPREXA   15 mg, Oral, Nightly      ondansetron ODT 4 MG disintegrating tablet  Commonly known as: ZOFRAN-ODT   4 mg, Translingual, Every 6 Hours PRN      sucralfate 1 g tablet  Commonly known as: Carafate   1 g, Oral, 4 Times Daily      Ultracare Pen Needles 32G X 4 MM misc  Generic drug: Insulin Pen Needle   Use to inject ozempic 1 time per week.               Allergies   Allergen Reactions    Iron Sucrose Unknown - High Severity     HX OF HEMOCHROMATOSIS     Lamotrigine Rash    Latex Hives, Nausea And Vomiting and Rash     blisters      Lamictal Xr [Lamotrigine Er] Rash    Nickel Rash     RASH W/COSMETIC JEWELRY    Sulfa Antibiotics Rash     PT REPORTS SHE HAS NEVER HAD TROUBLE OR NOTED ISSUES WITH SULFA BEFORE       Discharge Disposition:  Home or Self Care    Diet:  Hospital:No active diet order      Discharge Activity:   Activity Instructions       Activity as Tolerated              CODE STATUS:  Code Status and Medical Interventions: CPR (Attempt to Resuscitate); Full Support   Ordered at: 06/13/25 0027     Code Status (Patient has no pulse and is not breathing):    CPR (Attempt to Resuscitate)     Medical Interventions (Patient has pulse or is breathing):    Full Support     Level Of Support Discussed With:    Patient         Future Appointments   Date Time Provider Department Center   6/20/2025  1:00 PM Ellen Strickland DO [facility] [place] [facility]   6/27/2025 11:20 AM Barby Marr APRN [facility] IZABELA  [facility]   7/16/2025 11:30 AM Quintin Smiley MD [facility] [place] [facility]   8/14/2025  2:00 PM Juany Duvall, [facility] ONC ETWN [facility]   11/20/2025 10:30 AM Marcie Jones, [facility] GE ETWH DANIEL       Additional Instructions for the Follow-ups that You Need to Schedule       Discharge Follow-up with PCP   As directed       Currently Documented PCP:    Ellen Strickland DO    PCP Phone Number:    [phone]     Follow Up Details: In less than one week        Discharge Follow-up with Specified Provider: Dr Crabtree, Nephrology; 2 Weeks   As directed      To: Dr Crabtree, Nephrology   Follow Up: 2 Weeks                Pertinent  and/or Most Recent Results     PROCEDURES:   None     LAB RESULTS:      Lab 06/15/25  0450 06/14/25  0444 06/13/25  0431 06/12/25  1933   WBC 9.04 7.55 6.19 8.77   HEMOGLOBIN 8.7* 9.0* 9.1* 9.9*   HEMATOCRIT 26.4* 28.8* 28.8* 31.0*   PLATELETS 204 231 232 280   NEUTROS ABS  --   --  4.66 4.63   IMMATURE GRANS (ABS)  --   --  0.10* 0.04   LYMPHS ABS  --   --  1.30 2.65   MONOS ABS  --   --  0.08* 0.59   EOS ABS  --   --  0.03 0.80*   MCV 88.0 91.4 89.7 90.6   PROCALCITONIN  --   --  0.08  --          Lab 06/15/25  0450 06/14/25  0444 06/13/25  0431 06/12/25  1933   SODIUM 139 137 138 139   POTASSIUM 4.3 5.0 4.2 4.1   CHLORIDE 102 99 98 98   CO2 22.2 25.9 26.0 26.9   ANION GAP 14.8 12.1 14.0 14.1   .6* 88.7* 67.0* 66.6*   CREATININE 8.97* 9.24* 8.40* 8.32*   EGFR 4.6* 4.4* 4.9* 5.0*   GLUCOSE 108* 180* 182* 107*   CALCIUM 9.7 10.0 9.9 10.2   MAGNESIUM 2.1 2.0 1.8  --    PHOSPHORUS  --   --  4.4  --          Lab 06/13/25  0431 06/12/25 1933   TOTAL PROTEIN 6.5 7.2   ALBUMIN 3.4* 3.7   GLOBULIN 3.1 3.5   ALT (SGPT) 10 12   AST (SGOT) 12 13   BILIRUBIN <0.2 <0.2   ALK PHOS 96 112         Lab 06/12/25  2106 06/12/25 1933   PROBNP  --  2,373.0*   HSTROP T 28* 28*                 Brief Urine Lab Results  (Last result in the past 365 days)        Color   Clarity   Blood   Leuk Est   Nitrite    Protein   CREAT   Urine HCG        04/20/25 1657 Yellow   Clear   Negative   Trace   Negative   30 mg/dL (1+)                 Microbiology Results (last 10 days)       Procedure Component Value - Date/Time    Respiratory Culture - Sputum, Cough [[id number]] Collected: 06/13/25 1834    Lab Status: Final result Specimen: Sputum from Cough Updated: 06/15/25 0932     Respiratory Culture Light growth (2+) Normal respiratory nicole. No S. aureus or Pseudomonas aeruginosa detected. Final report.     Gram Stain Less than 10 Epithelial cells per low power field      Greater than 20 WBCs per low power field      Rare (1+) Gram positive cocci in pairs and clusters      Occasional Gram positive bacilli            CT Chest Without Contrast Diagnostic  Result Date: 6/13/2025  Impression: Impression: 1.Mild dependent atelectasis/scarring within the lung bases. Otherwise the lungs are clear. 2.Mild central lobar emphysema. 3.Small amount of free fluid noted within the upper abdomen, nonspecific. Electronically Signed: Basilio Galindo DO  6/13/2025 6:55 PM EDT  Workstation ID: [id number]    XR Chest 1 View  Result Date: 6/12/2025  Impression: Impression: No acute cardiopulmonary abnormality. Electronically Signed: Basilio Galindo,   6/12/2025 7:49 PM EDT  Workstation ID: [id number]      Results for orders placed during the hospital encounter of 03/29/23    Doppler Arterial Multi Level Lower Extremity - Bilateral CAR    Interpretation Summary    Right Conclusion: The right PABLO is normal. Normal digital pressures.    Left Conclusion: The left PABLO is normal. Normal digital pressures.    Normal arterial evaluation of both lower extremities.      Results for orders placed during the hospital encounter of 03/29/23    Doppler Arterial Multi Level Lower Extremity - Bilateral CAR    Interpretation Summary    Right Conclusion: The right PABLO is normal. Normal digital pressures.    Left Conclusion: The left PABLO is normal. Normal digital  pressures.    Normal arterial evaluation of both lower extremities.      Results for orders placed during the hospital encounter of 09/30/24    Adult Transthoracic Echo Complete W/ Cont if Necessary Per Protocol    Interpretation Summary    Left ventricular systolic function is hyperdynamic (EF > 70%).    Left ventricular diastolic function is consistent with (grade I) impaired relaxation and age.    No significant valvular disease.    Intracavitary gradient at approximately 35 to 40 mmHg secondary to hyperdynamic state.      Labs Pending at Discharge:        Time spent on Discharge including face to face service:  35 minutes    Electronically signed by Paulino Austin MD, 06/15/25, 4:56 PM EDT.

## 2025-06-16 ENCOUNTER — TRANSITIONAL CARE MANAGEMENT TELEPHONE ENCOUNTER (OUTPATIENT)
Dept: CALL CENTER | Facility: HOSPITAL | Age: 65
End: 2025-06-16
Payer: MEDICAID

## 2025-06-16 DIAGNOSIS — F43.21 COMPLICATED GRIEVING: ICD-10-CM

## 2025-06-16 DIAGNOSIS — F41.1 GENERALIZED ANXIETY DISORDER: ICD-10-CM

## 2025-06-16 DIAGNOSIS — F41.0 PANIC ATTACKS: ICD-10-CM

## 2025-06-16 DIAGNOSIS — F31.13 BIPOLAR DISORDER, CURRENT EPISODE MANIC WITHOUT PSYCHOTIC FEATURES, SEVERE: ICD-10-CM

## 2025-06-16 DIAGNOSIS — I10 PRIMARY HYPERTENSION: ICD-10-CM

## 2025-06-16 RX ORDER — HYDRALAZINE HYDROCHLORIDE 25 MG/1
25 TABLET, FILM COATED ORAL DAILY
Qty: 30 TABLET | Refills: 0 | Status: SHIPPED | OUTPATIENT
Start: 2025-06-16

## 2025-06-16 RX ORDER — LORATADINE 10 MG/1
TABLET ORAL
Qty: 30 TABLET | Refills: 0 | Status: SHIPPED | OUTPATIENT
Start: 2025-06-16

## 2025-06-16 NOTE — OUTREACH NOTE
Call Center TCM Note      Flowsheet Row Responses   [facility] patient discharged from? [place]   Does the patient have one of the following disease processes/diagnoses(primary or secondary)? COPD   TCM attempt successful? Yes   Call start time 1343   Unsuccessful attempts Attempt 1  [verbal release for Glenis Díaz dtr and Hali Machuca, sister]   Call end time 1353   Discharge diagnosis Acute hypoxic respiratory failure   COPD in acute exacerbation   Meds reviewed with patient/caregiver? Yes   Is the patient having any side effects they believe may be caused by any medication additions or changes? No   Does the patient have all medications ordered at discharge? Yes   Is the patient taking all medications as directed (includes completed medication regime)? Yes   Comments Pt has a f/u with PCP on 6/20/25 she would like to keep if possible, will send a message to office   Does the patient have an appointment with their PCP within 7-14 days of discharge? Yes   Nursing Interventions Confirmed date/time of appointment   DME comments Pt has ordered pulse Ox for home use   Pulse Ox monitoring None   Did the patient receive a copy of their discharge instructions? Yes   Nursing interventions Reviewed instructions with patient   What is the patient's perception of their health status since discharge? New symptoms unrelated to diagnosis  [Reports her breathing is better, reports she has had dizziness that started this am. Had pt check her BP while on phone,  107/57 which is about the same as when discharged.]   Nursing Interventions Nurse provided patient education  [Pt reports she feels weak, dizzy today. Pt has only had a protein drink today, is going to try to eat a meal. Denies chest pain/stroke type s/s at this time. Falls prevention advised, using a cane at this time, sister assisting.]   Is the patient/caregiver able to teach back the hierarchy of who to call/visit for symptoms/problems? PCP, Specialist, Home  health nurse, Urgent Care, ED, 911 Yes   TCM call completed? Yes   Call end time 3893            REJI BRAVO - Registered Nurse    6/16/2025, 13:53 EDT         145

## 2025-06-17 RX ORDER — OLANZAPINE 15 MG/1
15 TABLET, FILM COATED ORAL EVERY EVENING
Qty: 30 TABLET | Refills: 0 | Status: SHIPPED | OUTPATIENT
Start: 2025-06-17

## 2025-06-17 RX ORDER — ESCITALOPRAM OXALATE 10 MG/1
10 TABLET ORAL DAILY
Qty: 30 TABLET | Refills: 1 | Status: SHIPPED | OUTPATIENT
Start: 2025-06-17 | End: 2025-06-20

## 2025-06-17 RX ORDER — LORAZEPAM 0.5 MG/1
0.5 TABLET ORAL DAILY PRN
Qty: 15 TABLET | Refills: 1 | Status: SHIPPED | OUTPATIENT
Start: 2025-06-17

## 2025-06-17 NOTE — TELEPHONE ENCOUNTER
Medication sent to patient's preferred pharmacy in record.  
REFILL REQUEST:    LORazepam (ATIVAN) 0.5 MG tablet (03/13/2025)     OLANZapine (zyPREXA) 15 MG tablet (03/13/2025)     escitalopram (LEXAPRO) 10 MG tablet (05/15/2025)     F/UP: 06/27/2025.  LOV: 05/15/2025.    LAST UDS:  POC Medline 12 Panel Urine Drug Screen (10/04/2024 12:49)   
no

## 2025-06-20 ENCOUNTER — OFFICE VISIT (OUTPATIENT)
Dept: FAMILY MEDICINE CLINIC | Facility: CLINIC | Age: 65
End: 2025-06-20
Payer: MEDICAID

## 2025-06-20 VITALS
HEIGHT: 64 IN | DIASTOLIC BLOOD PRESSURE: 63 MMHG | HEART RATE: 70 BPM | SYSTOLIC BLOOD PRESSURE: 116 MMHG | BODY MASS INDEX: 33.12 KG/M2 | TEMPERATURE: 98.6 F | OXYGEN SATURATION: 94 % | WEIGHT: 194 LBS

## 2025-06-20 DIAGNOSIS — R29.898 WEAKNESS OF BOTH LOWER EXTREMITIES: ICD-10-CM

## 2025-06-20 DIAGNOSIS — I10 PRIMARY HYPERTENSION: ICD-10-CM

## 2025-06-20 DIAGNOSIS — Z09 HOSPITAL DISCHARGE FOLLOW-UP: Primary | ICD-10-CM

## 2025-06-20 DIAGNOSIS — F41.9 ANXIETY: ICD-10-CM

## 2025-06-20 DIAGNOSIS — E66.09 CLASS 1 OBESITY DUE TO EXCESS CALORIES WITH SERIOUS COMORBIDITY AND BODY MASS INDEX (BMI) OF 33.0 TO 33.9 IN ADULT: Chronic | ICD-10-CM

## 2025-06-20 DIAGNOSIS — J44.9 CHRONIC OBSTRUCTIVE PULMONARY DISEASE, UNSPECIFIED COPD TYPE: ICD-10-CM

## 2025-06-20 DIAGNOSIS — E66.811 CLASS 1 OBESITY DUE TO EXCESS CALORIES WITH SERIOUS COMORBIDITY AND BODY MASS INDEX (BMI) OF 33.0 TO 33.9 IN ADULT: Chronic | ICD-10-CM

## 2025-06-20 PROBLEM — J44.1 COPD EXACERBATION: Status: RESOLVED | Noted: 2025-06-12 | Resolved: 2025-06-20

## 2025-06-20 RX ORDER — PREDNISONE 20 MG/1
40 TABLET ORAL DAILY
Qty: 10 TABLET | Refills: 0 | Status: SHIPPED | OUTPATIENT
Start: 2025-06-20 | End: 2025-06-25

## 2025-06-20 RX ORDER — HYDROXYZINE HYDROCHLORIDE 25 MG/1
25 TABLET, FILM COATED ORAL EVERY 8 HOURS PRN
Qty: 90 TABLET | Refills: 5 | Status: SHIPPED | OUTPATIENT
Start: 2025-06-20 | End: 2025-07-20

## 2025-06-20 RX ORDER — TIOTROPIUM BROMIDE 18 UG/1
1 CAPSULE ORAL; RESPIRATORY (INHALATION)
Qty: 30 CAPSULE | Refills: 5 | Status: SHIPPED | OUTPATIENT
Start: 2025-06-20

## 2025-06-20 NOTE — ASSESSMENT & PLAN NOTE
COPD is improving with treatment.    Plan:  Continue same medication/s without change.    Discussed medication dosage, use, side effects, and goals of treatment in detail.    Warning signs of respiratory distress were reviewed with the patient. .    Patient Treatment Goals:   symptom prevention, minimizing limitation in activity, prevention of exacerbations and use of ER/inpatient care, maintenance of optimal pulmonary function, and minimization of adverse effects of treatment    Followup in 6 months.

## 2025-06-20 NOTE — PROGRESS NOTES
Transitional Care Follow Up Visit  Subjective     Cecilia Sparks is a 64 y.o. female who presents for a transitional care management visit.    Within 48 business hours after discharge our office contacted her via telephone to coordinate her care and needs.      I reviewed and discussed the details of that call along with the discharge summary, hospital problems, inpatient lab results, inpatient diagnostic studies, and consultation reports with Cecilia.     Current outpatient and discharge medications have been reconciled for the patient.  Reviewed by: Ellen Strickland DO           6/15/2025     7:10 PM   Date of TCM Phone Call   [facility]   Date of Admission 6/12/2025   Date of Discharge 6/15/2025   Discharge Disposition Home or Self Care       Risk for Readmission (LACE) Score: 10 (6/15/2025  6:00 AM)         Course During Hospital Stay:    Cecilia Sparks is a 64 y.o. female with a medical history of hypertension, hyperlipidemia, type 2 diabetes mellitus, HFpEF, anxiety, bipolar disorder, ESRD on peritoneal dialysis, COPD, CAD presented to the ED for evaluation of shortness of breath present for the previous 2 weeks, progressively worsening.  Associated symptoms of cough, denies fevers or chills.  Patient was a previous tobacco smoker, quitting in October and has been vaping since.  States she quit vaping around 30 days ago due to worsening cough.  In the emergency department patient requiring 2 L nasal cannula to maintain O2 saturations.  proBNP elevated at 2373.  Chest x-ray with no acute abnormalities.  Patient received nebulized breathing treatments and IV steroids in the emergency department for wheezing noted.  Patient eventually weaned off of supplemental oxygen, walk test performed and able to ambulate without the need of any supplemental oxygen.  Patient discharged with new Spiriva inhaler, referral placed to pulm clinic/COPD clinic.  Patient discharged with remaining doses of  "prednisone.  Patient CT scan in house returns with mild dependent atelectasis/scarring with mild central lobar emphysema.  Procalcitonin within normal limits therefore not discharged on antibiotics.  Nephrology followed in house for assistance with peritoneal dialysis.  Patient seen on date of discharge, clinically and hemodynamically stable.  Patient provided concerning signs and symptoms prompting immediate medical attention, patient understanding and agreeable      The following portions of the patient's history were reviewed and updated as appropriate: allergies, current medications, past family history, past medical history, past social history, past surgical history, and problem list.    Review of Systems   Constitutional:  Negative for chills and fever.   Respiratory:  Negative for cough and shortness of breath.    Cardiovascular:  Negative for chest pain and leg swelling.   Gastrointestinal:  Negative for abdominal pain, constipation and diarrhea.   Genitourinary:  Negative for difficulty urinating.   All other systems reviewed and are negative.    Objective     Vitals:    06/20/25 1323   BP: 116/63   BP Location: Right arm   Patient Position: Sitting   Cuff Size: Adult   Pulse: 70   Temp: 98.6 °F (37 °C)   TempSrc: Temporal   SpO2: 94%   Weight: 88 kg (194 lb)   Height: 162.6 cm (64\")   PainSc: 0-No pain         Physical Exam  Vitals reviewed.   Constitutional:       Appearance: She is well-developed. She is obese.   HENT:      Head: Normocephalic and atraumatic.      Right Ear: External ear normal.      Left Ear: External ear normal.      Mouth/Throat:      Pharynx: No oropharyngeal exudate.   Eyes:      Conjunctiva/sclera: Conjunctivae normal.      Pupils: Pupils are equal, round, and reactive to light.   Neck:      Vascular: No carotid bruit.   Cardiovascular:      Rate and Rhythm: Normal rate and regular rhythm.      Heart sounds: No murmur heard.     No friction rub. No gallop.   Pulmonary:      " Effort: Pulmonary effort is normal.      Breath sounds: Normal breath sounds. No wheezing or rhonchi.   Abdominal:      General: There is no distension.   Skin:     General: Skin is warm and dry.   Neurological:      Mental Status: She is alert and oriented to person, place, and time.      Cranial Nerves: No cranial nerve deficit.      Motor: No weakness.   Psychiatric:         Mood and Affect: Mood and affect normal.         Behavior: Behavior normal.         Thought Content: Thought content normal.         Judgment: Judgment normal.         Assessment & Plan     Diagnoses and all orders for this visit:    1. Hospital discharge follow-up (Primary)    2. Primary hypertension  Comments:  209/96  Assessment & Plan:  Hypertension is stable and controlled  Continue current treatment regimen.  Blood pressure will be reassessed in 6 months.      3. Weakness of both lower extremities  -     Miscellaneous DME    4. Anxiety  -     hydrOXYzine (ATARAX) 25 MG tablet; Take 1 tablet by mouth Every 8 (Eight) Hours As Needed for Itching or Anxiety for up to 30 days.  Dispense: 90 tablet; Refill: 5    5. Chronic obstructive pulmonary disease, unspecified COPD type  Assessment & Plan:  COPD is improving with treatment.    Plan:  Continue same medication/s without change.    Discussed medication dosage, use, side effects, and goals of treatment in detail.    Warning signs of respiratory distress were reviewed with the patient. .    Patient Treatment Goals:   symptom prevention, minimizing limitation in activity, prevention of exacerbations and use of ER/inpatient care, maintenance of optimal pulmonary function, and minimization of adverse effects of treatment    Followup in 6 months.    Orders:  -     tiotropium (Spiriva HandiHaler) 18 MCG per inhalation capsule; Place 1 capsule into inhaler and inhale Daily.  Dispense: 30 capsule; Refill: 5  -     predniSONE (DELTASONE) 20 MG tablet; Take 2 tablets by mouth Daily for 5 days.   Dispense: 10 tablet; Refill: 0    6. Class 1 obesity due to excess calories with serious comorbidity and body mass index (BMI) of 33.0 to 33.9 in adult  Assessment & Plan:  Patient's (Body mass index is 33.3 kg/m².) indicates that they are obese (BMI >30) with health conditions that include hypertension and dyslipidemias . Weight is unchanged. BMI  is above average; BMI management plan is completed. We discussed low calorie, low carb based diet program, portion control, and increasing exercise.             Patient was given instructions and counseling regarding her condition or for health maintenance advice. Please see specific information pulled into the AVS if appropriate.     Follow Up   Return in about 3 months (around 9/20/2025).    Patient or patient representative verbalized consent for the use of Ambient Listening during the visit with  Ellen Strickland DO for chart documentation. 6/20/2025  13:42 EDT    Ellen Strickland DO

## 2025-06-20 NOTE — ASSESSMENT & PLAN NOTE
Patient's (Body mass index is 33.3 kg/m².) indicates that they are obese (BMI >30) with health conditions that include hypertension and dyslipidemias . Weight is unchanged. BMI  is above average; BMI management plan is completed. We discussed low calorie, low carb based diet program, portion control, and increasing exercise.

## 2025-06-27 ENCOUNTER — OFFICE VISIT (OUTPATIENT)
Dept: BEHAVIORAL HEALTH | Facility: CLINIC | Age: 65
End: 2025-06-27
Payer: MEDICAID

## 2025-06-27 ENCOUNTER — TELEPHONE (OUTPATIENT)
Facility: HOSPITAL | Age: 65
End: 2025-06-27
Payer: MEDICAID

## 2025-06-27 VITALS
SYSTOLIC BLOOD PRESSURE: 149 MMHG | HEIGHT: 64 IN | WEIGHT: 196 LBS | BODY MASS INDEX: 33.46 KG/M2 | DIASTOLIC BLOOD PRESSURE: 68 MMHG | HEART RATE: 73 BPM

## 2025-06-27 DIAGNOSIS — F41.1 GENERALIZED ANXIETY DISORDER: ICD-10-CM

## 2025-06-27 DIAGNOSIS — F31.30 BIPOLAR I DISORDER, MOST RECENT EPISODE DEPRESSED: Primary | ICD-10-CM

## 2025-06-27 DIAGNOSIS — F41.0 PANIC ATTACKS: ICD-10-CM

## 2025-06-27 NOTE — PATIENT INSTRUCTIONS
1.  Please return to clinic at your next scheduled visit.  Please contact the clinic ([phone]) at least 24 hours prior in the event you need to cancel.  2.  Do no harm to yourself or others.    3.  Avoid alcohol and drugs.    4.  Take all medications as prescribed.  Please contact the clinic with any concerns. If you are in need of medication refills, please call the clinic at [phone].    5. Should you want to get in touch with your provider, ELOINA Velarde, please contact the office ([phone]), and staff will be able to page [facility] directly.  6. In the event you have personal crisis, contact the following crisis numbers: Suicide Prevention Hotline 1-260.819.3777; [facility] Helpline 8-686-798-AVZX; [facility] Emergency Room [phone]; text HELLO to [phone]; or 825.     SPECIFIC RECOMMENDATIONS:     1.      Medications discussed at this encounter:     New Medications Ordered This Visit   Medications    Vortioxetine HBr (Trintellix) 5 MG tablet tablet     Sig: Take 1 tablet by mouth Daily With Breakfast.     Dispense:  30 tablet     Refill:  2                       2.      Psychotherapy recommendations: We will continue therapy at future visits.     3.     Return to clinic: Return in about 1 month (around 7/27/2025).

## 2025-06-27 NOTE — PROGRESS NOTES
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 tablet by mouth Every 6 (Six) Hours As Needed for Nausea or Vomiting. 15 tablet 1    tiotropium (Spiriva HandiHaler) 18 MCG per inhalation capsule Place 1 capsule into inhaler and inhale Daily for 30 days. 30 capsule 0    tiotropium (Spiriva HandiHaler) 18 MCG per inhalation capsule Place 1 capsule into inhaler and inhale Daily. 30 capsule 5    albuterol (PROVENTIL) (2.5 MG/3ML) 0.083% nebulizer solution Take 2.5 mg by nebulization 4 (Four) Times a Day As Needed for Wheezing. 120 mL 5    albuterol sulfate  (90 Base) MCG/ACT inhaler Inhale 2 puffs Every 4 (Four) Hours As Needed for Wheezing. 6.7 g 5    Vortioxetine HBr (Trintellix) 5 MG tablet tablet Take 1 tablet by mouth Daily With Breakfast. 30 tablet 2     No current facility-administered medications for this visit.     Problem List:  Patient Active Problem List   Diagnosis    PVD (peripheral vascular disease) with claudication    Primary osteoarthritis of left knee    Meningitis after procedure    Hypertension    ESRD on peritoneal dialysis    Asthma    Anxiety    Systemic lupus erythematosus    Stroke    Polyneuropathy    Stage 5 chronic kidney disease    Migraine    Major depressive disorder, recurrent severe without psychotic features    Kidney stones    Hereditary hemochromatosis    Gout    Drug  dependence    Epilepsy    Gastro-esophageal reflux disease without esophagitis    Chronic obstructive pulmonary disease    Pulmonary emphysema    Chronic UTI    Congestive heart failure    Bipolar disorder    Fibromyalgia    Chronic back pain    Anemia, autoimmune hemolytic    Mixed hyperlipidemia    Hypertensive emergency    Gastroesophageal reflux disease    Lower abdominal pain    Memory loss    Sequelae, post-stroke    Class 1 obesity due to excess calories with serious comorbidity and body mass index (BMI) of 33.0 to 33.9 in adult    Hospital discharge follow-up     Allergy:   Allergies   Allergen Reactions    Iron Sucrose Unknown - High Severity     HX OF HEMOCHROMATOSIS     Lamotrigine Rash    Latex Hives, Nausea And Vomiting and Rash     blisters      Lamictal Xr [Lamotrigine Er] Rash    Nickel Rash     RASH W/COSMETIC JEWELRY    Sulfa Antibiotics Rash     PT REPORTS SHE HAS NEVER HAD TROUBLE OR NOTED ISSUES WITH SULFA BEFORE      Discontinued Medications:  There are no discontinued medications.    PLAN:   Presentation meets DSM-V criteria for:  Diagnoses and all orders for this visit:    1. Bipolar I disorder, most recent episode depressed (Primary)  -     Vortioxetine HBr (Trintellix) 5 MG tablet tablet; Take 1 tablet by mouth Daily With Breakfast.  Dispense: 30 tablet; Refill: 2    2. Generalized anxiety disorder  -     Vortioxetine HBr (Trintellix) 5 MG tablet tablet; Take 1 tablet by mouth Daily With Breakfast.  Dispense: 30 tablet; Refill: 2    3. Panic attacks  -     Vortioxetine HBr (Trintellix) 5 MG tablet tablet; Take 1 tablet by mouth Daily With Breakfast.  Dispense: 30 tablet; Refill: 2       Continue olanzapine to 15 mg at bedtime  Continue ativan 0.5 mg once daily as needed for panic attacks  Start trintellix 5 mg daily  Medication Education:   ZYPREXA (OLANZAPINE) Risks, benefits, alternatives discussed with patient including nausea and vomiting, GI upset, sedation, dizziness/falls risk,  akathisia, hypotension, increased appetite, lowering of seizure threshold, theoretical risk of tardive dyskinesia. After discussion of these risks and benefits, the patient voiced understanding and agreed to proceed.  ATIVAN (LORAZEPAM) Risks, benefits, and alternatives discussed with patient including sedation/falls risk, dizziness, disinhibition, headache, fatigue, dry mouth, blurred vision, constipation, nausea, diarrhea, heartburn, unusual taste in mouth, urinary retention, increased appetite, restlessness, sexual dysfunction, sweating, weight gain, cardiac arrhythmias, seizures, and fall risk.  After discussion of these risks and benefits, patient voiced understanding and agreed to proceed.  Antonina reviewed, UDS ordered, and controlled substance agreement signed & witnessed.  TRINTELLIX (VORTIOXETINE) Risks, benefits, alternatives discussed with patient including nausea, vomiting, constipation, sexual dysfunction.  Onset of action is usually in 2 weeks.  After discussion of these risks and benefits, the patient voiced understanding and agreed to proceed.    Medications:   New Medications Ordered This Visit   Medications    Vortioxetine HBr (Trintellix) 5 MG tablet tablet     Sig: Take 1 tablet by mouth Daily With Breakfast.     Dispense:  30 tablet     Refill:  2      ANTONINA reviewed.   Discussed medication options and treatment plan of prescribed medication as well as the risks, benefits, and side effects.  Patient is agreeable to call the office with any worsening of symptoms or onset of side effects.   Patient is agreeable to call 911 or go to the nearest ER should he/she begin having SI/HI.   Patient acknowledged, is agreeable to continue with current treatment plan, and was educated on the importance of compliance with treatment and follow-up appointments.  Addressed all questions and concerns.     Psychotherapy:    Provided minimal supportive therapy.  Functional status: Moderate symptoms OR moderate  difficulty in social occupational or social functioning (51-60)  Prognosis: Fair with expectation for some response to treatment  Progress: gradually worsening      Follow-up: Return in about 1 month (around 7/27/2025).     This document has been electronically signed by ELOINA Velarde  June 30, 2025 13:32 EDT  Please note that portions of this note were completed with a voice recognition program.  Copied text in this note has been reviewed and is accurate as of 06/30/25

## 2025-06-30 ENCOUNTER — TELEPHONE (OUTPATIENT)
Dept: ORTHOPEDIC SURGERY | Facility: CLINIC | Age: 65
End: 2025-06-30
Payer: MEDICAID

## 2025-06-30 ENCOUNTER — TELEPHONE (OUTPATIENT)
Dept: PSYCHIATRY | Facility: CLINIC | Age: 65
End: 2025-06-30
Payer: MEDICAID

## 2025-06-30 ENCOUNTER — HOSPITAL ENCOUNTER (OUTPATIENT)
Facility: HOSPITAL | Age: 65
Discharge: HOME | End: 2025-06-30
Payer: MEDICAID

## 2025-06-30 VITALS
HEIGHT: 64 IN | RESPIRATION RATE: 16 BRPM | OXYGEN SATURATION: 94 % | HEART RATE: 87 BPM | DIASTOLIC BLOOD PRESSURE: 93 MMHG | SYSTOLIC BLOOD PRESSURE: 138 MMHG | BODY MASS INDEX: 33.46 KG/M2 | WEIGHT: 196 LBS | TEMPERATURE: 98.3 F

## 2025-06-30 DIAGNOSIS — J43.2 CENTRILOBULAR EMPHYSEMA: Primary | ICD-10-CM

## 2025-06-30 DIAGNOSIS — J98.11 ATELECTASIS: ICD-10-CM

## 2025-06-30 DIAGNOSIS — F17.211 CIGARETTE NICOTINE DEPENDENCE IN REMISSION: ICD-10-CM

## 2025-06-30 PROCEDURE — 99214 OFFICE O/P EST MOD 30 MIN: CPT | Performed by: NURSE PRACTITIONER

## 2025-06-30 RX ORDER — ALBUTEROL SULFATE 0.83 MG/ML
2.5 SOLUTION RESPIRATORY (INHALATION) 4 TIMES DAILY PRN
Qty: 120 ML | Refills: 5 | Status: SHIPPED | OUTPATIENT
Start: 2025-06-30

## 2025-06-30 RX ORDER — ALBUTEROL SULFATE 90 UG/1
2 INHALANT RESPIRATORY (INHALATION) EVERY 4 HOURS PRN
Qty: 6.7 G | Refills: 5 | Status: SHIPPED | OUTPATIENT
Start: 2025-06-30

## 2025-06-30 NOTE — TELEPHONE ENCOUNTER
----- Message from Cris CRUZ sent at 6/25/2025  1:19 PM EDT -----  Patient has been approved for Zilretta Left Knee after July 3.  Okay to schedule when appropriate.  Auth #:  [id number].  Auth Dates:  06/25/25 to 07/25/25

## 2025-06-30 NOTE — TELEPHONE ENCOUNTER
HUB OK TO RELAY MSG (6-30-25)    CALLED THE PT AND CAN'T GET ANY ANSWER.  NEED TO R/S HER APPT.     APPT SHOULD BE AFTER 7-3.    APPT MADE: 7-1 AT 3:15 PM LT KNEE SERGIO SHOT WITH DR CODY    LAST LT KNEE STEROID INJ: 4-3    [employer]=AUTH EXP: 7-25-25

## 2025-06-30 NOTE — PROGRESS NOTES
Primary Care Provider  Ellen Strickland DO   Referring Provider  Paulino Austin MD    Patient Complaint  Hospital Follow Up Visit, Dizziness, oxygen dropping (When she stands up states it drops to 85-86. Short of breath on exertion ), and Cough (Dry cough, feels like something is stuck in there)      Subjective       History of Presenting Illness  Cecilia Sparks is a pleasant 64 y.o. female  who presents to [facility] here for new patient appointment after hospitalization at [facility] 6/12 through 6/15/2025 for acute hypoxic respiratory failure, COPD in acute exacerbation and ESRD on peritoneal dialysis.  Hospital course include the following:Cecilia Sparks is a 64 y.o. female with a medical history of hypertension, hyperlipidemia, type 2 diabetes mellitus, HFpEF, anxiety, bipolar disorder, ESRD on peritoneal dialysis, COPD, CAD presented to the ED for evaluation of shortness of breath present for the previous 2 weeks, progressively worsening.  Associated symptoms of cough, denies fevers or chills.  Patient was a previous tobacco smoker, quitting in October and has been vaping since.  States she quit vaping around 30 days ago due to worsening cough.  In the emergency department patient requiring 2 L nasal cannula to maintain O2 saturations.  proBNP elevated at 2373.  Chest x-ray with no acute abnormalities.  Patient received nebulized breathing treatments and IV steroids in the emergency department for wheezing noted.  Patient eventually weaned off of supplemental oxygen, walk test performed and able to ambulate without the need of any supplemental oxygen.  Patient discharged with new Spiriva inhaler, referral placed to pulm clinic/COPD clinic.  Patient discharged with remaining doses of prednisone.  Patient CT scan in house returns with mild dependent atelectasis/scarring with mild central lobar emphysema.  Procalcitonin within normal limits therefore not discharged on  antibiotics.  Nephrology followed in house for assistance with peritoneal dialysis.  Patient seen on date of discharge, clinically and hemodynamically stable.  Patient provided concerning signs and symptoms prompting immediate medical attention, patient understanding and agreeable     Patient is here with her sister today.  Patient presents with oxygen at 94% on room air.  She is a former smoker quit 2024.  She does have an Acapella flutter valve which she is using at home.  She is currently on Spiriva by her PCP and has albuterol and albuterol nebulizer at home.  She does have a dog and cat at home.  She has no known exposures to histoplasmosis asbestos or tuberculosis.  She does have peritoneal dialysis for end-stage renal disease.    At present time patient denies  wheezing, headaches, chest pain, weight loss or hemoptysis. Patient denies fevers, chills and night sweats. Cecilia Sparks is able to perform ADLs.      I have personally reviewed the review of systems, past family, social, medical and surgical histories; and agree with their findings.      Review of Systems   Constitutional: Negative.    HENT: Negative.     Respiratory:  Positive for cough and shortness of breath.    Cardiovascular: Negative.    Musculoskeletal: Negative.    Neurological:  Positive for dizziness.   Psychiatric/Behavioral: Negative.           Family History   Problem Relation Age of Onset    Cancer Sister     Cancer Brother     Cancer Paternal Aunt     Cancer Paternal Uncle     Cancer Other     Stroke Other     Diabetes Other     Heart failure Other     Malig Hyperthermia Neg Hx         Social History     Socioeconomic History    Marital status:    Tobacco Use    Smoking status: Former     Current packs/day: 0.00     Average packs/day: 0.5 packs/day for 54.0 years (27.0 ttl pk-yrs)     Types: Cigarettes     Start date:      Quit date:      Years since quittin.4     Passive exposure: Current    Smokeless  tobacco: Never   Vaping Use    Vaping status: Never Used   Substance and Sexual Activity    Alcohol use: Not Currently    Drug use: Never    Sexual activity: Defer        Past Medical History:   Diagnosis Date    Acid reflux disease     Anemia     NO RECENT INTERVENTIONS    Anxiety     Arthritis     Asthma     Bipolar disorder     Borderline diabetic     HX OF BUT REPORTS NO ISSUES NOT ON MEDICATION AND IS DIET CONTROLLED    Cataract     Chronic back pain 01/13/2012    Chronic bronchitis     DENIES ANY CURRENT ISSUES    Chronic kidney disease     Stage 5    Chronic UTI     NO CURRENT S/S    Colitis     Congestive heart failure (CHF)     FOLLOWED BY DR [facility], NO CURRENT S/S    Depression     Dizziness     Drug dependence     Fibromyalgia     H/O psychiatric care     Heart murmur     Hematological disorder     hemachromatosis     Hemochromatosis 01/13/2012    Hemorrhoids     Hyperlipidemia     Hypertension, essential     Hypoglycemia 01/13/2021    Kidney stones     past     Knee pain     Lack of coordination     Major depression, recurrent     Meningitis     Migraine headache     Mitral valve prolapse     Muscle spasm     Neck pain     Night sweats     HX OF NO CURRENT ISSUES    PAD (peripheral artery disease)     STENTS L AND RIGHT GROIN    Pain management     PAIN PUMP W/FENTANYL/ DR KIRK PAIN PUMP CURRENTLY EMPTY    Peptic ulcer     Peritoneal dialysis catheter in place     PD DOES 5 DAYS/WEEK. PD EXCHANGE 4 TIMES ON THOSE DAYS.    PONV (postoperative nausea and vomiting)     Psoriasis-like skin disease     eczema    PTSD (post-traumatic stress disorder)     Ringing in ears     Seizures 1990    NONE FOR A LONG TIME    Stroke (cerebrum) 2000    TIA, MEMORY ISSUES    Syncope and collapse     REPORTS HAPPENED ABOUT A YEAR AGO    Weakness     Weight loss         Immunization History   Administered Date(s) Administered    COVID-19 ([employer]) 06/08/2021    Fluzone (or Fluarix & Flulaval for VFC) >6mos  10/01/2018, 11/15/2023    Influenza Injectable Mdck Pf Quad 11/01/2017, 11/05/2019, 10/19/2021    Pneumococcal Conjugate 13-Valent (PCV13) 11/01/2017       Allergies   Allergen Reactions    Iron Sucrose Unknown - High Severity     HX OF HEMOCHROMATOSIS     Lamotrigine Rash    Latex Hives, Nausea And Vomiting and Rash     blisters      Lamictal Xr [Lamotrigine Er] Rash    Nickel Rash     RASH W/COSMETIC JEWELRY    Sulfa Antibiotics Rash     PT REPORTS SHE HAS NEVER HAD TROUBLE OR NOTED ISSUES WITH SULFA BEFORE          Current Outpatient Medications:     amLODIPine (NORVASC) 10 MG tablet, Take 1 tablet by mouth Daily., Disp: , Rfl:     atorvastatin (LIPITOR) 40 MG tablet, TAKE 1 TABLET BY MOUTH EVERY EVENING FOR CHOLESTEROL, Disp: 30 tablet, Rfl: 4    calcium acetate (PHOS BINDER,) 667 MG capsule capsule, Take 2 capsules by mouth 3 (Three) Times a Day Before Meals., Disp: , Rfl:     calcium acetate (PHOS BINDER,) 667 MG capsule capsule, Take 1 capsule by mouth With Snacks. Take before snack, Disp: , Rfl:     Calcium Carb-Cholecalciferol (GNP Calcium 600 +D3) 600-20 MG-MCG tablet, TAKE 1 TABLET BY MOUTH TWICE DAILY, Disp: 60 tablet, Rfl: 11    carvedilol (COREG) 25 MG tablet, Take 1 tablet by mouth 2 (Two) Times a Day., Disp: 180 tablet, Rfl: 3    clopidogrel (PLAVIX) 75 MG tablet, TAKE 1 TABLET BY MOUTH ONCE DAILY FOR HEART (Patient taking differently: Take 1 tablet by mouth Daily.), Disp: 30 tablet, Rfl: 4    diphenhydrAMINE (BENADRYL) 25 MG tablet, Take 2 tablets by mouth Every Night., Disp: , Rfl:     docusate sodium (COLACE) 100 MG capsule, Take 1 capsule by mouth 2 (Two) Times a Day., Disp: , Rfl:     fluticasone (FLONASE) 50 MCG/ACT nasal spray, Administer 2 sprays into the nostril(s) as directed by provider Daily As Needed for Allergies., Disp: , Rfl:     furosemide (LASIX) 80 MG tablet, TAKE 1 TABLET BY MOUTH TWICE DAILY, Disp: 60 tablet, Rfl: 4    GNP ClearLax 17 GM/SCOOP powder, Take 17 g by mouth Daily.,  Disp: , Rfl:     Heparin & NaCl Lock Flush (HEPARIN COMBINATION IV), Infuse  into a venous catheter. With dialysis solution FOR PD CATH, TAKES EVERY MONDAY, Disp: , Rfl:     hydrALAZINE (APRESOLINE) 25 MG tablet, TAKE 1 TABLET BY MOUTH ONCE DAILY, Disp: 30 tablet, Rfl: 0    hydrOXYzine (ATARAX) 25 MG tablet, Take 1 tablet by mouth Every 8 (Eight) Hours As Needed for Itching or Anxiety for up to 30 days., Disp: 90 tablet, Rfl: 5    hyoscyamine (LEVSIN) 0.125 MG SL tablet, TAKE 1 TABLET UNDER TONGUE EVERY SIX HOURS AS NEEDED FOR CRAMPING OR DIARRHEA (Patient taking differently: Place 1 tablet under the tongue Every 6 (Six) Hours As Needed.), Disp: 60 tablet, Rfl: 1    Insulin Pen Needle (Ultracare Pen Needles) 32G X 4 MM misc, Use to inject ozempic 1 time per week., Disp: 4 each, Rfl: 1    Lokelma 10 g pack, Take 20 g by mouth 2 (Two) Times a Week. REPORTS USES 2 PACKS AND ONLY USES ON DAYS SHE DOES NOT DO PD DIALYSIS, Disp: , Rfl:     loratadine (CLARITIN) 10 MG tablet, TAKE 1 TABLET BY MOUTH ONCE DAILY FOR ALLERGIES, Disp: 30 tablet, Rfl: 0    LORazepam (ATIVAN) 0.5 MG tablet, TAKE 1 TABLET BY MOUTH ONCE DAILY AS NEEDED FOR ANXIETY, Disp: 15 tablet, Rfl: 1    losartan (COZAAR) 100 MG tablet, Take 1 tablet by mouth Daily., Disp: , Rfl:     metOLazone (ZAROXOLYN) 5 MG tablet, Take 1 tablet by mouth Daily., Disp: , Rfl:     OLANZapine (zyPREXA) 15 MG tablet, TAKE 1 TABLET BY MOUTH EVERY EVENING, Disp: 30 tablet, Rfl: 0    ondansetron ODT (ZOFRAN-ODT) 4 MG disintegrating tablet, Place 1 tablet on the tongue Every 6 (Six) Hours As Needed for Nausea or Vomiting., Disp: 15 tablet, Rfl: 0    pantoprazole (PROTONIX) 40 MG EC tablet, TAKE 1 TABLET BY MOUTH ONCE DAILY (STOMACH) (Patient taking differently: Take 1 tablet by mouth Daily.), Disp: 90 tablet, Rfl: 1    promethazine (PHENERGAN) 12.5 MG tablet, Take 1 tablet by mouth Every 6 (Six) Hours As Needed for Nausea or Vomiting., Disp: 15 tablet, Rfl: 1    tiotropium (Spiriva  "HandiHaler) 18 MCG per inhalation capsule, Place 1 capsule into inhaler and inhale Daily., Disp: 30 capsule, Rfl: 5    Vortioxetine HBr (Trintellix) 5 MG tablet tablet, Take 1 tablet by mouth Daily With Breakfast., Disp: 30 tablet, Rfl: 2    albuterol (PROVENTIL) (2.5 MG/3ML) 0.083% nebulizer solution, Take 2.5 mg by nebulization 4 (Four) Times a Day As Needed for Wheezing., Disp: 120 mL, Rfl: 5    albuterol sulfate  (90 Base) MCG/ACT inhaler, Inhale 2 puffs Every 4 (Four) Hours As Needed for Wheezing., Disp: 6.7 g, Rfl: 5    tiotropium (Spiriva HandiHaler) 18 MCG per inhalation capsule, Place 1 capsule into inhaler and inhale Daily for 30 days., Disp: 30 capsule, Rfl: 0         Vital Signs   /93   Pulse 87   Temp 98.3 °F (36.8 °C)   Resp 16   Ht 162.6 cm (64\")   Wt 88.9 kg (196 lb)   SpO2 94%   BMI 33.64 kg/m²       Objective     Physical Exam  Vitals reviewed.   Constitutional:       General: She is not in acute distress.     Appearance: Normal appearance. She is obese. She is not ill-appearing.   HENT:      Head: Normocephalic and atraumatic.      Nose: Nose normal.      Mouth/Throat:      Mouth: Mucous membranes are moist.      Pharynx: Oropharynx is clear.   Eyes:      Extraocular Movements: Extraocular movements intact.      Conjunctiva/sclera: Conjunctivae normal.      Pupils: Pupils are equal, round, and reactive to light.   Cardiovascular:      Rate and Rhythm: Normal rate and regular rhythm.      Pulses: Normal pulses.      Heart sounds: Normal heart sounds.   Pulmonary:      Effort: Pulmonary effort is normal. No respiratory distress.      Breath sounds: Normal breath sounds. No stridor. No wheezing, rhonchi or rales.   Abdominal:      General: Bowel sounds are normal.   Musculoskeletal:         General: Normal range of motion.      Cervical back: Normal range of motion and neck supple.   Skin:     General: Skin is warm and dry.   Neurological:      Mental Status: She is alert and " oriented to person, place, and time.   Psychiatric:         Behavior: Behavior normal.         Results Review  I have personally reviewed the prior office notes, hospital records, labs, and diagnostics.  CT Chest Without Contrast Diagnostic [MDL772] (Order [id number])  Order  Status: Final result     Study Notes     Roseann Manrique on 6/13/2025  6:16 PM EDT   SHORTNESS OF BREATH, HYPOXIA  PT HAS HX OF ASTHMA, CHF  CTDI 5.60    PS     Appointment Information    PACS Images     Radiology Images  Study Result    Narrative & Impression   CT CHEST WO CONTRAST DIAGNOSTIC     Date of Exam: 6/13/2025 6:08 PM EDT     Indication: hypoxia.     Comparison: None available.     Technique: Axial CT images were obtained of the chest without contrast administration.  Reconstructed coronal and sagittal images were also obtained. Automated exposure control and iterative construction methods were used.        Findings:  Lower Neck: Unremarkable.     Hilum and Mediastinum: Mild vascular calcification of the thoracic aorta. Otherwise unremarkable. Normal size heart without pericardial effusion. The thoracic vasculature is unremarkable on this noncontrast study.     Lung Parenchyma and Pleura: Mild dependent atelectasis/scarring within the lung bases. Mild central lobar emphysema.  Otherwise the lungs are clear. The central airways are patent. No pleural effusion or pneumothorax.     Upper Abdomen: Small amount of free fluid noted within the upper abdomen, nonspecific. Otherwise unremarkable     Soft tissues: Unremarkable.     Osseous structures: No acute osseous abnormality              IMPRESSION:  Impression:  1.Mild dependent atelectasis/scarring within the lung bases. Otherwise the lungs are clear.  2.Mild central lobar emphysema.  3.Small amount of free fluid noted within the upper abdomen, nonspecific.           Electronically Signed: Basilio Galindo DO    6/13/2025 6:55 PM EDT    Workstation ID: [id number]          Assessment         Patient Active Problem List   Diagnosis    PVD (peripheral vascular disease) with claudication    Primary osteoarthritis of left knee    Meningitis after procedure    Hypertension    ESRD on peritoneal dialysis    Asthma    Anxiety    Systemic lupus erythematosus    Stroke    Polyneuropathy    Stage 5 chronic kidney disease    Migraine    Major depressive disorder, recurrent severe without psychotic features    Kidney stones    Hereditary hemochromatosis    Gout    Drug dependence    Epilepsy    Gastro-esophageal reflux disease without esophagitis    Chronic obstructive pulmonary disease    Pulmonary emphysema    Chronic UTI    Congestive heart failure    Bipolar disorder    Fibromyalgia    Chronic back pain    Anemia, autoimmune hemolytic    Mixed hyperlipidemia    Hypertensive emergency    Gastroesophageal reflux disease    Lower abdominal pain    Memory loss    Sequelae, post-stroke    Class 1 obesity due to excess calories with serious comorbidity and body mass index (BMI) of 33.0 to 33.9 in adult    Hospital discharge follow-up        Plan     Diagnoses and all orders for this visit:    1. Centrilobular emphysema (Primary)  -     Alpha - 1 - Antitrypsin Phenotype  -     Complete PFT - Pre & Post Bronchodilator; Future  -     6 Minute Walk Test; Future  -     albuterol sulfate  (90 Base) MCG/ACT inhaler; Inhale 2 puffs Every 4 (Four) Hours As Needed for Wheezing.  Dispense: 6.7 g; Refill: 5  -     albuterol (PROVENTIL) (2.5 MG/3ML) 0.083% nebulizer solution; Take 2.5 mg by nebulization 4 (Four) Times a Day As Needed for Wheezing.  Dispense: 120 mL; Refill: 5    2. Atelectasis  -     Alpha - 1 - Antitrypsin Phenotype  -     Complete PFT - Pre & Post Bronchodilator; Future  -     6 Minute Walk Test; Future  -     albuterol sulfate  (90 Base) MCG/ACT inhaler; Inhale 2 puffs Every 4 (Four) Hours As Needed for Wheezing.  Dispense: 6.7 g; Refill: 5  -     albuterol (PROVENTIL)  (2.5 MG/3ML) 0.083% nebulizer solution; Take 2.5 mg by nebulization 4 (Four) Times a Day As Needed for Wheezing.  Dispense: 120 mL; Refill: 5    3. Cigarette nicotine dependence in remission  -     Alpha - 1 - Antitrypsin Phenotype  -     Complete PFT - Pre & Post Bronchodilator; Future  -     6 Minute Walk Test; Future  -     albuterol sulfate  (90 Base) MCG/ACT inhaler; Inhale 2 puffs Every 4 (Four) Hours As Needed for Wheezing.  Dispense: 6.7 g; Refill: 5  -     albuterol (PROVENTIL) (2.5 MG/3ML) 0.083% nebulizer solution; Take 2.5 mg by nebulization 4 (Four) Times a Day As Needed for Wheezing.  Dispense: 120 mL; Refill: 5       4.  Patient to continue with albuterol inhaler and DuoNeb nebulizer as needed for shortness of air or wheeze.  Patient to continue with her Spiriva at this time.  Will get patient back in the office after her pulmonary function test 6-minute walk to review results discussed possibly putting her on triple therapy depending on results.      Smoking status:  reports that she quit smoking about 17 months ago. Her smoking use included cigarettes. She started smoking about 55 years ago. She has a 27 pack-year smoking history. She has been exposed to tobacco smoke. She has never used smokeless tobacco.    Vaccination status: Reviewed  Immunization History   Administered Date(s) Administered    COVID-19 (FAIZAN) 06/08/2021    Fluzone (or Fluarix & Flulaval for VFC) >6mos 10/01/2018, 11/15/2023    Influenza Injectable Mdck Pf Quad 11/01/2017, 11/05/2019, 10/19/2021    Pneumococcal Conjugate 13-Valent (PCV13) 11/01/2017        Medications personally reviewed    Follow Up  Return in about 3 months (around 9/30/2025).    Patient was given instructions and counseling regarding her condition or for health maintenance advice. Please see specific information pulled into the AVS if appropriate.     I spent 25 minutes caring for Cecilia Sparks on this date of service. This time includes time  spent by me in the following activities:preparing for the visit, reviewing tests, obtaining and/or reviewing a separately obtained history, performing a medically appropriate examination and/or evaluation, counseling and educating the patient/family/caregiver, ordering medications, tests, or procedures, documenting information in the medical record, independently interpreting results and communicating that information with the patient/family/caregiver and answered questions family members, discuss medications.

## 2025-06-30 NOTE — TELEPHONE ENCOUNTER
PT PHONED IN.    SHE STATED THAT THE PHARMACY SAYS HER TRINTELLIX 5MG IS NEEDING A PA FOR COVERAGE.

## 2025-07-01 ENCOUNTER — OFFICE VISIT (OUTPATIENT)
Dept: ORTHOPEDIC SURGERY | Facility: CLINIC | Age: 65
End: 2025-07-01
Payer: MEDICAID

## 2025-07-01 ENCOUNTER — TELEPHONE (OUTPATIENT)
Dept: ONCOLOGY | Facility: HOSPITAL | Age: 65
End: 2025-07-01
Payer: MEDICAID

## 2025-07-01 VITALS
DIASTOLIC BLOOD PRESSURE: 85 MMHG | HEART RATE: 82 BPM | OXYGEN SATURATION: 92 % | SYSTOLIC BLOOD PRESSURE: 130 MMHG | WEIGHT: 196 LBS | BODY MASS INDEX: 33.64 KG/M2

## 2025-07-01 DIAGNOSIS — E83.110 HEREDITARY HEMOCHROMATOSIS: ICD-10-CM

## 2025-07-01 DIAGNOSIS — D64.9 CHRONIC ANEMIA: Primary | ICD-10-CM

## 2025-07-01 DIAGNOSIS — D64.9 CHRONIC ANEMIA: ICD-10-CM

## 2025-07-01 DIAGNOSIS — N18.5 CKD (CHRONIC KIDNEY DISEASE) STAGE 5, GFR LESS THAN 15 ML/MIN: ICD-10-CM

## 2025-07-01 DIAGNOSIS — N18.5 CKD (CHRONIC KIDNEY DISEASE) STAGE 5, GFR LESS THAN 15 ML/MIN: Primary | ICD-10-CM

## 2025-07-01 DIAGNOSIS — M17.12 PRIMARY OSTEOARTHRITIS OF LEFT KNEE: Primary | ICD-10-CM

## 2025-07-01 RX ADMIN — LIDOCAINE HYDROCHLORIDE 5 ML: 10 INJECTION, SOLUTION INFILTRATION; PERINEURAL at 15:44

## 2025-07-01 NOTE — PROGRESS NOTES
Chief Complaint  Pain and Follow-up of the Left Knee       Subjective      Cecilia Sparks presents to [facility] for a follow up for her left knee. She has been treating her left knee osteoarthritis conservatively. She was last seen in the office on 25 where she had a left knee steroid injection. She states this injection gave her relief but has been having increase in pain to her knee. She is requesting a repeat injection today in the office.     Allergies   Allergen Reactions    Iron Sucrose Unknown - High Severity     HX OF HEMOCHROMATOSIS     Lamotrigine Rash    Latex Hives, Nausea And Vomiting and Rash     blisters      Lamictal Xr [Lamotrigine Er] Rash    Nickel Rash     RASH W/COSMETIC JEWELRY    Sulfa Antibiotics Rash     PT REPORTS SHE HAS NEVER HAD TROUBLE OR NOTED ISSUES WITH SULFA BEFORE        Social History     Socioeconomic History    Marital status:    Tobacco Use    Smoking status: Former     Current packs/day: 0.00     Average packs/day: 0.5 packs/day for 54.0 years (27.0 ttl pk-yrs)     Types: Cigarettes     Start date:      Quit date:      Years since quittin.5     Passive exposure: Current    Smokeless tobacco: Never   Vaping Use    Vaping status: Never Used   Substance and Sexual Activity    Alcohol use: Not Currently    Drug use: Never    Sexual activity: Defer        I reviewed the patient's chief complaint, history of present illness, review of systems, past medical history, surgical history, family history, social history, medications, and allergy list.     Review of Systems     Constitutional: Denies fevers, chills, weight loss  Cardiovascular: Denies chest pain, shortness of breath  Skin: Denies rashes, acute skin changes  Neurologic: Denies headache, loss of consciousness  MSK: left knee pain       Vital Signs:   /85   Pulse 82   Wt 88.9 kg (196 lb)   SpO2 92%   BMI 33.64 kg/m²          Physical Exam  General: Alert. No  acute distress    Ortho Exam        Left lower extremity: Mild to moderate knee effusion.  Positive tenderness to the medial joint line.  Positive tenderness to the lateral joint line.  115 degrees of flexion.  Full degrees extension. Knee extensor mechanism intact. Knee stable to varus and valgus stress. Calf soft, nontender. Demonstrates active ankle plantarflexion and dorsiflexion. Sensation intact over the dorsal and plantar foot.  Palpable pedal pulses.        Large Joint Arthrocentesis: L knee  Date/Time: 7/1/2025 3:44 PM  Consent given by: patient  Site marked: site marked  Timeout: Immediately prior to procedure a time out was called to verify the correct patient, procedure, equipment, support staff and site/side marked as required   Supporting Documentation  Indications: pain   Procedure Details  Location: knee - L knee  Needle gauge: 21 G.  Medications administered: 5 mL lidocaine 1 %; 32 mg Triamcinolone Acetonide 32 MG  Patient tolerance: patient tolerated the procedure well with no immediate complications        This injection documentation was Scribed for Shahla Ram MD by Rain Martinez.  07/01/25   15:44 EDT      Imaging Results (Most Recent)       None             Result Review :       CT Chest Without Contrast Diagnostic  Result Date: 6/13/2025  Narrative: CT CHEST WO CONTRAST DIAGNOSTIC Date of Exam: 6/13/2025 6:08 PM EDT Indication: hypoxia. Comparison: None available. Technique: Axial CT images were obtained of the chest without contrast administration.  Reconstructed coronal and sagittal images were also obtained. Automated exposure control and iterative construction methods were used. Findings: Lower Neck: Unremarkable. Hilum and Mediastinum: Mild vascular calcification of the thoracic aorta. Otherwise unremarkable. Normal size heart without pericardial effusion. The thoracic vasculature is unremarkable on this noncontrast study. Lung Parenchyma and Pleura: Mild dependent atelectasis/scarring  within the lung bases. Mild central lobar emphysema. Otherwise the lungs are clear. The central airways are patent. No pleural effusion or pneumothorax. Upper Abdomen: Small amount of free fluid noted within the upper abdomen, nonspecific. Otherwise unremarkable Soft tissues: Unremarkable. Osseous structures: No acute osseous abnormality     Impression: Impression: 1.Mild dependent atelectasis/scarring within the lung bases. Otherwise the lungs are clear. 2.Mild central lobar emphysema. 3.Small amount of free fluid noted within the upper abdomen, nonspecific. Electronically Signed: Basilio Galindo DO  6/13/2025 6:55 PM EDT  Workstation ID: [id number]    XR Chest 1 View  Result Date: 6/12/2025  Narrative: XR CHEST 1 VW Date of Exam: 6/12/2025 7:32 PM EDT Indication: SOA Triage Protocol Comparison: 4/20/2025 Findings: Lines: None Lungs: The lungs are clear. Pleura: No pleural effusion or pneumothorax. Cardiomediastinum: The cardiomediastinal silhouette is normal Soft Tissues: Unremarkable. Bones: No acute osseous abnormality.     Impression: Impression: No acute cardiopulmonary abnormality. Electronically Signed: Basilio Galindo DO  6/12/2025 7:49 PM EDT  Workstation ID: [id number]             Assessment and Plan     Diagnoses and all orders for this visit:    1. Primary osteoarthritis of left knee (Primary)  -     Large Joint Arthrocentesis: L knee        The patient presents here today for a follow up for her left knee osteoarthritis.     Discussed operative treatment options regarding a left total knee arthroplasty versus non operative treatment options regarding injections, medications and therapy.     Patient wishes to proceed with conservative treatment options as she does dialysis four times a day and does not think this would be feasible to do.     Discussed the risks and benefits of a left knee Zilretta injection today in the office. Patient expressed understanding and wishes to proceed. Patient tolerted  the injection well and without any complications.     Patient will continue home exercises and will continue current medications for pain control.     Call or return if worsening symptoms.    Follow Up     PRN     Patient was given instructions and counseling regarding her condition or for health maintenance advice. Please see specific information pulled into the AVS if appropriate.     TransScribed for Shahla Ram MD by Mary Jimenez CMA   07/01/25   15:44 EDT    I have personally performed the services described in this document as scribed by the above individual and it is both accurate and complete. Shahla Ram MD 07/03/25

## 2025-07-03 LAB
QT INTERVAL: 374 MS
QTC INTERVAL: 441 MS

## 2025-07-03 RX ORDER — LIDOCAINE HYDROCHLORIDE 10 MG/ML
5 INJECTION, SOLUTION INFILTRATION; PERINEURAL
Status: COMPLETED | OUTPATIENT
Start: 2025-07-01 | End: 2025-07-01

## 2025-07-16 ENCOUNTER — OFFICE VISIT (OUTPATIENT)
Dept: CARDIOLOGY | Facility: CLINIC | Age: 65
End: 2025-07-16
Payer: MEDICAID

## 2025-07-16 VITALS
OXYGEN SATURATION: 88 % | WEIGHT: 193.6 LBS | DIASTOLIC BLOOD PRESSURE: 58 MMHG | SYSTOLIC BLOOD PRESSURE: 100 MMHG | BODY MASS INDEX: 33.05 KG/M2 | HEART RATE: 88 BPM | HEIGHT: 64 IN

## 2025-07-16 DIAGNOSIS — N18.6 ESRD ON PERITONEAL DIALYSIS: Chronic | ICD-10-CM

## 2025-07-16 DIAGNOSIS — I10 ESSENTIAL HYPERTENSION: Primary | ICD-10-CM

## 2025-07-16 DIAGNOSIS — E78.2 MIXED HYPERLIPIDEMIA: ICD-10-CM

## 2025-07-16 DIAGNOSIS — Z99.2 ESRD ON PERITONEAL DIALYSIS: Chronic | ICD-10-CM

## 2025-07-16 DIAGNOSIS — I25.10 CORONARY ARTERY CALCIFICATION SEEN ON CAT SCAN: ICD-10-CM

## 2025-07-16 DIAGNOSIS — J42 CHRONIC BRONCHITIS, UNSPECIFIED CHRONIC BRONCHITIS TYPE: ICD-10-CM

## 2025-07-16 NOTE — PROGRESS NOTES
CARDIOLOGY FOLLOW-UP PROGRESS NOTE        Chief Complaint  Follow-up and Hypertension    Subjective            Cecilia Sparks presents to [facility]  History of Present Illness      The patient comes to establish cardiac care.  She was previously evaluated by one of her cardiologist who is no longer with us.  The patient has a past medical history significant for end-stage renal disease on peritoneal dialysis 5 times per week.  She also have COPD and recent admission with exacerbation treated with steroids and antibiotics.  She has essential hypertension, mixed hyperlipidemia and diastolic dysfunction.  The patient have a previously unremarkable nuclear stress test but had extensive coronary calcifications on CT. denies angina but reports exertional dyspnea.  In fact her O2 sat is about 91% on room air and resting but once she walk 25 m the saturation goes down to 87 here at the office.  She denies fevers or chills.  She is scheduled for 6-minute walk test to evaluate for possible need for home oxygen.      Past History:    Medical History:  Past Medical History:   Diagnosis Date    Acid reflux disease     Anemia     NO RECENT INTERVENTIONS    Anxiety     Arthritis     Asthma     Bipolar disorder     Borderline diabetic     HX OF BUT REPORTS NO ISSUES NOT ON MEDICATION AND IS DIET CONTROLLED    Cataract     Chronic back pain 01/13/2012    Chronic bronchitis     DENIES ANY CURRENT ISSUES    Chronic kidney disease     Stage 5    Chronic UTI     NO CURRENT S/S    Colitis     Congestive heart failure (CHF)     FOLLOWED BY DR [facility], NO CURRENT S/S    Depression     Dizziness     Drug dependence     Fibromyalgia     H/O psychiatric care     Heart murmur     Hematological disorder     hemachromatosis     Hemochromatosis 01/13/2012    Hemorrhoids     Hyperlipidemia     Hypertension, essential     Hypoglycemia 01/13/2021    Kidney stones     past     Knee pain     Lack of  coordination     Major depression, recurrent     Meningitis     Migraine headache     Mitral valve prolapse     Muscle spasm     Neck pain     Night sweats     HX OF NO CURRENT ISSUES    PAD (peripheral artery disease)     STENTS L AND RIGHT GROIN    Pain management     PAIN PUMP W/FENTANYL/ DR KIRK PAIN PUMP CURRENTLY EMPTY    Peptic ulcer     Peritoneal dialysis catheter in place     PD DOES 5 DAYS/WEEK. PD EXCHANGE 4 TIMES ON THOSE DAYS.    PONV (postoperative nausea and vomiting)     Psoriasis-like skin disease     eczema    PTSD (post-traumatic stress disorder)     Ringing in ears     Seizures     NONE FOR A LONG TIME    Stroke (cerebrum)     TIA, MEMORY ISSUES    Syncope and collapse     REPORTS HAPPENED ABOUT A YEAR AGO    Weakness     Weight loss        Surgical History: has a past surgical history that includes Appendectomy;  section; Colonoscopy (); Hysterectomy (,); Tonsillectomy (); Colonoscopy (N/A, 2021); Femoral artery stent (Bilateral); Pain Pump Insertion/Revision; Peritoneal Dialysis Catheter; VOCAL CORD MASS EXCISION; Neck surgery (Left); Esophagogastroduodenoscopy (N/A, 2022); Interventional radiology procedure (Right, 2022); Peritoneal Dialysis Catheter (N/A, 2023); Hemodialysis Catheter (Right, 2023); Hemodialysis Catheter Removal; Pain Pump Insertion/Revision (Left, 2024); Wound Exploration Leg (N/A, 2024); Esophagogastroduodenoscopy (N/A, 3/19/2025); and Colonoscopy (N/A, 3/19/2025).     Family History: family history includes Cancer in her brother, paternal aunt, paternal uncle, sister, and another family member; Diabetes in an other family member; Heart failure in an other family member; Stroke in an other family member.     Social History: reports that she quit smoking about 18 months ago. Her smoking use included cigarettes. She started smoking about 55 years ago. She has a 27 pack-year smoking history. She has been  exposed to tobacco smoke. She has never used smokeless tobacco. She reports that she does not currently use alcohol. She reports that she does not use drugs.    Allergies: Iron sucrose, Lamotrigine, Latex, Lamictal xr [lamotrigine er], Nickel, and Sulfa antibiotics    Current Outpatient Medications on File Prior to Visit   Medication Sig    albuterol (PROVENTIL) (2.5 MG/3ML) 0.083% nebulizer solution Take 2.5 mg by nebulization 4 (Four) Times a Day As Needed for Wheezing.    albuterol sulfate  (90 Base) MCG/ACT inhaler Inhale 2 puffs Every 4 (Four) Hours As Needed for Wheezing.    amLODIPine (NORVASC) 10 MG tablet Take 1 tablet by mouth Daily.    atorvastatin (LIPITOR) 40 MG tablet TAKE 1 TABLET BY MOUTH EVERY EVENING FOR CHOLESTEROL    calcium acetate (PHOS BINDER,) 667 MG capsule capsule Take 2 capsules by mouth 3 (Three) Times a Day Before Meals.    calcium acetate (PHOS BINDER,) 667 MG capsule capsule Take 1 capsule by mouth With Snacks. Take before snack    Calcium Carb-Cholecalciferol (GNP Calcium 600 +D3) 600-20 MG-MCG tablet TAKE 1 TABLET BY MOUTH TWICE DAILY    carvedilol (COREG) 25 MG tablet Take 1 tablet by mouth 2 (Two) Times a Day.    clopidogrel (PLAVIX) 75 MG tablet TAKE 1 TABLET BY MOUTH ONCE DAILY FOR HEART (Patient taking differently: Take 1 tablet by mouth Daily.)    diphenhydrAMINE (BENADRYL) 25 MG tablet Take 2 tablets by mouth Every Night.    docusate sodium (COLACE) 100 MG capsule Take 1 capsule by mouth 2 (Two) Times a Day.    fluticasone (FLONASE) 50 MCG/ACT nasal spray Administer 2 sprays into the nostril(s) as directed by provider Daily As Needed for Allergies.    furosemide (LASIX) 80 MG tablet TAKE 1 TABLET BY MOUTH TWICE DAILY    GNP ClearLax 17 GM/SCOOP powder Take 17 g by mouth Daily.    Heparin & NaCl Lock Flush (HEPARIN COMBINATION IV) Infuse  into a venous catheter. With dialysis solution FOR PD CATH, TAKES EVERY MONDAY    hydrALAZINE (APRESOLINE) 25 MG tablet TAKE 1 TABLET  BY MOUTH ONCE DAILY    hydrOXYzine (ATARAX) 25 MG tablet Take 1 tablet by mouth Every 8 (Eight) Hours As Needed for Itching or Anxiety for up to 30 days.    hyoscyamine (LEVSIN) 0.125 MG SL tablet TAKE 1 TABLET UNDER TONGUE EVERY SIX HOURS AS NEEDED FOR CRAMPING OR DIARRHEA (Patient taking differently: Place 1 tablet under the tongue Every 6 (Six) Hours As Needed.)    Insulin Pen Needle (Ultracare Pen Needles) 32G X 4 MM misc Use to inject ozempic 1 time per week.    Lokelma 10 g pack Take 20 g by mouth 2 (Two) Times a Week. REPORTS USES 2 PACKS AND ONLY USES ON DAYS SHE DOES NOT DO PD DIALYSIS    loratadine (CLARITIN) 10 MG tablet TAKE 1 TABLET BY MOUTH ONCE DAILY FOR ALLERGIES    LORazepam (ATIVAN) 0.5 MG tablet TAKE 1 TABLET BY MOUTH ONCE DAILY AS NEEDED FOR ANXIETY    losartan (COZAAR) 100 MG tablet Take 1 tablet by mouth Daily.    metOLazone (ZAROXOLYN) 5 MG tablet Take 1 tablet by mouth Daily.    OLANZapine (zyPREXA) 15 MG tablet TAKE 1 TABLET BY MOUTH EVERY EVENING    ondansetron ODT (ZOFRAN-ODT) 4 MG disintegrating tablet Place 1 tablet on the tongue Every 6 (Six) Hours As Needed for Nausea or Vomiting.    pantoprazole (PROTONIX) 40 MG EC tablet TAKE 1 TABLET BY MOUTH ONCE DAILY (STOMACH) (Patient taking differently: Take 1 tablet by mouth Daily.)    promethazine (PHENERGAN) 12.5 MG tablet Take 1 tablet by mouth Every 6 (Six) Hours As Needed for Nausea or Vomiting.    tiotropium (Spiriva HandiHaler) 18 MCG per inhalation capsule Place 1 capsule into inhaler and inhale Daily.    Vortioxetine HBr (Trintellix) 5 MG tablet tablet Take 1 tablet by mouth Daily With Breakfast.    tiotropium (Spiriva HandiHaler) 18 MCG per inhalation capsule Place 1 capsule into inhaler and inhale Daily for 30 days.     No current facility-administered medications on file prior to visit.          Review of Systems   All system reviewed and negative save for exertional dyspnea    Objective     /58 (BP Location: Left arm,  "Patient Position: Sitting, Cuff Size: Large Adult)   Pulse 88   Ht 162.6 cm (64\")   Wt 87.8 kg (193 lb 9.6 oz)   SpO2 (!) 88%   BMI 33.23 kg/m²       Physical Exam    General : Alert, awake, no acute distress  Neck : Supple, no carotid bruit, no jugular venous distention  CVS : Regular rate and rhythm, no murmur, rubs or gallops  Lungs: Diffuse wheezing  Abdomen: Soft, nontender, bowel sounds heard in all 4 quadrants  Extremities: Warm, well-perfused, no pedal edema  Neurologic. No motor deficits.     Result Review :     The following data was reviewed by: Quintin Brewster MD on 07/16/2025:    CMP          6/13/2025    04:31 6/14/2025    04:44 6/15/2025    04:50   CMP   Glucose 182  180  108    BUN 67.0  88.7  102.6    Creatinine 8.40  9.24  8.97    EGFR 4.9  4.4  4.6    Sodium 138  137  139    Potassium 4.2  5.0  4.3    Chloride 98  99  102    Calcium 9.9  10.0  9.7    Total Protein 6.5      Albumin 3.4      Globulin 3.1      Total Bilirubin <0.2      Alkaline Phosphatase 96      AST (SGOT) 12      ALT (SGPT) 10      Albumin/Globulin Ratio 1.1      BUN/Creatinine Ratio 8.0  9.6  11.4    Anion Gap 14.0  12.1  14.8      CBC          6/13/2025    04:31 6/14/2025    04:44 6/15/2025    04:50   CBC   WBC 6.19  7.55  9.04    RBC 3.21  3.15  3.00    Hemoglobin 9.1  9.0  8.7    Hematocrit 28.8  28.8  26.4    MCV 89.7  91.4  88.0    MCH 28.3  28.6  29.0    MCHC 31.6  31.3  33.0    RDW 14.6  14.6  14.5    Platelets 232  231  204      TSH          9/27/2024    11:36   TSH   TSH 2.600      Lipid Panel          9/27/2024    11:36   Lipid Panel   Total Cholesterol 153    Triglycerides 81    HDL Cholesterol 63    VLDL Cholesterol 15    LDL Cholesterol  75    LDL/HDL Ratio 1.17           Data reviewed: Cardiology studies        Results for orders placed during the hospital encounter of 09/30/24    Adult Transthoracic Echo Complete W/ Cont if Necessary Per Protocol    Interpretation Summary    Left ventricular systolic function " is hyperdynamic (EF > 70%).    Left ventricular diastolic function is consistent with (grade I) impaired relaxation and age.    No significant valvular disease.    Intracavitary gradient at approximately 35 to 40 mmHg secondary to hyperdynamic state.      Results for orders placed during the hospital encounter of 12/23/24    Stress Test With Myocardial Perfusion One Day 12/24/2024 11:57 AM    Interpretation Summary  Low risk, regadenoson myocardial perfusion imaging study.  There is uniform tracer uptake noticed throughout.  No reversible ischemia is noted.  No transient ischemic dilatation noted.  Calculated LVEF is greater than 60%.  No regional wall motion abnormalities are noted.  Normal hemodynamic response.  No ST changes noted concerning for ischemia.  No significant arrhythmias during recovery.               Assessment and Plan        Diagnoses and all orders for this visit:    1. Essential hypertension (Primary)    2. Chronic bronchitis, unspecified chronic bronchitis type    3. ESRD on peritoneal dialysis    4. Mixed hyperlipidemia    5. Coronary artery calcification seen on CAT scan          I will continue with optimal medical therapy including blood pressure control.  Goal blood pressure for this patient will be less than 130/80 mmHg.  Continue with carvedilol, losartan and vasodilators as tolerated.  Continue with high-dose statin therapy for goal LDL less than 70.  Continue with diuretics as tolerated.  Continue with bronchodilators.  Patient is advised to have a close follow-up after the scheduled 6-minute walk test.  Today she desaturated on minimal exertion and that is a suggestion of possible eventual need for home oxygen.  Continue with dialysis maintenance.  Continue with lifestyle modification and heart healthy diet.  She has coronary use the prednisone as needed which was prescribed for at the hospital and recommended to use as an outpatient.  Will reevaluate in 3 months.    Follow Up      Return in about 3 months (around 10/16/2025) for [facility]unch.    Patient was given instructions and counseling regarding her condition or for health maintenance advice. Please see specific information pulled into the AVS if appropriate.

## 2025-07-23 DIAGNOSIS — F41.0 PANIC ATTACKS: ICD-10-CM

## 2025-07-23 DIAGNOSIS — I10 PRIMARY HYPERTENSION: ICD-10-CM

## 2025-07-23 DIAGNOSIS — F43.21 COMPLICATED GRIEVING: ICD-10-CM

## 2025-07-23 DIAGNOSIS — F41.1 GENERALIZED ANXIETY DISORDER: ICD-10-CM

## 2025-07-23 DIAGNOSIS — F31.13 BIPOLAR DISORDER, CURRENT EPISODE MANIC WITHOUT PSYCHOTIC FEATURES, SEVERE: ICD-10-CM

## 2025-07-23 RX ORDER — OLANZAPINE 15 MG/1
15 TABLET, FILM COATED ORAL EVERY EVENING
Qty: 30 TABLET | Refills: 0 | Status: SHIPPED | OUTPATIENT
Start: 2025-07-23

## 2025-07-23 RX ORDER — HYDRALAZINE HYDROCHLORIDE 25 MG/1
25 TABLET, FILM COATED ORAL DAILY
Qty: 30 TABLET | Refills: 0 | Status: SHIPPED | OUTPATIENT
Start: 2025-07-23

## 2025-07-23 RX ORDER — LORATADINE 10 MG/1
TABLET ORAL
Qty: 30 TABLET | Refills: 0 | Status: SHIPPED | OUTPATIENT
Start: 2025-07-23

## 2025-07-23 NOTE — TELEPHONE ENCOUNTER
REFILL REQUEST:    OLANZapine (zyPREXA) 15 MG tablet (06/17/2025)     F/UP: 08/01/2025.  LOV: 06/27/2025.

## 2025-08-15 ENCOUNTER — READMISSION MANAGEMENT (OUTPATIENT)
Dept: CALL CENTER | Facility: HOSPITAL | Age: 65
End: 2025-08-15
Payer: MEDICAID

## 2025-08-15 DIAGNOSIS — F41.0 PANIC ATTACKS: ICD-10-CM

## 2025-08-15 DIAGNOSIS — I10 PRIMARY HYPERTENSION: ICD-10-CM

## 2025-08-15 DIAGNOSIS — F43.21 COMPLICATED GRIEVING: ICD-10-CM

## 2025-08-15 DIAGNOSIS — F41.1 GENERALIZED ANXIETY DISORDER: ICD-10-CM

## 2025-08-15 DIAGNOSIS — F31.13 BIPOLAR DISORDER, CURRENT EPISODE MANIC WITHOUT PSYCHOTIC FEATURES, SEVERE: ICD-10-CM

## 2025-08-15 RX ORDER — ESCITALOPRAM OXALATE 10 MG/1
10 TABLET ORAL DAILY
Qty: 30 TABLET | Refills: 0 | OUTPATIENT
Start: 2025-08-15

## 2025-08-15 RX ORDER — LORATADINE 10 MG/1
TABLET ORAL
Qty: 30 TABLET | Refills: 0 | Status: SHIPPED | OUTPATIENT
Start: 2025-08-15

## 2025-08-15 RX ORDER — HYDRALAZINE HYDROCHLORIDE 25 MG/1
25 TABLET, FILM COATED ORAL DAILY
Qty: 30 TABLET | Refills: 0 | Status: SHIPPED | OUTPATIENT
Start: 2025-08-15 | End: 2025-08-18 | Stop reason: HOSPADM

## 2025-08-15 RX ORDER — OLANZAPINE 15 MG/1
15 TABLET, FILM COATED ORAL EVERY EVENING
Qty: 30 TABLET | Refills: 0 | Status: SHIPPED | OUTPATIENT
Start: 2025-08-15

## (undated) DEVICE — TROCAR: Brand: KII® SLEEVE

## (undated) DEVICE — BLANKT WARM PACU MISTRAL/AIR PREM REFL A/ 85.8X50IN

## (undated) DEVICE — SOL IRRG H2O PL/BG 1000ML STRL

## (undated) DEVICE — Device: Brand: DEFENDO AIR/WATER/SUCTION AND BIOPSY VALVE

## (undated) DEVICE — TOTAL TRAY, 16FR 10ML SIL FOLEY, URN: Brand: MEDLINE

## (undated) DEVICE — SOL IRR NACL 0.9PCT BT 1000ML

## (undated) DEVICE — BALN EVERCROSS OTW .035 6F 9X40 80

## (undated) DEVICE — INTENDED FOR TISSUE SEPARATION, AND OTHER PROCEDURES THAT REQUIRE A SHARP SURGICAL BLADE TO PUNCTURE OR CUT.: Brand: BARD-PARKER ® CARBON RIB-BACK BLADES

## (undated) DEVICE — SUT PROLN 2/0 CT2 30IN 8411H

## (undated) DEVICE — PENCL E/S SMOKEEVAC W/TELESCP CANN

## (undated) DEVICE — PROXIMATE RH ROTATING HEAD SKIN STAPLERS (35 WIDE) CONTAINS 35 STAINLESS STEEL STAPLES: Brand: PROXIMATE

## (undated) DEVICE — ENDOPATH PNEUMONEEDLE INSUFFLATION NEEDLES WITH LUER LOCK CONNECTORS 120MM: Brand: ENDOPATH

## (undated) DEVICE — LAMINECTOMY CERVICAL DISC-LF: Brand: MEDLINE INDUSTRIES, INC.

## (undated) DEVICE — LABCORP SPECI WHL BLD LYOPHILIZED W/DILUT ACT CLOT EA/3 CT

## (undated) DEVICE — VASCULAR ACCESS-LF: Brand: MEDLINE INDUSTRIES, INC.

## (undated) DEVICE — GOWN,REINFRCE,POLY,SIRUS,BREATH SLV,XXLG: Brand: MEDLINE

## (undated) DEVICE — DECANTER: Brand: UNBRANDED

## (undated) DEVICE — RADIFOCUS GLIDECATH: Brand: GLIDECATH

## (undated) DEVICE — GLV SURG SENSICARE SLT PF LF 6.5 STRL

## (undated) DEVICE — GENERAL LAPAROSCOPY-LF: Brand: MEDLINE INDUSTRIES, INC.

## (undated) DEVICE — LINER SURG CANSTR SXN S/RIGD 1500CC

## (undated) DEVICE — RADIFOCUS GLIDEWIRE: Brand: GLIDEWIRE

## (undated) DEVICE — GAMMEX® NON-LATEX SIZE 7.5, STERILE NEOPRENE POWDER-FREE SURGICAL GLOVE: Brand: GAMMEX

## (undated) DEVICE — GLV SURG BIOGEL LTX PF 7 1/2

## (undated) DEVICE — CATH OMNI FLUSH 5FR

## (undated) DEVICE — SLV SCD KN/LEN ADJ EXPRSS BLENDED MD 1P/U

## (undated) DEVICE — ADHS SKIN SURG TISS VISC PREMIERPRO EXOFIN HI/VISC FAST/DRY

## (undated) DEVICE — SINGLE-USE BIOPSY FORCEPS: Brand: RADIAL JAW 4

## (undated) DEVICE — VAGINAL PREP TRAY: Brand: MEDLINE INDUSTRIES, INC.

## (undated) DEVICE — CONN JET HYDRA H20 AUXILIARY DISP

## (undated) DEVICE — EGD OR ERCP KIT: Brand: MEDLINE INDUSTRIES, INC.

## (undated) DEVICE — 3M™ STERI-DRAPE™ X-RAY IMAGE INTENSIFIER DRAPE, 10 PER CARTON / 4 CARTONS PER CASE, 1013: Brand: STERI-DRAPE™

## (undated) DEVICE — THIS SET CONSISTS OF A FEMALE LOCKING CONNECTOR/ON-OFF CLAMP ASSEMBLY, TUBING AND DOUBLE SEALING MALE LUER LOCK CONNECTOR. THIS SET IS TO BE USED WITH THE BAXTER LOCKING TITANIUM ADAPTER FOR PERITONEAL DIALYSIS CATHETER IN DISCONNECT APPLICATIONS AND IN CYCLER APPLICATIONS WHERE ASEPTIC CONNECTIONS AND DISCONNECTIONS ARE PERFORMED AT THE TRANSFER SET/CYCLER SET JUNCTURE.: Brand: MINICAP

## (undated) DEVICE — STERILE POLYISOPRENE POWDER-FREE SURGICAL GLOVES WITH EMOLLIENT COATING: Brand: PROTEXIS

## (undated) DEVICE — GAUZE,SPONGE,4"X4",16PLY,STRL,LF,10/TRAY: Brand: MEDLINE

## (undated) DEVICE — HEMOSPLIT XK HEMODIALYSIS CATH, ST, 16 FR. 19CM: Brand: HEMOSPLIT XK LONG-TERM HEMODIALYSIS CATHETER

## (undated) DEVICE — 3 RING SUTURE PASSER - 16 CM: Brand: 3 RING SUTURE PASSER - 16 CM

## (undated) DEVICE — LAPAROSCOPIC TROCAR SLEEVE/SINGLE USE: Brand: KII® OPTICAL ACCESS SYSTEM

## (undated) DEVICE — 3M™ IOBAN™ 2 ANTIMICROBIAL INCISE DRAPE 6650EZ: Brand: IOBAN™ 2

## (undated) DEVICE — PERITONEAL DIALYSIS ACCESSORY,TWO PART TITANIUM LUER ADAPTER: Brand: ARGYLE

## (undated) DEVICE — DEV CLS VASC MYNX 6FTO 7FR

## (undated) DEVICE — SUT ETHILON 3/0 30IN BLK

## (undated) DEVICE — BLCK/BITE BLOX WO/DENTL/RIM W/STRAP 54F

## (undated) DEVICE — GAUZE,SPONGE,4"X4",32PLY,XRAY,STRL,LF: Brand: MEDLINE

## (undated) DEVICE — ELECTRD BLD EDGE/STD 1P 2.4X6.35MM STRL

## (undated) DEVICE — DEFENDO AIR WATER SUCTION AND BIOPSY VALVE KIT FOR  OLYMPUS: Brand: DEFENDO AIR/WATER/SUCTION AND BIOPSY VALVE

## (undated) DEVICE — INTRO SHEATH PRELUDE PRO MARKRTIP 6F11

## (undated) DEVICE — COLON KIT: Brand: MEDLINE INDUSTRIES, INC.

## (undated) DEVICE — ANTIBACTERIAL UNDYED BRAIDED (POLYGLACTIN 910), SYNTHETIC ABSORBABLE SUTURE: Brand: COATED VICRYL

## (undated) DEVICE — INTRO SHEATH PRELUDE/PRO .035 5F 11X50CM GRY LF

## (undated) DEVICE — THE STERILE LIGHT HANDLE COVER IS USED WITH STERIS SURGICAL LIGHTING AND VISUALIZATION SYSTEMS.

## (undated) DEVICE — C-ARM DRAPE  44 X 77 GUSSET FIT: Brand: OPTICS ONE

## (undated) DEVICE — SYR LUERLOK 50ML

## (undated) DEVICE — ENDOPATH XCEL BLADELESS TROCARS WITH STABILITY SLEEVES: Brand: ENDOPATH XCEL

## (undated) DEVICE — GLV SURG SENSICARE SLT PF LF 7.5 STRL

## (undated) DEVICE — SYR LUERLOK 30CC

## (undated) DEVICE — ENDOPATH XCEL WITH OPTIVIEW TECHNOLOGY BLADELESS TROCARS WITH STABILITY SLEEVES: Brand: ENDOPATH XCEL OPTIVIEW

## (undated) DEVICE — UNDYED BRAIDED (POLYGLACTIN 910), SYNTHETIC ABSORBABLE SUTURE: Brand: COATED VICRYL

## (undated) DEVICE — Device

## (undated) DEVICE — SOLIDIFIER LIQLOC PLS 1500CC BT

## (undated) DEVICE — KT INTRO MIC VSI SMOTH REG 4F 40CM 7CM

## (undated) DEVICE — LAPAROVUE VISIBILITY SYSTEM LAPAROSCOPIC SOLUTIONS: Brand: LAPAROVUE

## (undated) DEVICE — DRSNG SURESITE WNDW 4X4.5

## (undated) DEVICE — SUT VIC 3/0 SH 27IN J416H

## (undated) DEVICE — MEDICINE CUP, GRADUATED, STER: Brand: MEDLINE

## (undated) DEVICE — DESTINATION RENAL GUIDING SHEATH: Brand: DESTINATION

## (undated) DEVICE — SUT SILK 2/0 FS BLK 18IN 685G

## (undated) DEVICE — SMOKE ATTACHMENT: Brand: VALLEYLAB

## (undated) DEVICE — MARKER,SKIN,WI/RULER AND LABELS: Brand: MEDLINE

## (undated) DEVICE — PAD GRND REM POLYHESIVE A/ DISP